# Patient Record
Sex: MALE | Race: BLACK OR AFRICAN AMERICAN | Employment: FULL TIME | URBAN - METROPOLITAN AREA
[De-identification: names, ages, dates, MRNs, and addresses within clinical notes are randomized per-mention and may not be internally consistent; named-entity substitution may affect disease eponyms.]

---

## 2017-01-10 ENCOUNTER — OFFICE VISIT (OUTPATIENT)
Dept: FAMILY MEDICINE CLINIC | Age: 47
End: 2017-01-10

## 2017-01-10 VITALS
TEMPERATURE: 96.2 F | HEART RATE: 72 BPM | OXYGEN SATURATION: 96 % | BODY MASS INDEX: 32.55 KG/M2 | RESPIRATION RATE: 16 BRPM | WEIGHT: 240.3 LBS | SYSTOLIC BLOOD PRESSURE: 123 MMHG | DIASTOLIC BLOOD PRESSURE: 76 MMHG | HEIGHT: 72 IN

## 2017-01-10 DIAGNOSIS — L81.9 HYPERPIGMENTATION: Primary | ICD-10-CM

## 2017-01-10 DIAGNOSIS — Z23 ENCOUNTER FOR IMMUNIZATION: ICD-10-CM

## 2017-01-10 DIAGNOSIS — Z79.01 ANTICOAGULANT LONG-TERM USE: ICD-10-CM

## 2017-01-10 NOTE — MR AVS SNAPSHOT
Visit Information Date & Time Provider Department Dept. Phone Encounter #  
 1/10/2017  8:45 AM Skylar Benítez MD [facility] [phone] [id number] Follow-up Instructions Return in about 3 months (around 4/10/2017) for [facility], labs. Upcoming Health Maintenance Date Due DTaP/Tdap/Td series (1 - Tdap) 10/1/1990 INFLUENZA AGE 9 TO ADULT 1/5/2018* *Topic was postponed. The date shown is not the original due date. Allergies as of 1/10/2017  Review Complete On: 1/10/2017 By: Chelsi Pham LPN Severity Noted Reaction Type Reactions Bee Sting [Sting, Bee] High 09/08/2010    Swelling Neomycin High 09/08/2010    Rash Neosporin [Neomycin-bacitracin-polymyxin] High 09/08/2010    Rash Current Immunizations  Reviewed on 4/29/2014 Name Date Influenza Vaccine 11/14/2013 Tdap 1/10/2017 Not reviewed this visit You Were Diagnosed With   
  
 Codes Comments Hyperpigmentation    -  Primary ICD-10-CM: L81.9 ICD-9-CM: 709.00 Encounter for immunization     ICD-10-CM: W00 ICD-9-CM: V03.89 Anticoagulant long-term use     ICD-10-CM: Z79.01 
ICD-9-CM: V58.61 Vitals BP Pulse Temp Resp Height(growth percentile) Weight(growth percentile) 123/76 72 96.2 °F (35.7 °C) 16 6' (1.829 m) 240 lb 4.8 oz (109 kg) SpO2 BMI Smoking Status 96% 32.59 kg/m2 Never Smoker BMI and BSA Data Body Mass Index Body Surface Area 32.59 kg/m 2 2.35 m 2 Preferred Pharmacy Pharmacy Name Phone Mirella Velas[facility] [address]  [place] [phone] Your Updated Medication List  
  
   
This list is accurate as of: 1/10/17  9:15 AM.  Always use your most recent med list.  
  
  
  
  
 EPINEPHrine 0.3 mg/0.3 mL injection Commonly known as:  EPIPEN  
0.3 mL by IntraMUSCular route once as needed for Anaphylaxis or Allergic Response. ergocalciferol 50,000 unit capsule Commonly known as:  ERGOCALCIFEROL Take 1 Cap by mouth two (2) times a week. LORazepam 0.5 mg tablet Commonly known as:  ATIVAN Take 1 Tab by mouth daily as needed for Anxiety. propranolol 10 mg tablet Commonly known as:  INDERAL  
TAKE 1/2 TO 1 TABLET BY MOUTH TWICE DAILY  
  
 rosuvastatin 20 mg tablet Commonly known as:  CRESTOR  
TAKE 1 TABLET BY MOUTH EVERY NIGHT  
  
 sertraline 50 mg tablet Commonly known as:  ZOLOFT Take 1 Tab by mouth daily. Start with 1/2 tablet daily for 8 days then take a full tablet for anxiety. warfarin 5 mg tablet Commonly known as:  COUMADIN  
TAKE 1 TABLET BY MOUTH MONDAY THROUGH FRIDAY AND 1/2 TABLET BY MOUTH SATURDAY AND SUNDAY We Performed the Following IA IMMUNIZ ADMIN,1 SINGLE/COMB VAC/TOXOID [id number] CPT(R)] TETANUS, DIPHTHERIA TOXOIDS AND ACELLULAR PERTUSSIS VACCINE (TDAP), IN INDIVIDS. >=7, IM [id number] CPT(R)] Follow-up Instructions Return in about 3 months (around 4/10/2017) for [facility]. Introducing [facility] [place] [facility]! Zackary Mars introduces [employer] patient portal. Now you can access parts of your medical record, email your doctor's office, and request medication refills online. 1. In your internet browser, go to [email or web address] 2. Click on the First Time User? Click Here link in the Sign In box. You will see the New Member Sign Up page. 3. Enter your [employer] Access Code exactly as it appears below. You will not need to use this code after youve completed the sign-up process. If you do not sign up before the expiration date, you must request a new code. · [employer] Access Code: [email or web address] Expires: 4/10/2017  9:15 AM 
 
4. Enter the last four digits of your Social Security Number (xxxx) and Date of Birth (mm/dd/yyyy) as indicated and click Submit. You will be taken to the next sign-up page. 5. Create a [employer] ID. This will be your [employer] login ID and cannot be changed, so think of one that is secure and easy to remember. 6. Create a [employer] password. You can change your password at any time. 7. Enter your Password Reset Question and Answer. This can be used at a later time if you forget your password. 8. Enter your e-mail address. You will receive e-mail notification when new information is available in [address] 9. Click Sign Up. You can now view and download portions of your medical record. 10. Click the Download Summary menu link to download a portable copy of your medical information. If you have questions, please visit the Frequently Asked Questions section of the [employer] website. Remember, [employer] is NOT to be used for urgent needs. For medical emergencies, dial 911. Now available from your iPhone and Android! Please provide this summary of care documentation to your next provider. Your primary care clinician is listed as [id number] If you have any questions after today's visit, please call [phone].

## 2017-01-10 NOTE — PROGRESS NOTES
Left leg pain 2 days ago. Notes darkening of skin top of left foot for months. No regular exercise. Discussed diet changes. Nurse history read and confirmed by patient. Visit Vitals    /76    Pulse 72    Temp 96.2 °F (35.7 °C)    Resp 16    Ht 6' (1.829 m)    Wt 240 lb 4.8 oz (109 kg)    SpO2 96%    BMI 32.59 kg/m2     Patient alert and cooperative. Left dorsal foot with area of hyperpigmentation, looks also like dry skin. Assessment:  1. Left foot hyperpigmentation. Plan:  1. Recommended to use moisturizing cream, use soft brush with washing to remove if caused by dry skin. 2. Follow up if not improving or worse for derm referral.  3. Return in three months for annual labs, CHP. 4. Tdap given. 5. Follow otherwise here prn.

## 2017-01-10 NOTE — PROGRESS NOTES
Here for recheck and to discuss labs-he just had his cholesterol done. He has not had a CHP since 2013  Has been having pain in his left leg off and on for a couple days. Had been sitting more than usual with recent snow storm.  Not really bothering him today  Declines flu but will take his Tdap  Non smoker with no plans to start  Has not seen any other providers since last visit  We are out of Right to Decide booklets

## 2017-01-19 RX ORDER — WARFARIN SODIUM 5 MG/1
TABLET ORAL
Qty: 90 TAB | Refills: 3 | Status: SHIPPED | OUTPATIENT
Start: 2017-01-19 | End: 2018-01-14 | Stop reason: SDUPTHER

## 2017-04-04 ENCOUNTER — LAB ONLY (OUTPATIENT)
Dept: FAMILY MEDICINE CLINIC | Age: 47
End: 2017-04-04

## 2017-04-04 DIAGNOSIS — E78.9 DISORDER OF LIPOID METABOLISM: ICD-10-CM

## 2017-04-04 DIAGNOSIS — E55.9 VITAMIN D DEFICIENCY: Primary | ICD-10-CM

## 2017-04-04 DIAGNOSIS — R73.03 PREDIABETES: ICD-10-CM

## 2017-04-04 NOTE — LETTER
4/5/2017 10:19 AM 
 
Mr. Rocco Meyers [facility] [address] [id number] Dear Rocco Meyers: Please find your most recent results below. Resulted Orders VITAMIN D, 25 HYDROXY Result Value Ref Range VITAMIN D, 25-HYDROXY 18.0 (L) 30.0 - 100.0 ng/mL Comment:  
   Vitamin D deficiency has been defined by the [address] [place] [address] practice guideline as a 
level of serum 25-OH vitamin D less than 20 ng/mL (1,2). The Endocrine Society went on to further define vitamin D 
insufficiency as a level between 21 and 29 ng/mL (2). 1. IOM ([place] of Medicine). 2010. Dietary reference 
   intakes for calcium and D. [facility]: The 
   [place]. 2. Priya MF, Nay UNDERWOOD, Jose ALMAZAN, et al. 
   Evaluation, treatment, and prevention of vitamin D 
   deficiency: an Endocrine Society clinical practice 
   guideline. JCEM. 2011 Jul; 96(7):1911-30. Narrative Performed at:  [address]  [id number] : Apurva Werner MD, Phone:  [phone] HEMOGLOBIN A1C WITH EAG Result Value Ref Range Hemoglobin A1c 6.3 (H) 4.8 - 5.6 % Comment:  
            Pre-diabetes: 5.7 - 6.4 Diabetes: >6.4 Glycemic control for adults with diabetes: <7.0 Estimated average glucose 134 mg/dL Narrative Performed at:  [address]  [id number] : Apurva Werner MD, Phone:  [phone] LIPID PANEL Result Value Ref Range Cholesterol, total 132 100 - 199 mg/dL Triglyceride 172 (H) 0 - 149 mg/dL HDL Cholesterol 37 (L) >39 mg/dL VLDL, calculated 34 5 - 40 mg/dL LDL, calculated 61 0 - 99 mg/dL Narrative Performed at:  [address]  [id number] : Apurva Werner MD, Phone:  [phone] METABOLIC PANEL, COMPREHENSIVE  
 Result Value Ref Range Glucose 117 (H) 65 - 99 mg/dL BUN 12 6 - 24 mg/dL Creatinine 0.97 0.76 - 1.27 mg/dL GFR est non-AA 93 >59 mL/min/1.73 GFR est  >59 mL/min/1.73  
 BUN/Creatinine ratio 12 9 - 20 Comment: **Please note reference interval change** Sodium 141 134 - 144 mmol/L Potassium 4.5 3.5 - 5.2 mmol/L Chloride 103 96 - 106 mmol/L  
 CO2 22 18 - 29 mmol/L Calcium 9.0 8.7 - 10.2 mg/dL Protein, total 6.9 6.0 - 8.5 g/dL Albumin 4.2 3.5 - 5.5 g/dL GLOBULIN, TOTAL 2.7 1.5 - 4.5 g/dL A-G Ratio 1.6 1.2 - 2.2 Comment: **Please note reference interval change** Bilirubin, total 0.5 0.0 - 1.2 mg/dL Alk. phosphatase 72 39 - 117 IU/L  
 AST (SGOT) 37 0 - 40 IU/L  
 ALT (SGPT) 60 (H) 0 - 44 IU/L Narrative Performed at:  [address]  [id number] : Fortunato Feliciano MD, Phone:  [phone] CVD REPORT Result Value Ref Range INTERPRETATION Note Comment:  
   Supplement report is available. Narrative Performed at:  [address] [place] [address] [address] [place] [address]  [id number] : Everton Weaver PhD, Phone:  [phone] Lab [facility]

## 2017-04-05 LAB
25(OH)D3+25(OH)D2 SERPL-MCNC: 18 NG/ML (ref 30–100)
ALBUMIN SERPL-MCNC: 4.2 G/DL (ref 3.5–5.5)
ALBUMIN/GLOB SERPL: 1.6 {RATIO} (ref 1.2–2.2)
ALP SERPL-CCNC: 72 IU/L (ref 39–117)
ALT SERPL-CCNC: 60 IU/L (ref 0–44)
AST SERPL-CCNC: 37 IU/L (ref 0–40)
BILIRUB SERPL-MCNC: 0.5 MG/DL (ref 0–1.2)
BUN SERPL-MCNC: 12 MG/DL (ref 6–24)
BUN/CREAT SERPL: 12 (ref 9–20)
CALCIUM SERPL-MCNC: 9 MG/DL (ref 8.7–10.2)
CHLORIDE SERPL-SCNC: 103 MMOL/L (ref 96–106)
CHOLEST SERPL-MCNC: 132 MG/DL (ref 100–199)
CO2 SERPL-SCNC: 22 MMOL/L (ref 18–29)
CREAT SERPL-MCNC: 0.97 MG/DL (ref 0.76–1.27)
EST. AVERAGE GLUCOSE BLD GHB EST-MCNC: 134 MG/DL
GLOBULIN SER CALC-MCNC: 2.7 G/DL (ref 1.5–4.5)
GLUCOSE SERPL-MCNC: 117 MG/DL (ref 65–99)
HBA1C MFR BLD: 6.3 % (ref 4.8–5.6)
HDLC SERPL-MCNC: 37 MG/DL
INTERPRETATION, 910389: NORMAL
LDLC SERPL CALC-MCNC: 61 MG/DL (ref 0–99)
POTASSIUM SERPL-SCNC: 4.5 MMOL/L (ref 3.5–5.2)
PROT SERPL-MCNC: 6.9 G/DL (ref 6–8.5)
SODIUM SERPL-SCNC: 141 MMOL/L (ref 134–144)
TRIGL SERPL-MCNC: 172 MG/DL (ref 0–149)
VLDLC SERPL CALC-MCNC: 34 MG/DL (ref 5–40)

## 2017-04-05 RX ORDER — ERGOCALCIFEROL 1.25 MG/1
50000 CAPSULE ORAL 2 TIMES WEEKLY
Qty: 24 CAP | Refills: 0 | Status: SHIPPED | OUTPATIENT
Start: 2017-04-07 | End: 2017-06-27 | Stop reason: ALTCHOICE

## 2017-04-05 NOTE — PROGRESS NOTES
Low Vit. D.  Begin ergocalciferol 50 K units twice weekly and recheck 3 mos. Average BS remains in prediabetic range. Inc TG/low HDL. Take Krill oil supp.   Rest O.K.

## 2017-04-20 DIAGNOSIS — Z79.01 ANTICOAGULANT LONG-TERM USE: Primary | ICD-10-CM

## 2017-05-25 ENCOUNTER — HOSPITAL ENCOUNTER (EMERGENCY)
Age: 47
Discharge: HOME | End: 2017-05-26
Attending: EMERGENCY MEDICINE
Payer: COMMERCIAL

## 2017-05-25 VITALS
BODY MASS INDEX: 30.54 KG/M2 | SYSTOLIC BLOOD PRESSURE: 132 MMHG | HEIGHT: 75 IN | OXYGEN SATURATION: 97 % | RESPIRATION RATE: 16 BRPM | HEART RATE: 70 BPM | DIASTOLIC BLOOD PRESSURE: 62 MMHG | TEMPERATURE: 97.6 F | WEIGHT: 245.59 LBS

## 2017-05-25 DIAGNOSIS — S80.12XA CONTUSION OF LEFT LOWER LEG, INITIAL ENCOUNTER: Primary | ICD-10-CM

## 2017-05-25 PROCEDURE — 99282 EMERGENCY DEPT VISIT SF MDM: CPT

## 2017-05-26 RX ORDER — HYDROCODONE BITARTRATE AND ACETAMINOPHEN 5; 325 MG/1; MG/1
1 TABLET ORAL
Qty: 20 TAB | Refills: 0 | Status: SHIPPED | OUTPATIENT
Start: 2017-05-26 | End: 2017-06-14

## 2017-05-26 NOTE — ED PROVIDER NOTES
HPI Comments: Audelia Courtney is a 51 y.o. male with PMHx significant for Cellulitis, who presents ambulatory to [facility] ED with cc of pain and swelling to the left lower leg. Pt states he has a \"dent\" to his left leg and does not know if it resulted from a recent injury or a potential bug bite. He notes that the swelling has blue discoloration and that the pain is localized to the bruise. Pt denies any fever. Oesas Cox MD    Social Hx: - smoking; + EtOH; - illicit drug use    There are no other complains, changes, or physical findings at this time. The history is provided by the patient. No  was used. Past Medical History:   Diagnosis Date    Allergic rhinitis     Anxiety disorder     Cellulitis     post tattoo    Deep vein thrombosis (DVT) ([address]     [facility]  [facility]17 f/u note to manage Lovenox    Essential tremor     Phlebitis     Pulmonary embolism ([facility] 3/2011    multiple episodes    Rectal bleeding 10/2011    on coumadin at the time  and again 4/2017 4/12/17 note from [address] [facility])     Umbilical hernia     Vitamin D deficiency 04/2014    again 4/2017       Past Surgical History:   Procedure Laterality Date    HX APPENDECTOMY  2003    HX HERNIA REPAIR  4105    umbilical         Family History:   Problem Relation Age of Onset    Heart Disease Father     Heart Disease Maternal Grandmother     Heart Disease Maternal Grandfather        Social History     Social History    Marital status: SINGLE     Spouse name: N/A    Number of children: N/A    Years of education: N/A     Occupational History    Not on file.      Social History Main Topics    Smoking status: Never Smoker    Smokeless tobacco: Current User    Alcohol use 0.0 oz/week      Comment: rare social    Drug use: No    Sexual activity: Yes     Partners: Female     Other Topics Concern    Not on file     Social History Narrative         ALLERGIES: Bee sting [sting, bee]; Neomycin; and Neosporin [neomycin-bacitracin-polymyxin]    Review of Systems   Constitutional: Negative for fatigue and fever. HENT: Negative for congestion, ear pain and rhinorrhea. Eyes: Negative for pain and redness. Respiratory: Negative for cough and wheezing. Cardiovascular: Negative for chest pain and palpitations. Gastrointestinal: Negative for abdominal pain, nausea and vomiting. Genitourinary: Negative for dysuria, frequency and urgency. Musculoskeletal: Positive for myalgias (left leg ). Negative for back pain, neck pain and neck stiffness. Skin: Negative for rash and wound. + bruising to lower left leg   Neurological: Negative for weakness, light-headedness, numbness and headaches. Patient Vitals for the past 12 hrs:   Temp Pulse Resp BP SpO2   05/25/17 2349 97.6 °F (36.4 °C) 70 16 132/62 97 %     Physical Exam   Constitutional: He is oriented to person, place, and time. He appears well-developed and well-nourished. Non-toxic appearance. No distress. HENT:   Head: Normocephalic and atraumatic. Head is without right periorbital erythema and without left periorbital erythema. Right Ear: External ear normal.   Left Ear: External ear normal.   Nose: Nose normal.   Mouth/Throat: Uvula is midline. No trismus in the jaw. Eyes: Conjunctivae and EOM are normal. Pupils are equal, round, and reactive to light. No scleral icterus. Neck: Normal range of motion and full passive range of motion without pain. Cardiovascular: Normal rate, regular rhythm and normal heart sounds. Pulmonary/Chest: Effort normal and breath sounds normal. No accessory muscle usage. No tachypnea. No respiratory distress. He has no decreased breath sounds. He has no wheezes. Abdominal: Soft. There is no tenderness. There is no rigidity and no guarding. Musculoskeletal: Normal range of motion. Neurological: He is alert and oriented to person, place, and time. He is not disoriented. No cranial nerve deficit or sensory deficit. GCS eye subscore is 4. GCS verbal subscore is 5. GCS motor subscore is 6. Skin: Skin is intact. No rash noted. Tender contusion of LLE. No break in skin, no surrounding erythema. Psychiatric: He has a normal mood and affect. His speech is normal.   Nursing note and vitals reviewed. MDM  Number of Diagnoses or Management Options  Contusion of left lower leg, initial encounter:   Diagnosis management comments: No redness, break in skin. Presentation suggests contusion. Will have pt monitor for changes in redness or swelling. Amount and/or Complexity of Data Reviewed  Review and summarize past medical records: yes    Patient Progress  Patient progress: stable      Procedures    LABORATORY TESTS:  No results found for this or any previous visit (from the past 12 hour(s)). IMAGING RESULTS:  No orders to display     MEDICATIONS GIVEN:  Medications - No data to display    IMPRESSION:  1. Contusion of left lower leg, initial encounter        PLAN:  1. Current Discharge Medication List      START taking these medications    Details   HYDROcodone-acetaminophen (NORCO) 5-325 mg per tablet Take 1 Tab by mouth every four (4) hours as needed for Pain. Max Daily Amount: 6 Tabs. Qty: 20 Tab, Refills: 0           2. Follow-up Information     Follow up With Details Comments [address], [place] Schedule an appointment as soon as possible for a visit PRIMARY CARE: call to schedule follow up 14 [facility]  [address]  [phone]          Return to ED if worse     DISCHARGE NOTE  1:00 AM  The patient has been re-evaluated and is ready for discharge. Reviewed available results with patient. Counseled patient on diagnosis and care plan. Patient has expressed understanding, and all questions have been answered.  Patient agrees with plan and agrees to follow up as recommended, or return to the ED if their symptoms worsen. Discharge instructions have been provided and explained to the patient, along with reasons to return to the ED. ATTESTATION:  This note is prepared by Tom Avalos, acting as Scribe for ENE June. ENE June: The scribe's documentation has been prepared under my direction and personally reviewed by me in its entirety. I confirm that the note above accurately reflects all work, treatment, procedures, and medical decision making performed by me.

## 2017-05-26 NOTE — DISCHARGE INSTRUCTIONS

## 2017-05-26 NOTE — ED NOTES
Discharge instructions reviewed with patient by BOZENA Saucedo. Pt denies questions or concerns. Pt appears in no acute distress. Pt ambulatory out of department with steady gait.

## 2017-05-26 NOTE — ED TRIAGE NOTES
Pt c/o of painful spot on his left lower leg since yesterday. Pt thinks it might be an insect bite. Pt states the pain has continued to worsen. Denies itching or fevers.

## 2017-05-31 ENCOUNTER — OFFICE VISIT (OUTPATIENT)
Dept: FAMILY MEDICINE CLINIC | Age: 47
End: 2017-05-31

## 2017-05-31 VITALS
HEART RATE: 88 BPM | OXYGEN SATURATION: 95 % | BODY MASS INDEX: 30.23 KG/M2 | HEIGHT: 75 IN | DIASTOLIC BLOOD PRESSURE: 61 MMHG | RESPIRATION RATE: 16 BRPM | WEIGHT: 243.1 LBS | TEMPERATURE: 98.1 F | SYSTOLIC BLOOD PRESSURE: 114 MMHG

## 2017-05-31 DIAGNOSIS — S80.12XA CONTUSION OF LEFT LEG, INITIAL ENCOUNTER: Primary | ICD-10-CM

## 2017-05-31 DIAGNOSIS — L03.116 CELLULITIS OF LEFT LOWER LEG: ICD-10-CM

## 2017-05-31 RX ORDER — SULFAMETHOXAZOLE AND TRIMETHOPRIM 800; 160 MG/1; MG/1
1 TABLET ORAL 2 TIMES DAILY
Qty: 14 TAB | Refills: 0 | Status: SHIPPED | OUTPATIENT
Start: 2017-05-31 | End: 2017-06-07

## 2017-05-31 NOTE — PROGRESS NOTES
Chief Complaint   Patient presents with    Leg Swelling     5/25/17 noted swelling in left leg and redness. ED [facility] ER on 5/25/17. 1. Have you been to the ER, urgent care clinic since your last visit? Hospitalized since your last visit? Yes When: 5/25/17 Where: ED [facility] ER Reason for visit: Left leg swollen and red. 2. Have you seen or consulted any other health care providers outside of the [address] [place] [address] since your last visit? Include any pap smears or colon screening. Yes When: 4/17 Where: GI Specialist-Dr. Juan Walker Reason for visit: Colonoscopy. The patient was counseled on the dangers of tobacco use, and Patient is a non smoker. Reviewed strategies to maximize success, including Continue not to smoke. I have reviewed Health Maintenance with the patient and updated. Advance Care Planning information reviewed and given to the patient.

## 2017-05-31 NOTE — PROGRESS NOTES
Left lower leg with inc warmth, erythema, and swelling. Tender to palpation. Has a 1 cm knot also tender but more tender overlying area of redness and swelling below. Visit Vitals    /61 (BP 1 Location: Right arm, BP Patient Position: Sitting)    Pulse 88    Temp 98.1 °F (36.7 °C)    Resp 16    Ht 6' 3\" (1.905 m)    Wt 243 lb 1.6 oz (110.3 kg)    SpO2 95%    BMI 30.39 kg/m2       Patient alert and cooperative. Left lower leg with 1 cm knot, has moderate tenderness, erythema and swelling just below this. No posterior calf tenderness. No tenderness in the foot. No significant tenderness above the knot. Assessment:  1. Left lower leg possible contusion, evidence from exam consistent with cellulitis. Plan:  1. Begin Bactrim DS b.i.d. for seven days. 2. Check xray. 3. Return in a week for recheck, can cancel if significantly improved on the antibiotic. 4. Follow otherwise here prn.

## 2017-05-31 NOTE — MR AVS SNAPSHOT
Visit Information Date & Time Provider Department Dept. Phone Encounter #  
 5/31/2017  4:30 PM Marly Moses MD [facility] [phone] [id number] Follow-up Instructions Return in about 1 week (around 6/7/2017) for Recheck. Your Appointments 6/20/2017  9:15 AM  
LAB with LAB ARIEL Jama (GLENDY Mcgraw) Appt Note: Dr. Armando Corrales Fasting [address] [facility] [address]  
  
   
 [address]  
  
    
 6/27/2017  9:30 AM  
COMPLETE PHYSICAL with MD Sheri Hunter [facility]) Appt Note: CHP, Results; CHP, results. (30 min.) - lp  
 [address] 
[address] [facility] [address]  
[phone]  
  
   
 [address] [address] [facility] [address] [place] [address] Upcoming Health Maintenance Date Due INFLUENZA AGE 9 TO ADULT 1/5/2018* DTaP/Tdap/Td series (2 - Td) 1/10/2027 *Topic was postponed. The date shown is not the original due date. Allergies as of 5/31/2017  Review Complete On: 5/31/2017 By: Dylan Clatyon RN Severity Noted Reaction Type Reactions Bee Sting [Sting, Bee] High 09/08/2010    Swelling Neomycin High 09/08/2010    Rash Neosporin [Neomycin-bacitracin-polymyxin] High 09/08/2010    Rash Current Immunizations  Reviewed on 1/10/2017 Name Date Influenza Vaccine 11/14/2013 Tdap 1/10/2017 Not reviewed this visit You Were Diagnosed With   
  
 Codes Comments Contusion of left leg, initial encounter    -  Primary ICD-10-CM: A83.81DR ICD-9-CM: 924.5 Cellulitis of left lower leg     ICD-10-CM: L03.116 ICD-9-CM: 729. 6 Vitals BP Pulse Temp Resp Height(growth percentile) Weight(growth percentile) 114/61 (BP 1 Location: Right arm, BP Patient Position: Sitting) 88 98.1 °F (36.7 °C) 16 6' 3\" (1.905 m) 243 lb 1.6 oz (110.3 kg) SpO2 BMI Smoking Status 95% 30.39 kg/m2 Never Smoker Vitals History BMI and BSA Data Body Mass Index Body Surface Area  
 30.39 kg/m 2 2.42 m 2 Preferred Pharmacy Pharmacy Name Phone Mirella Velasquez [address]  [place] [phone] Your Updated Medication List  
  
   
This list is accurate as of: 5/31/17  4:55 PM.  Always use your most recent med list.  
  
  
  
  
 ergocalciferol 50,000 unit capsule Commonly known as:  ERGOCALCIFEROL Take 1 Cap by mouth two (2) times a week. Indications: VITAMIN D DEFICIENCY HYDROcodone-acetaminophen 5-325 mg per tablet Commonly known as:  Prem Kilts Take 1 Tab by mouth every four (4) hours as needed for Pain. Max Daily Amount: 6 Tabs. propranolol 10 mg tablet Commonly known as:  INDERAL  
TAKE 1/2 TO 1 TABLET BY MOUTH TWICE DAILY  
  
 rosuvastatin 20 mg tablet Commonly known as:  CRESTOR  
TAKE 1 TABLET BY MOUTH EVERY NIGHT  
  
 trimethoprim-sulfamethoxazole 160-800 mg per tablet Commonly known as:  BACTRIM DS, SEPTRA DS Take 1 Tab by mouth two (2) times a day for 7 days. warfarin 5 mg tablet Commonly known as:  COUMADIN  
TAKE 1 TABLET BY MOUTH ONCE DAILY MONDAY THROUGH FRIDAY AND ONE-HALF TABLET ONCE DAILY ON SATURDAY AND SUNDAY AS DIRECTED Prescriptions Sent to Pharmacy Refills  
 trimethoprim-sulfamethoxazole (BACTRIM DS, SEPTRA DS) 160-800 mg per tablet 0 Sig: Take 1 Tab by mouth two (2) times a day for 7 days. Class: Normal  
 Pharmacy: [facility] [address] #: [phone] Route: Oral  
  
Follow-up Instructions Return in about 1 week (around 6/7/2017) for Recheck. To-Do List   
 05/31/2017 Imaging:  XR TIB/FIB LT Introducing [facility]!    
 Juanita Savage introduces [employer] patient portal. Now you can access parts of your medical record, email your doctor's office, and request medication refills online. 1. In your internet browser, go to https://[employer]. Done In :[employer] 2. Click on the First Time User? Click Here link in the Sign In box. You will see the New Member Sign Up page. 3. Enter your [employer] Access Code exactly as it appears below. You will not need to use this code after youve completed the sign-up process. If you do not sign up before the expiration date, you must request a new code. · [employer] Access Code: [email or web address] Expires: 8/23/2017 11:42 PM 
 
4. Enter the last four digits of your Social Security Number (xxxx) and Date of Birth (mm/dd/yyyy) as indicated and click Submit. You will be taken to the next sign-up page. 5. Create a [employer] ID. This will be your [employer] login ID and cannot be changed, so think of one that is secure and easy to remember. 6. Create a [employer] password. You can change your password at any time. 7. Enter your Password Reset Question and Answer. This can be used at a later time if you forget your password. 8. Enter your e-mail address. You will receive e-mail notification when new information is available in [address] 9. Click Sign Up. You can now view and download portions of your medical record. 10. Click the Download Summary menu link to download a portable copy of your medical information. If you have questions, please visit the Frequently Asked Questions section of the [employer] website. Remember, [employer] is NOT to be used for urgent needs. For medical emergencies, dial 911. Now available from your iPhone and Android! Please provide this summary of care documentation to your next provider. Your primary care clinician is listed as [phone] If you have any questions after today's visit, please call [phone].

## 2017-06-01 ENCOUNTER — HOSPITAL ENCOUNTER (EMERGENCY)
Age: 47
Discharge: HOME | End: 2017-06-02
Attending: EMERGENCY MEDICINE | Admitting: EMERGENCY MEDICINE
Payer: COMMERCIAL

## 2017-06-01 DIAGNOSIS — L03.116 CELLULITIS OF LEFT LOWER EXTREMITY: ICD-10-CM

## 2017-06-01 DIAGNOSIS — S80.12XD CONTUSION OF LEG, LEFT, SUBSEQUENT ENCOUNTER: Primary | ICD-10-CM

## 2017-06-01 LAB
ALBUMIN SERPL BCP-MCNC: 3.9 G/DL (ref 3.5–5)
ALBUMIN/GLOB SERPL: 1.1 {RATIO} (ref 1.1–2.2)
ALP SERPL-CCNC: 71 U/L (ref 45–117)
ALT SERPL-CCNC: 43 U/L (ref 12–78)
ANION GAP BLD CALC-SCNC: 8 MMOL/L (ref 5–15)
AST SERPL W P-5'-P-CCNC: 30 U/L (ref 15–37)
BASOPHILS # BLD AUTO: 0 K/UL (ref 0–0.1)
BASOPHILS # BLD: 0 % (ref 0–1)
BILIRUB SERPL-MCNC: 0.9 MG/DL (ref 0.2–1)
BUN SERPL-MCNC: 15 MG/DL (ref 6–20)
BUN/CREAT SERPL: 13 (ref 12–20)
CALCIUM SERPL-MCNC: 9.4 MG/DL (ref 8.5–10.1)
CHLORIDE SERPL-SCNC: 109 MMOL/L (ref 97–108)
CO2 SERPL-SCNC: 24 MMOL/L (ref 21–32)
CREAT SERPL-MCNC: 1.17 MG/DL (ref 0.7–1.3)
EOSINOPHIL # BLD: 0.1 K/UL (ref 0–0.4)
EOSINOPHIL NFR BLD: 2 % (ref 0–7)
ERYTHROCYTE [DISTWIDTH] IN BLOOD BY AUTOMATED COUNT: 14.1 % (ref 11.5–14.5)
GLOBULIN SER CALC-MCNC: 3.7 G/DL (ref 2–4)
GLUCOSE SERPL-MCNC: 104 MG/DL (ref 65–100)
HCT VFR BLD AUTO: 42.8 % (ref 36.6–50.3)
HGB BLD-MCNC: 14.6 G/DL (ref 12.1–17)
INR BLD: 2.2 (ref 0.9–1.2)
INR PPP: 3.1 (ref 0.9–1.1)
LYMPHOCYTES # BLD AUTO: 34 % (ref 12–49)
LYMPHOCYTES # BLD: 2.3 K/UL (ref 0.8–3.5)
MCH RBC QN AUTO: 30 PG (ref 26–34)
MCHC RBC AUTO-ENTMCNC: 34.1 G/DL (ref 30–36.5)
MCV RBC AUTO: 88.1 FL (ref 80–99)
MONOCYTES # BLD: 0.3 K/UL (ref 0–1)
MONOCYTES NFR BLD AUTO: 5 % (ref 5–13)
NEUTS SEG # BLD: 4.1 K/UL (ref 1.8–8)
NEUTS SEG NFR BLD AUTO: 59 % (ref 32–75)
PLATELET # BLD AUTO: 171 K/UL (ref 150–400)
POTASSIUM SERPL-SCNC: 4 MMOL/L (ref 3.5–5.1)
PROT SERPL-MCNC: 7.6 G/DL (ref 6.4–8.2)
PROTHROMBIN TIME: 32.4 SEC (ref 9–11.1)
RBC # BLD AUTO: 4.86 M/UL (ref 4.1–5.7)
SODIUM SERPL-SCNC: 141 MMOL/L (ref 136–145)
WBC # BLD AUTO: 6.9 K/UL (ref 4.1–11.1)

## 2017-06-01 PROCEDURE — 36415 COLL VENOUS BLD VENIPUNCTURE: CPT | Performed by: EMERGENCY MEDICINE

## 2017-06-01 PROCEDURE — 99284 EMERGENCY DEPT VISIT MOD MDM: CPT

## 2017-06-01 PROCEDURE — 85610 PROTHROMBIN TIME: CPT | Performed by: EMERGENCY MEDICINE

## 2017-06-01 PROCEDURE — 85025 COMPLETE CBC W/AUTO DIFF WBC: CPT | Performed by: EMERGENCY MEDICINE

## 2017-06-01 PROCEDURE — 80053 COMPREHEN METABOLIC PANEL: CPT | Performed by: EMERGENCY MEDICINE

## 2017-06-01 PROCEDURE — 85610 PROTHROMBIN TIME: CPT

## 2017-06-02 ENCOUNTER — APPOINTMENT (OUTPATIENT)
Dept: GENERAL RADIOLOGY | Age: 47
End: 2017-06-02
Attending: EMERGENCY MEDICINE
Payer: COMMERCIAL

## 2017-06-02 ENCOUNTER — APPOINTMENT (OUTPATIENT)
Dept: ULTRASOUND IMAGING | Age: 47
End: 2017-06-02
Attending: EMERGENCY MEDICINE
Payer: COMMERCIAL

## 2017-06-02 VITALS
DIASTOLIC BLOOD PRESSURE: 72 MMHG | TEMPERATURE: 98.1 F | SYSTOLIC BLOOD PRESSURE: 118 MMHG | RESPIRATION RATE: 18 BRPM | HEART RATE: 87 BPM | WEIGHT: 241.18 LBS | BODY MASS INDEX: 29.99 KG/M2 | HEIGHT: 75 IN | OXYGEN SATURATION: 97 %

## 2017-06-02 PROCEDURE — 93971 EXTREMITY STUDY: CPT

## 2017-06-02 PROCEDURE — 73590 X-RAY EXAM OF LOWER LEG: CPT

## 2017-06-02 NOTE — ED NOTES
Pt discharged with written instructions at this time. Pt verbalizes understanding and all questions were answered. Discharged by Lonell Close, in stable condition, with wife.

## 2017-06-02 NOTE — ED PROVIDER NOTES
HPI Comments: Robin Mitchell is a 51 y.o. male, pmhx significant for DVT in LLE (on Coumadin), cellulitis, PE, anxiety, and vitamin D deficiency, who presents ambulatory with family to the ED c/o progressively worsening lower left leg swelling and erythema x 1 week. Pt was seen at [facility] ED on 5/25/17 with similar complaints and diagnosed with contusion of lower left leg. Pt then followed-up with PCP yesterday, who started him on Bactrim for concern of developing cellulitis, and ordered an outpatient tib/fib XR which pt has not gotten yet. Pt states that he filled his Bactrim prescription, but has not started the abx. Pt states that he was diagnosed with a DVT in his LLE x 6 years ago, with an unknown cause. Pt has been chronically on Coumadin since, and reports compliance with daily medication. Pt denies any recent trauma/injury to his leg. Pt denies fever, chills, chest pain, or SOB. PCP: Louis Alex MD    Family Hx: denies family hx of DVT/PE  PSHx: Significant for appendectomy, umbilical hernia repair, colonoscopy  Social Hx: - tobacco, + EtOH (rarely), - Illicit Drugs    There are no other complaints, changes, or physical findings at this time. The history is provided by the patient. No  was used.         Past Medical History:   Diagnosis Date    Allergic rhinitis     Anxiety disorder     Cellulitis     post tattoo    Deep vein thrombosis (DVT) ([facility] [address]     [facility]  [facility]/24/17 f/u note to manage Lovenox    Essential tremor     Phlebitis     Pulmonary embolism ([facility] [address] 3/2011    multiple episodes    Rectal bleeding 10/2011    on coumadin at the time  and again 4/2017 [facility]12/[facility]     Umbilical hernia     Vitamin D deficiency 04/2014    again 4/2017       Past Surgical History:   Procedure Laterality Date    HX APPENDECTOMY  2003    HX COLONOSCOPY  04/2017    Dr. Dread Dominguez  [address]    umbilical         Family History:   Problem Relation Age of Onset    Heart Disease Father     Heart Disease Maternal Grandmother     Heart Disease Maternal Grandfather        Social History     Social History    Marital status: SINGLE     Spouse name: N/A    Number of children: N/A    Years of education: N/A     Occupational History    Not on file. Social History Main Topics    Smoking status: Never Smoker    Smokeless tobacco: Current User    Alcohol use 0.0 oz/week      Comment: rare social    Drug use: No    Sexual activity: Yes     Partners: Female     Other Topics Concern    Not on file     Social History Narrative         ALLERGIES: Bee sting [sting, bee]; Neomycin; and Neosporin [neomycin-bacitracin-polymyxin]    Review of Systems   Constitutional: Negative for activity change, chills and fever. HENT: Negative for congestion and sore throat. Eyes: Negative for pain and redness. Respiratory: Negative for cough, chest tightness and shortness of breath. Cardiovascular: Positive for leg swelling (LLE). Negative for chest pain and palpitations. Gastrointestinal: Negative for abdominal pain, diarrhea, nausea and vomiting. Genitourinary: Negative for dysuria, frequency and urgency. Musculoskeletal: Negative for back pain and neck pain. Skin: Positive for color change (erythema in LLE). Negative for rash. Neurological: Negative for syncope, light-headedness and headaches. Psychiatric/Behavioral: Negative for confusion. All other systems reviewed and are negative. Vitals:    06/01/17 2141   BP: 137/82   Pulse: 87   Resp: 18   Temp: 98.1 °F (36.7 °C)   SpO2: 98%   Weight: 109.4 kg (241 lb 2.9 oz)   Height: 6' 3\" (1.905 m)            Physical Exam   Constitutional: He is oriented to person, place, and time. He appears well-developed and well-nourished. No distress. HENT:   Head: Normocephalic. Nose: Nose normal.   Mouth/Throat: Oropharynx is clear and moist. No oropharyngeal exudate.    Eyes: Conjunctivae are normal. Pupils are equal, round, and reactive to light. No scleral icterus. Neck: Normal range of motion. Neck supple. No JVD present. No tracheal deviation present. No thyromegaly present. Cardiovascular: Normal rate, regular rhythm and intact distal pulses. Exam reveals no gallop and no friction rub. No murmur heard. Pulmonary/Chest: Effort normal and breath sounds normal. No stridor. No respiratory distress. He has no wheezes. He has no rales. Abdominal: Soft. Bowel sounds are normal. He exhibits no distension. There is no tenderness. There is no rebound and no guarding. Musculoskeletal: Normal range of motion. He exhibits no edema. Lymphadenopathy:     He has no cervical adenopathy. Neurological: He is alert and oriented to person, place, and time. No cranial nerve deficit. He exhibits normal muscle tone. Coordination normal.   Skin: Skin is warm and dry. No rash noted. He is not diaphoretic. No erythema. Left Leg:   Mild erythema to distal third of anterior shin  Mild soft tissue swelling around ankle  2+ DP/TP pulses  Brisk CR  Skin intact   Psychiatric: He has a normal mood and affect. His behavior is normal.   Nursing note and vitals reviewed.        MDM  Number of Diagnoses or Management Options  Cellulitis of left lower extremity:   Contusion of leg, left, subsequent encounter:   Diagnosis management comments:   DDx: cellulitis, contusion, DVT, insect bite       Amount and/or Complexity of Data Reviewed  Clinical lab tests: reviewed and ordered  Tests in the radiology section of CPT®: ordered and reviewed  Review and summarize past medical records: yes    Risk of Complications, Morbidity, and/or Mortality  General comments: Suspect simple cellulitis/contusion vs insect bite; afebrile; doppler negative for dvt; inr therapeutic; plain films negative for fx; continue bactrim as prescribed; Cee Purcell MD      Patient Progress  Patient progress: stable    ED Course Procedures    Progress Note:  12:58 AM  Discussed workup results/findings, and counseled pt regarding his diagnosis. Pt has been encouraged to follow-up as instructed. All questions have been answered, and pt conveyed understanding. Written by YUDI Olguin, as dictated by Benja Baird MD.    LABORATORY TESTS:  Recent Results (from the past 12 hour(s))   POC INR    Collection Time: 06/01/17 10:02 PM   Result Value Ref Range    INR (POC) 2.2 (H) <1.2     CBC WITH AUTOMATED DIFF    Collection Time: 06/01/17 10:59 PM   Result Value Ref Range    WBC 6.9 4.1 - 11.1 K/uL    RBC 4.86 4.10 - 5.70 M/uL    HGB 14.6 12.1 - 17.0 g/dL    HCT 42.8 36.6 - 50.3 %    MCV 88.1 80.0 - 99.0 FL    MCH 30.0 26.0 - 34.0 PG    MCHC 34.1 30.0 - 36.5 g/dL    RDW 14.1 11.5 - 14.5 %    PLATELET 641 982 - 526 K/uL    NEUTROPHILS 59 32 - 75 %    LYMPHOCYTES 34 12 - 49 %    MONOCYTES 5 5 - 13 %    EOSINOPHILS 2 0 - 7 %    BASOPHILS 0 0 - 1 %    ABS. NEUTROPHILS 4.1 1.8 - 8.0 K/UL    ABS. LYMPHOCYTES 2.3 0.8 - 3.5 K/UL    ABS. MONOCYTES 0.3 0.0 - 1.0 K/UL    ABS. EOSINOPHILS 0.1 0.0 - 0.4 K/UL    ABS. BASOPHILS 0.0 0.0 - 0.1 K/UL   METABOLIC PANEL, COMPREHENSIVE    Collection Time: 06/01/17 10:59 PM   Result Value Ref Range    Sodium 141 136 - 145 mmol/L    Potassium 4.0 3.5 - 5.1 mmol/L    Chloride 109 (H) 97 - 108 mmol/L    CO2 24 21 - 32 mmol/L    Anion gap 8 5 - 15 mmol/L    Glucose 104 (H) 65 - 100 mg/dL    BUN 15 6 - 20 MG/DL    Creatinine 1.17 0.70 - 1.30 MG/DL    BUN/Creatinine ratio 13 12 - 20      GFR est AA >60 >60 ml/min/1.73m2    GFR est non-AA >60 >60 ml/min/1.73m2    Calcium 9.4 8.5 - 10.1 MG/DL    Bilirubin, total 0.9 0.2 - 1.0 MG/DL    ALT (SGPT) 43 12 - 78 U/L    AST (SGOT) 30 15 - 37 U/L    Alk.  phosphatase 71 45 - 117 U/L    Protein, total 7.6 6.4 - 8.2 g/dL    Albumin 3.9 3.5 - 5.0 g/dL    Globulin 3.7 2.0 - 4.0 g/dL    A-G Ratio 1.1 1.1 - 2.2     PROTHROMBIN TIME + INR    Collection Time: 06/01/17 10:59 PM   Result Value Ref Range    INR 3.1 (H) 0.9 - 1.1      Prothrombin time 32.4 (H) 9.0 - 11.1 sec       IMAGING RESULTS:  DUPLEX LOWER EXT VENOUS LEFT   Final Result     Indication: Left lower extremity swelling for one week     Duplex venous Doppler examination was performed from the left inguinal ligament  to the mid-calf. Deep venous structures were well imaged and appeared  compressible throughout. Both wave form and color flow analysis demonstrated  normal flow patterns. Augmentation was demonstrable.     IMPRESSION  IMPRESSION:  NORMAL DUPLEX VENOUS DOPPLER EXAMINATION. XR TIB/FIB LT   Final Result     EXAM: XR TIB/FIB LT     INDICATION: Left leg swelling for one week.     COMPARISON: None.     FINDINGS: AP and lateral views of the left tibia and fibula demonstrate no  fracture or other acute osseous, articular or soft tissue abnormality.     IMPRESSION  IMPRESSION: No acute abnormality. IMPRESSION:  1. Contusion of leg, left, subsequent encounter    2. Cellulitis of left lower extremity        PLAN:  1. Discharge home. Current Discharge Medication List        2. Follow-up Information     Follow up With Details Comments [address], [place] Schedule an appointment as soon as possible for a visit in 1 day  [address] [place], [place] [id number] [phone]          3. Return to ED if worse       DISCHARGE NOTE:  12:58 AM  Patient's results have been reviewed with them. Patient and/or family have verbally conveyed their understanding and agreement of the patient's signs, symptoms, diagnosis, treatment and prognosis and additionally agree to follow up as recommended or return to the Emergency Room should their condition change prior to follow-up.  Discharge instructions have also been provided to the patient with some educational information regarding their diagnosis as well a list of reasons why they would want to return to the ER prior to their follow-up appointment should their condition change. This note is prepared by Douguo, acting as Scribe for MD Rell Bryant MD: The scribe's documentation has been prepared under my direction and personally reviewed by me in its entirety. I confirm that the note above accurately reflects all work, treatment, procedures, and medical decision making performed by me.

## 2017-06-02 NOTE — DISCHARGE INSTRUCTIONS
Cellulitis: Care Instructions  Your Care Instructions    Cellulitis is a skin infection. It often occurs after a break in the skin from a scrape, cut, bite, or puncture, or after a rash. The doctor has checked you carefully, but problems can develop later. If you notice any problems or new symptoms, get medical treatment right away. Follow-up care is a key part of your treatment and safety. Be sure to make and go to all appointments, and call your doctor if you are having problems. It's also a good idea to know your test results and keep a list of the medicines you take. How can you care for yourself at home? · Take your antibiotics as directed. Do not stop taking them just because you feel better. You need to take the full course of antibiotics. · Prop up the infected area on pillows to reduce pain and swelling. Try to keep the area above the level of your heart as often as you can. · If your doctor told you how to care for your wound, follow your doctor's instructions. If you did not get instructions, follow this general advice:  ¨ Wash the wound with clean water 2 times a day. Don't use hydrogen peroxide or alcohol, which can slow healing. ¨ You may cover the wound with a thin layer of petroleum jelly, such as Vaseline, and a nonstick bandage. ¨ Apply more petroleum jelly and replace the bandage as needed. · Be safe with medicines. Take pain medicines exactly as directed. ¨ If the doctor gave you a prescription medicine for pain, take it as prescribed. ¨ If you are not taking a prescription pain medicine, ask your doctor if you can take an over-the-counter medicine. To prevent cellulitis in the future  · Try to prevent cuts, scrapes, or other injuries to your skin. Cellulitis most often occurs where there is a break in the skin. · If you get a scrape, cut, mild burn, or bite, wash the wound with clean water as soon as you can to help avoid infection.  Don't use hydrogen peroxide or alcohol, which can slow healing. · If you have swelling in your legs (edema), support stockings and good skin care may help prevent leg sores and cellulitis. · Take care of your feet, especially if you have diabetes or other conditions that increase the risk of infection. Wear shoes and socks. Do not go barefoot. If you have athlete's foot or other skin problems on your feet, talk to your doctor about how to treat them. When should you call for help? Call your doctor now or seek immediate medical care if:  · You have signs that your infection is getting worse, such as:  ¨ Increased pain, swelling, warmth, or redness. ¨ Red streaks leading from the area. ¨ Pus draining from the area. ¨ A fever. · You get a rash. Watch closely for changes in your health, and be sure to contact your doctor if:  · You are not getting better after 1 day (24 hours). · You do not get better as expected. Where can you learn more? Go to http://tito-ludmila.info/. Samreen Akers in the search box to learn more about \"Cellulitis: Care Instructions. \"  Current as of: October 13, 2016  Content Version: 11.2  © 1915-1043 [employer]. Care instructions adapted under license by [employer] (which disclaims liability or warranty for this information). If you have questions about a medical condition or this instruction, always ask your healthcare professional. [place] [address] any warranty or liability for your use of this information. Contusion: Care Instructions  Your Care Instructions  Contusion is the medical term for a bruise. It is the result of a direct blow or an impact, such as a fall. Contusions are common sports injuries. Most people think of a bruise as a black-and-blue spot. This happens when small blood vessels get torn and leak blood under the skin. But bones, muscles, and organs can also get bruised. This may damage deep tissues but not cause a bruise you can see.   The doctor will do a physical exam to find the location of your contusion. You may also have tests to make sure you do not have a more serious injury, such as a broken bone or nerve damage. These may include X-rays or other imaging tests like a CT scan or MRI. Deep-tissue contusions may cause pain and swelling. But if there is no serious damage, they will often get better in a few weeks with home treatment. The doctor has checked you carefully, but problems can develop later. If you notice any problems or new symptoms, get medical treatment right away. Follow-up care is a key part of your treatment and safety. Be sure to make and go to all appointments, and call your doctor if you are having problems. It's also a good idea to know your test results and keep a list of the medicines you take. How can you care for yourself at home? · Put ice or a cold pack on the sore area for 10 to 20 minutes at a time to stop swelling. Put a thin cloth between the ice pack and your skin. · Be safe with medicines. Read and follow all instructions on the label. ¨ If the doctor gave you a prescription medicine for pain, take it as prescribed. ¨ If you are not taking a prescription pain medicine, ask your doctor if you can take an over-the-counter medicine. · If you can, prop up the sore area on pillows as much as possible for the next few days. Try to keep the sore area above the level of your heart. When should you call for help? Call your doctor now or seek immediate medical care if:  · Your pain gets worse. · You have new or worse swelling. · You have tingling, weakness, or numbness in the area near the contusion. · The area near the contusion is cold or pale. Watch closely for changes in your health, and be sure to contact your doctor if:  · You do not get better as expected. Where can you learn more? Go to http://tito-ludmila.info/.   Enter S2 in the search box to learn more about \"Contusion: Care Instructions. \"  Current as of: May 27, 2016  Content Version: 11.2  © 2655-0695 [employer], [facility]. Care instructions adapted under license by [employer] (which disclaims liability or warranty for this information). If you have questions about a medical condition or this instruction, always ask your healthcare professional. [place] [address] any warranty or liability for your use of this information.

## 2017-06-13 ENCOUNTER — HOSPITAL ENCOUNTER (EMERGENCY)
Age: 47
Discharge: HOME | End: 2017-06-14
Attending: EMERGENCY MEDICINE
Payer: COMMERCIAL

## 2017-06-13 DIAGNOSIS — L03.119 CELLULITIS AND ABSCESS OF LEG, EXCEPT FOOT: Primary | ICD-10-CM

## 2017-06-13 DIAGNOSIS — L02.419 CELLULITIS AND ABSCESS OF LEG, EXCEPT FOOT: Primary | ICD-10-CM

## 2017-06-13 PROCEDURE — 99284 EMERGENCY DEPT VISIT MOD MDM: CPT

## 2017-06-13 PROCEDURE — 96365 THER/PROPH/DIAG IV INF INIT: CPT

## 2017-06-13 NOTE — LETTER
[address] 
[facility] [place] [facility] DE[facility] 
[address] [address] 
[phone] Work/School Note Date: 6/13/2017 To Whom It May concern: Shannan Hair was seen and treated today in the emergency room by the following provider(s): 
Attending Provider: Joy Dowell MD. Shannan Hair should be excused from work on 6/14/2017. Sincerely, Joy Dowell MD

## 2017-06-14 ENCOUNTER — APPOINTMENT (OUTPATIENT)
Dept: CT IMAGING | Age: 47
End: 2017-06-14
Attending: EMERGENCY MEDICINE
Payer: COMMERCIAL

## 2017-06-14 VITALS
TEMPERATURE: 97.8 F | HEART RATE: 68 BPM | BODY MASS INDEX: 30.51 KG/M2 | SYSTOLIC BLOOD PRESSURE: 114 MMHG | RESPIRATION RATE: 16 BRPM | DIASTOLIC BLOOD PRESSURE: 78 MMHG | WEIGHT: 245.37 LBS | OXYGEN SATURATION: 94 % | HEIGHT: 75 IN

## 2017-06-14 LAB
ALBUMIN SERPL BCP-MCNC: 3.4 G/DL (ref 3.5–5)
ALBUMIN/GLOB SERPL: 1.1 {RATIO} (ref 1.1–2.2)
ALP SERPL-CCNC: 69 U/L (ref 45–117)
ALT SERPL-CCNC: 53 U/L (ref 12–78)
ANION GAP BLD CALC-SCNC: 8 MMOL/L (ref 5–15)
AST SERPL W P-5'-P-CCNC: 28 U/L (ref 15–37)
BASOPHILS # BLD AUTO: 0 K/UL (ref 0–0.1)
BASOPHILS # BLD: 0 % (ref 0–1)
BILIRUB SERPL-MCNC: 0.8 MG/DL (ref 0.2–1)
BUN SERPL-MCNC: 20 MG/DL (ref 6–20)
BUN/CREAT SERPL: 23 (ref 12–20)
CALCIUM SERPL-MCNC: 8.2 MG/DL (ref 8.5–10.1)
CHLORIDE SERPL-SCNC: 108 MMOL/L (ref 97–108)
CO2 SERPL-SCNC: 23 MMOL/L (ref 21–32)
CREAT SERPL-MCNC: 0.86 MG/DL (ref 0.7–1.3)
EOSINOPHIL # BLD: 0.1 K/UL (ref 0–0.4)
EOSINOPHIL NFR BLD: 3 % (ref 0–7)
ERYTHROCYTE [DISTWIDTH] IN BLOOD BY AUTOMATED COUNT: 14 % (ref 11.5–14.5)
GLOBULIN SER CALC-MCNC: 3.2 G/DL (ref 2–4)
GLUCOSE SERPL-MCNC: 126 MG/DL (ref 65–100)
HCT VFR BLD AUTO: 37.8 % (ref 36.6–50.3)
HGB BLD-MCNC: 12.8 G/DL (ref 12.1–17)
INR BLD: 2.1 (ref 0.9–1.2)
LYMPHOCYTES # BLD AUTO: 41 % (ref 12–49)
LYMPHOCYTES # BLD: 2.1 K/UL (ref 0.8–3.5)
MCH RBC QN AUTO: 29.3 PG (ref 26–34)
MCHC RBC AUTO-ENTMCNC: 33.9 G/DL (ref 30–36.5)
MCV RBC AUTO: 86.5 FL (ref 80–99)
MONOCYTES # BLD: 0.4 K/UL (ref 0–1)
MONOCYTES NFR BLD AUTO: 8 % (ref 5–13)
NEUTS SEG # BLD: 2.5 K/UL (ref 1.8–8)
NEUTS SEG NFR BLD AUTO: 48 % (ref 32–75)
PLATELET # BLD AUTO: 169 K/UL (ref 150–400)
POTASSIUM SERPL-SCNC: 3.8 MMOL/L (ref 3.5–5.1)
PROT SERPL-MCNC: 6.6 G/DL (ref 6.4–8.2)
RBC # BLD AUTO: 4.37 M/UL (ref 4.1–5.7)
SODIUM SERPL-SCNC: 139 MMOL/L (ref 136–145)
WBC # BLD AUTO: 5.2 K/UL (ref 4.1–11.1)

## 2017-06-14 PROCEDURE — 85610 PROTHROMBIN TIME: CPT

## 2017-06-14 PROCEDURE — 74011000258 HC RX REV CODE- 258: Performed by: EMERGENCY MEDICINE

## 2017-06-14 PROCEDURE — 73701 CT LOWER EXTREMITY W/DYE: CPT

## 2017-06-14 PROCEDURE — 74011250636 HC RX REV CODE- 250/636: Performed by: EMERGENCY MEDICINE

## 2017-06-14 PROCEDURE — 36415 COLL VENOUS BLD VENIPUNCTURE: CPT | Performed by: EMERGENCY MEDICINE

## 2017-06-14 PROCEDURE — 85025 COMPLETE CBC W/AUTO DIFF WBC: CPT | Performed by: EMERGENCY MEDICINE

## 2017-06-14 PROCEDURE — 74011636320 HC RX REV CODE- 636/320: Performed by: EMERGENCY MEDICINE

## 2017-06-14 PROCEDURE — 80053 COMPREHEN METABOLIC PANEL: CPT | Performed by: EMERGENCY MEDICINE

## 2017-06-14 RX ORDER — CEFTRIAXONE 1 G/1
INJECTION, POWDER, FOR SOLUTION INTRAMUSCULAR; INTRAVENOUS
Status: DISCONTINUED
Start: 2017-06-14 | End: 2017-06-14 | Stop reason: HOSPADM

## 2017-06-14 RX ORDER — SODIUM CHLORIDE 900 MG/100ML
INJECTION INTRAVENOUS
Status: DISCONTINUED
Start: 2017-06-14 | End: 2017-06-14 | Stop reason: HOSPADM

## 2017-06-14 RX ORDER — SODIUM CHLORIDE 0.9 % (FLUSH) 0.9 %
10 SYRINGE (ML) INJECTION
Status: COMPLETED | OUTPATIENT
Start: 2017-06-14 | End: 2017-06-14

## 2017-06-14 RX ORDER — SULFAMETHOXAZOLE AND TRIMETHOPRIM 800; 160 MG/1; MG/1
1 TABLET ORAL 2 TIMES DAILY
Qty: 14 TAB | Refills: 0 | Status: SHIPPED | OUTPATIENT
Start: 2017-06-14 | End: 2017-06-21

## 2017-06-14 RX ORDER — SODIUM CHLORIDE 0.9 % (FLUSH) 0.9 %
5-10 SYRINGE (ML) INJECTION EVERY 8 HOURS
Status: DISCONTINUED | OUTPATIENT
Start: 2017-06-14 | End: 2017-06-14 | Stop reason: HOSPADM

## 2017-06-14 RX ORDER — HYDROCODONE BITARTRATE AND ACETAMINOPHEN 5; 325 MG/1; MG/1
1 TABLET ORAL
Qty: 20 TAB | Refills: 0 | Status: SHIPPED | OUTPATIENT
Start: 2017-06-14 | End: 2017-06-27 | Stop reason: ALTCHOICE

## 2017-06-14 RX ORDER — SODIUM CHLORIDE 0.9 % (FLUSH) 0.9 %
5-10 SYRINGE (ML) INJECTION AS NEEDED
Status: DISCONTINUED | OUTPATIENT
Start: 2017-06-14 | End: 2017-06-14 | Stop reason: HOSPADM

## 2017-06-14 RX ORDER — CEPHALEXIN 500 MG/1
500 CAPSULE ORAL 4 TIMES DAILY
Qty: 28 CAP | Refills: 0 | Status: SHIPPED | OUTPATIENT
Start: 2017-06-14 | End: 2017-06-21

## 2017-06-14 RX ORDER — SODIUM CHLORIDE 9 MG/ML
50 INJECTION, SOLUTION INTRAVENOUS
Status: COMPLETED | OUTPATIENT
Start: 2017-06-14 | End: 2017-06-14

## 2017-06-14 RX ADMIN — Medication 10 ML: at 01:39

## 2017-06-14 RX ADMIN — SODIUM CHLORIDE 50 ML/HR: 900 INJECTION, SOLUTION INTRAVENOUS at 01:39

## 2017-06-14 RX ADMIN — CEFTRIAXONE 1 G: 1 INJECTION, POWDER, FOR SOLUTION INTRAMUSCULAR; INTRAVENOUS at 01:08

## 2017-06-14 RX ADMIN — IOPAMIDOL 100 ML: 612 INJECTION, SOLUTION INTRAVENOUS at 01:39

## 2017-06-14 NOTE — DISCHARGE INSTRUCTIONS

## 2017-06-14 NOTE — ED NOTES
Assumed care of pt from triage. Pt complaining of pain and swelling in left lower leg. Swelling started approximately 3 wks ago. Pt referred to a doctor, but pt did not go to appt because \"swelling went back down\". Pt states \"it flared back up Saturday\". Pt denies DM or cardiac history. Pt has 3+ pitting edema in LLE with palpable pedal pulses and sensation present. Pt in no acute distress at this time. Call bell within reach.

## 2017-06-14 NOTE — ED PROVIDER NOTES
HPI Comments: Jennifer Cheng is a 51 y.o. male, pmhx HTN, prediabetes / PE / cellulitis, who presents ambulatory to the ED c/o progressively worsening redness, swelling, and pain to his L anterior shin x 3 weeks. Pt states he was evaluated for similar sxs, in ED [facility] ED, on 5/25/2017. He reports completing his course of Bactrim ~1 week ago and states his sxs seemed to improve, before they worsened again. Pt notes obtaining a cut over the area of redness recently, but states that is unrelated to his earlier sxs. Per chart review, pt has been evaluated in this ED twice for similar sxs over the last 3 weeks. He specifically denies any recent loss of sensation in his toes, fever, chills, nausea, vomiting, diarrhea, abd pain, CP, SOB, lightheadedness, dizziness, HA, heart palpitations, urinary sxs, changes in BM, changes in PO intake, melena, hematochezia, cough, or congestion. PCP: Jagruti Gonzalez MD    PMHx: Significant for DVT, umbilical hernia, cellulitis s/p tattoo, PE, anxiety, HTN, prediabetes  PSHx: Significant for appendectomy, hernia repair, colonoscopy  Social Hx: -tobacco, +EtOH (rarely), -Illicit Drugs    There are no other complaints, changes, or physical findings at this time. The history is provided by the patient.         Past Medical History:   Diagnosis Date    Allergic rhinitis     Anxiety disorder     Cellulitis     post tattoo    Deep vein thrombosis (DVT) ([address]     [facility] f/u note to manage Lovenox    Essential tremor     Phlebitis     Pulmonary embolism ([address] 3/2011    multiple episodes    Rectal bleeding 10/2011    on coumadin @ the time &again 4/2017 4/12/17 note from [facility]la//colonoscopy report rec'd    Thromboembolus [facility])     Umbilical hernia     Vitamin D deficiency 04/2014    again 4/2017       Past Surgical History:   Procedure Laterality Date    HX APPENDECTOMY  2003    HX COLONOSCOPY  04/28/2017    Dr. Gerald Perez [address]  2000 umbilical         Family History:   Problem Relation Age of Onset    Heart Disease Father     Heart Disease Maternal Grandmother     Heart Disease Maternal Grandfather        Social History     Social History    Marital status: SINGLE     Spouse name: N/A    Number of children: N/A    Years of education: N/A     Occupational History    Not on file. Social History Main Topics    Smoking status: Never Smoker    Smokeless tobacco: Current User    Alcohol use 0.0 oz/week      Comment: rare social    Drug use: No    Sexual activity: Yes     Partners: Female     Other Topics Concern    Not on file     Social History Narrative         ALLERGIES: Bee sting [sting, bee]; Neomycin; and Neosporin [neomycin-bacitracin-polymyxin]    Review of Systems   Constitutional: Negative for chills and fever. HENT: Negative for congestion, ear pain, rhinorrhea and sore throat. Respiratory: Negative for cough and shortness of breath. Cardiovascular: Negative for chest pain, palpitations and leg swelling. Gastrointestinal: Negative for abdominal pain, constipation, diarrhea, nausea and vomiting. No melena  No hematochezia   Endocrine: Negative for polyuria. Genitourinary: Negative for dysuria, frequency and hematuria. Musculoskeletal: Positive for myalgias (LLE). +swelling to L anterior shin   Skin: Positive for color change (LLE redness). Neurological: Negative for dizziness, weakness, light-headedness, numbness and headaches. No tingling   All other systems reviewed and are negative. Vitals:    06/13/17 2350 06/13/17 2356 06/14/17 0045 06/14/17 0100   BP:  129/88 114/78    Pulse:  68     Resp:  16     Temp:  97.8 °F (36.6 °C)     SpO2: 96% 95% 94% 94%   Weight:  111.3 kg (245 lb 6 oz)     Height:  6' 3\" (1.905 m)              Physical Exam   Nursing note and vitals reviewed.   General appearance - well nourished, well appearing, and in no distress  Eyes - pupils equal and reactive, extraocular eye movements intact  ENT - mucous membranes moist, pharynx normal without lesions  Neck - supple, no significant adenopathy; non-tender to palpation  Chest - clear to auscultation, no wheezes, rales or rhonchi; non-tender to palpation  Heart - normal rate and regular rhythm, S1 and S2 normal, no murmurs noted  Abdomen - soft, nontender, nondistended, no masses or organomegaly  Musculoskeletal - no joint tenderness, deformity; normal ROM  Extremities - peripheral pulses normal, L food edematous  Skin - erythema, warmth, and tenderness over the L anterior shin, small area with questionable fluctuance, abrasion tot he proximal aspect with surrounding erythema  Neurological - alert, oriented x3, normal speech, no focal findings or movement disorder noted  Written by Philip Arroyo ED Scribe, as dictated by Desi Cherry MD    MDM  Number of Diagnoses or Management Options  Cellulitis and abscess of leg, except foot:   Diagnosis management comments:   DDx: cellulitis, abscess       Amount and/or Complexity of Data Reviewed  Clinical lab tests: reviewed and ordered  Tests in the radiology section of CPT®: reviewed and ordered  Review and summarize past medical records: yes  Independent visualization of images, tracings, or specimens: yes    Patient Progress  Patient progress: stable      Procedures      Progress note:  2:54 AM  Pt noted to be feeling better, INR 2.1, pt ready for discharge. Updated pt and/or family on all final lab and imaging findings. Will follow up as instructed. All questions have been answered, pt voiced understanding and agreement with plan. Narcotics were prescribed, pt was advised not to drive or operate heavy machinery. Abx were prescribed, pt advised that diarrhea and rash are possible side effects of the medications. Specific return precautions provided as well as instructions to return to the ED should sx worsen at any time. Vital signs stable for discharge.    Written by Dain Truong, ED Scribe, as dictated by Rufina Alexander MD     LABORATORY TESTS:  Recent Results (from the past 12 hour(s))   CBC WITH AUTOMATED DIFF    Collection Time: 06/14/17 12:54 AM   Result Value Ref Range    WBC 5.2 4.1 - 11.1 K/uL    RBC 4.37 4.10 - 5.70 M/uL    HGB 12.8 12.1 - 17.0 g/dL    HCT 37.8 36.6 - 50.3 %    MCV 86.5 80.0 - 99.0 FL    MCH 29.3 26.0 - 34.0 PG    MCHC 33.9 30.0 - 36.5 g/dL    RDW 14.0 11.5 - 14.5 %    PLATELET 555 918 - 511 K/uL    NEUTROPHILS 48 32 - 75 %    LYMPHOCYTES 41 12 - 49 %    MONOCYTES 8 5 - 13 %    EOSINOPHILS 3 0 - 7 %    BASOPHILS 0 0 - 1 %    ABS. NEUTROPHILS 2.5 1.8 - 8.0 K/UL    ABS. LYMPHOCYTES 2.1 0.8 - 3.5 K/UL    ABS. MONOCYTES 0.4 0.0 - 1.0 K/UL    ABS. EOSINOPHILS 0.1 0.0 - 0.4 K/UL    ABS. BASOPHILS 0.0 0.0 - 0.1 K/UL   METABOLIC PANEL, COMPREHENSIVE    Collection Time: 06/14/17 12:54 AM   Result Value Ref Range    Sodium 139 136 - 145 mmol/L    Potassium 3.8 3.5 - 5.1 mmol/L    Chloride 108 97 - 108 mmol/L    CO2 23 21 - 32 mmol/L    Anion gap 8 5 - 15 mmol/L    Glucose 126 (H) 65 - 100 mg/dL    BUN 20 6 - 20 MG/DL    Creatinine 0.86 0.70 - 1.30 MG/DL    BUN/Creatinine ratio 23 (H) 12 - 20      GFR est AA >60 >60 ml/min/1.73m2    GFR est non-AA >60 >60 ml/min/1.73m2    Calcium 8.2 (L) 8.5 - 10.1 MG/DL    Bilirubin, total 0.8 0.2 - 1.0 MG/DL    ALT (SGPT) 53 12 - 78 U/L    AST (SGOT) 28 15 - 37 U/L    Alk. phosphatase 69 45 - 117 U/L    Protein, total 6.6 6.4 - 8.2 g/dL    Albumin 3.4 (L) 3.5 - 5.0 g/dL    Globulin 3.2 2.0 - 4.0 g/dL    A-G Ratio 1.1 1.1 - 2.2     POC INR    Collection Time: 06/14/17  3:02 AM   Result Value Ref Range    INR (POC) 2.1 (H) <1.2         IMAGING RESULTS:     CT Results  (Last 48 hours)               06/14/17 0153  CT LOW EXT LT W CONT Preliminary result    Narrative:  *PRELIMINARY REPORT*       *PRELIMINARY REPORT*       Diffuse soft tissue edema compatible with cellulitis. No significant bone   findings.  No fluid collection or masses, vessels appear patent. Preliminary report was provided by Dr. Lynn López, the on-call radiologist, at       Final report to follow. *END PRELIMINARY REPORT*                               Preliminary report was provided by  , the on-call radiologist, at       Final report to follow. *END PRELIMINARY REPORT*                                       MEDICATIONS GIVEN:  Medications   sodium chloride (NS) flush 5-10 mL (not administered)   sodium chloride (NS) flush 5-10 mL (not administered)   ADDaptor (not administered)   cefTRIAXone (ROCEPHIN) 1 gram injection (not administered)   0.9% sodium chloride (MBP/ADV) 0.9 % infusion (not administered)   sodium chloride (NS) flush 10 mL (10 mL IntraVENous Given 6/14/17 0139)   iopamidol (ISOVUE 300) 61 % contrast injection 100 mL (100 mL IntraVENous Given 6/14/17 0139)   0.9% sodium chloride infusion (50 mL/hr IntraVENous New Bag 6/14/17 0139)   cefTRIAXone (ROCEPHIN) 1 g in 0.9% sodium chloride (MBP/ADV) 50 mL (1 g IntraVENous New Bag 6/14/17 0108)       IMPRESSION:  1. Cellulitis and abscess of leg, except foot        PLAN:  1. Current Discharge Medication List      START taking these medications    Details   cephALEXin (KEFLEX) 500 mg capsule Take 1 Cap by mouth four (4) times daily for 7 days. Qty: 28 Cap, Refills: 0      trimethoprim-sulfamethoxazole (BACTRIM DS) 160-800 mg per tablet Take 1 Tab by mouth two (2) times a day for 7 days. Qty: 14 Tab, Refills: 0         CONTINUE these medications which have CHANGED    Details   HYDROcodone-acetaminophen (NORCO) 5-325 mg per tablet Take 1 Tab by mouth every four (4) hours as needed for Pain. Max Daily Amount: 6 Tabs. Qty: 20 Tab, Refills: 0           2.    Follow-up Information     Follow up With Details Comments [address], [place] In 2 days  [address] [facility]  [address]  [phone]      [facility] EMERGENCY DEPT  If symptoms worsen [address] [facility]  [phone]        Return to ED if worse     DISCHARGE NOTE:  2:54 AM  The patient's results have been reviewed with family and/or caregiver. They verbally convey their understanding and agreement of the patient's signs, symptoms, diagnosis, treatment, and prognosis. They additionally agree to follow up as recommended in the discharge instructions or to return to the Emergency Room should the patient's condition change prior to their follow-up appointment. The family and/or caregiver verbally agrees with the care-plan and all of their questions have been answered. The discharge instructions have also been provided to the them along with educational information regarding the patient's diagnosis and a list of reasons why the patient would want to return to the ER prior to their follow-up appointment should their condition change. Written by Martín Calderon, ED Scribe, as dictated by Patric Chawla MD.         This note is prepared by Martín Calderon, acting as Scribe for MD Rufina Sierra MD : The scribe's documentation has been prepared under my direction and personally reviewed by me in its entirety. I confirm that the note above accurately reflects all work, treatment, procedures, and medical decision making performed by me. This note will not be viewable in [address]

## 2017-06-16 ENCOUNTER — HOSPITAL ENCOUNTER (EMERGENCY)
Age: 47
Discharge: HOME | End: 2017-06-16
Attending: FAMILY MEDICINE

## 2017-06-16 VITALS
TEMPERATURE: 98.2 F | HEART RATE: 75 BPM | RESPIRATION RATE: 18 BRPM | OXYGEN SATURATION: 95 % | HEIGHT: 75 IN | SYSTOLIC BLOOD PRESSURE: 120 MMHG | BODY MASS INDEX: 30.6 KG/M2 | DIASTOLIC BLOOD PRESSURE: 66 MMHG | WEIGHT: 246.1 LBS

## 2017-06-16 DIAGNOSIS — L03.116 CELLULITIS OF LEFT LOWER EXTREMITY: Primary | ICD-10-CM

## 2017-06-16 NOTE — UC PROVIDER NOTE
HPI Comments: Reports this is a work related injury. Patient is a 51 y.o. male presenting with skin problem. The history is provided by the patient. No  was used. Skin Problem    This is a new problem. Episode onset: Hx of 3 weeks ago he hit himself with a shovel to LLE, soon after LLE was swellling with redness. Seen at [facility] multiple times, last visit was 6/13/17, placed on Bactrim and Keflex. Reports no changes. Denies fever. Past Medical History:   Diagnosis Date    Allergic rhinitis     Anxiety disorder     Cellulitis     post tattoo    Deep vein thrombosis (DVT) ([address]     [facility] f/u note to manage Lovenox    Essential tremor     Phlebitis     Pulmonary embolism ([facility] 3/2011    multiple episodes    Rectal bleeding 10/2011    on coumadin @ the time &again 4/2017 4/12/17 note from [facility]//colonoscopy report rec'd    Thromboembolus [facility])     Umbilical hernia     Vitamin D deficiency 04/2014    again 4/2017        Past Surgical History:   Procedure Laterality Date    HX APPENDECTOMY  2003    HX COLONOSCOPY  04/28/2017    Dr. Harriet Shell HX HERNIA REPAIR  5697    umbilical         Family History   Problem Relation Age of Onset    Heart Disease Father     Heart Disease Maternal Grandmother     Heart Disease Maternal Grandfather         Social History     Social History    Marital status: SINGLE     Spouse name: N/A    Number of children: N/A    Years of education: N/A     Occupational History    Not on file. Social History Main Topics    Smoking status: Never Smoker    Smokeless tobacco: Current User    Alcohol use 0.0 oz/week      Comment: rare social    Drug use: No    Sexual activity: Yes     Partners: Female     Other Topics Concern    Not on file     Social History Narrative                ALLERGIES: Bee sting [sting, bee]; Neomycin; and Neosporin [neomycin-bacitracin-polymyxin]    Review of Systems   Constitutional: Negative. HENT: Negative. Respiratory: Negative. Cardiovascular: Negative. Gastrointestinal: Negative. Musculoskeletal: Negative for joint swelling. Skin: Positive for color change and wound. Neurological: Negative. Psychiatric/Behavioral: Negative. Vitals:    06/16/17 1450   BP: 120/66   Pulse: 75   Resp: 18   Temp: 98.2 °F (36.8 °C)   SpO2: 95%   Weight: 111.6 kg (246 lb 1.6 oz)   Height: 6' 3\" (1.905 m)       Physical Exam   Constitutional: He is oriented to person, place, and time. He appears well-developed and well-nourished. HENT:   Head: Normocephalic and atraumatic. Right Ear: External ear normal.   Left Ear: External ear normal.   Nose: Nose normal.   Mouth/Throat: Oropharynx is clear and moist.   Eyes: Conjunctivae and EOM are normal. Pupils are equal, round, and reactive to light. Neck: Normal range of motion. Neck supple. Cardiovascular: Normal rate, regular rhythm and normal heart sounds. Pulmonary/Chest: Effort normal and breath sounds normal.   Musculoskeletal: Normal range of motion. He exhibits edema and tenderness. Neurological: He is alert and oriented to person, place, and time. Skin: Skin is warm and dry. There is erythema. LLE with 2 + edema including left foot  + erythema (erysipelas)  2+ DP/PT pulses  No calf tenderness    Psychiatric: He has a normal mood and affect. His behavior is normal. Thought content normal.   Nursing note and vitals reviewed. MDM     Differential Diagnosis; Clinical Impression; Plan:     CLINICAL IMPRESSION:  Cellulitis of left lower extremity  (primary encounter diagnosis)    Plan:  1. Cellulites of LLE. Patient is advised to go to the ER as he is failing outpatient therapy. He reports this is a work related injury, employed by [employer]. 2. Advised to speak with supervisor but needs to get IV abx.   3.   Risk of Significant Complications, Morbidity, and/or Mortality:   Presenting problems:   Moderate  Diagnostic procedures:   Moderate  Progress:   Patient progress:  Stable      Procedures

## 2017-06-16 NOTE — DISCHARGE INSTRUCTIONS

## 2017-06-19 DIAGNOSIS — Z79.01 ANTICOAGULANT LONG-TERM USE: Primary | ICD-10-CM

## 2017-06-19 NOTE — PROGRESS NOTES
Called and reached his voicemail that is full and is not taking messages.  He generally will call back once he sees our number on the phone display

## 2017-06-21 LAB
INR PPP: 1.6 (ref 0.8–1.2)
PROTHROMBIN TIME: 16.2 SEC (ref 9.1–12)

## 2017-06-27 ENCOUNTER — OFFICE VISIT (OUTPATIENT)
Dept: FAMILY MEDICINE CLINIC | Age: 47
End: 2017-06-27

## 2017-06-27 VITALS
HEIGHT: 72 IN | TEMPERATURE: 98.1 F | DIASTOLIC BLOOD PRESSURE: 73 MMHG | OXYGEN SATURATION: 96 % | WEIGHT: 241.2 LBS | RESPIRATION RATE: 16 BRPM | HEART RATE: 74 BPM | BODY MASS INDEX: 32.67 KG/M2 | SYSTOLIC BLOOD PRESSURE: 114 MMHG

## 2017-06-27 DIAGNOSIS — Z72.0 TOBACCO ABUSE: ICD-10-CM

## 2017-06-27 DIAGNOSIS — E78.9 DISORDER OF LIPOID METABOLISM: ICD-10-CM

## 2017-06-27 DIAGNOSIS — Z91.030 BEE STING ALLERGY: ICD-10-CM

## 2017-06-27 DIAGNOSIS — Z00.00 PHYSICAL EXAM: Primary | ICD-10-CM

## 2017-06-27 DIAGNOSIS — L03.116 CELLULITIS OF LEFT LOWER LEG: ICD-10-CM

## 2017-06-27 DIAGNOSIS — E66.9 CLASS 1 OBESITY WITH BODY MASS INDEX (BMI) 32.0 TO 32.9 IN ADULT: ICD-10-CM

## 2017-06-27 DIAGNOSIS — E55.9 VITAMIN D DEFICIENCY: ICD-10-CM

## 2017-06-27 DIAGNOSIS — Z86.711 HX PULMONARY EMBOLISM: ICD-10-CM

## 2017-06-27 DIAGNOSIS — F41.0 PANIC ANXIETY SYNDROME: ICD-10-CM

## 2017-06-27 DIAGNOSIS — Z86.718 PERSONAL HISTORY OF DVT (DEEP VEIN THROMBOSIS): ICD-10-CM

## 2017-06-27 DIAGNOSIS — R73.03 PREDIABETES: ICD-10-CM

## 2017-06-27 DIAGNOSIS — Z79.01 ANTICOAGULANT LONG-TERM USE: ICD-10-CM

## 2017-06-27 DIAGNOSIS — R25.1 TREMOR: ICD-10-CM

## 2017-06-27 RX ORDER — DOXYCYCLINE 100 MG/1
CAPSULE ORAL
Refills: 0 | COMMUNITY
Start: 2017-06-17 | End: 2017-10-25 | Stop reason: ALTCHOICE

## 2017-06-27 RX ORDER — PNV NO.95/FERROUS FUM/FOLIC AC 28MG-0.8MG
1 TABLET ORAL DAILY
COMMUNITY
End: 2020-02-22

## 2017-06-27 NOTE — PROGRESS NOTES
HISTORY OF PRESENT ILLNESS  Lissette Purdy is a 51 y.o. male. HPI   Here for [facility]. Pt will inc Krill from 300 to 500 and recheck Vit. D and LP in 2-3 mos. Eye doctor > 10 yrs. Dentist 10 yrs. Colonoscopy past yr. 10 yr repeat. Finishing antibiotic for left lower leg cellulitis. Wants to stop dipping. 1 can daily. ER visit x 2 for leg cellulitis past month. No other signif hx past yr. No FH prostate cancer. Trying to lose. Inc water intake. Walks 1 mile daily in 30 miles. 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       warfarin (COUMADIN) 5 mg tablet TAKE 1 TABLET BY MOUTH ONCE DAILY MONDAY THROUGH FRIDAY AND ONE-HALF TABLET ONCE DAILY ON SATURDAY AND SUNDAY AS DIRECTED 90 Tab 3    rosuvastatin (CRESTOR) 20 mg tablet TAKE 1 TABLET BY MOUTH EVERY NIGHT 30 Tab 10     Allergies   Allergen Reactions    Bee Sting [Sting, Bee] Swelling    Neomycin Rash    Neosporin [Neomycin-Bacitracin-Polymyxin] Rash     Past Medical History:   Diagnosis Date    Allergic rhinitis     Anxiety disorder     Cellulitis     post tattoo    [facility] vein thrombosis [facility]DVT) [facility]     [facility] f/u note to manage Lovenox    Essential tremor     Phlebitis     Pul[facility] 3/2011    multiple episodes    Rectal bleeding 10/2011    on coumadin @ the time &again 4/2017 4/12/17 note from [facility]//colonoscopy report rec'd    [facility]embolus [facility])  Umbilical hernia     Vitamin D deficiency 04/2014    again 4/2017     Past Surgical History:   Procedure Laterality Date    HX APPENDECTOMY  2003    HX COLONOSCOPY  04/28/2017    Dr. Meera Barclay HX HERNIA REPAIR  3519    umbilical     Family History   Problem Relation Age of Onset    Heart Disease Father     Heart Disease Maternal Grandmother     Heart Disease Maternal Grandfather      Social History   Substance Use Topics    Smoking status: Never Smoker    Smokeless tobacco: Current User     Types: Snuff    Alcohol use 0.0 oz/week      Comment: rare social      Lab Results  Component Value Date/Time   WBC 5.2 06/14/2017 12:54 AM   HGB 12.8 06/14/2017 12:54 AM   HCT 37.8 06/14/2017 12:54 AM   PLATELET 077 74/97/7367 12:54 AM   MCV 86.5 06/14/2017 12:54 AM     Lab Results  Component Value Date/Time   Hemoglobin A1c 6.3 04/04/2017 09:05 AM   Hemoglobin A1c 6.2 04/04/2016 11:06 AM   Hemoglobin A1c 6.2 04/03/2015 08:16 AM   Glucose 126 06/14/2017 12:54 AM   LDL, calculated 61 04/04/2017 09:05 AM   Creatinine 0.86 06/14/2017 12:54 AM      Lab Results  Component Value Date/Time   Cholesterol, total 132 04/04/2017 09:05 AM   HDL Cholesterol 37 04/04/2017 09:05 AM   LDL, calculated 61 04/04/2017 09:05 AM   Triglyceride 172 04/04/2017 09:05 AM   Lab Results  Component Value Date/Time   ALT (SGPT) 53 06/14/2017 12:54 AM   AST (SGOT) 28 06/14/2017 12:54 AM   Alk.  phosphatase 69 06/14/2017 12:54 AM   Bilirubin, total 0.8 06/14/2017 12:54 AM   Albumin 3.4 06/14/2017 12:54 AM   Protein, total 6.6 06/14/2017 12:54 AM   INR 1.6 06/20/2017 12:10 PM   Prothrombin time 16.2 06/20/2017 12:10 PM   PLATELET 410 95/81/2290 12:54 AM       Lab Results  Component Value Date/Time   GFR est non-AA >60 06/14/2017 12:54 AM   GFR est AA >60 06/14/2017 12:54 AM   Creatinine 0.86 06/14/2017 12:54 AM   BUN 20 06/14/2017 12:54 AM   Sodium 139 06/14/2017 12:54 AM   Potassium 3.8 06/14/2017 12:54 AM   Chloride 108 06/14/2017 12:54 AM   CO2 23 06/14/2017 12:54 AM   Lab Results  Component Value Date/Time   TSH 2.390 04/04/2016 11:06 AM      Lab Results   Component Value Date/Time    Glucose 126 06/14/2017 12:54 AM         Review of Systems   Constitutional: Positive for weight loss. HENT: Negative. Eyes: Negative. Respiratory: Negative. Cardiovascular: Negative. Gastrointestinal: Negative. Genitourinary: Negative. Musculoskeletal: Negative. Skin: Negative. Neurological: Negative. Endo/Heme/Allergies: Negative. Psychiatric/Behavioral: Negative. Physical Exam   Vitals:    06/27/17 0934   BP: 114/73   Pulse: 74   Resp: 16   Temp: 98.1 °F (36.7 °C)   SpO2: 96%   Weight: 241 lb 3.2 oz (109.4 kg)   Height: 6' 0.25\" (1.835 m)       General  alert, cooperative, no distress, appears stated age   Head  Normocephalic, without obvious abnormality, atraumatic   Eyes  conjunctivae/corneas clear. PERRL. Fundi benign   Ears  Canals and TMs clear   Nose Passages patent. Mucosa normal. No drainage or sinus tenderness. Throat Lips, mucosa nml, and tongue pierced with bead. Teeth and gums normal.  Post pharynx neg. Neck supple, nontender, no adenopathy, thyroid: not enlarged, no masses/nodules, no carotid bruits   Back   symmetric, no curvature. FROM. No CVA tenderness   Lungs   clear to auscultation bilaterally   Chest wall  no tenderness   Heart  regular rate and rhythm, no murmur, click, rub or gallop   Abdomen   Obese, soft, non-tender. Bowel sounds normal. No masses,  No organomegaly   Genitalia  Testes atrophic, descended bilat w/o masses. No hernias   Rectal  Normal tone, normal lower pole prostate, no masses or tenderness   Extremities extremities normal, atraumatic, no cyanosis. Pitting edema left ankle. Pulses 2+ and symmetric except absent pedals. Skin No rashes or lesions. Tattoos. Neurologic Romberg neg, nml heel, toe and Tandem gait. DTRs 2+ symmetric except absent Achilles.          ASSESSMENT and PLAN    ICD-10-CM ICD-9-CM    1. Physical exam Z00.00 V70.9 AMB POC EKG ROUTINE W/ 12 LEADS, INTER & REP   2. Vitamin D deficiency E55.9 268.9    3. Prediabetes R73.03 790.29    4. 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 Personal history of DVT (deep vein thrombosis) Z86.718 V12.51    6. Hx pulmonary embolism Z86.711 V12.55    7. Bee sting allergy Z91.038 V15.06    8. Panic anxiety syndrome F41.0 300.01    9. Anticoagulant long-term use Z79.01 V58.61    10. Tremor R25.1 781.0    11. Tobacco abuse Z72.0 305.1    12. Class 1 obesity with body mass index (BMI) 32.0 to 32.9 in adult E66.9 278.00     Z68.32 V85.32    13. Cellulitis of left lower leg L03.116 682.6 doxycycline (MONODOX) 100 mg capsule     Follow-up Disposition:  Return in about 1 year (around 6/27/2018) for Annual visit and labs.

## 2017-06-27 NOTE — PROGRESS NOTES
Chief Complaint   Patient presents with    Complete Physical     Labs done and has the results. Patient completed Vit D and has not had a recheck yet. 1. Have you been to the ER, urgent care clinic since your last visit? Hospitalized since your last visit? Yes When: 6/17 Where: [facility] x2, [address] ER Reason for visit: Left leg cellulitis    2. Have you seen or consulted any other health care providers outside of the [address] [place] [address] since your last visit? Include any pap smears or colon screening. Yes When: 6/17 Where: [address] Reason for visit: Cellulitis did lab work and given antibiotics. I have reviewed Health Maintenance with the patient and updated. Advance Care Planning information reviewed and given to the patient. Complete Physical Exam Male  Pre-Visit Questions:    1. Do you follow a low fat or low salt diet ? y  2. Do you follow an exercise program? n  3. Have you had your tetanus booster in the last 10 years? y  3. Have you ever had a Pneumonia vaccine? n  5. Do you smoke? n  6. Do you consider yourself overweight? y  7. Do you perform Testicular self exam?n  8. Is there a family history of CAD< age 48? n  5. Is there a family history of Cancer? n  10. Do you have any Cancer risks? n  11. Have you had a colonoscopy? y  15. Have you been to your eye doctor past year?   n  15. Have you been to your dentist in the last 6 months?  n  14. Have you had your flu shot for this season?   n

## 2017-06-27 NOTE — MR AVS SNAPSHOT
Visit Information Date & Time Provider Department Dept. Phone Encounter #  
 6/27/2017  9:30 AM Marly Moses MD [facility] [phone] [id number] Follow-up Instructions Return in about 1 year (around 6/27/2018) for Annual visit and labs. Upcoming Health Maintenance Date Due INFLUENZA AGE 9 TO ADULT 1/5/2018* DTaP/Tdap/Td series (2 - Td) 1/10/2027 COLONOSCOPY 4/28/2027 *Topic was postponed. The date shown is not the original due date. Allergies as of 6/27/2017  Review Complete On: 6/27/2017 By: Marly Moses MD  
  
 Severity Noted Reaction Type Reactions Bee Sting [Sting, Bee] High 09/08/2010    Swelling Neomycin High 09/08/2010    Rash Neosporin [Neomycin-bacitracin-polymyxin] High 09/08/2010    Rash Current Immunizations  Reviewed on 1/10/2017 Name Date Influenza Vaccine 11/14/2013 Tdap 1/10/2017 Not reviewed this visit You Were Diagnosed With   
  
 Codes Comments Physical exam    -  Primary ICD-10-CM: Z00.00 ICD-9-CM: V70.9 Vitamin D deficiency     ICD-10-CM: E55.9 ICD-9-CM: 268.9 Prediabetes     ICD-10-CM: R73.03 
ICD-9-CM: 790.29 Disorder of lipoid metabolism     ICD-10-CM: E78.9 ICD-9-CM: 272.9 Personal history of DVT (deep vein thrombosis)     ICD-10-CM: W63.559 ICD-9-CM: V12.51 Hx pulmonary embolism     ICD-10-CM: C37.826 ICD-9-CM: V12.55 Bee sting allergy     ICD-10-CM: Z91.038 
ICD-9-CM: V15.06 Panic anxiety syndrome     ICD-10-CM: F41.0 ICD-9-CM: 300.01 Anticoagulant long-term use     ICD-10-CM: Z79.01 
ICD-9-CM: V58.61 Tremor     ICD-10-CM: R25.1 ICD-9-CM: 781.0 Tobacco abuse     ICD-10-CM: Z72.0 ICD-9-CM: 305.1 Class 1 obesity with body mass index (BMI) 32.0 to 32.9 in adult     ICD-10-CM: E66.9, Z68.32 
ICD-9-CM: 278.00, V85.32 Cellulitis of left lower leg     ICD-10-CM: L03.116 ICD-9-CM: 164. 6 Vitals BP Pulse Temp Resp Height(growth percentile) Weight(growth percentile) 114/73 (BP 1 Location: Left arm, BP Patient Position: Sitting) 74 98.1 °F (36.7 °C) 16 6' 0.25\" (1.835 m) 241 lb 3.2 oz (109.4 kg) SpO2 BMI Smoking Status 96% 32.49 kg/m2 Never Smoker Vitals History BMI and BSA Data Body Mass Index Body Surface Area  
 32.49 kg/m 2 2.36 m 2 Preferred Pharmacy Pharmacy Name Phone Mirella Velas[facility] [address]  [place] [phone] Your Updated Medication List  
  
   
This list is accurate as of: 6/27/17 10:31 AM.  Always use your most recent med list.  
  
  
  
  
 doxycycline 100 mg capsule Commonly known as:  Phan Major TK ONE C PO  Q 12 H TAT KRILL -97-36-50 mg Cap Generic drug:  krill-omega-3-dha-epa-lipids Take 1 Cap by mouth daily. rosuvastatin 20 mg tablet Commonly known as:  CRESTOR  
TAKE 1 TABLET BY MOUTH EVERY NIGHT  
  
 warfarin 5 mg tablet Commonly known as:  COUMADIN  
TAKE 1 TABLET BY MOUTH ONCE DAILY MONDAY THROUGH FRIDAY AND ONE-HALF TABLET ONCE DAILY ON SATURDAY AND SUNDAY AS DIRECTED We Performed the Following AMB POC EKG ROUTINE W/ 12 LEADS, INTER & REP [99471 CPT(R)] Follow-up Instructions Return in about 1 year (around 6/27/2018) for Annual visit and labs. Introducing [facility] & HEALTH SERVICES! Chris Garnett introduces Lumoid patient portal. Now you can access parts of your medical record, email your doctor's office, and request medication refills online. 1. In your internet browser, go to [email or web address] 2. Click on the First Time User? Click Here link in the Sign In box. You will see the New Member Sign Up page. 3. Enter your Lumoid Access Code exactly as it appears below. You will not need to use this code after youve completed the sign-up process.  If you do not sign up before the expiration date, you must request a new code. · LearnVest Access Code: [email or web address] Expires: 8/23/2017 11:42 PM 
 
4. Enter the last four digits of your Social Security Number (xxxx) and Date of Birth (mm/dd/yyyy) as indicated and click Submit. You will be taken to the next sign-up page. 5. Create a LearnVest ID. This will be your LearnVest login ID and cannot be changed, so think of one that is secure and easy to remember. 6. Create a LearnVest password. You can change your password at any time. 7. Enter your Password Reset Question and Answer. This can be used at a later time if you forget your password. 8. Enter your e-mail address. You will receive e-mail notification when new information is available in [address] 9. Click Sign Up. You can now view and download portions of your medical record. 10. Click the Download Summary menu link to download a portable copy of your medical information. If you have questions, please visit the Frequently Asked Questions section of the LearnVest website. Remember, LearnVest is NOT to be used for urgent needs. For medical emergencies, dial 911. Now available from your iPhone and Android! Please provide this summary of care documentation to your next provider. Your primary care clinician is listed as [phone] If you have any questions after today's visit, please call [phone].

## 2017-06-27 NOTE — LETTER
8/1/2017 4:30 PM 
 
Mr. Shannan Hair [facility] [address] [address] Dear Shannan Hair: Please find your most recent results below. Resulted Orders PROTHROMBIN TIME + INR Result Value Ref Range INR 2.3 (H) 0.8 - 1.2 Comment:  
   Reference interval is for non-anticoagulated patients. Suggested INR therapeutic range for Vitamin K 
antagonist therapy: 
   Standard Dose (moderate intensity 
                  therapeutic range):       2.0 - 3.0 Higher intensity therapeutic range       2.5 - 3.5 Prothrombin time 23.1 (H) 9.1 - 12.0 sec Narrative Performed at:  [address]  [id number] : Rebeca Cuevas MD, Phone:  [phone] Please call me if you have any questions: [phone] Sincerely, Marcus Francois MD

## 2017-07-12 RX ORDER — ROSUVASTATIN CALCIUM 20 MG/1
TABLET, COATED ORAL
Qty: 90 TAB | Refills: 2 | Status: SHIPPED | OUTPATIENT
Start: 2017-07-12 | End: 2018-06-01 | Stop reason: SDUPTHER

## 2017-07-28 LAB
INR PPP: 2.3 (ref 0.8–1.2)
PROTHROMBIN TIME: 23.1 SEC (ref 9.1–12)

## 2017-08-01 NOTE — PROGRESS NOTES
Spoke with patient after obtaining 2 patient identifiers-Lab results reviewed with the patient. Copy of labs mailed to the patient.

## 2017-09-23 ENCOUNTER — APPOINTMENT (OUTPATIENT)
Dept: GENERAL RADIOLOGY | Age: 47
End: 2017-09-23
Attending: EMERGENCY MEDICINE
Payer: COMMERCIAL

## 2017-09-23 ENCOUNTER — HOSPITAL ENCOUNTER (EMERGENCY)
Age: 47
Discharge: HOME | End: 2017-09-23
Attending: EMERGENCY MEDICINE | Admitting: EMERGENCY MEDICINE
Payer: COMMERCIAL

## 2017-09-23 VITALS
HEART RATE: 78 BPM | WEIGHT: 243 LBS | DIASTOLIC BLOOD PRESSURE: 72 MMHG | SYSTOLIC BLOOD PRESSURE: 118 MMHG | TEMPERATURE: 98.3 F | OXYGEN SATURATION: 96 % | RESPIRATION RATE: 16 BRPM | BODY MASS INDEX: 32.2 KG/M2 | HEIGHT: 73 IN

## 2017-09-23 DIAGNOSIS — S41.112A ARM LACERATION, LEFT, INITIAL ENCOUNTER: Primary | ICD-10-CM

## 2017-09-23 LAB
INR PPP: 2.5 (ref 0.9–1.1)
PROTHROMBIN TIME: 25.7 SEC (ref 9–11.1)

## 2017-09-23 PROCEDURE — 73080 X-RAY EXAM OF ELBOW: CPT

## 2017-09-23 PROCEDURE — 73090 X-RAY EXAM OF FOREARM: CPT

## 2017-09-23 PROCEDURE — 85610 PROTHROMBIN TIME: CPT | Performed by: EMERGENCY MEDICINE

## 2017-09-23 PROCEDURE — 99284 EMERGENCY DEPT VISIT MOD MDM: CPT

## 2017-09-23 PROCEDURE — 36415 COLL VENOUS BLD VENIPUNCTURE: CPT | Performed by: EMERGENCY MEDICINE

## 2017-09-23 PROCEDURE — 75810000294 HC INTERM/LAYERED WND RPR

## 2017-09-23 PROCEDURE — 74011000250 HC RX REV CODE- 250: Performed by: EMERGENCY MEDICINE

## 2017-09-23 PROCEDURE — 77030018836 HC SOL IRR NACL ICUM -A

## 2017-09-23 PROCEDURE — 77030002974 HC SUT PLN J&J -A

## 2017-09-23 PROCEDURE — 77030031132 HC SUT NYL COVD -A

## 2017-09-23 RX ORDER — LIDOCAINE HYDROCHLORIDE AND EPINEPHRINE 10; 10 MG/ML; UG/ML
1.5 INJECTION, SOLUTION INFILTRATION; PERINEURAL ONCE
Status: COMPLETED | OUTPATIENT
Start: 2017-09-23 | End: 2017-09-23

## 2017-09-23 RX ADMIN — LIDOCAINE HYDROCHLORIDE,EPINEPHRINE BITARTRATE 15 MG: 10; .01 INJECTION, SOLUTION INFILTRATION; PERINEURAL at 09:41

## 2017-09-23 NOTE — ED PROVIDER NOTES
HPI Comments: 51 y.o. male with past medical history significant for thromboembolus, phlebitis, umbilical hernia, cellulitis, rectal bleeding, anxiety, and deep vein thrombosis who presents from home with chief complaint of laceration. Patient comes in from work for a left forearm laceration after accidentally cutting it with a chainsaw. Patient notes he was able to control the bleeding, but he currently takes Coumadin for clots. Patient denies any other issues at this time. There are no other acute medical concerns at this time. Social hx: Tobacco Use: No, Alcohol Use: Yes, Drug Use: No    PCP: Georgia Ng MD    Note written by Jeremías Helms, as dictated by Jazmine Hughes MD 9:05 AM      The history is provided by the patient. Past Medical History:   Diagnosis Date    Allergic rhinitis     Anxiety disorder     Cellulitis     post tattoo    Deep vein thrombosis (DVT) ([facility] [address]     [facility]  [facility]17 f/u note to manage Lovenox    Essential tremor     Phlebitis     Pulmonary embolism ([facility] [address] 3/2011    multiple episodes    Rectal bleeding 10/2011    on coumadin @ the time &again 4/2017 4/12/17 note from Carla//colonoscopy report rec'd    Thromboembolus [facility])     Umbilical hernia     Vitamin D deficiency 04/2014    again 4/2017       Past Surgical History:   Procedure Laterality Date    HX APPENDECTOMY  2003    HX COLONOSCOPY  04/28/2017    Dr. Shannan Oh HX HERNIA REPAIR  4505    umbilical         Family History:   Problem Relation Age of Onset    Heart Disease Father     Heart Disease Maternal Grandmother     Heart Disease Maternal Grandfather        Social History     Social History    Marital status: SINGLE     Spouse name: N/A    Number of children: N/A    Years of education: N/A     Occupational History    Not on file.      Social History Main Topics    Smoking status: Never Smoker    Smokeless tobacco: Current User     Types: Snuff    Alcohol use 0.0 oz/week Comment: rare social    Drug use: No    Sexual activity: Yes     Partners: Female     Other Topics Concern    Not on file     Social History Narrative         ALLERGIES: Bee sting [sting, bee]; Neomycin; and Neosporin [neomycin-bacitracin-polymyxin]    Review of Systems   Constitutional: Negative for chills, diaphoresis and fever. HENT: Negative for congestion, postnasal drip, rhinorrhea and sore throat. Eyes: Negative for photophobia, discharge, redness and visual disturbance. Respiratory: Negative for cough, chest tightness, shortness of breath and wheezing. Cardiovascular: Negative for chest pain, palpitations and leg swelling. Gastrointestinal: Negative for abdominal distention, abdominal pain, blood in stool, constipation, diarrhea, nausea and vomiting. Genitourinary: Negative for difficulty urinating, dysuria, frequency, hematuria and urgency. Musculoskeletal: Negative for arthralgias, back pain, joint swelling and myalgias. Skin: Positive for wound. Negative for color change and rash. Neurological: Negative for dizziness, speech difficulty, weakness, light-headedness, numbness and headaches. Psychiatric/Behavioral: Negative for confusion. The patient is not nervous/anxious. All other systems reviewed and are negative. Vitals:    09/23/17 0855   BP: 137/86   Pulse: 81   Resp: 18   Temp: 98 °F (36.7 °C)   SpO2: 100%   Weight: 110.2 kg (243 lb)   Height: 6' 1\" (1.854 m)            Physical Exam   Constitutional: He is oriented to person, place, and time. He appears well-developed and well-nourished. No distress. HENT:   Head: Normocephalic and atraumatic. Right Ear: External ear normal.   Left Ear: External ear normal.   Nose: Nose normal.   Mouth/Throat: Oropharynx is clear and moist.   Eyes: Conjunctivae and EOM are normal. Pupils are equal, round, and reactive to light. No scleral icterus. Neck: Normal range of motion. Neck supple. No JVD present.  No tracheal deviation present. No thyromegaly present. Cardiovascular: Normal rate, regular rhythm and normal heart sounds. Exam reveals no gallop and no friction rub. No murmur heard. Pulmonary/Chest: Effort normal and breath sounds normal. No respiratory distress. He has no wheezes. He has no rales. He exhibits no tenderness. Abdominal: Soft. Bowel sounds are normal. He exhibits no distension and no mass. There is no tenderness. There is no rebound and no guarding. Musculoskeletal: Normal range of motion. He exhibits no edema or tenderness. Patient is distally neurovascularly and motor intact    Lymphadenopathy:     He has no cervical adenopathy. Neurological: He is alert and oriented to person, place, and time. He has normal strength. He displays no atrophy and no tremor. No cranial nerve deficit. He exhibits normal muscle tone. Coordination and gait normal.   Skin: Skin is warm and dry. Laceration noted. No rash noted. He is not diaphoretic. No erythema. See attached image of left forearm   Psychiatric: He has a normal mood and affect. His behavior is normal. Judgment and thought content normal.   Nursing note and vitals reviewed. Note written by Jeremías Jackson, as dictated by Albert Anaya MD 9:05 AM          MDM  Number of Diagnoses or Management Options  Diagnosis management comments: Aury Bonner  Impression: 50-year-old male presenting to the emergency department was acute laceration to his left arm sustained with a chain saw while at work. Of note the patient is on Coumadin secondary to the thrombophilia disease. Plan of care will be x-ray to rule out foreign body, wash the wound repair laceration. His tetanus immunization is up-to-date.     ED Course       Procedures

## 2017-09-23 NOTE — ED TRIAGE NOTES
Pt arrives from work with laceration to left forearm approx 3-4\" in length. Pt was cutting down trees and tree limb hit pt causing chainsaw to cut his forearm. Bleeding controlled upon arrival. Pt takes coumadin.

## 2017-09-23 NOTE — ED NOTES
Procedure Note - Wound Repair:    Performed by BOZENA Gordon . Immediately prior to the procedure, the patient was reevaluated and found suitable for the planned procedure and any planned medications. Tendon/Joint function was Intact. Neurovascular function was Intact. Site prepped with Betadine. Anesthesia was obtained via local infiltration with 1% lidocaine and with epinephrine. Wound irrigated with copious amounts of normal saline and explored. Wound was located on the left forearm, measured 6 cm and was linear and clean wound edges. Level of complexity was: layered. Second superficial laceration was present, 3 cm in length, level of complexity was: simple. Wound was closed using Two layer suture closure: Subcutaneous Layer:  3 sutures placed, stitch type:simple interrupted, suture: 4-0 gut. Skin Layer:  10 sutures placed, stitch type:simple interrupted, suture: 4-0 nylon. .  Foreign body was not suspected. Foreign body was not found.   Procedure was tolerated well with no immediate complications

## 2017-09-23 NOTE — DISCHARGE INSTRUCTIONS
Cuts Closed With Stitches: Care Instructions  Your Care Instructions  A cut can happen anywhere on your body. The doctor used stitches to close the cut. Using stitches also helps the cut heal and reduces scarring. Sometimes pieces of tape called Steri-Strips are put over the stitches. If the cut went deep and through the skin, the doctor may have put in two layers of stitches. The deeper layer brings the deep part of the cut together. These stitches will dissolve and don't need to be removed. The stitches in the upper layer are the ones you see on the cut. You will probably have a bandage over the stitches. You will need to have the stitches removed, usually in 7 to 14 days. The doctor has checked you carefully, but problems can develop later. If you notice any problems or new symptoms, get medical treatment right away. Follow-up care is a key part of your treatment and safety. Be sure to make and go to all appointments, and call your doctor if you are having problems. It's also a good idea to know your test results and keep a list of the medicines you take. How can you care for yourself at home? · Keep the cut dry for the first 24 to 48 hours. After this, you can shower if your doctor okays it. Pat the cut dry. · Don't soak the cut, such as in a bathtub. Your doctor will tell you when it's safe to get the cut wet. · If your doctor told you how to care for your cut, follow your doctor's instructions. If you did not get instructions, follow this general advice:  ¨ After the first 24 to 48 hours, wash around the cut with clean water 2 times a day. Don't use hydrogen peroxide or alcohol, which can slow healing. ¨ You may cover the cut with a thin layer of petroleum jelly, such as Vaseline, and a nonstick bandage. ¨ Apply more petroleum jelly and replace the bandage as needed. · Prop up the sore area on a pillow anytime you sit or lie down during the next 3 days.  Try to keep it above the level of your heart. This will help reduce swelling. · Avoid any activity that could cause your cut to reopen. · Do not remove the stitches on your own. Your doctor will tell you when to come back to have the stitches removed. · Leave Steri-Strips on until they fall off. · Be safe with medicines. Read and follow all instructions on the label. ¨ If the doctor gave you a prescription medicine for pain, take it as prescribed. ¨ If you are not taking a prescription pain medicine, ask your doctor if you can take an over-the-counter medicine. When should you call for help? Call your doctor now or seek immediate medical care if:  · You have new pain, or your pain gets worse. · The skin near the cut is cold or pale or changes color. · You have tingling, weakness, or numbness near the cut. · The cut starts to bleed, and blood soaks through the bandage. Oozing small amounts of blood is normal.  · You have trouble moving the area near the cut. · You have symptoms of infection, such as:  ¨ Increased pain, swelling, warmth, or redness around the cut. ¨ Red streaks leading from the cut. ¨ Pus draining from the cut. ¨ A fever. Watch closely for changes in your health, and be sure to contact your doctor if:  · The cut reopens. · You do not get better as expected. Where can you learn more? Go to http://tito-ludmila.info/. Enter R217 in the search box to learn more about \"Cuts Closed With Stitches: Care Instructions. \"  Current as of: March 20, 2017  Content Version: 11.3  © 5284-5139 reportbrain. Care instructions adapted under license by [employer] (which disclaims liability or warranty for this information). If you have questions about a medical condition or this instruction, always ask your healthcare professional. [address] any warranty or liability for your use of this information.

## 2017-09-23 NOTE — LETTER
[address] 
[address] [place] [address] [id number] 
[phone] Work/School Note Date: 9/23/2017 To Whom It May concern: Hannah Herring was seen and treated today in the emergency room by the following provider(s): 
Attending Provider: Albert Anaya MD. Hannah Herring - no use left arm for 7 days Sincerely, 
 
 
 
 
Albert Anaya MD

## 2017-10-25 ENCOUNTER — OFFICE VISIT (OUTPATIENT)
Dept: FAMILY MEDICINE CLINIC | Age: 47
End: 2017-10-25

## 2017-10-25 VITALS
OXYGEN SATURATION: 96 % | HEART RATE: 66 BPM | HEIGHT: 73 IN | BODY MASS INDEX: 32.93 KG/M2 | TEMPERATURE: 96.7 F | WEIGHT: 248.5 LBS | DIASTOLIC BLOOD PRESSURE: 69 MMHG | SYSTOLIC BLOOD PRESSURE: 118 MMHG | RESPIRATION RATE: 16 BRPM

## 2017-10-25 DIAGNOSIS — N52.9 ERECTILE DYSFUNCTION, UNSPECIFIED ERECTILE DYSFUNCTION TYPE: Primary | ICD-10-CM

## 2017-10-25 RX ORDER — SILDENAFIL CITRATE 20 MG/1
20-100 TABLET ORAL
Qty: 20 TAB | Refills: 0 | Status: SHIPPED | OUTPATIENT
Start: 2017-10-25 | End: 2018-03-28

## 2017-10-25 NOTE — PROGRESS NOTES
Chief Complaint   Patient presents with    Blood Pressure Check    Medication Refill     1. Have you been to the ER, urgent care clinic since your last visit? Hospitalized since your last visit? Yes When: 9/23/17 Where: [facility] ER Reason for visit: Cut left arm with a chain saw. 2. Have you seen or consulted any other health care providers outside of the [address] [place] [address] since your last visit? Include any pap smears or colon screening. No     I have reviewed Health Maintenance with the patient and updated. Advance Care Planning information reviewed and given to the patient.

## 2017-10-25 NOTE — PROGRESS NOTES
Pbs keeping erection past month. Never been on meds. Walking, crunches, drinking more water. Visit Vitals    /69 (BP 1 Location: Left arm, BP Patient Position: Sitting)    Pulse 66    Temp 96.7 °F (35.9 °C) (Oral)    Resp 16    Ht 6' 1\" (1.854 m)    Wt 248 lb 8 oz (112.7 kg)    SpO2 96%    BMI 32.79 kg/m2       Patient alert and cooperative. Reviewed above. Assessment:  1. ED. Plan:  1. Begin Sildenafil 20 mg tab, take up to 5 at one time for dysfunction. 2. Patient due for monthly INR check today. 3. Return in eight months for CHP, annual blood work. 4. Follow otherwise here prn.

## 2017-10-25 NOTE — MR AVS SNAPSHOT
Visit Information Date & Time Provider Department Dept. Phone Encounter #  
 10/25/2017  9:40 AM Brenda Schmidt MD [facility] [phone] [id number] Follow-up Instructions Return in about 8 months (around 6/25/2018) for [facility], labs. Upcoming Health Maintenance Date Due INFLUENZA AGE 9 TO ADULT 1/5/2018* DTaP/Tdap/Td series (2 - Td) 1/10/2027 COLONOSCOPY 4/28/2027 *Topic was postponed. The date shown is not the original due date. Allergies as of 10/25/2017  Review Complete On: 10/25/2017 By: Brenda Schmidt MD  
  
 Severity Noted Reaction Type Reactions Bee Sting [Sting, Bee] High 09/08/2010    Swelling Neomycin High 09/08/2010    Rash Neosporin [Neomycin-bacitracin-polymyxin] High 09/08/2010    Rash Current Immunizations  Reviewed on 1/10/2017 Name Date Influenza Vaccine 11/14/2013 Tdap 1/10/2017 Not reviewed this visit You Were Diagnosed With   
  
 Codes Comments Erectile dysfunction, unspecified erectile dysfunction type    -  Primary ICD-10-CM: N52.9 ICD-9-CM: 607.84 Vitals BP Pulse Temp Resp Height(growth percentile) Weight(growth percentile)  
 118/69 (BP 1 Location: Left arm, BP Patient Position: Sitting) 66 96.7 °F (35.9 °C) (Oral) 16 6' 1\" (1.854 m) 248 lb 8 oz (112.7 kg) SpO2 BMI Smoking Status 96% 32.79 kg/m2 Never Smoker Vitals History BMI and BSA Data Body Mass Index Body Surface Area 32.79 kg/m 2 2.41 m 2 Preferred Pharmacy Pharmacy Name Phone Mirella Velasquez [address]  [place] [phone] Your Updated Medication List  
  
   
This list is accurate as of: 10/25/17 10:17 AM.  Always use your most recent med list. KRILL -49-46-50 mg Cap Generic drug:  krill-omega-3-dha-epa-lipids Take 1 Cap by mouth daily. rosuvastatin 20 mg tablet Commonly known as:  CRESTOR  
TAKE 1 TABLET BY MOUTH EVERY NIGHT  
  
 sildenafil 20 mg tablet Commonly known as:  REVATIO Take 1-5 Tabs by mouth daily as needed. warfarin 5 mg tablet Commonly known as:  COUMADIN  
TAKE 1 TABLET BY MOUTH ONCE DAILY MONDAY THROUGH FRIDAY AND ONE-HALF TABLET ONCE DAILY ON SATURDAY AND SUNDAY AS DIRECTED Prescriptions Printed Refills  
 sildenafil (REVATIO) 20 mg tablet 0 Sig: Take 1-5 Tabs by mouth daily as needed. Class: Print Route: Oral  
  
Follow-up Instructions Return in about 8 months (around 6/25/2018) for [facility], labs. Introducing [facility] SERVICES! Kash Luciano introduces Huddler patient portal. Now you can access parts of your medical record, email your doctor's office, and request medication refills online. 1. In your internet browser, go to [email or web address] 2. Click on the First Time User? Click Here link in the Sign In box. You will see the New Member Sign Up page. 3. Enter your Huddler Access Code exactly as it appears below. You will not need to use this code after youve completed the sign-up process. If you do not sign up before the expiration date, you must request a new code. · Huddler Access Code: [email or web address] Expires: 12/22/2017 11:04 AM 
 
4. Enter the last four digits of your Social Security Number (xxxx) and Date of Birth (mm/dd/yyyy) as indicated and click Submit. You will be taken to the next sign-up page. 5. Create a Easy-Pointt ID. This will be your Huddler login ID and cannot be changed, so think of one that is secure and easy to remember. 6. Create a Huddler password. You can change your password at any time. 7. Enter your Password Reset Question and Answer. This can be used at a later time if you forget your password. 8. Enter your e-mail address. You will receive e-mail notification when new information is available in [address] 9. Click Sign Up. You can now view and download portions of your medical record. 10. Click the Download Summary menu link to download a portable copy of your medical information. If you have questions, please visit the Frequently Asked Questions section of the [facility] website. Remember, [facility] is NOT to be used for urgent needs. For medical emergencies, dial 911. Now available from your iPhone and Android! Please provide this summary of care documentation to your next provider. Your primary care clinician is listed as [phone] If you have any questions after today's visit, please call [phone].

## 2017-10-25 NOTE — LETTER
11/1/2017 10:56 AM 
 
Mr. Gillian Reinoso [address] [address] [id number] Dear Gillian Reinoso: Please find your most recent results below. Resulted Orders PROTHROMBIN TIME + INR Result Value Ref Range INR 2.5 (H) 0.8 - 1.2 Comment:  
   Reference interval is for non-anticoagulated patients. Suggested INR therapeutic range for Vitamin K 
antagonist therapy: 
   Standard Dose (moderate intensity 
                  therapeutic range):       2.0 - 3.0 Higher intensity therapeutic range       2.5 - 3.5 Prothrombin time 25.0 (H) 9.1 - 12.0 sec Narrative Performed at:  [address]  [id number] : Christine Sullivan MD, Phone:  [phone] Please call me if you have any questions: [phone] Sincerely, Ekta Bland MD

## 2017-10-26 LAB
INR PPP: 2.5 (ref 0.8–1.2)
PROTHROMBIN TIME: 25 SEC (ref 9.1–12)

## 2018-01-24 ENCOUNTER — LAB ONLY (OUTPATIENT)
Dept: FAMILY MEDICINE CLINIC | Age: 48
End: 2018-01-24

## 2018-01-24 DIAGNOSIS — Z79.01 ANTICOAGULANT LONG-TERM USE: Primary | ICD-10-CM

## 2018-01-25 LAB
INR PPP: 1.8 (ref 0.8–1.2)
PROTHROMBIN TIME: 18.8 SEC (ref 9.1–12)

## 2018-03-22 LAB
INR PPP: 3 (ref 0.8–1.2)
PROTHROMBIN TIME: 29.3 SEC (ref 9.1–12)

## 2018-03-28 ENCOUNTER — HOSPITAL ENCOUNTER (EMERGENCY)
Age: 48
Discharge: HOME | End: 2018-03-28
Attending: EMERGENCY MEDICINE
Payer: COMMERCIAL

## 2018-03-28 VITALS
HEART RATE: 67 BPM | BODY MASS INDEX: 32.25 KG/M2 | SYSTOLIC BLOOD PRESSURE: 147 MMHG | OXYGEN SATURATION: 98 % | HEIGHT: 74 IN | RESPIRATION RATE: 17 BRPM | TEMPERATURE: 98.4 F | DIASTOLIC BLOOD PRESSURE: 81 MMHG | WEIGHT: 251.32 LBS

## 2018-03-28 DIAGNOSIS — L73.9 FOLLICULITIS: Primary | ICD-10-CM

## 2018-03-28 PROCEDURE — 99283 EMERGENCY DEPT VISIT LOW MDM: CPT

## 2018-03-28 PROCEDURE — 74011250637 HC RX REV CODE- 250/637: Performed by: PHYSICIAN ASSISTANT

## 2018-03-28 RX ORDER — SILDENAFIL 50 MG/1
50 TABLET, FILM COATED ORAL AS NEEDED
COMMUNITY
End: 2020-08-28 | Stop reason: SDUPTHER

## 2018-03-28 RX ORDER — HYDROCODONE BITARTRATE AND ACETAMINOPHEN 5; 325 MG/1; MG/1
1 TABLET ORAL
Qty: 10 TAB | Refills: 0 | Status: SHIPPED | OUTPATIENT
Start: 2018-03-28 | End: 2018-08-15 | Stop reason: ALTCHOICE

## 2018-03-28 RX ORDER — DOXYCYCLINE HYCLATE 100 MG
100 TABLET ORAL
Status: COMPLETED | OUTPATIENT
Start: 2018-03-28 | End: 2018-03-28

## 2018-03-28 RX ORDER — HYDROCODONE BITARTRATE AND ACETAMINOPHEN 5; 325 MG/1; MG/1
1 TABLET ORAL
Status: COMPLETED | OUTPATIENT
Start: 2018-03-28 | End: 2018-03-28

## 2018-03-28 RX ORDER — DOXYCYCLINE 100 MG/1
100 CAPSULE ORAL 2 TIMES DAILY
Qty: 20 CAP | Refills: 0 | Status: SHIPPED | OUTPATIENT
Start: 2018-03-29 | End: 2018-08-15 | Stop reason: ALTCHOICE

## 2018-03-28 RX ADMIN — DOXYCYCLINE HYCLATE 100 MG: 100 TABLET, COATED ORAL at 23:09

## 2018-03-28 RX ADMIN — HYDROCODONE BITARTRATE AND ACETAMINOPHEN 1 TABLET: 5; 325 TABLET ORAL at 23:09

## 2018-03-28 NOTE — LETTER
[address] 
[facility] EMERGENCY DEPT 
[address] [place] [address] [address] [id number] [phone] Work/School Note Date: 3/28/2018 To Whom It May concern: Katia Peña was seen and treated today in the emergency room by the following provider(s): 
Attending Provider: Nicolasa Garcia MD 
Physician Assistant: BOZENA Tracey. Katia Peña may return to work on 3/31/18 or sooner, if feeling better. Sincerely, Catrachita Marvin PA

## 2018-03-29 NOTE — ED NOTES
BOZENA Navarro at bedside to provide discharge paperwork. Vital signs stable. Pt in no apparent distress at this time. Mental status at baseline. Ambulatory to waiting room with steady gate, discharge paperwork in hand. Accompanied by visitor.

## 2018-03-29 NOTE — ED NOTES
Assumed care of patient from triage. Patient is A&Ox4, respirations even and unlabored. Pt. States he noticed what appears to be an insect bite to the right side of his face, temporal region on Sunday and reports the area is tingling and he believes swelling is moving down his face from the initial site. Patient denies vision changes but reports headache. No other complaints at this time. Pt. Connie Crawford in low bed in POC, side rail x1.

## 2018-03-29 NOTE — DISCHARGE INSTRUCTIONS
Folliculitis: Care Instructions  Your Care Instructions    Folliculitis (say \"aoo-YPK-hie-LY-tus\") is an infection of the pouches (follicles) in the skin where hair grows. It can occur on any part of the body, but it is most common on the scalp, face, armpits, and groin. Bacteria, such as those found in a hot tub, can cause folliculitis. Folliculitis begins as a red, tender area near a strand of hair. The skin can itch or burn and may drain pus or blood. Sometimes folliculitis can lead to more serious skin infections. Your doctor usually can treat mild folliculitis with an antibiotic cream or ointment. If you have folliculitis on your scalp, you may use a shampoo that kills bacteria. Antibiotics you take as pills can treat infections deeper in the skin. For stubborn cases of folliculitis, laser treatment may be an option. Laser treatment uses strong beams of light to destroy the hair follicle. But hair will no longer grow in the treated area. Follow-up care is a key part of your treatment and safety. Be sure to make and go to all appointments, and call your doctor if you are having problems. It's also a good idea to know your test results and keep a list of the medicines you take. How can you care for yourself at home? · Take your medicine exactly as prescribed. If your doctor prescribed antibiotics, take them as directed. Do not stop taking them just because you feel better. You need to take the full course of antibiotics. · Use a soap that kills bacteria to wash the infected area. If your scalp or beard is infected, use a shampoo with selenium or propylene glycol. Be careful. Do not scrub too long or too hard. · Mix 1 1/3 cup warm water and 1 tablespoon vinegar. Soak a cloth in the mixture, and place it over the infected skin until it cools off (usually 5 to 10 minutes). You can do this 3 to 6 times a day. · Do not share your razor, towel, or washcloth. That can spread folliculitis.   · Use a new blade in your razor each time you shave to keep from re-infecting your skin. · If you tend to get folliculitis, avoid using hot tubs. They can contain bacteria that cause folliculitis. When should you call for help? Call your doctor now or seek immediate medical care if:  ? · You have symptoms of infection, such as:  ¨ Increased pain, swelling, warmth, or redness. ¨ Red streaks leading from the area. ¨ Pus draining from the area. ¨ A fever. ? Watch closely for changes in your health, and be sure to contact your doctor if:  ? · You do not get better as expected. Where can you learn more? Go to http://tito-ludmila.info/. Enter M257 in the search box to learn more about \"Folliculitis: Care Instructions. \"  Current as of: October 13, 2016  Content Version: 11.4  © 1409-7361 [employer]. Care instructions adapted under license by [employer] (which disclaims liability or warranty for this information). If you have questions about a medical condition or this instruction, always ask your healthcare professional. [address] any warranty or liability for your use of this information.

## 2018-03-29 NOTE — ED PROVIDER NOTES
EMERGENCY DEPARTMENT HISTORY AND PHYSICAL EXAM      Date: 3/28/2018  Patient Name: Becky Russell    History of Presenting Illness     Chief Complaint   Patient presents with   [address]     since sunday noticed red spot on right side of face. \"i think something bit me and it wont go away\" last medicated with tylenol at 9pm       History Provided By: Patient    HPI: Becky Russell, 49 y.o. male with PMHx significant for phlebitis, umbilical hernia, cellulitis, PE, anxiety, tremors, DVT, and a-fib, presents ambulatory to the ED with cc of 3 small bumps to the right side of his face which he first noticed 3 days ago. Pt is unsure of the source of the bumps. He explains that the bumps are painful. His associated pain is moderate and constant. He describes the pain as a stinging sensation. Pt also c/o an associated headache. He has tried Tylenol with no relief. Pt is currently on Coumadin. He reports no itching or burning with the bumps. He denies visual disturbance or fevers. PCP: Marco Pearce MD    There are no other complaints, changes, or physical findings at this time. Current Facility-Administered Medications   Medication Dose Route Frequency Provider Last Rate Last Dose    doxycycline (VIBRAMYCIN) capsule 100 mg  100 mg Oral NOW BOZENA Pearson        HYDROcodone-acetaminophen [facility]) 5-325 mg per tablet 1 Tab  1 Tab Oral NOW BOZENA Pearson         Current Outpatient Prescriptions   Medication Sig Dispense Refill    [START ON 3/29/2018] doxycycline (MONODOX) 100 mg capsule Take 1 Cap by mouth two (2) times a day. 20 Cap 0    HYDROcodone-acetaminophen (NORCO) 5-325 mg per tablet Take 1 Tab by mouth every six (6) hours as needed for Pain. Max Daily Amount: 4 Tabs.  10 Tab 0    warfarin (COUMADIN) 5 mg tablet TAKE 1 TABLET BY MOUTH ONCE DAILY ON MONDAY THROUGH FRIDAY AND ONE-HALF TABLET ONCE DAILY ON SATURDAY AND SUNDAY AS DIRECTED. 30 Tab 0    rosuvastatin (CRESTOR) 20 mg tablet TAKE 1 TABLET BY MOUTH EVERY NIGHT 90 Tab 2    krill-omega-3-dha-epa-lipids (KRILL OIL) 356-15-80-27 mg cap Take 1 Cap by mouth daily.  sildenafil citrate (VIAGRA) 50 mg tablet Take 50 mg by mouth as needed. Past History     Past Medical History:  Past Medical History:   Diagnosis Date    [facility]fib ([facility]     Allergic rhinitis     Anxiety disorder     Cellulitis     post tattoo    Deep vein thrombosis (DVT) ([facility] [address]     [facility]  [facility]/24/17 f/u note to manage Lovenox    Essential tremor     Laceration of arm 09/23/2017    Left cut by a chain saw.  Phlebitis     Pulmonary embolism ([facility] [address] 3/2011    multiple episodes    Rectal bleeding 10/2011    on coumadin @ the time &again 4/2017 4/12/17 note from Carla//colonoscopy report rec'd    Thromboembolus [facility])     Umbilical hernia     Vitamin D deficiency 04/2014    again 4/2017       Past Surgical History:  Past Surgical History:   Procedure Laterality Date    HX APPENDECTOMY  2003    HX COLONOSCOPY  04/28/2017    Dr. Dre Pires HX HERNIA REPAIR  [id number]    umbilical       Family History:  Family History   Problem Relation Age of Onset    Heart Disease Father     Heart Disease Maternal Grandmother     Heart Disease Maternal Grandfather        Social History:  Social History   Substance Use Topics    Smoking status: Never Smoker    Smokeless tobacco: Current User     Types: Snuff    Alcohol use 0.0 oz/week      Comment: weekends       Allergies: Allergies   Allergen Reactions    Bee Sting [Sting, Bee] Swelling    Neomycin Rash    Neosporin [Neomycin-Bacitracin-Polymyxin] Rash         Review of Systems   Review of Systems   Constitutional: Negative. Negative for fever. HENT: Negative. Eyes: Negative for visual disturbance. Respiratory: Negative. Cardiovascular: Negative. Gastrointestinal: Negative. Negative for constipation, diarrhea, nausea and vomiting.         Denies liver disease   Endocrine:        Denies thyroid disease   Genitourinary: Negative. Negative for dysuria. Denies kidney disease   Musculoskeletal: Negative. Skin: Positive for rash (3 bumps to right side of face). Neurological: Positive for headaches. All other systems reviewed and are negative. Physical Exam   Physical Exam   Constitutional: He is oriented to person, place, and time. He appears well-developed and well-nourished. No distress. HENT:   Head: Normocephalic and atraumatic. Right Ear: External ear normal.   Left Ear: External ear normal.   Nose: Nose normal.   Mouth/Throat: Oropharynx is clear and moist. No oropharyngeal exudate. Eyes: Conjunctivae and EOM are normal. Pupils are equal, round, and reactive to light. Right eye exhibits no discharge. Left eye exhibits no discharge. No scleral icterus. Neck: Normal range of motion. Neck supple. No tracheal deviation present. Cardiovascular: Normal rate, regular rhythm, normal heart sounds and intact distal pulses. Exam reveals no gallop and no friction rub. No murmur heard. Pulmonary/Chest: Effort normal and breath sounds normal. No respiratory distress. He has no wheezes. He has no rales. He exhibits no tenderness. Abdominal: Soft. Bowel sounds are normal. He exhibits no distension and no mass. There is no tenderness. There is no rebound and no guarding. Musculoskeletal: He exhibits no edema or tenderness. Lymphadenopathy:     He has no cervical adenopathy. Neurological: He is alert and oriented to person, place, and time. No cranial nerve deficit. Coordination normal.   Skin: Skin is warm and dry. No rash noted. No erythema. 3 4-5 mm areas of induration and erythema to right cheek, temple, and preauricular area. Able to express a small amount of puss. Psychiatric: He has a normal mood and affect. His behavior is normal. Judgment and thought content normal.   Nursing note and vitals reviewed.     Medical Decision Making   I am the first provider for this patient. I reviewed the vital signs, available nursing notes, past medical history, past surgical history, family history and social history. Vital Signs-Reviewed the patient's vital signs. Patient Vitals for the past 12 hrs:   Temp Pulse Resp BP SpO2   03/28/18 2146 98.4 °F (36.9 °C) 67 17 147/81 98 %     Records Reviewed: Nursing Notes and Old Medical Records    Provider Notes (Medical Decision Making):   DDx: folliculitis, cellulitis, abscess, rash, ingrown hair, viral rash    ED Course:   Initial assessment performed. The patients presenting problems have been discussed, and they are in agreement with the care plan formulated and outlined with them. I have encouraged them to ask questions as they arise throughout their visit. 10:49 PM  Patient counseled to wear sunscreen while working outside after taking the antibiotic. He also was given strict return precautions. Pt understands and agrees. Disposition:  DISCHARGE NOTE  10:53 PM  The patient has been re-evaluated and is ready for discharge. Reviewed available results with patient. Counseled patient on diagnosis and care plan. Patient has expressed understanding, and all questions have been answered. Patient agrees with plan and agrees to follow up as recommended, or return to the ED if their symptoms worsen. Discharge instructions have been provided and explained to the patient, along with reasons to return to the ED. PLAN:  1. Current Discharge Medication List      START taking these medications    Details   doxycycline (MONODOX) 100 mg capsule Take 1 Cap by mouth two (2) times a day. Qty: 20 Cap, Refills: 0      HYDROcodone-acetaminophen (NORCO) 5-325 mg per tablet Take 1 Tab by mouth every six (6) hours as needed for Pain. Max Daily Amount: 4 Tabs. Qty: 10 Tab, Refills: 0    Associated Diagnoses: Folliculitis           2.    Follow-up Information     Follow up With Details Comments [address] [place]   [id number] [address] [facility] [address]  [phone]      [facility] EMERGENCY DEPT  If symptoms worsen [address] [facility] [place] [address]  [phone]        Return to ED if worse     Diagnosis     Clinical Impression:   1. Folliculitis        Attestations:    Attestation Note:  This note is prepared by SELENE [facility], acting as Scribe for [employer]    ENE Salas Peer: The scribe's documentation has been prepared under my direction and personally reviewed by me in its entirety. I confirm that the note above accurately reflects all work, treatment, procedures, and medical decision making performed by me.

## 2018-04-03 RX ORDER — WARFARIN SODIUM 5 MG/1
TABLET ORAL
Qty: 30 TAB | Refills: 0 | Status: SHIPPED | OUTPATIENT
Start: 2018-04-03 | End: 2018-05-07 | Stop reason: SDUPTHER

## 2018-04-03 NOTE — TELEPHONE ENCOUNTER
PCP: Dell Almanza MD    Last appt: 3/21/2018  Future Appointments  Date Time Provider Sabi Elena   4/23/2018 9:40 AM LAB [facility] GLENDY BLAIR       Requested Prescriptions     Pending Prescriptions Disp Refills    warfarin (COUMADIN) 5 mg tablet [Pharmacy Med Name: WARFARIN SOD 5MG TABLETS (PEACH)] 30 Tab 0     Sig: TAKE 1 TABLET BY MOUTH ONCE DAILY MONDAY- FRIDAY AND 1/2 TABLET BY MOUTH ONCE DAILY SAT-SUN     Lab Results   Component Value Date/Time    Sodium 139 06/14/2017 12:54 AM    Potassium 3.8 06/14/2017 12:54 AM    Chloride 108 06/14/2017 12:54 AM    CO2 23 06/14/2017 12:54 AM    Anion gap 8 06/14/2017 12:54 AM    Glucose 126 (H) 06/14/2017 12:54 AM    BUN 20 06/14/2017 12:54 AM    Creatinine 0.86 06/14/2017 12:54 AM    BUN/Creatinine ratio 23 (H) 06/14/2017 12:54 AM    GFR est AA >60 06/14/2017 12:54 AM    GFR est non-AA >60 06/14/2017 12:54 AM    Calcium 8.2 (L) 06/14/2017 12:54 AM     Lab Results   Component Value Date/Time    Hemoglobin A1c 6.3 (H) 04/04/2017 09:05 AM     Lab Results   Component Value Date/Time    Cholesterol, total 132 04/04/2017 09:05 AM    HDL Cholesterol 37 (L) 04/04/2017 09:05 AM    LDL, calculated 61 04/04/2017 09:05 AM    VLDL, calculated 34 04/04/2017 09:05 AM    Triglyceride 172 (H) 04/04/2017 09:05 AM     Lab Results   Component Value Date/Time    TSH 2.390 04/04/2016 11:06 AM

## 2018-04-24 LAB
INR PPP: 2.3 (ref 0.8–1.2)
PROTHROMBIN TIME: 22.8 SEC (ref 9.1–12)

## 2018-05-09 NOTE — TELEPHONE ENCOUNTER
PCP: Hernandez Alcala MD    Last appt: 4/23/2018  No future appointments.     Requested Prescriptions     Pending Prescriptions Disp Refills    warfarin (COUMADIN) 5 mg tablet [Pharmacy Med Name: WARFARIN SOD 5MG TABLETS (PEACH)] 30 Tab 0     Sig: TAKE 1 TABLET BY MOUTH ONCE DAILY MONDAY- FRIDAY AND 1/2 TABLET BY MOUTH ONCE DAILY SAT-SUN     Lab Results   Component Value Date/Time    Sodium 139 06/14/2017 12:54 AM    Potassium 3.8 06/14/2017 12:54 AM    Chloride 108 06/14/2017 12:54 AM    CO2 23 06/14/2017 12:54 AM    Anion gap 8 06/14/2017 12:54 AM    Glucose 126 (H) 06/14/2017 12:54 AM    BUN 20 06/14/2017 12:54 AM    Creatinine 0.86 06/14/2017 12:54 AM    BUN/Creatinine ratio 23 (H) 06/14/2017 12:54 AM    GFR est AA >60 06/14/2017 12:54 AM    GFR est non-AA >60 06/14/2017 12:54 AM    Calcium 8.2 (L) 06/14/2017 12:54 AM     Lab Results   Component Value Date/Time    Hemoglobin A1c 6.3 (H) 04/04/2017 09:05 AM     Lab Results   Component Value Date/Time    Cholesterol, total 132 04/04/2017 09:05 AM    HDL Cholesterol 37 (L) 04/04/2017 09:05 AM    LDL, calculated 61 04/04/2017 09:05 AM    VLDL, calculated 34 04/04/2017 09:05 AM    Triglyceride 172 (H) 04/04/2017 09:05 AM     Lab Results   Component Value Date/Time    TSH 2.390 04/04/2016 11:06 AM

## 2018-05-10 NOTE — TELEPHONE ENCOUNTER
----- Message from Gamaliel Russell sent at 5/9/2018  6:00 PM EDT -----  Regarding: Dr. Freida Iverson  Pt called regarding status of Rx(Warfarin\" 5 mg) requested 2 days ago to be called to [facility] on [address], and no response from the doctor. Pt stated he will be out of medication by Saturday. Best contact number [phone].

## 2018-05-12 RX ORDER — WARFARIN SODIUM 5 MG/1
TABLET ORAL
Qty: 30 TAB | Refills: 0 | Status: SHIPPED | OUTPATIENT
Start: 2018-05-12 | End: 2018-06-12 | Stop reason: SDUPTHER

## 2018-06-01 RX ORDER — ROSUVASTATIN CALCIUM 20 MG/1
TABLET, COATED ORAL
Qty: 90 TAB | Refills: 0 | Status: SHIPPED | OUTPATIENT
Start: 2018-06-01 | End: 2018-08-31 | Stop reason: SDUPTHER

## 2018-06-01 NOTE — TELEPHONE ENCOUNTER
PCP: Andrés Coats MD    Last appt: 4/23/2018  No future appointments.     Requested Prescriptions     Pending Prescriptions Disp Refills    rosuvastatin (CRESTOR) 20 mg tablet [Pharmacy Med Name: ROSUVASTATIN CALCIUM 20MG TABLETS] 90 Tab 0     Sig: TAKE 1 TABLET BY MOUTH EVERY NIGHT       Prior labs and Blood pressures:  BP Readings from Last 3 Encounters:   03/28/18 147/81   10/25/17 118/69   09/23/17 118/72     Lab Results   Component Value Date/Time    Sodium 139 06/14/2017 12:54 AM    Potassium 3.8 06/14/2017 12:54 AM    Chloride 108 06/14/2017 12:54 AM    CO2 23 06/14/2017 12:54 AM    Anion gap 8 06/14/2017 12:54 AM    Glucose 126 (H) 06/14/2017 12:54 AM    BUN 20 06/14/2017 12:54 AM    Creatinine 0.86 06/14/2017 12:54 AM    BUN/Creatinine ratio 23 (H) 06/14/2017 12:54 AM    GFR est AA >60 06/14/2017 12:54 AM    GFR est non-AA >60 06/14/2017 12:54 AM    Calcium 8.2 (L) 06/14/2017 12:54 AM     Lab Results   Component Value Date/Time    Hemoglobin A1c 6.3 (H) 04/04/2017 09:05 AM     Lab Results   Component Value Date/Time    Cholesterol, total 132 04/04/2017 09:05 AM    HDL Cholesterol 37 (L) 04/04/2017 09:05 AM    LDL, calculated 61 04/04/2017 09:05 AM    VLDL, calculated 34 04/04/2017 09:05 AM    Triglyceride 172 (H) 04/04/2017 09:05 AM     Lab Results   Component Value Date/Time    VITAMIN D, 25-HYDROXY 18.0 (L) 04/04/2017 09:05 AM       Lab Results   Component Value Date/Time    TSH 2.390 04/04/2016 11:06 AM

## 2018-06-20 LAB
INR PPP: 2.1 (ref 0.8–1.2)
PROTHROMBIN TIME: 21.4 SEC (ref 9.1–12)

## 2018-06-21 ENCOUNTER — TELEPHONE (OUTPATIENT)
Dept: FAMILY MEDICINE CLINIC | Age: 48
End: 2018-06-21

## 2018-08-15 ENCOUNTER — OFFICE VISIT (OUTPATIENT)
Dept: FAMILY MEDICINE CLINIC | Age: 48
End: 2018-08-15

## 2018-08-15 VITALS
TEMPERATURE: 96.2 F | OXYGEN SATURATION: 96 % | WEIGHT: 254.9 LBS | BODY MASS INDEX: 33.78 KG/M2 | HEART RATE: 70 BPM | DIASTOLIC BLOOD PRESSURE: 72 MMHG | HEIGHT: 73 IN | RESPIRATION RATE: 16 BRPM | SYSTOLIC BLOOD PRESSURE: 113 MMHG

## 2018-08-15 DIAGNOSIS — R73.03 PREDIABETES: ICD-10-CM

## 2018-08-15 DIAGNOSIS — Z79.01 ANTICOAGULANT LONG-TERM USE: ICD-10-CM

## 2018-08-15 DIAGNOSIS — Z12.5 PROSTATE CANCER SCREENING: ICD-10-CM

## 2018-08-15 DIAGNOSIS — E55.9 VITAMIN D DEFICIENCY: ICD-10-CM

## 2018-08-15 DIAGNOSIS — R60.9 EDEMA, UNSPECIFIED TYPE: Primary | ICD-10-CM

## 2018-08-15 DIAGNOSIS — Z00.00 LABORATORY EXAM ORDERED AS PART OF ROUTINE GENERAL MEDICAL EXAMINATION: ICD-10-CM

## 2018-08-15 DIAGNOSIS — E78.9 DISORDER OF LIPOID METABOLISM: ICD-10-CM

## 2018-08-15 PROBLEM — E66.9 CLASS 1 OBESITY WITH BODY MASS INDEX (BMI) 32.0 TO 32.9 IN ADULT: Status: RESOLVED | Noted: 2017-06-27 | Resolved: 2018-08-15

## 2018-08-15 RX ORDER — HYDROCHLOROTHIAZIDE 25 MG/1
25 TABLET ORAL DAILY
Qty: 10 TAB | Refills: 0 | Status: SHIPPED | OUTPATIENT
Start: 2018-08-15 | End: 2019-08-09

## 2018-08-15 RX ORDER — WARFARIN SODIUM 5 MG/1
TABLET ORAL
Qty: 90 TAB | Refills: 0 | Status: SHIPPED | OUTPATIENT
Start: 2018-08-15 | End: 2018-08-20 | Stop reason: SDUPTHER

## 2018-08-15 NOTE — PROGRESS NOTES
Chief Complaint   Patient presents with    Leg Pain     Pt c/o left leg pain from his leg retaining fluid starting three weeks ago. Visit Vitals    /72 (BP 1 Location: Left arm, BP Patient Position: Sitting)    Pulse 70    Temp 96.2 °F (35.7 °C) (Oral)    Resp 16    Ht 6' 1\" (1.854 m)    Wt 254 lb 14.4 oz (115.6 kg)    SpO2 96%    BMI 33.63 kg/m2     1. Have you been to the ER, urgent care clinic since your last visit? Hospitalized since your last visit? No   2. Have you seen or consulted any other health care providers outside of the [facility] since your last visit? Include any pap smears or colon screening.   No

## 2018-08-15 NOTE — PROGRESS NOTES
Noted swelling of left more so than right lower leg since getting new socks which are tighter. Has been eating more salty foods lately. Visit Vitals    /72 (BP 1 Location: Left arm, BP Patient Position: Sitting)    Pulse 70    Temp 96.2 °F (35.7 °C) (Oral)    Resp 16    Ht 6' 1\" (1.854 m)    Wt 254 lb 14.4 oz (115.6 kg)    SpO2 96%    BMI 33.63 kg/m2       Patient alert and cooperative. Left leg with a definite line at the end of the sock and some pitting edema above. No elicited calf tenderness. Assessment:  1. Lower extremity edema, questionably related to salt indiscretion. Plan:  1. Given script for HCTZ 25, (#10), to use as needed for swelling. 2. Lay off the salt. 3. Annual labs today. 4. Follow otherwise here prn.

## 2018-08-16 LAB
25(OH)D3+25(OH)D2 SERPL-MCNC: 23.1 NG/ML (ref 30–100)
ALBUMIN SERPL-MCNC: 4.3 G/DL (ref 3.5–5.5)
ALBUMIN/GLOB SERPL: 1.7 {RATIO} (ref 1.2–2.2)
ALP SERPL-CCNC: 64 IU/L (ref 39–117)
ALT SERPL-CCNC: 30 IU/L (ref 0–44)
APPEARANCE UR: CLEAR
AST SERPL-CCNC: 23 IU/L (ref 0–40)
BASOPHILS # BLD AUTO: 0 X10E3/UL (ref 0–0.2)
BASOPHILS NFR BLD AUTO: 0 %
BILIRUB SERPL-MCNC: 0.7 MG/DL (ref 0–1.2)
BILIRUB UR QL STRIP: NEGATIVE
BUN SERPL-MCNC: 12 MG/DL (ref 6–24)
BUN/CREAT SERPL: 12 (ref 9–20)
CALCIUM SERPL-MCNC: 9 MG/DL (ref 8.7–10.2)
CHLORIDE SERPL-SCNC: 106 MMOL/L (ref 96–106)
CHOLEST SERPL-MCNC: 113 MG/DL (ref 100–199)
CO2 SERPL-SCNC: 23 MMOL/L (ref 20–29)
COLOR UR: YELLOW
CREAT SERPL-MCNC: 1.01 MG/DL (ref 0.76–1.27)
EOSINOPHIL # BLD AUTO: 0.1 X10E3/UL (ref 0–0.4)
EOSINOPHIL NFR BLD AUTO: 2 %
ERYTHROCYTE [DISTWIDTH] IN BLOOD BY AUTOMATED COUNT: 14.7 % (ref 12.3–15.4)
EST. AVERAGE GLUCOSE BLD GHB EST-MCNC: 131 MG/DL
GLOBULIN SER CALC-MCNC: 2.5 G/DL (ref 1.5–4.5)
GLUCOSE SERPL-MCNC: 99 MG/DL (ref 65–99)
GLUCOSE UR QL: NEGATIVE
HBA1C MFR BLD: 6.2 % (ref 4.8–5.6)
HCT VFR BLD AUTO: 40.6 % (ref 37.5–51)
HDLC SERPL-MCNC: 38 MG/DL
HGB BLD-MCNC: 13.7 G/DL (ref 13–17.7)
HGB UR QL STRIP: NEGATIVE
IMM GRANULOCYTES # BLD: 0 X10E3/UL (ref 0–0.1)
IMM GRANULOCYTES NFR BLD: 0 %
INR PPP: 3.8 (ref 0.8–1.2)
INTERPRETATION, 910389: NORMAL
KETONES UR QL STRIP: NEGATIVE
LDLC SERPL CALC-MCNC: 48 MG/DL (ref 0–99)
LEUKOCYTE ESTERASE UR QL STRIP: NEGATIVE
LYMPHOCYTES # BLD AUTO: 2 X10E3/UL (ref 0.7–3.1)
LYMPHOCYTES NFR BLD AUTO: 36 %
MCH RBC QN AUTO: 29.6 PG (ref 26.6–33)
MCHC RBC AUTO-ENTMCNC: 33.7 G/DL (ref 31.5–35.7)
MCV RBC AUTO: 88 FL (ref 79–97)
MICRO URNS: ABNORMAL
MONOCYTES # BLD AUTO: 0.3 X10E3/UL (ref 0.1–0.9)
MONOCYTES NFR BLD AUTO: 6 %
NEUTROPHILS # BLD AUTO: 3.1 X10E3/UL (ref 1.4–7)
NEUTROPHILS NFR BLD AUTO: 56 %
NITRITE UR QL STRIP: NEGATIVE
PH UR STRIP: 5 [PH] (ref 5–7.5)
PLATELET # BLD AUTO: 185 X10E3/UL (ref 150–379)
POTASSIUM SERPL-SCNC: 4 MMOL/L (ref 3.5–5.2)
PROT SERPL-MCNC: 6.8 G/DL (ref 6–8.5)
PROT UR QL STRIP: NEGATIVE
PROTHROMBIN TIME: 36.5 SEC (ref 9.1–12)
PSA SERPL-MCNC: 0.5 NG/ML (ref 0–4)
RBC # BLD AUTO: 4.63 X10E6/UL (ref 4.14–5.8)
SODIUM SERPL-SCNC: 142 MMOL/L (ref 134–144)
SP GR UR: >=1.03 (ref 1–1.03)
TRIGL SERPL-MCNC: 133 MG/DL (ref 0–149)
TSH SERPL DL<=0.005 MIU/L-ACNC: 1.36 UIU/ML (ref 0.45–4.5)
UROBILINOGEN UR STRIP-MCNC: 0.2 MG/DL (ref 0.2–1)
VLDLC SERPL CALC-MCNC: 27 MG/DL (ref 5–40)
WBC # BLD AUTO: 5.5 X10E3/UL (ref 3.4–10.8)

## 2018-08-16 NOTE — PROGRESS NOTES
Low Vit. D. Take OTC supp 0369-3942 units daily. INR little high. Watch for excess bruising. Skip a day's dose. Conc urine. Drink more water. Low HDL. Try Krill oil supp. Average BS remains in prediabetic range.

## 2018-12-04 DIAGNOSIS — Z79.01 ANTICOAGULANT LONG-TERM USE: Primary | ICD-10-CM

## 2018-12-07 LAB
INR PPP: 1.9 (ref 0.8–1.2)
PROTHROMBIN TIME: 19.4 SEC (ref 9.1–12)

## 2018-12-11 NOTE — PROGRESS NOTES
Writer called patient to notify of results from Dr. Elizabeth Gamez. Writer spoke with patient. Patient verified . Writer notified patient. Patient verbalized understanding and appreciation.

## 2019-02-25 RX ORDER — WARFARIN SODIUM 5 MG/1
TABLET ORAL
Qty: 90 TAB | Refills: 0 | Status: SHIPPED | OUTPATIENT
Start: 2019-02-25 | End: 2019-05-24 | Stop reason: SDUPTHER

## 2019-02-25 NOTE — TELEPHONE ENCOUNTER
PCP: Joe Brown MD    Last appt: 12/6/2018  No future appointments.     Requested Prescriptions     Pending Prescriptions Disp Refills    warfarin (COUMADIN) 5 mg tablet [Pharmacy Med Name: WARFARIN SOD 5MG TABLETS (PEACH)] 90 Tab 0     Sig: TAKE 1 TABLET BY MOUTH EVERY DAY ON MONDAY THROUGH FRIDAY, AND TAKE 1/2 TABLET EVERY SATURDAY AND SUNDAY       Prior labs and Blood pressures:  BP Readings from Last 3 Encounters:   08/15/18 113/72   03/28/18 147/81   10/25/17 118/69     Lab Results   Component Value Date/Time    Sodium 142 08/15/2018 02:46 PM    Potassium 4.0 08/15/2018 02:46 PM    Chloride 106 08/15/2018 02:46 PM    CO2 23 08/15/2018 02:46 PM    Anion gap 8 06/14/2017 12:54 AM    Glucose 99 08/15/2018 02:46 PM    BUN 12 08/15/2018 02:46 PM    Creatinine 1.01 08/15/2018 02:46 PM    BUN/Creatinine ratio 12 08/15/2018 02:46 PM    GFR est  08/15/2018 02:46 PM    GFR est non-AA 88 08/15/2018 02:46 PM    Calcium 9.0 08/15/2018 02:46 PM     Lab Results   Component Value Date/Time    Hemoglobin A1c 6.2 (H) 08/15/2018 02:46 PM     Lab Results   Component Value Date/Time    Cholesterol, total 113 08/15/2018 02:46 PM    HDL Cholesterol 38 (L) 08/15/2018 02:46 PM    LDL, calculated 48 08/15/2018 02:46 PM    VLDL, calculated 27 08/15/2018 02:46 PM    Triglyceride 133 08/15/2018 02:46 PM     Lab Results   Component Value Date/Time    VITAMIN D, 25-HYDROXY 23.1 (L) 08/15/2018 02:46 PM       Lab Results   Component Value Date/Time    TSH 1.360 08/15/2018 02:46 PM

## 2019-04-16 ENCOUNTER — OFFICE VISIT (OUTPATIENT)
Dept: FAMILY MEDICINE CLINIC | Age: 49
End: 2019-04-16

## 2019-04-16 VITALS
SYSTOLIC BLOOD PRESSURE: 118 MMHG | DIASTOLIC BLOOD PRESSURE: 81 MMHG | TEMPERATURE: 97.1 F | BODY MASS INDEX: 32.08 KG/M2 | RESPIRATION RATE: 18 BRPM | HEIGHT: 74 IN | OXYGEN SATURATION: 97 % | HEART RATE: 71 BPM | WEIGHT: 250 LBS

## 2019-04-16 DIAGNOSIS — Z79.01 ANTICOAGULANT LONG-TERM USE: Primary | ICD-10-CM

## 2019-04-16 DIAGNOSIS — J40 BRONCHITIS: Primary | ICD-10-CM

## 2019-04-16 DIAGNOSIS — Z91.030 BEE STING ALLERGY: ICD-10-CM

## 2019-04-16 RX ORDER — DOXYCYCLINE 100 MG/1
100 CAPSULE ORAL 2 TIMES DAILY
Qty: 14 CAP | Refills: 0 | Status: SHIPPED | OUTPATIENT
Start: 2019-04-16 | End: 2019-04-22 | Stop reason: SDUPTHER

## 2019-04-16 RX ORDER — ENOXAPARIN SODIUM 100 MG/ML
40 INJECTION SUBCUTANEOUS DAILY
Qty: 10 SYRINGE | Refills: 0 | Status: SHIPPED | OUTPATIENT
Start: 2019-04-16 | End: 2019-04-22 | Stop reason: SDUPTHER

## 2019-04-16 RX ORDER — EPINEPHRINE 0.3 MG/.3ML
0.3 INJECTION SUBCUTANEOUS
Qty: 2 SYRINGE | Refills: 0 | Status: SHIPPED | OUTPATIENT
Start: 2019-04-16 | End: 2019-04-16

## 2019-04-16 NOTE — PROGRESS NOTES
Dark yellow mucus past 2-3 weeks. Not currently working on diet nor exercise. May need to get off coumadin for tooth extraction. Previously switched to Lovenox. Friend gave him the daily injections. Also needs Epipen refilled. Patient denies chest pain, dyspnea, unexpected weight change, unexpected pain, mood or memory changes. Visit Vitals /81 (BP 1 Location: Left arm, BP Patient Position: Sitting) Pulse 71 Temp 97.1 °F (36.2 °C) (Oral) Resp 18 Ht 6' 1.62\" (1.87 m) Wt 250 lb (113.4 kg) SpO2 97% BMI 32.43 kg/m² Patient alert and cooperative. Lungs clear. Assessment: 1. Bronchitis by history. Plan: 1. Doxycycline 100 mg b.i.d. for a week. 2. Follow up if persistent purulence. 3. Refilled EpiPen for bee sting allergy. 4. Script for Lovenox to use with upcoming tooth extraction off Coumadin. 5. Follow otherwise here prn.

## 2019-04-16 NOTE — PROGRESS NOTES
Caio Burdick  Identified pt with two pt identifiers(name and ). Chief Complaint Patient presents with  Sore Throat 2.5 weeks  Cough  
  dark yellow; 2.5 weeks 1. Have you been to the ER, urgent care clinic since your last visit? Hospitalized since your last visit? No 
 
2. Have you seen or consulted any other health care providers outside of the [address] [place] [address] since your last visit? Include any pap smears or colon screening. No 
 
 
Would you like to sign up for MyChart today, if you have not already done so? No 
If not, would you like information on MyChart, and how to sign up at a later time? No 
 
 
Medication reconciliation up to date and corrected with patient at this time. Today's provider has been notified of reason for visit, vitals and flowsheets obtained on patients. Reviewed record in preparation for visit, huddled with provider and have obtained necessary documentation. There are no preventive care reminders to display for this patient. Wt Readings from Last 3 Encounters:  
19 250 lb (113.4 kg) 08/15/18 254 lb 14.4 oz (115.6 kg) 18 251 lb 5.2 oz (114 kg) Temp Readings from Last 3 Encounters:  
19 97.1 °F (36.2 °C) (Oral) 08/15/18 96.2 °F (35.7 °C) (Oral) 18 98.4 °F (36.9 °C) BP Readings from Last 3 Encounters:  
19 118/81  
08/15/18 113/72  
18 147/81 Pulse Readings from Last 3 Encounters:  
19 71  
08/15/18 70  
18 67 Vitals:  
 19 3497 BP: 118/81 Pulse: 71 Resp: 18 Temp: 97.1 °F (36.2 °C) TempSrc: Oral  
SpO2: 97% Weight: 250 lb (113.4 kg) Height: 6' 1.62\" (1.87 m) PainSc:   3 PainLoc: Throat Learning Assessment: 
:  
 
Learning Assessment 2017 PRIMARY LEARNER Patient HIGHEST LEVEL OF EDUCATION - PRIMARY LEARNER  SOME COLLEGE  
BARRIERS PRIMARY LEARNER NONE  
CO-LEARNER CAREGIVER No  
PRIMARY LANGUAGE ENGLISH  
 LEARNER PREFERENCE PRIMARY DEMONSTRATION  
ANSWERED BY Patient RELATIONSHIP SELF Depression Screening: 
:  
 
3 most recent PHQ Screens 4/16/2019 Little interest or pleasure in doing things Not at all Feeling down, depressed, irritable, or hopeless Not at all Total Score PHQ 2 0 Fall Risk Assessment: 
:  
 
Fall Risk Assessment, last 12 mths 4/16/2019 Able to walk? Yes Fall in past 12 months? No  
 
 
Abuse Screening: 
:  
 
Abuse Screening Questionnaire 4/16/2019 Do you ever feel afraid of your partner? Marvene Hence Are you in a relationship with someone who physically or mentally threatens you? Marvene Hence Is it safe for you to go home? Y  
 
 
ADL Screening: 
:  
 

## 2019-04-18 LAB
INR PPP: 2.3 (ref 0.8–1.2)
PROTHROMBIN TIME: 23.1 SEC (ref 9.1–12)

## 2019-04-22 DIAGNOSIS — J40 BRONCHITIS: ICD-10-CM

## 2019-04-22 RX ORDER — ENOXAPARIN SODIUM 100 MG/ML
40 INJECTION SUBCUTANEOUS DAILY
Qty: 10 SYRINGE | Refills: 0 | Status: SHIPPED | OUTPATIENT
Start: 2019-04-22 | End: 2019-05-02

## 2019-04-22 RX ORDER — DOXYCYCLINE 100 MG/1
100 CAPSULE ORAL 2 TIMES DAILY
Qty: 14 CAP | Refills: 0 | Status: SHIPPED | OUTPATIENT
Start: 2019-04-22 | End: 2019-08-09 | Stop reason: ALTCHOICE

## 2019-04-22 RX ORDER — EPINEPHRINE 0.3 MG/.3ML
INJECTION SUBCUTANEOUS
Refills: 0 | COMMUNITY
Start: 2019-04-16 | End: 2019-04-22 | Stop reason: SDUPTHER

## 2019-04-22 RX ORDER — EPINEPHRINE 0.3 MG/.3ML
INJECTION SUBCUTANEOUS
Qty: 1 SYRINGE | Refills: 0 | Status: SHIPPED | OUTPATIENT
Start: 2019-04-22 | End: 2021-12-10

## 2019-04-22 NOTE — TELEPHONE ENCOUNTER
PCP: Jas Matt MD    Last appt: 2019  No future appointments. Requested Prescriptions     Pending Prescriptions Disp Refills    doxycycline (MONODOX) 100 mg capsule 14 Cap 0     Sig: Take 1 Cap by mouth two (2) times a day.  enoxaparin (LOVENOX) 40 mg/0.4 mL 10 Syringe 0     Si.4 mL by SubCUTAneous route daily for 10 doses.     EPINEPHrine (EPIPEN) 0.3 mg/0.3 mL injection  0     Sig: INJECT 0.3 ML BY INTRAMUSCULAR ROUTE ONCE PRN FOR ANAPHYLAXIS OR ALLERGIC RESPONSE FOR UP TO ONE DOSE       Prior labs and Blood pressures:  BP Readings from Last 3 Encounters:   19 118/81   08/15/18 113/72   18 147/81     Lab Results   Component Value Date/Time    Sodium 142 08/15/2018 02:46 PM    Potassium 4.0 08/15/2018 02:46 PM    Chloride 106 08/15/2018 02:46 PM    CO2 23 08/15/2018 02:46 PM    Anion gap 8 2017 12:54 AM    Glucose 99 08/15/2018 02:46 PM    BUN 12 08/15/2018 02:46 PM    Creatinine 1.01 08/15/2018 02:46 PM    BUN/Creatinine ratio 12 08/15/2018 02:46 PM    GFR est  08/15/2018 02:46 PM    GFR est non-AA 88 08/15/2018 02:46 PM    Calcium 9.0 08/15/2018 02:46 PM     Lab Results   Component Value Date/Time    Hemoglobin A1c 6.2 (H) 08/15/2018 02:46 PM     Lab Results   Component Value Date/Time    Cholesterol, total 113 08/15/2018 02:46 PM    HDL Cholesterol 38 (L) 08/15/2018 02:46 PM    LDL, calculated 48 08/15/2018 02:46 PM    VLDL, calculated 27 08/15/2018 02:46 PM    Triglyceride 133 08/15/2018 02:46 PM     Lab Results   Component Value Date/Time    VITAMIN D, 25-HYDROXY 23.1 (L) 08/15/2018 02:46 PM       Lab Results   Component Value Date/Time    TSH 1.360 08/15/2018 02:46 PM

## 2019-05-24 NOTE — TELEPHONE ENCOUNTER
PCP: Greer Amin MD    Last appt: 4/17/2019  No future appointments.     Requested Prescriptions     Pending Prescriptions Disp Refills    warfarin (COUMADIN) 5 mg tablet [Pharmacy Med Name: WARFARIN SOD 5MG TABLETS (PEACH)] 90 Tab 0     Sig: TAKE 1 TABLET BY MOUTH EVERY DAY ON MONDAY THROUGH FRIDAY, AND TAKE 1/2 TABLET EVERY SATURDAY AND SUNDAY       Prior labs and Blood pressures:  BP Readings from Last 3 Encounters:   04/16/19 118/81   08/15/18 113/72   03/28/18 147/81     Lab Results   Component Value Date/Time    Sodium 142 08/15/2018 02:46 PM    Potassium 4.0 08/15/2018 02:46 PM    Chloride 106 08/15/2018 02:46 PM    CO2 23 08/15/2018 02:46 PM    Anion gap 8 06/14/2017 12:54 AM    Glucose 99 08/15/2018 02:46 PM    BUN 12 08/15/2018 02:46 PM    Creatinine 1.01 08/15/2018 02:46 PM    BUN/Creatinine ratio 12 08/15/2018 02:46 PM    GFR est  08/15/2018 02:46 PM    GFR est non-AA 88 08/15/2018 02:46 PM    Calcium 9.0 08/15/2018 02:46 PM     Lab Results   Component Value Date/Time    Hemoglobin A1c 6.2 (H) 08/15/2018 02:46 PM     Lab Results   Component Value Date/Time    Cholesterol, total 113 08/15/2018 02:46 PM    HDL Cholesterol 38 (L) 08/15/2018 02:46 PM    LDL, calculated 48 08/15/2018 02:46 PM    VLDL, calculated 27 08/15/2018 02:46 PM    Triglyceride 133 08/15/2018 02:46 PM     Lab Results   Component Value Date/Time    VITAMIN D, 25-HYDROXY 23.1 (L) 08/15/2018 02:46 PM       Lab Results   Component Value Date/Time    TSH 1.360 08/15/2018 02:46 PM

## 2019-05-28 RX ORDER — WARFARIN SODIUM 5 MG/1
TABLET ORAL
Qty: 90 TAB | Refills: 0 | Status: SHIPPED | OUTPATIENT
Start: 2019-05-28 | End: 2019-08-27 | Stop reason: SDUPTHER

## 2019-06-22 ENCOUNTER — HOSPITAL ENCOUNTER (EMERGENCY)
Age: 49
Discharge: HOME | End: 2019-06-22
Attending: EMERGENCY MEDICINE
Payer: COMMERCIAL

## 2019-06-22 ENCOUNTER — APPOINTMENT (OUTPATIENT)
Dept: ULTRASOUND IMAGING | Age: 49
End: 2019-06-22
Attending: EMERGENCY MEDICINE
Payer: COMMERCIAL

## 2019-06-22 VITALS
SYSTOLIC BLOOD PRESSURE: 122 MMHG | OXYGEN SATURATION: 96 % | TEMPERATURE: 97.8 F | BODY MASS INDEX: 33.24 KG/M2 | HEIGHT: 74 IN | HEART RATE: 74 BPM | WEIGHT: 259.04 LBS | DIASTOLIC BLOOD PRESSURE: 85 MMHG | RESPIRATION RATE: 18 BRPM

## 2019-06-22 DIAGNOSIS — M79.89 LEG SWELLING: Primary | ICD-10-CM

## 2019-06-22 LAB
INR PPP: 2.9 (ref 0.8–1.2)
INR PPP: 3.2 (ref 0.9–1.1)
PROTHROMBIN TIME: 28.5 SEC (ref 9.1–12)
PROTHROMBIN TIME: 30.5 SEC (ref 9–11.1)

## 2019-06-22 PROCEDURE — 85610 PROTHROMBIN TIME: CPT

## 2019-06-22 PROCEDURE — 99282 EMERGENCY DEPT VISIT SF MDM: CPT

## 2019-06-22 PROCEDURE — 36415 COLL VENOUS BLD VENIPUNCTURE: CPT

## 2019-06-22 PROCEDURE — 93971 EXTREMITY STUDY: CPT

## 2019-06-22 NOTE — DISCHARGE INSTRUCTIONS
Patient Education        Leg and Ankle Edema: Care Instructions  Your Care Instructions  Swelling in the legs, ankles, and feet is called edema. It is common after you sit or stand for a while. Long plane flights or car rides often cause swelling in the legs and feet. You may also have swelling if you have to stand for long periods of time at your job. Problems with the veins in the legs (varicose veins) and changes in hormones can also cause swelling. Sometimes the swelling in the ankles and feet is caused by a more serious problem, such as heart failure, infection, blood clots, or liver or kidney disease. Follow-up care is a key part of your treatment and safety. Be sure to make and go to all appointments, and call your doctor if you are having problems. It's also a good idea to know your test results and keep a list of the medicines you take. How can you care for yourself at home? · If your doctor gave you medicine, take it as prescribed. Call your doctor if you think you are having a problem with your medicine. · Whenever you are resting, raise your legs up. Try to keep the swollen area higher than the level of your heart. · Take breaks from standing or sitting in one position. ? Walk around to increase the blood flow in your lower legs. ? Move your feet and ankles often while you stand, or tighten and relax your leg muscles. · Wear support stockings. Put them on in the morning, before swelling gets worse. · Eat a balanced diet. Lose weight if you need to. · Limit the amount of salt (sodium) in your diet. Salt holds fluid in the body and may increase swelling. When should you call for help? Call 911 anytime you think you may need emergency care. For example, call if:    · You have symptoms of a blood clot in your lung (called a pulmonary embolism). These may include:  ? Sudden chest pain. ? Trouble breathing. ?  Coughing up blood.    Call your doctor now or seek immediate medical care if:    · You have signs of a blood clot, such as:  ? Pain in your calf, back of the knee, thigh, or groin. ? Redness and swelling in your leg or groin.     · You have symptoms of infection, such as:  ? Increased pain, swelling, warmth, or redness. ? Red streaks or pus. ? A fever.    Watch closely for changes in your health, and be sure to contact your doctor if:    · Your swelling is getting worse.     · You have new or worsening pain in your legs.     · You do not get better as expected. Where can you learn more? Go to http://tito-ludmila.info/. Enter W132 in the search box to learn more about \"Leg and Ankle Edema: Care Instructions. \"  Current as of: September 23, 2018  Content Version: 11.9  © 4925-4466 [employer], [employer]. Care instructions adapted under license by [employer] (which disclaims liability or warranty for this information). If you have questions about a medical condition or this instruction, always ask your healthcare professional. [place] [address] any warranty or liability for your use of this information.

## 2019-06-22 NOTE — ED NOTES
Pt c/o RLE pain. Pt reports \"knot\" and redness behind knee. Pt has strong pedal pulse. Pt has hx of DVT in LLE and PE. Pt states he has been on coumadin ~10 yrs. Pt in no acute distress at this time. VSS. Call bell within reach.

## 2019-06-22 NOTE — ED PROVIDER NOTES
EMERGENCY DEPARTMENT HISTORY AND PHYSICAL EXAM      Date: 6/22/2019  Patient Name: Romana Lares    History of Presenting Illness     Chief Complaint   Patient presents with    Leg Pain     ambulatory tot riage, states that he started with mild right leg pain yesterday and noticed redness and a \"knot\" behind his knee. Denies injury, has a hx of DVT, takes coumadin for DVT       History Provided By: Patient    HPI: Romana Lares, 51 y.o. male with PMHx as noted below presents the emergency department with chief complaint of leg swelling. Patient notes that 2 days ago noted swelling behind the right knee that is been constant without specific relieving or exacerbating factors. Patient notes there is no pain associated with the swelling. Patient does have a history of DVT in the past and notes he has been compliant with his Coumadin. Otherwise no fevers, chills, chest pain, shortness of breath. PCP: Cadence Sotomayor MD    Current Outpatient Medications   Medication Sig Dispense Refill    warfarin (COUMADIN) 5 mg tablet TAKE 1 TABLET BY MOUTH EVERY DAY ON MONDAY THROUGH FRIDAY, AND TAKE 1/2 TABLET EVERY SATURDAY AND SUNDAY 90 Tab 0    doxycycline (MONODOX) 100 mg capsule Take 1 Cap by mouth two (2) times a day. 14 Cap 0    EPINEPHrine (EPIPEN) 0.3 mg/0.3 mL injection INJECT 0.3 ML BY INTRAMUSCULAR ROUTE ONCE PRN FOR ANAPHYLAXIS OR ALLERGIC RESPONSE FOR UP TO ONE DOSE 1 Syringe 0    rosuvastatin (CRESTOR) 20 mg tablet TAKE 1 TABLET BY MOUTH EVERY NIGHT 90 Tab 3    hydroCHLOROthiazide (HYDRODIURIL) 25 mg tablet Take 1 Tab by mouth daily. Indications: Edema 10 Tab 0    sildenafil citrate (VIAGRA) 50 mg tablet Take 50 mg by mouth as needed.  krill-omega-3-dha-epa-lipids (KRILL OIL) 031-05-63-08 mg cap Take 1 Cap by mouth daily.          Past History     Past Medical History:  Past Medical History:   Diagnosis Date    A-fib ([address]     Allergic rhinitis     Anxiety disorder     Cellulitis post tattoo    Deep vein thrombosis (DVT) [address] [address]     [facility]  [facility]17 f/u note to manage Lovenox    Essential tremor     Laceration of arm 09/23/2017    Left cut by a chain saw.  Phlebitis     Pulmonary embolism ([facility] [address] 3/2011    multiple episodes    Rectal bleeding 10/2011    on coumadin @ the time &again 4/2017 4/12/17 note from Carla//colonoscopy report rec'd    [facility]embolus [facility])     Umbilical hernia     Vitamin D deficiency 04/2014    again 4/2017       Past Surgical History:  Past Surgical History:   Procedure Laterality Date    HX APPENDECTOMY  2003    HX COLONOSCOPY  04/28/2017    Dr. Dennise Carrillo HX HERNIA REPAIR  [id number]    umbilical       Family History:  Family History   Problem Relation Age of Onset    Heart Disease Father     Heart Disease Maternal Grandmother     Heart Disease Maternal Grandfather        Social History:  Social History     Tobacco Use    Smoking status: Never Smoker    Smokeless tobacco: Current User     Types: Snuff   Substance Use Topics    Alcohol use: Yes     Alcohol/week: 0.0 oz     Comment: weekends    Drug use: No       Allergies: Allergies   Allergen Reactions    Bee Sting [Sting, Bee] Swelling    Neomycin Rash    Neosporin [Neomycin-Bacitracin-Polymyxin] Rash         Review of Systems   Review of Systems  Constitutional: Negative for fever, chills, and fatigue. HENT: Negative for congestion, sore throat, rhinorrhea, sneezing and neck stiffness   Eyes: Negative for discharge and redness. Respiratory: Negative for  shortness of breath, wheezing   Cardiovascular: Negative for chest pain, palpitations   Gastrointestinal: Negative for nausea, vomiting, abdominal pain, constipation, diarrhea and blood in stool. Genitourinary: Negative for dysuria, hematuria, flank pain, decreased urine volume, discharge,   Musculoskeletal: Negative for myalgias or joint pain . Skin: Negative for rash or lesions .    Neurological: Negative weakness, light-headedness, numbness and headaches. Physical Exam   Physical Exam    GENERAL: alert and oriented, no acute distress  EYES: PEERL, No injection, discharge or icterus. ENT: Mucous membranes pink and moist.  NECK: Supple  LUNGS: Airway patent. Non-labored respirations. Breath sounds clear with good air entry bilaterally. HEART: Regular rate and rhythm. No peripheral edema  ABDOMEN: Non-distended and non-tender, without guarding or rebound. SKIN:  warm, dry  MSK/EXTREMITIES: Without tenderness or deformity, with normal ROM, area of swelling in the right popliteal fossa without any erythema, warmth, induration or fluctuance noted  NEUROLOGICAL: Alert, oriented      Diagnostic Study Results     Labs -     Recent Results (from the past 12 hour(s))   PROTHROMBIN TIME + INR    Collection Time: 06/22/19  3:50 AM   Result Value Ref Range    INR 3.2 (H) 0.9 - 1.1      Prothrombin time 30.5 (H) 9.0 - 11.1 sec       Radiologic Studies -   No orders to display     CT Results  (Last 48 hours)    None        CXR Results  (Last 48 hours)    None            Medical Decision Making   I am the first provider for this patient. I reviewed the vital signs, available nursing notes, past medical history, past surgical history, family history and social history. Vital Signs-Reviewed the patient's vital signs. Patient Vitals for the past 12 hrs:   Temp Pulse Resp BP SpO2   06/22/19 0245    122/85    06/22/19 0158 97.8 °F (36.6 °C) 74 18 136/83 96 %         Records Reviewed: Nursing Notes and Old Medical Records    Provider Notes (Medical Decision Making): On presentation, the patient is well appearing, in no acute distress with normal vital signs. Based on my history and exam the differential diagnosis for this patient includes DVT, Baker's cyst, hematoma. There were no signs of significant infection on exam.  Ultrasound demonstrated no DVT.   Patient symptoms may be secondary to nonvisualized Baker's cyst or perhaps hematoma secondary to his anticoagulation status. Patient is stable for discharge home at this time. He should return to the emergency department if the area continues to expand. Otherwise should follow-up with his primary care physician next week. ED Course:   Initial assessment performed. The patients presenting problems have been discussed, and they are in agreement with the care plan formulated and outlined with them. I have encouraged them to ask questions as they arise throughout their visit. PROGRESS NOTE:  The patient has been re-evaluated and is ready for discharge. Reviewed available results with patient and have counseled them on diagnosis and care plan. They have expressed understanding, and all their questions have been answered. They agree with plan and agree to have pt F/U as recommended, or return to the ED if their sxs worsen. Discharge instructions have been provided and explained to them, along with reasons to have pt return to the ED. The patient is amenable to discharge so will discharge pt at this time    Rossy Vargas MD        Disposition:  home    PLAN:  1. Discharge Medication List as of 6/22/2019  5:16 AM        2. Follow-up Information     Follow up With Specialties Details Why Contact Info    Jumana Sotomayor MD Family Practice Schedule an appointment as soon as possible for a visit in 2 days  [address] [place], [place] [id number] [phone]          Return to ED if worse     Diagnosis     Clinical Impression:   1.  Leg swelling

## 2019-06-22 NOTE — ED NOTES
Bedside shift change report given to SAMAN Serrato (oncoming nurse) by Grace Kathleen RN (offgoing nurse). Report included the following information SBAR, Kardex, ED Summary, Intake/Output, MAR and Recent Results.

## 2019-08-09 ENCOUNTER — OFFICE VISIT (OUTPATIENT)
Dept: FAMILY MEDICINE CLINIC | Age: 49
End: 2019-08-09

## 2019-08-09 VITALS
SYSTOLIC BLOOD PRESSURE: 119 MMHG | BODY MASS INDEX: 32.6 KG/M2 | DIASTOLIC BLOOD PRESSURE: 79 MMHG | RESPIRATION RATE: 24 BRPM | OXYGEN SATURATION: 93 % | HEART RATE: 78 BPM | WEIGHT: 254 LBS | HEIGHT: 74 IN | TEMPERATURE: 96.9 F

## 2019-08-09 DIAGNOSIS — E55.9 VITAMIN D DEFICIENCY: ICD-10-CM

## 2019-08-09 DIAGNOSIS — R60.9 EDEMA, UNSPECIFIED TYPE: ICD-10-CM

## 2019-08-09 DIAGNOSIS — Z79.01 ANTICOAGULANT LONG-TERM USE: ICD-10-CM

## 2019-08-09 DIAGNOSIS — E78.9 DISORDER OF LIPOID METABOLISM: ICD-10-CM

## 2019-08-09 DIAGNOSIS — K62.5 RECTAL BLEEDING: Primary | ICD-10-CM

## 2019-08-09 DIAGNOSIS — R73.03 PREDIABETES: ICD-10-CM

## 2019-08-09 DIAGNOSIS — Z12.5 PROSTATE CANCER SCREENING: ICD-10-CM

## 2019-08-09 NOTE — PROGRESS NOTES
Here for f/u from ER 6/22. Swelling got better with propping up. Swells up at night. Swelling worse past week. Worse when sitting. Better when walking around. Right heel pain past 2 weeks. Notes in AM.  Notes blood in toilet past week. Patient denies chest pain, dyspnea, unexpected weight change, unexpected pain, mood or memory changes. Visit Vitals  /79 (BP 1 Location: Right arm, BP Patient Position: Sitting)   Pulse 78   Temp 96.9 °F (36.1 °C) (Oral)   Resp 24   Ht 6' 2\" (1.88 m)   Wt 254 lb (115.2 kg)   SpO2 93%   BMI 32.61 kg/m²     Patient alert and cooperative. Right heel - no swelling, warmth, erythema. No specific tenderness, though locates pain on the tip of the posterior calcaneus. Assessment:  1. Rectal bleeding, new, on Coumadin. 2. Right heel pain. Plan:  1. Recommended to get insert for shoe. 2. Check annual labs. 3. Set up urgent GI referral for the rectal bleeding. 4. Follow otherwise here prn.

## 2019-08-09 NOTE — PROGRESS NOTES
Eloisa Heredia  Identified pt with two pt identifiers(name and ). Chief Complaint   Patient presents with    Leg Swelling     BLE    Nasal Congestion    Foot Pain     right heel    Rectal Bleeding       1. Have you been to the ER, urgent care clinic since your last visit? Hospitalized since your last visit? Yes, [facility] ED    2. Have you seen or consulted any other health care providers outside of the [address] [place] [address] since your last visit? Include any pap smears or colon screening. No      Would you like to sign up for MyChart today, if you have not already done so? No  If not, would you like information on MyChart, and how to sign up at a later time? No      Medication reconciliation up to date and corrected with patient at this time. Today's provider has been notified of reason for visit, vitals and flowsheets obtained on patients. Reviewed record in preparation for visit, huddled with provider and have obtained necessary documentation.       Health Maintenance Due   Topic    Influenza Age 5 to Adult        Wt Readings from Last 3 Encounters:   19 254 lb (115.2 kg)   19 259 lb 0.7 oz (117.5 kg)   19 250 lb (113.4 kg)     Temp Readings from Last 3 Encounters:   19 96.9 °F (36.1 °C) (Oral)   19 97.8 °F (36.6 °C)   19 97.1 °F (36.2 °C) (Oral)     BP Readings from Last 3 Encounters:   19 119/79   19 122/85   19 118/81     Pulse Readings from Last 3 Encounters:   19 78   19 74   19 71     Vitals:    19 0935   BP: 119/79   Pulse: 78   Resp: 24   Temp: 96.9 °F (36.1 °C)   TempSrc: Oral   SpO2: 93%   Weight: 254 lb (115.2 kg)   Height: 6' 2\" (1.88 m)   PainSc:   0 - No pain         Learning Assessment:  :     Learning Assessment 2017   PRIMARY LEARNER Patient   HIGHEST LEVEL OF EDUCATION - PRIMARY LEARNER  SOME COLLEGE   BARRIERS PRIMARY LEARNER NONE   CO-LEARNER CAREGIVER No   PRIMARY LANGUAGE ENGLISH   LEARNER PREFERENCE PRIMARY DEMONSTRATION   ANSWERED BY Patient   RELATIONSHIP SELF       Depression Screening:  :     3 most recent PHQ Screens 4/16/2019   Little interest or pleasure in doing things Not at all   Feeling down, depressed, irritable, or hopeless Not at all   Total Score PHQ 2 0       Fall Risk Assessment:  :     Fall Risk Assessment, last 12 mths 4/16/2019   Able to walk? Yes   Fall in past 12 months? No       Abuse Screening:  :     Abuse Screening Questionnaire 4/16/2019   Do you ever feel afraid of your partner? N   Are you in a relationship with someone who physically or mentally threatens you? N   Is it safe for you to go home?  Y       ADL Screening:  :     ADL Assessment 4/16/2019   Feeding yourself No Help Needed   Getting from bed to chair No Help Needed   Getting dressed No Help Needed   Bathing or showering No Help Needed   Walk across the room (includes cane/walker) No Help Needed   Using the telphone No Help Needed   Taking your medications No Help Needed   Preparing meals No Help Needed   Managing money (expenses/bills) No Help Needed   Moderately strenuous housework (laundry) No Help Needed   Shopping for personal items (toiletries/medicines) No Help Needed   Shopping for groceries No Help Needed   Driving No Help Needed   Climbing a flight of stairs No Help Needed   Getting to places beyond walking distances No Help Needed

## 2019-08-19 LAB
25(OH)D3+25(OH)D2 SERPL-MCNC: 26.9 NG/ML (ref 30–100)
ALBUMIN SERPL-MCNC: 4 G/DL (ref 3.5–5.5)
ALBUMIN/GLOB SERPL: 1.4 {RATIO} (ref 1.2–2.2)
ALP SERPL-CCNC: 76 IU/L (ref 39–117)
ALT SERPL-CCNC: 32 IU/L (ref 0–44)
APPEARANCE UR: CLEAR
AST SERPL-CCNC: 26 IU/L (ref 0–40)
BASOPHILS # BLD AUTO: 0 X10E3/UL (ref 0–0.2)
BASOPHILS NFR BLD AUTO: 0 %
BILIRUB SERPL-MCNC: 0.8 MG/DL (ref 0–1.2)
BILIRUB UR QL STRIP: NEGATIVE
BUN SERPL-MCNC: 7 MG/DL (ref 6–24)
BUN/CREAT SERPL: 6 (ref 9–20)
CALCIUM SERPL-MCNC: 8.8 MG/DL (ref 8.7–10.2)
CHLORIDE SERPL-SCNC: 108 MMOL/L (ref 96–106)
CHOLEST SERPL-MCNC: 112 MG/DL (ref 100–199)
CO2 SERPL-SCNC: 23 MMOL/L (ref 20–29)
COLOR UR: YELLOW
CREAT SERPL-MCNC: 1.13 MG/DL (ref 0.76–1.27)
EOSINOPHIL # BLD AUTO: 0.2 X10E3/UL (ref 0–0.4)
EOSINOPHIL NFR BLD AUTO: 2 %
ERYTHROCYTE [DISTWIDTH] IN BLOOD BY AUTOMATED COUNT: 15.6 % (ref 12.3–15.4)
EST. AVERAGE GLUCOSE BLD GHB EST-MCNC: 137 MG/DL
GLOBULIN SER CALC-MCNC: 2.9 G/DL (ref 1.5–4.5)
GLUCOSE SERPL-MCNC: 114 MG/DL (ref 65–99)
GLUCOSE UR QL: NEGATIVE
HBA1C MFR BLD: 6.4 % (ref 4.8–5.6)
HCT VFR BLD AUTO: 47.3 % (ref 37.5–51)
HDLC SERPL-MCNC: 33 MG/DL
HGB BLD-MCNC: 15.8 G/DL (ref 13–17.7)
HGB UR QL STRIP: NEGATIVE
IMM GRANULOCYTES # BLD AUTO: 0 X10E3/UL (ref 0–0.1)
IMM GRANULOCYTES NFR BLD AUTO: 0 %
INR PPP: 2.1 (ref 0.8–1.2)
INTERPRETATION, 910389: NORMAL
KETONES UR QL STRIP: ABNORMAL
LDLC SERPL CALC-MCNC: 53 MG/DL (ref 0–99)
LEUKOCYTE ESTERASE UR QL STRIP: NEGATIVE
LYMPHOCYTES # BLD AUTO: 1.3 X10E3/UL (ref 0.7–3.1)
LYMPHOCYTES NFR BLD AUTO: 20 %
MCH RBC QN AUTO: 30.3 PG (ref 26.6–33)
MCHC RBC AUTO-ENTMCNC: 33.4 G/DL (ref 31.5–35.7)
MCV RBC AUTO: 91 FL (ref 79–97)
MICRO URNS: ABNORMAL
MONOCYTES # BLD AUTO: 0.3 X10E3/UL (ref 0.1–0.9)
MONOCYTES NFR BLD AUTO: 5 %
NEUTROPHILS # BLD AUTO: 4.8 X10E3/UL (ref 1.4–7)
NEUTROPHILS NFR BLD AUTO: 73 %
NITRITE UR QL STRIP: NEGATIVE
PH UR STRIP: 5.5 [PH] (ref 5–7.5)
PLATELET # BLD AUTO: 194 X10E3/UL (ref 150–450)
POTASSIUM SERPL-SCNC: 4.4 MMOL/L (ref 3.5–5.2)
PROT SERPL-MCNC: 6.9 G/DL (ref 6–8.5)
PROT UR QL STRIP: NEGATIVE
PROTHROMBIN TIME: 20.2 SEC (ref 9.1–12)
PSA SERPL-MCNC: 0.4 NG/ML (ref 0–4)
RBC # BLD AUTO: 5.21 X10E6/UL (ref 4.14–5.8)
SODIUM SERPL-SCNC: 142 MMOL/L (ref 134–144)
SP GR UR: 1.02 (ref 1–1.03)
TRIGL SERPL-MCNC: 129 MG/DL (ref 0–149)
TSH SERPL DL<=0.005 MIU/L-ACNC: 1.41 UIU/ML (ref 0.45–4.5)
UROBILINOGEN UR STRIP-MCNC: 0.2 MG/DL (ref 0.2–1)
VLDLC SERPL CALC-MCNC: 26 MG/DL (ref 5–40)
WBC # BLD AUTO: 6.6 X10E3/UL (ref 3.4–10.8)

## 2019-08-19 NOTE — PROGRESS NOTES
Low Vit. D. Take OTC supp 2505-1203 units daily. INR O.K. Average BS in high prediabetic range. Recheck 3-6 mos. Watch sweets and carbs. Can begin Metformin to prevent from becoming diabetic if desired.

## 2019-08-26 ENCOUNTER — TELEPHONE (OUTPATIENT)
Dept: FAMILY MEDICINE CLINIC | Age: 49
End: 2019-08-26

## 2019-08-26 NOTE — TELEPHONE ENCOUNTER
Writer returned patients call, two patient identifiers used for patient verification, name and , patient advised 1-2000 units daily vit D. Patient voiced back understanding.

## 2019-08-26 NOTE — TELEPHONE ENCOUNTER
Patient called to see how much Vitamin D he needs to take. He was told by Dr. Ciara Molina that he needs to start taking it, but he's not sure how much.

## 2019-08-29 NOTE — TELEPHONE ENCOUNTER
Agree with nurse's note from Colleen Fitzpatrick [facility] on 4/88/5768, pt already notified 7399-7617 units Vit D daily. No further action needed.

## 2019-09-11 RX ORDER — ROSUVASTATIN CALCIUM 20 MG/1
TABLET, COATED ORAL
Qty: 90 TAB | Refills: 3 | Status: SHIPPED | OUTPATIENT
Start: 2019-09-11 | End: 2020-10-02

## 2019-09-11 NOTE — TELEPHONE ENCOUNTER
PCP: Bertha García MD    Last appt: 8/13/2019  No future appointments.     Requested Prescriptions     Pending Prescriptions Disp Refills    rosuvastatin (CRESTOR) 20 mg tablet [Pharmacy Med Name: ROSUVASTATIN 20MG TABLETS] 90 Tab 0     Sig: TAKE 1 TABLET BY MOUTH EVERY NIGHT       Prior labs and Blood pressures:  BP Readings from Last 3 Encounters:   08/09/19 119/79   06/22/19 122/85   04/16/19 118/81     Lab Results   Component Value Date/Time    Sodium 142 08/17/2019 08:11 AM    Potassium 4.4 08/17/2019 08:11 AM    Chloride 108 (H) 08/17/2019 08:11 AM    CO2 23 08/17/2019 08:11 AM    Anion gap 8 06/14/2017 12:54 AM    Glucose 114 (H) 08/17/2019 08:11 AM    BUN 7 08/17/2019 08:11 AM    Creatinine 1.13 08/17/2019 08:11 AM    BUN/Creatinine ratio 6 (L) 08/17/2019 08:11 AM    GFR est AA 88 08/17/2019 08:11 AM    GFR est non-AA 76 08/17/2019 08:11 AM    Calcium 8.8 08/17/2019 08:11 AM     Lab Results   Component Value Date/Time    Hemoglobin A1c 6.4 (H) 08/17/2019 08:11 AM     Lab Results   Component Value Date/Time    Cholesterol, total 112 08/17/2019 08:11 AM    HDL Cholesterol 33 (L) 08/17/2019 08:11 AM    LDL, calculated 53 08/17/2019 08:11 AM    VLDL, calculated 26 08/17/2019 08:11 AM    Triglyceride 129 08/17/2019 08:11 AM     Lab Results   Component Value Date/Time    VITAMIN D, 25-HYDROXY 26.9 (L) 08/17/2019 08:11 AM       Lab Results   Component Value Date/Time    TSH 1.410 08/17/2019 08:11 AM

## 2019-09-22 ENCOUNTER — OFFICE VISIT (OUTPATIENT)
Dept: URGENT CARE | Age: 49
End: 2019-09-22

## 2019-09-22 VITALS
HEIGHT: 74 IN | WEIGHT: 242 LBS | HEART RATE: 92 BPM | RESPIRATION RATE: 18 BRPM | DIASTOLIC BLOOD PRESSURE: 71 MMHG | SYSTOLIC BLOOD PRESSURE: 131 MMHG | TEMPERATURE: 98.6 F | BODY MASS INDEX: 31.06 KG/M2 | OXYGEN SATURATION: 93 %

## 2019-09-22 DIAGNOSIS — J34.0 CELLULITIS OF EXTERNAL NOSE: Primary | ICD-10-CM

## 2019-09-22 RX ORDER — DOXYCYCLINE 100 MG/1
100 TABLET ORAL 2 TIMES DAILY
Qty: 20 TAB | Refills: 0 | Status: SHIPPED | OUTPATIENT
Start: 2019-09-22 | End: 2019-10-02

## 2019-09-22 RX ORDER — HYDROCORTISONE 25 MG/G
CREAM TOPICAL
Qty: 3.5 G | Refills: 0 | Status: SHIPPED | OUTPATIENT
Start: 2019-09-22 | End: 2019-09-25

## 2019-09-22 NOTE — PROGRESS NOTES
Nasal Pain   This is a new problem. The current episode started more than 2 days ago. The problem occurs constantly. The problem has been gradually worsening. Associated symptoms include headaches. Pertinent negatives include no chest pain and no shortness of breath. Associated symptoms comments: A red swollen painful bump on bridge of nose. Exacerbated by: movment of face  Nothing relieves the symptoms. He has tried nothing for the symptoms. Past Medical History:   Diagnosis Date    [facility]-fib ([facility] [address]     Allergic rhinitis     Anxiety disorder     Cellulitis     post tattoo    Deep vein thrombosis (DVT) [facility]     [facility] f/u note to manage Lovenox    Essential tremor     Laceration of arm 09/23/2017    Left cut by a chain saw.     Phlebitis     Pulmonary embolism ([facility] [address] 3/2011    multiple episodes    Rectal bleeding 10/2011    on coumadin @ the time &again 4/2017 4/12/17 note from Carla//colonoscopy report rec'd    Thromboembolus [facility])     Umbilical hernia     Vitamin D deficiency 04/2014    again 4/2017        Past Surgical History:   Procedure Laterality Date    HX APPENDECTOMY  2003    HX COLONOSCOPY  04/28/2017    Dr. Louann Larsen HX HERNIA REPAIR  [id number]    umbilical         Family History   Problem Relation Age of Onset    Heart Disease Father     Heart Disease Maternal Grandmother     Heart Disease Maternal Grandfather         Social History     Socioeconomic History    Marital status: SINGLE     Spouse name: Not on file    Number of children: Not on file    Years of education: Not on file    Highest education level: Not on file   Occupational History    Not on file   Social Needs    Financial resource strain: Not on file    Food insecurity:     Worry: Not on file     Inability: Not on file    Transportation needs:     Medical: Not on file     Non-medical: Not on file   Tobacco Use    Smoking status: Never Smoker    Smokeless tobacco: Current User     Types: Snuff   Substance and Sexual Activity    Alcohol use: Yes     Alcohol/week: 0.0 standard drinks     Comment: weekends    Drug use: No    Sexual activity: Yes     Partners: Female   Lifestyle    Physical activity:     Days per week: Not on file     Minutes per session: Not on file    Stress: Not on file   Relationships    Social connections:     Talks on phone: Not on file     Gets together: Not on file     Attends [employer] service: Not on file     Active member of club or organization: Not on file     Attends meetings of clubs or organizations: Not on file     Relationship status: Not on file    Intimate partner violence:     Fear of current or ex partner: Not on file     Emotionally abused: Not on file     Physically abused: Not on file     Forced sexual activity: Not on file   Other Topics Concern    Not on file   Social History Narrative    Not on file                ALLERGIES: Bee sting [sting, bee]; Neomycin; and Neosporin [neomycin-bacitracin-polymyxin]    Review of Systems   Constitutional: Negative for chills, fatigue and fever. HENT: Negative. Eyes: Negative. Respiratory: Negative for cough, shortness of breath and wheezing. Cardiovascular: Negative. Negative for chest pain. Gastrointestinal: Negative. Genitourinary: Negative. Musculoskeletal: Negative. Skin: Negative. Elevated and swollen bump on bridge of nose causing pain and a headache   Neurological: Positive for headaches. Negative for dizziness, syncope, weakness, light-headedness and numbness. Vitals:    09/22/19 1415   BP: 131/71   Pulse: 92   Resp: 18   Temp: 98.6 °F (37 °C)   SpO2: 93%   Weight: 242 lb (109.8 kg)   Height: 6' 2\" (1.88 m)       Physical Exam   Constitutional: He is oriented to person, place, and time. He appears well-developed and well-nourished.    HENT:   Right Ear: External ear normal.   Left Ear: External ear normal.   Mouth/Throat: Oropharynx is clear and moist. No oropharyngeal exudate. Swelling on bridge of nose with erythema and warmth. No oozing or drainage noted. Neck: Normal range of motion. Cardiovascular: Normal rate, regular rhythm and normal heart sounds. Pulmonary/Chest: Effort normal and breath sounds normal. No respiratory distress. He has no wheezes. He has no rales. He exhibits no tenderness. Abdominal: Soft. Bowel sounds are normal.   Musculoskeletal: Normal range of motion. Lymphadenopathy:     He has no cervical adenopathy. Neurological: He is alert and oriented to person, place, and time. Skin: Skin is warm and dry. Psychiatric: He has a normal mood and affect. MDM     Differential Diagnosis; Clinical Impression; Plan:     (J34.0) Cellulitis of external nose  (primary encounter diagnosis)  Orders Placed This Encounter      doxycycline (ADOXA) 100 mg tablet          Sig: Take 1 Tab by mouth two (2) times a day for 10 days. Dispense:  20 Tab          Refill:  0      hydrocortisone (HYTONE) 2.5 % topical cream          Sig: Apply  to affected area two (2) times daily as needed (pain and itching) for up to 3 days. use thin layer          Dispense:  3.5 g          Refill:  0    The patients condition was discussed with the patient and they understand. The patient is to follow up with PCP. If signs and symptoms become worse the pt is to go to the ER. The patient is to take medications as prescribed. AVS given with patient instructions upon discharge.                   Procedures

## 2019-09-22 NOTE — PATIENT INSTRUCTIONS

## 2019-09-24 PROBLEM — Z12.5 PROSTATE CANCER SCREENING: Status: RESOLVED | Noted: 2018-08-15 | Resolved: 2019-09-24

## 2019-09-30 ENCOUNTER — OFFICE VISIT (OUTPATIENT)
Dept: FAMILY MEDICINE CLINIC | Age: 49
End: 2019-09-30

## 2019-09-30 VITALS
RESPIRATION RATE: 16 BRPM | WEIGHT: 244 LBS | HEIGHT: 74 IN | SYSTOLIC BLOOD PRESSURE: 114 MMHG | HEART RATE: 82 BPM | OXYGEN SATURATION: 90 % | BODY MASS INDEX: 31.32 KG/M2 | TEMPERATURE: 97.6 F | DIASTOLIC BLOOD PRESSURE: 68 MMHG

## 2019-09-30 DIAGNOSIS — R06.09 DYSPNEA ON EXERTION: Primary | ICD-10-CM

## 2019-09-30 DIAGNOSIS — Z79.01 ANTICOAGULANT LONG-TERM USE: ICD-10-CM

## 2019-09-30 RX ORDER — MAG HYDROX/ALUMINUM HYD/SIMETH 200-200-20
SUSPENSION, ORAL (FINAL DOSE FORM) ORAL 2 TIMES DAILY
COMMUNITY
End: 2019-11-04

## 2019-09-30 NOTE — PROGRESS NOTES
Eloisa Heredia  Identified pt with two pt identifiers(name and ). Chief Complaint   Patient presents with    Cold Symptoms     X 2 weeks. cold sweats in the morning, SOB, cough       1. Have you been to the ER, urgent care clinic since your last visit? Hospitalized since your last visit? Yes 19 bite on nose    2. Have you seen or consulted any other health care providers outside of the [address] [place] [address] since your last visit? Include any pap smears or colon screening. NO      Provider notified of reason for visit, vitals and flowsheets obtained on patients.      Patient received paperwork for advance directive during previous visit but has not completed at this time     Reviewed record In preparation for visit, huddled with provider and have obtained necessary documentation      Health Maintenance Due   Topic    Influenza Age 5 to Adult        Wt Readings from Last 3 Encounters:   19 244 lb (110.7 kg)   19 242 lb (109.8 kg)   19 254 lb (115.2 kg)     Temp Readings from Last 3 Encounters:   19 97.6 °F (36.4 °C) (Oral)   19 98.6 °F (37 °C)   19 96.9 °F (36.1 °C) (Oral)     BP Readings from Last 3 Encounters:   19 114/68   19 131/71   19 119/79     Pulse Readings from Last 3 Encounters:   19 82   19 92   19 78     Vitals:    19 1159   BP: 114/68   Pulse: 82   Resp: 16   Temp: 97.6 °F (36.4 °C)   TempSrc: Oral   SpO2: 90%   Weight: 244 lb (110.7 kg)   Height: 6' 2\" (1.88 m)   PainSc:   0 - No pain         Learning Assessment:  :     Learning Assessment 2017   PRIMARY LEARNER Patient   HIGHEST LEVEL OF EDUCATION - PRIMARY LEARNER  SOME COLLEGE   BARRIERS PRIMARY LEARNER NONE   CO-LEARNER CAREGIVER No   PRIMARY LANGUAGE ENGLISH   LEARNER PREFERENCE PRIMARY DEMONSTRATION   ANSWERED BY Patient   RELATIONSHIP SELF       Depression Screening:  :     3 most recent PHQ Screens 2019   Little interest or pleasure in doing things Not at all   Feeling down, depressed, irritable, or hopeless Not at all   Total Score PHQ 2 0       Fall Risk Assessment:  :     Fall Risk Assessment, last 12 mths 4/16/2019   Able to walk? Yes   Fall in past 12 months? No       Abuse Screening:  :     Abuse Screening Questionnaire 4/16/2019   Do you ever feel afraid of your partner? N   Are you in a relationship with someone who physically or mentally threatens you? N   Is it safe for you to go home? Y       ADL Screening:  :     ADL Assessment 4/16/2019   Feeding yourself No Help Needed   Getting from bed to chair No Help Needed   Getting dressed No Help Needed   Bathing or showering No Help Needed   Walk across the room (includes cane/walker) No Help Needed   Using the telphone No Help Needed   Taking your medications No Help Needed   Preparing meals No Help Needed   Managing money (expenses/bills) No Help Needed   Moderately strenuous housework (laundry) No Help Needed   Shopping for personal items (toiletries/medicines) No Help Needed   Shopping for groceries No Help Needed   Driving No Help Needed   Climbing a flight of stairs No Help Needed   Getting to places beyond walking distances No Help Needed         Medication reconciliation up to date and corrected with patient at this time.

## 2019-09-30 NOTE — PROGRESS NOTES
Nose getting better. Coughing up clear phlegm past week. Getting SOB with activity. Chest feels congested. Patient denies chest pain, unexpected weight change, unexpected pain, mood or memory changes. Visit Vitals  /68 (BP 1 Location: Left arm, BP Patient Position: Sitting)   Pulse 82   Temp 97.6 °F (36.4 °C) (Oral)   Resp 16   Ht 6' 2\" (1.88 m)   Wt 244 lb (110.7 kg)   SpO2 90%   BMI 31.33 kg/m²     Patient alert and cooperative. Lungs clear. Assessment:  1. Dyspnea on exertion. 2. URI type symptoms. 3. Patient on chronic Coumadin. Plan:  1. Check D-dimer. 2. Check P-T.  3. Check chest x-ray. 4. To ER if dyspnea worse. 5. Follow otherwise here prn.

## 2019-10-01 ENCOUNTER — TELEPHONE (OUTPATIENT)
Dept: FAMILY MEDICINE CLINIC | Age: 49
End: 2019-10-01

## 2019-10-01 LAB
D DIMER PPP FEU-MCNC: 0.3 MG/L FEU (ref 0–0.49)
INR PPP: 3.2 (ref 0.8–1.2)
PROTHROMBIN TIME: 30.1 SEC (ref 9.1–12)

## 2019-10-02 NOTE — TELEPHONE ENCOUNTER
Writer called patient regarding coumadin and dentist office. Dr. Alexx Valle stated that patient should ask the dentist and see what he recommends. Patient did not answer. Writer left  requesting phone call back.

## 2019-10-14 RX ORDER — WARFARIN SODIUM 5 MG/1
TABLET ORAL
Qty: 30 TAB | Refills: 0 | Status: SHIPPED | OUTPATIENT
Start: 2019-10-14 | End: 2019-11-04 | Stop reason: SDUPTHER

## 2019-10-30 ENCOUNTER — OFFICE VISIT (OUTPATIENT)
Dept: URGENT CARE | Age: 49
End: 2019-10-30

## 2019-10-30 VITALS
DIASTOLIC BLOOD PRESSURE: 65 MMHG | SYSTOLIC BLOOD PRESSURE: 115 MMHG | HEIGHT: 74 IN | WEIGHT: 244 LBS | OXYGEN SATURATION: 91 % | BODY MASS INDEX: 31.32 KG/M2 | TEMPERATURE: 99.1 F | HEART RATE: 83 BPM | RESPIRATION RATE: 18 BRPM

## 2019-10-30 DIAGNOSIS — J20.9 ACUTE BRONCHITIS, UNSPECIFIED ORGANISM: Primary | ICD-10-CM

## 2019-10-30 RX ORDER — IPRATROPIUM BROMIDE AND ALBUTEROL SULFATE 2.5; .5 MG/3ML; MG/3ML
3 SOLUTION RESPIRATORY (INHALATION)
Status: COMPLETED | OUTPATIENT
Start: 2019-10-30 | End: 2019-10-30

## 2019-10-30 RX ORDER — ALBUTEROL SULFATE 90 UG/1
2 AEROSOL, METERED RESPIRATORY (INHALATION)
Qty: 1 INHALER | Refills: 0 | Status: SHIPPED | OUTPATIENT
Start: 2019-10-30 | End: 2019-12-02 | Stop reason: ALTCHOICE

## 2019-10-30 RX ORDER — BENZONATATE 200 MG/1
200 CAPSULE ORAL
Qty: 21 CAP | Refills: 0 | Status: SHIPPED | OUTPATIENT
Start: 2019-10-30 | End: 2019-11-06

## 2019-10-30 RX ORDER — PREDNISONE 10 MG/1
TABLET ORAL
Qty: 21 TAB | Refills: 0 | Status: SHIPPED | OUTPATIENT
Start: 2019-10-30 | End: 2019-12-02 | Stop reason: ALTCHOICE

## 2019-10-30 RX ADMIN — IPRATROPIUM BROMIDE AND ALBUTEROL SULFATE 3 ML: 2.5; .5 SOLUTION RESPIRATORY (INHALATION) at 17:39

## 2019-10-30 NOTE — PROGRESS NOTES
Cough   The history is provided by the patient. This is a new problem. Episode onset: 1 month before. The problem has not changed since onset. The cough is non-productive. There has been no fever. Associated symptoms include shortness of breath (on exertion ) and wheezing. Pertinent negatives include no chest pain, no chills, no weight loss, no nausea and no vomiting. He has tried cough syrup and antibiotics (9/30/10- doxy- was started for other reason ) for the symptoms. The treatment provided mild relief. He is not a smoker. His past medical history is significant for bronchitis. His past medical history does not include pneumonia, asthma or heart failure. Past Medical History:   Diagnosis Date    [facility]fib ([facility] [address]     Allergic rhinitis     Anxiety disorder     Cellulitis     post tattoo    [facility]     [facility] f/u note to manage Lovenox    Essential tremor     Laceration of arm 09/23/2017    Left cut by a chain saw.     Phlebitis     Pulmonary embolism ([facility] [address] 3/2011    multiple episodes    Rectal bleeding 10/2011    on coumadin @ the time &again 4/2017 4/12/17 note from Carla//colonoscopy report rec'd    Thromboembolus [facility])     Umbilical hernia     Vitamin D deficiency 04/2014    again 4/2017        Past Surgical History:   Procedure Laterality Date    HX APPENDECTOMY  2003    HX COLONOSCOPY  04/28/2017    Dr. Castellanos Safe  [id number]    umbilical         Family History   Problem Relation Age of Onset    Heart Disease Father     Heart Disease Maternal Grandmother     Heart Disease Maternal Grandfather         Social History     Socioeconomic History    Marital status: SINGLE     Spouse name: Not on file    Number of children: Not on file    Years of education: Not on file    Highest education level: Not on file   Occupational History    Not on file   Social Needs    Financial resource strain: Not on file    Food insecurity: Worry: Not on file     Inability: Not on file    Transportation needs:     Medical: Not on file     Non-medical: Not on file   Tobacco Use    Smoking status: Never Smoker    Smokeless tobacco: Current User     Types: Snuff   Substance and Sexual Activity    Alcohol use: Yes     Alcohol/week: 0.0 standard drinks     Comment: weekends    Drug use: No    Sexual activity: Yes     Partners: Female   Lifestyle    Physical activity:     Days per week: Not on file     Minutes per session: Not on file    Stress: Not on file   Relationships    Social connections:     Talks on phone: Not on file     Gets together: Not on file     Attends Worship service: Not on file     Active member of club or organization: Not on file     Attends meetings of clubs or organizations: Not on file     Relationship status: Not on file    Intimate partner violence:     Fear of current or ex partner: Not on file     Emotionally abused: Not on file     Physically abused: Not on file     Forced sexual activity: Not on file   Other Topics Concern    Not on file   Social History Narrative    Not on file                ALLERGIES: Bee sting [sting, bee]; Neomycin; and Neosporin [neomycin-bacitracin-polymyxin]    Review of Systems   Constitutional: Negative for chills and weight loss. Respiratory: Positive for cough, chest tightness, shortness of breath (on exertion ) and wheezing. Cardiovascular: Negative for chest pain and leg swelling. Gastrointestinal: Positive for abdominal pain. Negative for nausea and vomiting. Denies reflux sxs   All other systems reviewed and are negative. Vitals:    10/30/19 1657   BP: 115/65   Pulse: 83   Resp: 18   Temp: 99.1 °F (37.3 °C)   SpO2: 91%   Weight: 244 lb (110.7 kg)   Height: 6' 2\" (1.88 m)       Physical Exam   Constitutional: No distress.    HENT:   Right Ear: Tympanic membrane and ear canal normal.   Left Ear: Tympanic membrane and ear canal normal.   Nose: Nose normal. Mouth/Throat: No oropharyngeal exudate, posterior oropharyngeal edema or posterior oropharyngeal erythema. Eyes: Conjunctivae are normal. Right eye exhibits no discharge. Left eye exhibits no discharge. Neck: Neck supple. Pulmonary/Chest: Effort normal. No respiratory distress. He has decreased breath sounds. He has no wheezes. He has no rhonchi. He has no rales. Lymphadenopathy:     He has no cervical adenopathy. Skin: No rash noted. Nursing note and vitals reviewed. MDM    Procedures        ICD-10-CM ICD-9-CM    1. Acute bronchitis, unspecified organism J20.9 466.0 XR CHEST PA LAT       If sxs not resolved use omeprazole    Medications Ordered Today   Medications    albuterol-ipratropium (DUO-NEB) 2.5 MG-0.5 MG/3 ML     Order Specific Question:   MODE OF DELIVERY     Answer:   Nebulizer    predniSONE (STERAPRED DS) 10 mg dose pack     Sig: As directed     Dispense:  21 Tab     Refill:  0    benzonatate (TESSALON) 200 mg capsule     Sig: Take 1 Cap by mouth three (3) times daily as needed for Cough for up to 7 days. Dispense:  21 Cap     Refill:  0    albuterol (PROVENTIL HFA, VENTOLIN HFA, PROAIR HFA) 90 mcg/actuation inhaler     Sig: Take 2 Puffs by inhalation every six (6) hours as needed for Wheezing. Dispense:  1 Inhaler     Refill:  0     Results for orders placed or performed in visit on 10/30/19   XR CHEST PA LAT    Narrative    Exam:  2 view chest    Indication: Cough    Comparison to 9/30/2019. PA and lateral views demonstrate normal heart size. There is diffuse mild  interstitial thickening. No focal infiltrate is identified. The osseous  structures are unremarkable. Impression    Impression: New diffuse mild interstitial thickening may be related to  bronchitis. No focal infiltrate is identified. The patients condition was discussed with the patient and they understand. The patient is to follow up with primary care doctor.   If signs and symptoms become worse the pt is to go to the ER. The patient is to take medications as prescribed.

## 2019-11-04 ENCOUNTER — OFFICE VISIT (OUTPATIENT)
Dept: FAMILY MEDICINE CLINIC | Age: 49
End: 2019-11-04

## 2019-11-04 VITALS
HEIGHT: 74 IN | DIASTOLIC BLOOD PRESSURE: 68 MMHG | WEIGHT: 241 LBS | BODY MASS INDEX: 30.93 KG/M2 | HEART RATE: 72 BPM | RESPIRATION RATE: 18 BRPM | TEMPERATURE: 97.3 F | SYSTOLIC BLOOD PRESSURE: 108 MMHG | OXYGEN SATURATION: 98 %

## 2019-11-04 DIAGNOSIS — Z79.01 ANTICOAGULANT LONG-TERM USE: Primary | ICD-10-CM

## 2019-11-04 DIAGNOSIS — Z79.01 ANTICOAGULANT LONG-TERM USE: ICD-10-CM

## 2019-11-04 RX ORDER — WARFARIN SODIUM 5 MG/1
TABLET ORAL
Qty: 90 TAB | Refills: 0 | Status: SHIPPED | OUTPATIENT
Start: 2019-11-04 | End: 2020-03-09

## 2019-11-04 NOTE — PROGRESS NOTES
Coughing up green 2 weeks ago. Clear past 1-2 weeks. Doing better. Not working on diet nor exercise. Patient denies chest pain, dyspnea, unexpected weight change, unexpected pain, mood or memory changes. Visit Vitals  /68 (BP 1 Location: Left arm, BP Patient Position: Sitting)   Pulse 72   Temp 97.3 °F (36.3 °C) (Oral)   Resp 18   Ht 6' 2\" (1.88 m)   Wt 241 lb (109.3 kg)   SpO2 98%   BMI 30.94 kg/m²     Patient alert and cooperative. Reviewed above. Assessment:  1. Recent bronchitis, improving. 2. Chronic anticoagulation. Plan:  1. Check PT-INR today. 2. Refilled Coumadin. 3. Recheck in a month. 4. Follow otherwise here prn.

## 2019-11-04 NOTE — PROGRESS NOTES
Shayna Dan  Identified pt with two pt identifiers(name and ). Chief Complaint   Patient presents with    Cold Symptoms     X 1 month    Labs       1. Have you been to the ER, urgent care clinic since your last visit? Hospitalized since your last visit? NO    2. Have you seen or consulted any other health care providers outside of the [address] [place] [address] since your last visit? Include any pap smears or colon screening. NO      Provider notified of reason for visit, vitals and flowsheets obtained on patients.      Patient received paperwork for advance directive during previous visit but has not completed at this time     Reviewed record In preparation for visit, huddled with provider and have obtained necessary documentation      Health Maintenance Due   Topic    Influenza Age 5 to Adult     Shingrix Vaccine Age 49> (1 of 2)       Wt Readings from Last 3 Encounters:   19 241 lb (109.3 kg)   10/30/19 244 lb (110.7 kg)   19 244 lb (110.7 kg)     Temp Readings from Last 3 Encounters:   19 97.3 °F (36.3 °C) (Oral)   10/30/19 99.1 °F (37.3 °C)   19 97.6 °F (36.4 °C) (Oral)     BP Readings from Last 3 Encounters:   19 108/68   10/30/19 115/65   19 114/68     Pulse Readings from Last 3 Encounters:   19 72   10/30/19 83   19 82     Vitals:    19 0858   BP: 108/68   Pulse: 72   Resp: 18   Temp: 97.3 °F (36.3 °C)   TempSrc: Oral   SpO2: 98%   Weight: 241 lb (109.3 kg)   Height: 6' 2\" (1.88 m)   PainSc:   0 - No pain         Learning Assessment:  :     Learning Assessment 2017   PRIMARY LEARNER Patient   HIGHEST LEVEL OF EDUCATION - PRIMARY LEARNER  SOME COLLEGE   BARRIERS PRIMARY LEARNER NONE   CO-LEARNER CAREGIVER No   PRIMARY LANGUAGE ENGLISH   LEARNER PREFERENCE PRIMARY DEMONSTRATION   ANSWERED BY Patient   RELATIONSHIP SELF       Depression Screening:  :     3 most recent PHQ Screens 2019   Little interest or pleasure in doing things Not at all   Feeling down, depressed, irritable, or hopeless Not at all   Total Score PHQ 2 0       Fall Risk Assessment:  :     Fall Risk Assessment, last 12 mths 4/16/2019   Able to walk? Yes   Fall in past 12 months? No       Abuse Screening:  :     Abuse Screening Questionnaire 4/16/2019   Do you ever feel afraid of your partner? N   Are you in a relationship with someone who physically or mentally threatens you? N   Is it safe for you to go home? Y       ADL Screening:  :     ADL Assessment 4/16/2019   Feeding yourself No Help Needed   Getting from bed to chair No Help Needed   Getting dressed No Help Needed   Bathing or showering No Help Needed   Walk across the room (includes cane/walker) No Help Needed   Using the telphone No Help Needed   Taking your medications No Help Needed   Preparing meals No Help Needed   Managing money (expenses/bills) No Help Needed   Moderately strenuous housework (laundry) No Help Needed   Shopping for personal items (toiletries/medicines) No Help Needed   Shopping for groceries No Help Needed   Driving No Help Needed   Climbing a flight of stairs No Help Needed   Getting to places beyond walking distances No Help Needed         Medication reconciliation up to date and corrected with patient at this time.

## 2019-11-05 LAB
INR PPP: 3 (ref 0.8–1.2)
PROTHROMBIN TIME: 28.8 SEC (ref 9.1–12)

## 2019-12-02 ENCOUNTER — OFFICE VISIT (OUTPATIENT)
Dept: FAMILY MEDICINE CLINIC | Age: 49
End: 2019-12-02

## 2019-12-02 VITALS
WEIGHT: 242.1 LBS | DIASTOLIC BLOOD PRESSURE: 79 MMHG | SYSTOLIC BLOOD PRESSURE: 126 MMHG | HEART RATE: 105 BPM | RESPIRATION RATE: 24 BRPM | OXYGEN SATURATION: 89 % | TEMPERATURE: 98.2 F | BODY MASS INDEX: 31.07 KG/M2 | HEIGHT: 74 IN

## 2019-12-02 DIAGNOSIS — R05.9 COUGH: ICD-10-CM

## 2019-12-02 DIAGNOSIS — R06.2 WHEEZING: ICD-10-CM

## 2019-12-02 DIAGNOSIS — R06.09 DOE (DYSPNEA ON EXERTION): ICD-10-CM

## 2019-12-02 DIAGNOSIS — J40 BRONCHITIS: ICD-10-CM

## 2019-12-02 DIAGNOSIS — J02.9 SORE THROAT: Primary | ICD-10-CM

## 2019-12-02 LAB
FLUAV+FLUBV AG NOSE QL IA.RAPID: NEGATIVE POS/NEG
FLUAV+FLUBV AG NOSE QL IA.RAPID: NEGATIVE POS/NEG
S PYO AG THROAT QL: NEGATIVE
VALID INTERNAL CONTROL?: YES
VALID INTERNAL CONTROL?: YES

## 2019-12-02 RX ORDER — BENZONATATE 200 MG/1
200 CAPSULE ORAL
Qty: 30 CAP | Refills: 0 | Status: SHIPPED | OUTPATIENT
Start: 2019-12-02 | End: 2019-12-30

## 2019-12-02 RX ORDER — PREDNISONE 10 MG/1
TABLET ORAL
Qty: 21 TAB | Refills: 0 | Status: SHIPPED | OUTPATIENT
Start: 2019-12-02 | End: 2019-12-30

## 2019-12-02 NOTE — PROGRESS NOTES
Chief Complaint   Patient presents with    Sore Throat     room 5       HPI:  The patient is a 47 y.o. male who presents today for URI symptoms. Agree with nurse's note. SUBJECTIVE:   Gorge Flaherty complains of sore throat, productive cough, cough described as productive, mild shortness of breath with exertion and mild wheezing while lying flat and pain while swallowing for 1 1/2 weeks ago, gradually worsening since that time. He denies a history of chills, fevers, myalgias, nausea and vomiting. Tried OTC cold remedies with temporary relief including see below. Patient denies a history of asthma. Patient does not smoke cigarettes. Any evaluation to date? Yes, prior to his trip he had similar symptoms, was given Tessalon Perles, Steroid Pack, albuterol inhaler. Symptoms resolved but then returned after he returned to the [address] [place] [address],[address]. Recent Travel? Yes, just returned from a cruise to the [address] [place] [address]? no    FLU VACCINE? no   Immunization History   Administered Date(s) Administered    Influenza Vaccine 11/14/2013     Pneumonia Vaccine? no There is no immunization history for the selected administration types on file for this patient. Chart reviewed: immunizations are up to date and documented. Review of Systems  A comprehensive review of systems was negative except for that written in the HPI.     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  Phlebitis     Pulmonary embolism ([address] 3/2011    multiple episodes    Rectal bleeding 10/2011    on coumadin @ the time &again 4/2017 4/12/17 note from Carla//colonoscopy report rec'd    [facility]romboembolus [facility])     Umbilical hernia     Vitamin D deficiency 04/2014    again 4/2017       Past Surgical History:   Procedure Laterality Date    HX APPENDECTOMY  2003    HX COLONOSCOPY  04/28/2017    Dr. Bairon Medley    umbilical       Social History     Tobacco Use    Smoking status: Never Smoker    Smokeless tobacco: Current User     Types: Snuff   Substance Use Topics    Alcohol use: Yes     Alcohol/week: 0.0 standard drinks     Comment: weekends    Drug use: No       Family History   Problem Relation Age of Onset    Heart Disease Father     Heart Disease Maternal Grandmother     Heart Disease Maternal Grandfather        Outpatient Medications Marked as Taking for the 12/2/19 encounter (Office Visit) with Rony Pearce NP   Medication Sig Dispense Refill    predniSONE (STERAPRED DS) 10 mg dose pack As directed 21 Tab 0    benzonatate (TESSALON) 200 mg capsule Take 1 Cap by mouth three (3) times daily as needed for Cough. 30 Cap 0    aluminum-magnesium hydroxide 200-200 mg/5 mL 30 mL solution Take 5 mL by mouth three (3) times daily. gargle, swish and spit 100 mL 0    albuterol sulfate (PROAIR RESPICLICK) 90 mcg/actuation aepb Take 2 Puffs by inhalation every six (6) hours as needed for Cough.  1 Inhaler 0    warfarin (COUMADIN) 5 mg tablet TAKE 1 TABLET BY MOUTH EVERY DAY ON MONDAY THROUGH FRIDAY, AND 1/2 TABLET EVERY SATURDAY AND SUNDAY 90 Tab 0    rosuvastatin (CRESTOR) 20 mg tablet TAKE 1 TABLET BY MOUTH EVERY NIGHT 90 Tab 3    EPINEPHrine (EPIPEN) 0.3 mg/0.3 mL injection INJECT 0.3 ML BY INTRAMUSCULAR ROUTE ONCE PRN FOR ANAPHYLAXIS OR ALLERGIC RESPONSE FOR UP TO ONE DOSE 1 Syringe 0    sildenafil citrate (VIAGRA) 50 mg tablet Take 50 mg by mouth as needed.  krill-omega-3-dha-epa-lipids (KRILL OIL) 212-59-48-97 mg cap Take 1 Cap by mouth daily. Allergies   Allergen Reactions    Bee Sting [Sting, Bee] Swelling    Neomycin Rash    Neosporin [Neomycin-Bacitracin-Polymyxin] Rash       OBJECTIVE:    PE:  Visit Vitals  /79 (BP 1 Location: Left arm, BP Patient Position: Sitting)   Pulse (!) 105   Temp 98.2 °F (36.8 °C) (Oral)   Resp 24   Ht 6' 2\" (1.88 m)   Wt 242 lb 1.6 oz (109.8 kg)   SpO2 (!) 89%   BMI 31.08 kg/m²     He appears well, vital signs are as noted above   PAIN: No complaints of pain today. HEAD:  Normocephalic. Atraumatic.  + tender sinuses x 4. EYE: PERRLA. EOMs intact. Sclera anicteric without injection. No drainage or discharge. EARS: Hearing intact bilaterally. External ear canals normal without evidence of blood or swelling. Bilateral TM's intact, pearly grey with landmarks visible. + erythema or effusion. NOSE: Patent. Nasal turbinates pink. No polyps noted. + erythema/edema. + clear  discharge. MOUTH: mucous membranes pink and moist. Posterior pharynx normal with cobblestone appearance. + erythema, no white exudate or obstruction. NECK: supple. Midline trachea. RESP: Breath sounds are symmetrical bilaterally. Unlabored without SOB. Speaking in full sentences. Diminished throughout, right greater than left. No wheezes. No rales or rhonchi. Non-productive cough when elicited. CV: normal rate. Regular rhythm. S1, S2 audible. No murmur noted. No rubs, clicks or gallops noted. HEME/LYMPH: peripheral pulses palpable 2+ x 4 extremities. No peripheral edema is noted. + cervical adenopathy noted. SKIN: clean dry and intact throughout.  no rashes, erythema, ecchymosis, lacerations, abrasions, suspicious moles noted    Results for orders placed or performed in visit on 12/02/19   AMB POC RAPID STREP A   Result Value Ref Range    VALID INTERNAL CONTROL POC Yes     Group A Strep Ag Negative Negative   AMB POC NORBERTO INFLUENZA A/B TEST   Result Value Ref Range    VALID INTERNAL CONTROL POC Yes     Influenza A Ag POC Negative Negative Pos/Neg    Influenza B Ag POC Negative Negative Pos/Neg     Assessment/Plan:  Differential diagnosis and treatment options reviewed with patient who is in agreement with treatment plan as outlined below. ICD-10-CM ICD-9-CM    1. Sore throat J02.9 462 AMB POC RAPID STREP A      AMB POC NORBERTO INFLUENZA A/B TEST   2. Cough R05 786.2 XR CHEST PA LAT      predniSONE (STERAPRED DS) 10 mg dose pack      benzonatate (TESSALON) 200 mg capsule      aluminum-magnesium hydroxide 200-200 mg/5 mL 30 mL solution      albuterol sulfate (PROAIR RESPICLICK) 90 mcg/actuation aepb   3. ECHAVARRIA (dyspnea on exertion) R06.09 786.09 XR CHEST PA LAT      predniSONE (STERAPRED DS) 10 mg dose pack      aluminum-magnesium hydroxide 200-200 mg/5 mL 30 mL solution      albuterol sulfate (PROAIR RESPICLICK) 90 mcg/actuation aepb   4. Wheezing R06.2 786.07 XR CHEST PA LAT      predniSONE (STERAPRED DS) 10 mg dose pack      aluminum-magnesium hydroxide 200-200 mg/5 mL 30 mL solution      albuterol sulfate (PROAIR RESPICLICK) 90 mcg/actuation aepb   5. Bronchitis J40 490 XR CHEST PA LAT      predniSONE (STERAPRED DS) 10 mg dose pack      aluminum-magnesium hydroxide 200-200 mg/5 mL 30 mL solution      albuterol sulfate (PROAIR RESPICLICK) 90 mcg/actuation aepb     Diagnoses and all orders for this visit:    1. Sore throat  -     AMB POC RAPID STREP A  -     AMB POC NORBERTO INFLUENZA A/B TEST    2. Cough  -     XR CHEST PA LAT; Future  -     predniSONE (STERAPRED DS) 10 mg dose pack; As directed  -     benzonatate (TESSALON) 200 mg capsule; Take 1 Cap by mouth three (3) times daily as needed for Cough. -     aluminum-magnesium hydroxide 200-200 mg/5 mL 30 mL solution; Take 5 mL by mouth three (3) times daily. gargle, swish and spit  -     albuterol sulfate (PROAIR RESPICLICK) 90 mcg/actuation aepb; Take 2 Puffs by inhalation every six (6) hours as needed for Cough. 3. ECHAVARRIA (dyspnea on exertion)  -     XR CHEST PA LAT; Future  -     predniSONE (STERAPRED DS) 10 mg dose pack; As directed  -     aluminum-magnesium hydroxide 200-200 mg/5 mL 30 mL solution; Take 5 mL by mouth three (3) times daily. gargle, swish and spit  -     albuterol sulfate (PROAIR RESPICLICK) 90 mcg/actuation aepb; Take 2 Puffs by inhalation every six (6) hours as needed for Cough. 4. Wheezing  -     XR CHEST PA LAT; Future  -     predniSONE (STERAPRED DS) 10 mg dose pack; As directed  -     aluminum-magnesium hydroxide 200-200 mg/5 mL 30 mL solution; Take 5 mL by mouth three (3) times daily. gargle, swish and spit  -     albuterol sulfate (PROAIR RESPICLICK) 90 mcg/actuation aepb; Take 2 Puffs by inhalation every six (6) hours as needed for Cough. 5. Bronchitis  -     XR CHEST PA LAT; Future  -     predniSONE (STERAPRED DS) 10 mg dose pack; As directed  -     aluminum-magnesium hydroxide 200-200 mg/5 mL 30 mL solution; Take 5 mL by mouth three (3) times daily. gargle, swish and spit  -     albuterol sulfate (PROAIR RESPICLICK) 90 mcg/actuation aepb; Take 2 Puffs by inhalation every six (6) hours as needed for Cough. Follow-up and Dispositions    · Return if symptoms worsen or fail to improve.       lab results and schedule of future lab studies reviewed with patient  reviewed diet, exercise and weight control  reviewed medications and side effects in detail    Symptomatic therapy suggested: push fluids, rest and return office visit prn if symptoms persist or worsen. Lack of antibiotic effectiveness discussed with him. Call or return to clinic prn if these symptoms worsen or fail to improve as anticipated. Health Maintenance reviewed - deferred to PCP. Recommended healthy diet low in carbohydrates, fats, sodium and cholesterol. Recommended regular cardiovascular exercise 3-6 times per week for 30-60 minutes daily. Chart is reviewed and updated today in the office. Records requested for other providers patient has seen and is currently seeing. Verbal and written instructions (see AVS) provided. Patient expresses understanding of diagnosis and treatment plan.

## 2019-12-02 NOTE — PATIENT INSTRUCTIONS
Bronchitis: Care Instructions Your Care Instructions Bronchitis is inflammation of the bronchial tubes, which carry air to the lungs. The tubes swell and produce mucus, or phlegm. The mucus and inflamed bronchial tubes make you cough. You may have trouble breathing. Most cases of bronchitis are caused by viruses like those that cause colds. Antibiotics usually do not help and they may be harmful. Bronchitis usually develops rapidly and lasts about 2 to 3 weeks in otherwise healthy people. Follow-up care is a key part of your treatment and safety. Be sure to make and go to all appointments, and call your doctor if you are having problems. It's also a good idea to know your test results and keep a list of the medicines you take. How can you care for yourself at home? · Take all medicines exactly as prescribed. Call your doctor if you think you are having a problem with your medicine. · Get some extra rest. 
· Take an over-the-counter pain medicine, such as acetaminophen (Tylenol), ibuprofen (Advil, Motrin), or naproxen (Aleve) to reduce fever and relieve body aches. Read and follow all instructions on the label. · Do not take two or more pain medicines at the same time unless the doctor told you to. Many pain medicines have acetaminophen, which is Tylenol. Too much acetaminophen (Tylenol) can be harmful. · Take an over-the-counter cough medicine that contains dextromethorphan to help quiet a dry, hacking cough so that you can sleep. Avoid cough medicines that have more than one active ingredient. Read and follow all instructions on the label. · Breathe moist air from a humidifier, hot shower, or sink filled with hot water. The heat and moisture will thin mucus so you can cough it out. · Do not smoke. Smoking can make bronchitis worse. If you need help quitting, talk to your doctor about stop-smoking programs and medicines. These can increase your chances of quitting for good. When should you call for help? Call 911 anytime you think you may need emergency care. For example, call if: 
  · You have severe trouble breathing.  
 Call your doctor now or seek immediate medical care if: 
  · You have new or worse trouble breathing.  
  · You cough up dark brown or bloody mucus (sputum).  
  · You have a new or higher fever.  
  · You have a new rash.  
 Watch closely for changes in your health, and be sure to contact your doctor if: 
  · You cough more deeply or more often, especially if you notice more mucus or a change in the color of your mucus.  
  · You are not getting better as expected. Where can you learn more? Go to http://tito-ludmila.info/. Enter H333 in the search box to learn more about \"Bronchitis: Care Instructions. \" Current as of: June 9, 2019 Content Version: 12.2 © 8865-1220 [employer]. Care instructions adapted under license by [employer] (which disclaims liability or warranty for this information). If you have questions about a medical condition or this instruction, always ask your healthcare professional. [address] any warranty or liability for your use of this information. Cough: Care Instructions Your Care Instructions A cough is your body's response to something that bothers your throat or airways. Many things can cause a cough. You might cough because of a cold or the flu, bronchitis, or asthma. Smoking, postnasal drip, allergies, and stomach acid that backs up into your throat also can cause coughs. A cough is a symptom, not a disease. Most coughs stop when the cause, such as a cold, goes away. You can take a few steps at home to cough less and feel better. Follow-up care is a key part of your treatment and safety. Be sure to make and go to all appointments, and call your doctor if you are having problems.  It's also a good idea to know your test results and keep a list of the medicines you take. How can you care for yourself at home? · Drink lots of water and other fluids. This helps thin the mucus and soothes a dry or sore throat. Honey or lemon juice in hot water or tea may ease a dry cough. · Take cough medicine as directed by your doctor. · Prop up your head on pillows to help you breathe and ease a dry cough. · Try cough drops to soothe a dry or sore throat. Cough drops don't stop a cough. Medicine-flavored cough drops are no better than candy-flavored drops or hard candy. · Do not smoke. Avoid secondhand smoke. If you need help quitting, talk to your doctor about stop-smoking programs and medicines. These can increase your chances of quitting for good. When should you call for help? Call 911 anytime you think you may need emergency care. For example, call if: 
  · You have severe trouble breathing.  
 Call your doctor now or seek immediate medical care if: 
  · You cough up blood.  
  · You have new or worse trouble breathing.  
  · You have a new or higher fever.  
  · You have a new rash.  
 Watch closely for changes in your health, and be sure to contact your doctor if: 
  · You cough more deeply or more often, especially if you notice more mucus or a change in the color of your mucus.  
  · You have new symptoms, such as a sore throat, an earache, or sinus pain.  
  · You do not get better as expected. Where can you learn more? Go to http://tito-ludmila.info/. Enter D279 in the search box to learn more about \"Cough: Care Instructions. \" Current as of: June 9, 2019 Content Version: 12.2 © 0792-0131 [employer]. Care instructions adapted under license by [employer] (which disclaims liability or warranty for this information).  If you have questions about a medical condition or this instruction, always ask your healthcare professional. [address] any warranty or liability for your use of this information.

## 2019-12-02 NOTE — PROGRESS NOTES
Chief Complaint   Patient presents with    Sore Throat     room 5     Patient states he has had a sore throat x 1.5 weeks, pt states he has been using the inhaler, not helping, having difficulty taking deep breaths and coughing. 1. Have you been to the ER, urgent care clinic since your last visit? Hospitalized since your last visit? No    2. Have you seen or consulted any other health care providers outside of the [address] [place] [address] since your last visit? Include any pap smears or colon screening.  No    Visit Vitals  /79 (BP 1 Location: Left arm, BP Patient Position: Sitting)   Pulse (!) 105   Temp 98.2 °F (36.8 °C) (Oral)   Resp 24   Ht 6' 2\" (1.88 m)   Wt 242 lb 1.6 oz (109.8 kg)   SpO2 (!) 89%   BMI 31.08 kg/m²

## 2019-12-30 ENCOUNTER — OFFICE VISIT (OUTPATIENT)
Dept: URGENT CARE | Age: 49
End: 2019-12-30

## 2019-12-30 VITALS
SYSTOLIC BLOOD PRESSURE: 117 MMHG | HEART RATE: 92 BPM | WEIGHT: 236 LBS | HEIGHT: 74 IN | DIASTOLIC BLOOD PRESSURE: 59 MMHG | RESPIRATION RATE: 18 BRPM | TEMPERATURE: 98.9 F | BODY MASS INDEX: 30.29 KG/M2 | OXYGEN SATURATION: 96 %

## 2019-12-30 DIAGNOSIS — J20.9 ACUTE BRONCHITIS, UNSPECIFIED ORGANISM: Primary | ICD-10-CM

## 2019-12-30 RX ORDER — PREDNISONE 20 MG/1
20 TABLET ORAL 2 TIMES DAILY
Qty: 10 TAB | Refills: 0 | Status: SHIPPED | OUTPATIENT
Start: 2019-12-30 | End: 2020-01-04

## 2019-12-30 RX ORDER — BENZONATATE 200 MG/1
200 CAPSULE ORAL
Qty: 21 CAP | Refills: 0 | Status: SHIPPED | OUTPATIENT
Start: 2019-12-30 | End: 2020-01-06

## 2019-12-30 RX ORDER — DOXYCYCLINE 100 MG/1
100 CAPSULE ORAL 2 TIMES DAILY
Qty: 20 CAP | Refills: 0 | Status: SHIPPED | OUTPATIENT
Start: 2019-12-30 | End: 2020-01-14 | Stop reason: ALTCHOICE

## 2019-12-30 NOTE — PATIENT INSTRUCTIONS
Bronchitis: Care Instructions  Your Care Instructions    Bronchitis is inflammation of the bronchial tubes, which carry air to the lungs. The tubes swell and produce mucus, or phlegm. The mucus and inflamed bronchial tubes make you cough. You may have trouble breathing. Most cases of bronchitis are caused by viruses like those that cause colds. Antibiotics usually do not help and they may be harmful. Bronchitis usually develops rapidly and lasts about 2 to 3 weeks in otherwise healthy people. Follow-up care is a key part of your treatment and safety. Be sure to make and go to all appointments, and call your doctor if you are having problems. It's also a good idea to know your test results and keep a list of the medicines you take. How can you care for yourself at home? · Take all medicines exactly as prescribed. Call your doctor if you think you are having a problem with your medicine. · Get some extra rest.  · Take an over-the-counter pain medicine, such as acetaminophen (Tylenol), ibuprofen (Advil, Motrin), or naproxen (Aleve) to reduce fever and relieve body aches. Read and follow all instructions on the label. · Do not take two or more pain medicines at the same time unless the doctor told you to. Many pain medicines have acetaminophen, which is Tylenol. Too much acetaminophen (Tylenol) can be harmful. · Take an over-the-counter cough medicine that contains dextromethorphan to help quiet a dry, hacking cough so that you can sleep. Avoid cough medicines that have more than one active ingredient. Read and follow all instructions on the label. · Breathe moist air from a humidifier, hot shower, or sink filled with hot water. The heat and moisture will thin mucus so you can cough it out. · Do not smoke. Smoking can make bronchitis worse. If you need help quitting, talk to your doctor about stop-smoking programs and medicines. These can increase your chances of quitting for good.   When should you call for help? Call 911 anytime you think you may need emergency care. For example, call if:    · You have severe trouble breathing.    Call your doctor now or seek immediate medical care if:    · You have new or worse trouble breathing.     · You cough up dark brown or bloody mucus (sputum).     · You have a new or higher fever.     · You have a new rash.    Watch closely for changes in your health, and be sure to contact your doctor if:    · You cough more deeply or more often, especially if you notice more mucus or a change in the color of your mucus.     · You are not getting better as expected. Where can you learn more? Go to http://tito-ludmila.info/. Enter H333 in the search box to learn more about \"Bronchitis: Care Instructions. \"  Current as of: June 9, 2019  Content Version: 12.2  © 3422-7458 [employer]. Care instructions adapted under license by [employer] (which disclaims liability or warranty for this information). If you have questions about a medical condition or this instruction, always ask your healthcare professional. [address] any warranty or liability for your use of this information.

## 2019-12-30 NOTE — PROGRESS NOTES
Cough   The history is provided by the patient. This is a recurrent problem. The current episode started more than 1 week ago. The problem occurs every few minutes. The problem has been gradually worsening. The cough is non-productive. There has been no fever. Associated symptoms include wheezing. Pertinent negatives include no chest pain, no chills, no weight loss and no shortness of breath. He has tried cough syrup, inhalers and antibiotics for the symptoms. The treatment provided no relief. He is not a smoker (tobacco dipping ). His past medical history is significant for bronchitis and asthma. His past medical history does not include pneumonia. Past Medical History:   Diagnosis Date    [facility]fib ([facility] [address]     Allergic rhinitis     Anxiety disorder     Cellulitis     post tattoo    [facility]) [facility]     [facility] f/u note to manage Lovenox    Essential tremor     Laceration of arm 09/23/2017    Left cut by a chain saw.     Phlebitis     Pulmonary embolism ([facility] [address] 3/2011    multiple episodes    Rectal bleeding 10/2011    on coumadin @ the time &again 4/2017 4/12/17 note from Carla//colonoscopy report rec'd    [facility]embolus [facility])     Umbilical hernia     Vitamin D deficiency 04/2014    again 4/2017        Past Surgical History:   Procedure Laterality Date    HX APPENDECTOMY  2003    HX COLONOSCOPY  04/28/2017    Dr. Gopal Brand  [id number]    umbilical         Family History   Problem Relation Age of Onset    Heart Disease Father     Heart Disease Maternal Grandmother     Heart Disease Maternal Grandfather         Social History     Socioeconomic History    Marital status: SINGLE     Spouse name: Not on file    Number of children: Not on file    Years of education: Not on file    Highest education level: Not on file   Occupational History    Not on file   Social Needs    Financial resource strain: Not on file    Food insecurity:     Worry: Not on file     Inability: Not on file    Transportation needs:     Medical: Not on file     Non-medical: Not on file   Tobacco Use    Smoking status: Never Smoker    Smokeless tobacco: Current User     Types: Snuff   Substance and Sexual Activity    Alcohol use: Yes     Alcohol/week: 0.0 standard drinks     Comment: weekends    Drug use: No    Sexual activity: Yes     Partners: Female   Lifestyle    Physical activity:     Days per week: Not on file     Minutes per session: Not on file    Stress: Not on file   Relationships    Social connections:     Talks on phone: Not on file     Gets together: Not on file     Attends Judaism service: Not on file     Active member of club or organization: Not on file     Attends meetings of clubs or organizations: Not on file     Relationship status: Not on file    Intimate partner violence:     Fear of current or ex partner: Not on file     Emotionally abused: Not on file     Physically abused: Not on file     Forced sexual activity: Not on file   Other Topics Concern    Not on file   Social History Narrative    Not on file                ALLERGIES: Bee sting [sting, bee]; Neomycin; and Neosporin [neomycin-bacitracin-polymyxin]    Review of Systems   Constitutional: Negative for chills and weight loss. Respiratory: Positive for cough and wheezing. Negative for shortness of breath. Cardiovascular: Negative for chest pain. All other systems reviewed and are negative. Vitals:    12/30/19 1219   BP: 117/59   Pulse: 92   Resp: 18   Temp: 98.9 °F (37.2 °C)   SpO2: 96%   Weight: 236 lb (107 kg)   Height: 6' 2\" (1.88 m)       Physical Exam  Vitals signs and nursing note reviewed. Constitutional:       General: He is not in acute distress.   HENT:      Right Ear: Tympanic membrane and ear canal normal.      Left Ear: Tympanic membrane and ear canal normal.      Nose: Nose normal.      Mouth/Throat:      Pharynx: No oropharyngeal exudate or posterior oropharyngeal erythema. Eyes:      General:         Right eye: No discharge. Left eye: No discharge. Conjunctiva/sclera: Conjunctivae normal.   Neck:      Musculoskeletal: Neck supple. Pulmonary:      Effort: Pulmonary effort is normal. No respiratory distress. Breath sounds: Normal breath sounds. No wheezing or rales. Lymphadenopathy:      Cervical: No cervical adenopathy. Skin:     Findings: No rash. MDM    Procedures      ICD-10-CM ICD-9-CM    1. Acute bronchitis, unspecified organism J20.9 466.0        Out patient CXR ordered 12/2- he hasn't get it done ad deferred to do it today     Advised to get CXR if not better wityh antibiotics in 10 days   Medications Ordered Today   Medications    doxycycline (MONODOX) 100 mg capsule     Sig: Take 1 Cap by mouth two (2) times a day. Dispense:  20 Cap     Refill:  0    predniSONE (DELTASONE) 20 mg tablet     Sig: Take 20 mg by mouth two (2) times a day for 5 days. With food     Dispense:  10 Tab     Refill:  0    benzonatate (TESSALON) 200 mg capsule     Sig: Take 1 Cap by mouth three (3) times daily as needed for Cough for up to 7 days. Dispense:  21 Cap     Refill:  0     No results found for any visits on 12/30/19. The patients condition was discussed with the patient and they understand. The patient is to follow up with primary care doctor. If signs and symptoms become worse the pt is to go to the ER. The patient is to take medications as prescribed.

## 2019-12-30 NOTE — LETTER
NOTIFICATION RETURN TO WORK / SCHOOL 
 
12/30/2019 12:59 PM 
 
Mr. Judge Reaves [address] [place] [id number] To Whom It May Concern: Judge Reaves is currently under the care of [facility]. He will return to work/school on: 12/31/19. If there are questions or concerns please have the patient contact our office. Sincerely, Uriel Lynn MD

## 2020-01-10 ENCOUNTER — TELEPHONE (OUTPATIENT)
Dept: FAMILY MEDICINE CLINIC | Age: 50
End: 2020-01-10

## 2020-01-10 NOTE — TELEPHONE ENCOUNTER
----- Message from Calin Cameron sent at 1/9/2020 12:03 PM EST -----  Regarding: Read/telephone  Pt is returning a call from today. Pts number is [phone].

## 2020-01-14 ENCOUNTER — OFFICE VISIT (OUTPATIENT)
Dept: FAMILY MEDICINE CLINIC | Age: 50
End: 2020-01-14

## 2020-01-14 VITALS
RESPIRATION RATE: 18 BRPM | TEMPERATURE: 97.7 F | DIASTOLIC BLOOD PRESSURE: 76 MMHG | HEIGHT: 74 IN | SYSTOLIC BLOOD PRESSURE: 128 MMHG | OXYGEN SATURATION: 92 % | BODY MASS INDEX: 30.42 KG/M2 | HEART RATE: 81 BPM | WEIGHT: 237 LBS

## 2020-01-14 DIAGNOSIS — F41.0 PANIC ANXIETY SYNDROME: ICD-10-CM

## 2020-01-14 DIAGNOSIS — J45.31 MILD PERSISTENT ASTHMA WITH ACUTE EXACERBATION: Primary | ICD-10-CM

## 2020-01-14 RX ORDER — BUSPIRONE HYDROCHLORIDE 10 MG/1
TABLET ORAL
Qty: 50 TAB | Refills: 0 | Status: SHIPPED | OUTPATIENT
Start: 2020-01-14 | End: 2020-02-06

## 2020-01-14 RX ORDER — FLUTICASONE PROPIONATE 220 UG/1
2 AEROSOL, METERED RESPIRATORY (INHALATION) 2 TIMES DAILY
Qty: 1 INHALER | Refills: 1 | Status: SHIPPED | OUTPATIENT
Start: 2020-01-14 | End: 2020-02-22

## 2020-01-14 NOTE — PROGRESS NOTES
States ECHAVARRIA since seen December. Using inh 1 puff q 4-6 hours. Advised to inc to 2 puffs. Patient denies chest pain, unexpected weight change, unexpected pain, mood or memory changes. Requests med for nerves and shakes. Used Buspar in past with benefit. Visit Vitals  /76 (BP 1 Location: Right arm, BP Patient Position: Sitting)   Pulse 81   Temp 97.7 °F (36.5 °C) (Oral)   Resp 18   Ht 6' 2\" (1.88 m)   Wt 237 lb (107.5 kg)   SpO2 92%   BMI 30.43 kg/m²     Patient alert and cooperative. Reviewed above. Lungs clear. Assessment:  1. Asthma exacerbation post infection. Plan:  1. Increase current Albuterol to two puffs every four to six hours as needed. 2. Add Flovent, two puffs twice a day till not needing Albuterol inhaler. 3. If not improving, worse, set up pulmonary evaluation. 4. Recheck here otherwise prn.

## 2020-01-14 NOTE — PROGRESS NOTES
Etienne Palencia is a 47 y.o. male  HIPAA verified by two patient identifiers. Health Maintenance Due   Topic    Influenza Age 5 to Adult     Shingrix Vaccine Age 49> (1 of 2)     Chief Complaint   Patient presents with    Other     need different inhaler,sob     Visit Vitals  /76 (BP 1 Location: Right arm, BP Patient Position: Sitting)   Pulse 81   Temp 97.7 °F (36.5 °C) (Oral)   Resp 18   Ht 6' 2\" (1.88 m)   Wt 237 lb (107.5 kg)   SpO2 92%   BMI 30.43 kg/m²       Pain Scale: 0 - No pain/10  Pain Location:   1. Have you been to the ER, urgent care clinic since your last visit? Hospitalized since your last visit? No    2. Have you seen or consulted any other health care providers outside of the [address] [place] [address] since your last visit? Include any pap smears or colon screening.  No

## 2020-02-06 DIAGNOSIS — F41.0 PANIC ANXIETY SYNDROME: ICD-10-CM

## 2020-02-06 RX ORDER — BUSPIRONE HYDROCHLORIDE 10 MG/1
TABLET ORAL
Qty: 50 TAB | Refills: 0 | Status: SHIPPED | OUTPATIENT
Start: 2020-02-06 | End: 2020-03-17

## 2020-02-10 ENCOUNTER — OFFICE VISIT (OUTPATIENT)
Dept: FAMILY MEDICINE CLINIC | Age: 50
End: 2020-02-10

## 2020-02-10 DIAGNOSIS — Z79.01 ANTICOAGULANT LONG-TERM USE: Primary | ICD-10-CM

## 2020-02-11 LAB
INR PPP: 2.5 (ref 0.8–1.2)
PROTHROMBIN TIME: 24.5 SEC (ref 9.1–12)

## 2020-02-17 ENCOUNTER — APPOINTMENT (OUTPATIENT)
Dept: CT IMAGING | Age: 50
DRG: 189 | End: 2020-02-17
Attending: EMERGENCY MEDICINE
Payer: COMMERCIAL

## 2020-02-17 ENCOUNTER — OFFICE VISIT (OUTPATIENT)
Dept: FAMILY MEDICINE CLINIC | Age: 50
End: 2020-02-17

## 2020-02-17 ENCOUNTER — HOSPITAL ENCOUNTER (INPATIENT)
Age: 50
LOS: 5 days | Discharge: HOME | DRG: 189 | End: 2020-02-22
Attending: EMERGENCY MEDICINE | Admitting: INTERNAL MEDICINE
Payer: COMMERCIAL

## 2020-02-17 ENCOUNTER — APPOINTMENT (OUTPATIENT)
Dept: ULTRASOUND IMAGING | Age: 50
DRG: 189 | End: 2020-02-17
Attending: EMERGENCY MEDICINE
Payer: COMMERCIAL

## 2020-02-17 VITALS
WEIGHT: 242 LBS | OXYGEN SATURATION: 80 % | HEART RATE: 85 BPM | SYSTOLIC BLOOD PRESSURE: 134 MMHG | RESPIRATION RATE: 16 BRPM | DIASTOLIC BLOOD PRESSURE: 80 MMHG | TEMPERATURE: 98 F | HEIGHT: 74 IN | BODY MASS INDEX: 31.06 KG/M2

## 2020-02-17 DIAGNOSIS — R04.2 HEMOPTYSIS: ICD-10-CM

## 2020-02-17 DIAGNOSIS — M79.89 LEFT LEG SWELLING: ICD-10-CM

## 2020-02-17 DIAGNOSIS — Z86.718 PERSONAL HISTORY OF DVT (DEEP VEIN THROMBOSIS): ICD-10-CM

## 2020-02-17 DIAGNOSIS — Z86.711 HX PULMONARY EMBOLISM: ICD-10-CM

## 2020-02-17 DIAGNOSIS — R09.02 HYPOXIA: Primary | ICD-10-CM

## 2020-02-17 DIAGNOSIS — R06.09 DYSPNEA ON EXERTION: Primary | ICD-10-CM

## 2020-02-17 PROBLEM — J96.01 ACUTE RESPIRATORY FAILURE WITH HYPOXIA (HCC): Status: ACTIVE | Noted: 2020-02-17

## 2020-02-17 LAB
ALBUMIN SERPL-MCNC: 3.5 G/DL (ref 3.5–5)
ALBUMIN/GLOB SERPL: 0.7 {RATIO} (ref 1.1–2.2)
ALP SERPL-CCNC: 93 U/L (ref 45–117)
ALT SERPL-CCNC: 22 U/L (ref 12–78)
ANION GAP SERPL CALC-SCNC: 4 MMOL/L (ref 5–15)
ARTERIAL PATENCY WRIST A: YES
AST SERPL-CCNC: 29 U/L (ref 15–37)
ATRIAL RATE: 73 BPM
BASE EXCESS BLD CALC-SCNC: 1 MMOL/L
BASOPHILS # BLD: 0 K/UL (ref 0–0.1)
BASOPHILS NFR BLD: 1 % (ref 0–1)
BDY SITE: ABNORMAL
BILIRUB SERPL-MCNC: 1.2 MG/DL (ref 0.2–1)
BUN SERPL-MCNC: 6 MG/DL (ref 6–20)
BUN/CREAT SERPL: 5 (ref 12–20)
CA-I BLD-SCNC: 1.21 MMOL/L (ref 1.12–1.32)
CALCIUM SERPL-MCNC: 9 MG/DL (ref 8.5–10.1)
CALCULATED P AXIS, ECG09: 46 DEGREES
CALCULATED R AXIS, ECG10: -46 DEGREES
CALCULATED T AXIS, ECG11: 15 DEGREES
CHLORIDE SERPL-SCNC: 108 MMOL/L (ref 97–108)
CO2 SERPL-SCNC: 28 MMOL/L (ref 21–32)
CREAT SERPL-MCNC: 1.26 MG/DL (ref 0.7–1.3)
DIAGNOSIS, 93000: NORMAL
DIFFERENTIAL METHOD BLD: ABNORMAL
EOSINOPHIL # BLD: 0.2 K/UL (ref 0–0.4)
EOSINOPHIL NFR BLD: 4 % (ref 0–7)
ERYTHROCYTE [DISTWIDTH] IN BLOOD BY AUTOMATED COUNT: 18.9 % (ref 11.5–14.5)
GAS FLOW.O2 O2 DELIVERY SYS: ABNORMAL L/MIN
GAS FLOW.O2 SETTING OXYMISER: 4 L/M
GLOBULIN SER CALC-MCNC: 4.9 G/DL (ref 2–4)
GLUCOSE SERPL-MCNC: 87 MG/DL (ref 65–100)
HCO3 BLD-SCNC: 25.3 MMOL/L (ref 22–26)
HCT VFR BLD AUTO: 57.7 % (ref 36.6–50.3)
HGB BLD-MCNC: 18.2 G/DL (ref 12.1–17)
IMM GRANULOCYTES # BLD AUTO: 0 K/UL (ref 0–0.04)
IMM GRANULOCYTES NFR BLD AUTO: 0 % (ref 0–0.5)
INR PPP: 2.4 (ref 0.9–1.1)
LYMPHOCYTES # BLD: 1.2 K/UL (ref 0.8–3.5)
LYMPHOCYTES NFR BLD: 24 % (ref 12–49)
MCH RBC QN AUTO: 27.1 PG (ref 26–34)
MCHC RBC AUTO-ENTMCNC: 31.5 G/DL (ref 30–36.5)
MCV RBC AUTO: 85.9 FL (ref 80–99)
MONOCYTES # BLD: 0.4 K/UL (ref 0–1)
MONOCYTES NFR BLD: 8 % (ref 5–13)
NEUTS SEG # BLD: 3.1 K/UL (ref 1.8–8)
NEUTS SEG NFR BLD: 63 % (ref 32–75)
NRBC # BLD: 0 K/UL (ref 0–0.01)
NRBC BLD-RTO: 0 PER 100 WBC
P-R INTERVAL, ECG05: 170 MS
PCO2 BLD: 37.9 MMHG (ref 35–45)
PH BLD: 7.43 [PH] (ref 7.35–7.45)
PLATELET # BLD AUTO: 201 K/UL (ref 150–400)
PMV BLD AUTO: 8.8 FL (ref 8.9–12.9)
PO2 BLD: 50 MMHG (ref 80–100)
POTASSIUM SERPL-SCNC: 4.2 MMOL/L (ref 3.5–5.1)
PROT SERPL-MCNC: 8.4 G/DL (ref 6.4–8.2)
PROTHROMBIN TIME: 23.1 SEC (ref 9–11.1)
Q-T INTERVAL, ECG07: 394 MS
QRS DURATION, ECG06: 90 MS
QTC CALCULATION (BEZET), ECG08: 434 MS
RBC # BLD AUTO: 6.72 M/UL (ref 4.1–5.7)
SAO2 % BLD: 87 % (ref 92–97)
SODIUM SERPL-SCNC: 140 MMOL/L (ref 136–145)
SPECIMEN TYPE: ABNORMAL
TOTAL RESP. RATE, ITRR: 16
TROPONIN I SERPL-MCNC: <0.05 NG/ML
VENTRICULAR RATE, ECG03: 73 BPM
WBC # BLD AUTO: 5 K/UL (ref 4.1–11.1)

## 2020-02-17 PROCEDURE — 85610 PROTHROMBIN TIME: CPT

## 2020-02-17 PROCEDURE — 71275 CT ANGIOGRAPHY CHEST: CPT

## 2020-02-17 PROCEDURE — 74011250637 HC RX REV CODE- 250/637: Performed by: INTERNAL MEDICINE

## 2020-02-17 PROCEDURE — 93005 ELECTROCARDIOGRAM TRACING: CPT

## 2020-02-17 PROCEDURE — 74011636320 HC RX REV CODE- 636/320: Performed by: EMERGENCY MEDICINE

## 2020-02-17 PROCEDURE — 65660000000 HC RM CCU STEPDOWN

## 2020-02-17 PROCEDURE — 80053 COMPREHEN METABOLIC PANEL: CPT

## 2020-02-17 PROCEDURE — 36600 WITHDRAWAL OF ARTERIAL BLOOD: CPT

## 2020-02-17 PROCEDURE — 74011250636 HC RX REV CODE- 250/636: Performed by: INTERNAL MEDICINE

## 2020-02-17 PROCEDURE — 36415 COLL VENOUS BLD VENIPUNCTURE: CPT

## 2020-02-17 PROCEDURE — 85025 COMPLETE CBC W/AUTO DIFF WBC: CPT

## 2020-02-17 PROCEDURE — 74011000250 HC RX REV CODE- 250: Performed by: EMERGENCY MEDICINE

## 2020-02-17 PROCEDURE — 74011250637 HC RX REV CODE- 250/637: Performed by: EMERGENCY MEDICINE

## 2020-02-17 PROCEDURE — 84484 ASSAY OF TROPONIN QUANT: CPT

## 2020-02-17 PROCEDURE — 77010033678 HC OXYGEN DAILY

## 2020-02-17 PROCEDURE — 93970 EXTREMITY STUDY: CPT

## 2020-02-17 PROCEDURE — 99285 EMERGENCY DEPT VISIT HI MDM: CPT

## 2020-02-17 PROCEDURE — 82803 BLOOD GASES ANY COMBINATION: CPT

## 2020-02-17 PROCEDURE — 94640 AIRWAY INHALATION TREATMENT: CPT

## 2020-02-17 RX ORDER — IPRATROPIUM BROMIDE 0.5 MG/2.5ML
0.5 SOLUTION RESPIRATORY (INHALATION)
Status: COMPLETED | OUTPATIENT
Start: 2020-02-17 | End: 2020-02-17

## 2020-02-17 RX ORDER — WARFARIN SODIUM 5 MG/1
5 TABLET ORAL
Status: DISCONTINUED | OUTPATIENT
Start: 2020-02-17 | End: 2020-02-17

## 2020-02-17 RX ORDER — SODIUM CHLORIDE 0.9 % (FLUSH) 0.9 %
10 SYRINGE (ML) INJECTION
Status: COMPLETED | OUTPATIENT
Start: 2020-02-17 | End: 2020-02-17

## 2020-02-17 RX ORDER — FUROSEMIDE 10 MG/ML
40 INJECTION INTRAMUSCULAR; INTRAVENOUS 2 TIMES DAILY
Status: DISCONTINUED | OUTPATIENT
Start: 2020-02-18 | End: 2020-02-19

## 2020-02-17 RX ORDER — FUROSEMIDE 10 MG/ML
40 INJECTION INTRAMUSCULAR; INTRAVENOUS ONCE
Status: COMPLETED | OUTPATIENT
Start: 2020-02-17 | End: 2020-02-17

## 2020-02-17 RX ORDER — WARFARIN 2.5 MG/1
2.5 TABLET ORAL
COMMUNITY
End: 2020-09-11 | Stop reason: DRUGHIGH

## 2020-02-17 RX ORDER — ACETAMINOPHEN 325 MG/1
650 TABLET ORAL
Status: DISCONTINUED | OUTPATIENT
Start: 2020-02-17 | End: 2020-02-22 | Stop reason: HOSPADM

## 2020-02-17 RX ORDER — CARVEDILOL 3.12 MG/1
3.12 TABLET ORAL 2 TIMES DAILY WITH MEALS
Status: DISCONTINUED | OUTPATIENT
Start: 2020-02-17 | End: 2020-02-22 | Stop reason: HOSPADM

## 2020-02-17 RX ORDER — WARFARIN SODIUM 5 MG/1
5 TABLET ORAL ONCE
Status: COMPLETED | OUTPATIENT
Start: 2020-02-17 | End: 2020-02-17

## 2020-02-17 RX ORDER — ALBUTEROL SULFATE 0.83 MG/ML
5 SOLUTION RESPIRATORY (INHALATION)
Status: COMPLETED | OUTPATIENT
Start: 2020-02-17 | End: 2020-02-17

## 2020-02-17 RX ORDER — WARFARIN 2.5 MG/1
2.5 TABLET ORAL
Status: DISCONTINUED | OUTPATIENT
Start: 2020-02-22 | End: 2020-02-17

## 2020-02-17 RX ORDER — ROSUVASTATIN CALCIUM 20 MG/1
20 TABLET, COATED ORAL
Status: DISCONTINUED | OUTPATIENT
Start: 2020-02-17 | End: 2020-02-18 | Stop reason: CLARIF

## 2020-02-17 RX ADMIN — CARVEDILOL 3.12 MG: 3.12 TABLET, FILM COATED ORAL at 19:03

## 2020-02-17 RX ADMIN — ALBUTEROL SULFATE 5 MG: 2.5 SOLUTION RESPIRATORY (INHALATION) at 14:11

## 2020-02-17 RX ADMIN — Medication 10 ML: at 19:03

## 2020-02-17 RX ADMIN — IOPAMIDOL 100 ML: 755 INJECTION, SOLUTION INTRAVENOUS at 11:18

## 2020-02-17 RX ADMIN — FUROSEMIDE 40 MG: 10 INJECTION, SOLUTION INTRAMUSCULAR; INTRAVENOUS at 16:44

## 2020-02-17 RX ADMIN — ROSUVASTATIN 20 MG: 20 TABLET, FILM COATED ORAL at 21:13

## 2020-02-17 RX ADMIN — WARFARIN SODIUM 5 MG: 5 TABLET ORAL at 19:03

## 2020-02-17 RX ADMIN — IPRATROPIUM BROMIDE 0.5 MG: 0.5 SOLUTION RESPIRATORY (INHALATION) at 14:11

## 2020-02-17 NOTE — PROGRESS NOTES
TRANSFER - IN REPORT:    Verbal report received from Christine Randall (name) on Francisco Ornelas  being received from [facility] 12 (unit) for routine progression of care      Report consisted of patients Situation, Background, Assessment and   Recommendations(SBAR). Information from the following report(s) SBAR was reviewed with the receiving nurse. Opportunity for questions and clarification was provided. Assessment completed upon patients arrival to unit and care assumed.

## 2020-02-17 NOTE — H&P
Hospitalist Admission Note    NAME: Etienne Palencia   :  1970   MRN:  [id number]     Date/Time:  2020 3:53 PM    Patient PCP: Ronel Cisse MD  ______________________________________________________________________  Given the patient's current clinical presentation, I have a high level of concern for decompensation if discharged from the emergency department. Complex decision making was performed, which includes reviewing the patient's available past medical records, laboratory results, and x-ray films. My assessment of this patient's clinical condition and my plan of care is as follows. Assessment / Plan:  New onset hypoxic respiratory failure  New onset heart failure  -Admit to telemetry  -Start diuresis with IV Lasix  CTA chest: Diffuse groundglass opacity with focal areas of air-trapping may  represent mild edema or other acute abnormality. No evidence of pulmonary  Embolism.  -check TTE  -cardiology consult in the AM    ?Bronchitis  -has used prn inhalers in the past. Monitor response of his breathing to diuresis  -Add PRN nebs  -cough may relate to HF    Poxy Afib  -in sinus on EKG  -rate controlled     DVT  PE  -cont coumadin, pharm to dose    Code Status: Full  Surrogate Decision Maker:    DVT Prophylaxis: warfarin  GI Prophylaxis: not indicated    Baseline: Functional, independent ADLs      Subjective:   CHIEF COMPLAINT: SOB, lower extremity swelling    HISTORY OF PRESENT ILLNESS:     Vonda Burrell is a 47 y.o. hospital history of A. fib, DVT and PE, which occurred a few years apart, but more than 9 years ago, for which he is on coumadin. patient admits to cough which is been going on since about October with productive mostly of clear sputum. In the last several days he has noticed also a small clots of blood that come up with a cough. He is also noticed lower extremity edema ongoing for about 2 to 3 days.   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 He has not required oxygen in the past and is not known to have lung disease. He admits to using snuff but denies any smoking history. We were asked to admit for work up and evaluation of the above problems. Past Medical History:   Diagnosis Date    [facility]fib ([facility] [address]     Allergic rhinitis     Anxiety disorder     Cellulitis     post tattoo    [facility]     [facility] f/u note to manage Lovenox    Essential tremor     Laceration of arm 09/23/2017    Left cut by a chain saw.  Phlebitis     Pulmonary embolism ([facility] [address] 3/2011    multiple episodes    Rectal bleeding 10/2011    on coumadin @ the time &again 4/2017 4/12/17 note from Carla//colonoscopy report rec'd    Thromboembolus [facility])     Umbilical hernia     Vitamin D deficiency 04/2014    again 4/2017        Past Surgical History:   Procedure Laterality Date    HX APPENDECTOMY  2003    HX COLONOSCOPY  04/28/2017    Dr. Stacy Covert    umbilical       Social History     Tobacco Use    Smoking status: Never Smoker    Smokeless tobacco: Current User     Types: Snuff   Substance Use Topics    Alcohol use: Yes     Alcohol/week: 0.0 standard drinks     Comment: weekends        Family History   Problem Relation Age of Onset    Heart Disease Father     Heart Disease Maternal Grandmother     Heart Disease Maternal Grandfather      Allergies   Allergen Reactions    Bee Sting [Sting, Bee] Swelling    Neomycin Rash    Neosporin [Neomycin-Bacitracin-Polymyxin] Rash        Prior to Admission medications    Medication Sig Start Date End Date Taking?  Authorizing Provider   busPIRone (BUSPAR) 10 mg tablet TAKE 1 TO 2 TABLETS BY MOUTH THREE TIMES DAILY 2/6/20   Rosalina Sotomayor MD   fluticasone propionate (FLOVENT HFA) 220 mcg/actuation inhaler Take 2 Puffs by inhalation two (2) times a day. 1/14/20   Yue Sotomayor MD   albuterol sulfate (PROAIR RESPICLICK) 90 mcg/actuation aepb Take 2 Puffs by inhalation every six (6) hours as needed for Cough. 12/2/19   Tirso Negrete NP   warfarin (COUMADIN) 5 mg tablet TAKE 1 TABLET BY MOUTH EVERY DAY ON MONDAY THROUGH FRIDAY, AND 1/2 TABLET EVERY SATURDAY AND SUNDAY 11/4/19   Yue Sotomayor MD   rosuvastatin (CRESTOR) 20 mg tablet TAKE 1 TABLET BY MOUTH EVERY NIGHT 9/11/19   Yue Sotomayor MD   EPINEPHrine (EPIPEN) 0.3 mg/0.3 mL injection INJECT 0.3 ML BY INTRAMUSCULAR ROUTE ONCE PRN FOR ANAPHYLAXIS OR ALLERGIC RESPONSE FOR UP TO ONE DOSE 4/22/19   Yue Sotomayor MD   sildenafil citrate (VIAGRA) 50 mg tablet Take 50 mg by mouth as needed. Other, MD Gibson   krill-omega-3-dha-epa-lipids (KRILL OIL) 505-79-95-50 mg cap Take 1 Cap by mouth daily. Provider, Historical       REVIEW OF SYSTEMS:     I am not able to complete the review of systems because:    The patient is intubated and sedated    The patient has altered mental status due to his acute medical problems    The patient has baseline aphasia from prior stroke(s)    The patient has baseline dementia and is not reliable historian    The patient is in acute medical distress and unable to provide information           Total of 12 systems reviewed as follows:       POSITIVE= underlined text  Negative = text not underlined  General:  fever, chills, sweats, generalized weakness, weight loss/gain,      loss of appetite   Eyes:    blurred vision, eye pain, loss of vision, double vision  ENT:    rhinorrhea, pharyngitis   Respiratory:   cough, sputum production, SOB, ECHAVARRIA, wheezing, pleuritic pain   Cardiology:   chest pain, palpitations, orthopnea, PND, edema, syncope   Gastrointestinal:  abdominal pain , N/V, diarrhea, dysphagia, constipation, bleeding   Genitourinary:  frequency, urgency, dysuria, hematuria, incontinence   Muskuloskeletal :  arthralgia, myalgia, back pain  Hematology:  easy bruising, nose or gum bleeding, lymphadenopathy   Dermatological: rash, ulceration, pruritis, color change / jaundice  Endocrine:   hot flashes or polydipsia   Neurological:  headache, dizziness, confusion, focal weakness, paresthesia,     Speech difficulties, memory loss, gait difficulty  Psychological: Feelings of anxiety, depression, agitation    Objective:   VITALS:    Visit Vitals  /72   Pulse 86   Temp 97.7 °F (36.5 °C)   Resp 14   SpO2 91%       PHYSICAL EXAM:    General:    Alert, cooperative, no distress, appears stated age. HEENT: Atraumatic, anicteric sclerae, pink conjunctivae     No oral ulcers, mucosa moist, throat clear, dentition fair  Neck:  Supple, symmetrical,  thyroid: non tender  Lungs:   Lung sounds severely diminished. No Wheezing or Rhonchi. No rales. Chest wall:  No tenderness  No Accessory muscle use. Heart:   Regular  rhythm,  No  murmur   2+ pitting edema  Abdomen:   Soft, non-tender. Not distended. Bowel sounds normal  Extremities: No cyanosis. No clubbing,      Skin turgor normal, Capillary refill normal, Radial dial pulse 2+  Skin:     Not pale. Not Jaundiced  No rashes   Psych:  Good insight. Not depressed. Not anxious or agitated. Neurologic: EOMs intact. No facial asymmetry. No aphasia or slurred speech. Symmetrical strength, Sensation grossly intact.  Alert and oriented X 4.     _______________________________________________________________________  Care Plan discussed with:    Comments   Patient x    Family      RN x    Care Manager                    Consultant:      _______________________________________________________________________  Expected  Disposition:   Home with Family x   HH/PT/OT/RN    SNF/LTC    JOHNNA    ________________________________________________________________________  TOTAL TIME: 72 Minutes    Critical Care Provided     Minutes non procedure based      Comments     Reviewed previous records   >50% of visit spent in counseling and coordination of care x Discussion with patient and/or family and questions answered       ________________________________________________________________________  Signed: Bernetashi Kelly DO    Procedures: see electronic medical records for all procedures/Xrays and details which were not copied into this note but were reviewed prior to creation of Plan. LAB DATA REVIEWED:    Recent Results (from the past 24 hour(s))   CBC WITH AUTOMATED DIFF    Collection Time: 02/17/20 11:09 AM   Result Value Ref Range    WBC 5.0 4.1 - 11.1 K/uL    RBC 6.72 (H) 4.10 - 5.70 M/uL    HGB 18.2 (H) 12.1 - 17.0 g/dL    HCT 57.7 (H) 36.6 - 50.3 %    MCV 85.9 80.0 - 99.0 FL    MCH 27.1 26.0 - 34.0 PG    MCHC 31.5 30.0 - 36.5 g/dL    RDW 18.9 (H) 11.5 - 14.5 %    PLATELET 866 273 - 827 K/uL    MPV 8.8 (L) 8.9 - 12.9 FL    NRBC 0.0 0  WBC    ABSOLUTE NRBC 0.00 0.00 - 0.01 K/uL    NEUTROPHILS 63 32 - 75 %    LYMPHOCYTES 24 12 - 49 %    MONOCYTES 8 5 - 13 %    EOSINOPHILS 4 0 - 7 %    BASOPHILS 1 0 - 1 %    IMMATURE GRANULOCYTES 0 0.0 - 0.5 %    ABS. NEUTROPHILS 3.1 1.8 - 8.0 K/UL    ABS. LYMPHOCYTES 1.2 0.8 - 3.5 K/UL    ABS. MONOCYTES 0.4 0.0 - 1.0 K/UL    ABS. EOSINOPHILS 0.2 0.0 - 0.4 K/UL    ABS. BASOPHILS 0.0 0.0 - 0.1 K/UL    ABS. IMM.  GRANS. 0.0 0.00 - 0.04 K/UL    DF AUTOMATED     PROTHROMBIN TIME + INR    Collection Time: 02/17/20 11:09 AM   Result Value Ref Range    INR 2.4 (H) 0.9 - 1.1      Prothrombin time 23.1 (H) 9.0 - 92.3 sec   METABOLIC PANEL, COMPREHENSIVE    Collection Time: 02/17/20 11:09 AM   Result Value Ref Range    Sodium 140 136 - 145 mmol/L    Potassium 4.2 3.5 - 5.1 mmol/L    Chloride 108 97 - 108 mmol/L    CO2 28 21 - 32 mmol/L    Anion gap 4 (L) 5 - 15 mmol/L    Glucose 87 65 - 100 mg/dL    BUN 6 6 - 20 MG/DL    Creatinine 1.26 0.70 - 1.30 MG/DL    BUN/Creatinine ratio 5 (L) 12 - 20      GFR est AA >60 >60 ml/min/1.73m2    GFR est non-AA >60 >60 ml/min/1.73m2 Calcium 9.0 8.5 - 10.1 MG/DL    Bilirubin, total 1.2 (H) 0.2 - 1.0 MG/DL    ALT (SGPT) 22 12 - 78 U/L    AST (SGOT) 29 15 - 37 U/L    Alk. phosphatase 93 45 - 117 U/L    Protein, total 8.4 (H) 6.4 - 8.2 g/dL    Albumin 3.5 3.5 - 5.0 g/dL    Globulin 4.9 (H) 2.0 - 4.0 g/dL    A-G Ratio 0.7 (L) 1.1 - 2.2     TROPONIN I    Collection Time: 02/17/20 11:09 AM   Result Value Ref Range    Troponin-I, Qt. <0.05 <0.05 ng/mL   EKG, 12 LEAD, INITIAL    Collection Time: 02/17/20 11:14 AM   Result Value Ref Range    Ventricular Rate 73 BPM    Atrial Rate 73 BPM    P-R Interval 170 ms    QRS Duration 90 ms    Q-T Interval 394 ms    QTC Calculation (Bezet) 434 ms    Calculated P Axis 46 degrees    Calculated R Axis -46 degrees    Calculated T Axis 15 degrees    Diagnosis       Normal sinus rhythm  Left anterior fascicular block  When compared with ECG of 13-NOV-1999 20:06,  No significant change was found     POC EG7    Collection Time: 02/17/20  2:52 PM   Result Value Ref Range    Calcium, ionized (POC) 1.21 1.12 - 1.32 mmol/L    pH (POC) 7.432 7.35 - 7.45      pCO2 (POC) 37.9 35.0 - 45.0 MMHG    pO2 (POC) 50 (L) 80 - 100 MMHG    HCO3 (POC) 25.3 22 - 26 MMOL/L    Base excess (POC) 1 mmol/L    sO2 (POC) 87 (L) 92 - 97 %    Site LEFT RADIAL      Device: NASAL CANNULA      Flow rate (POC) 4 L/M    Allens test (POC) YES      Specimen type (POC) ARTERIAL      Total resp.  rate 16

## 2020-02-17 NOTE — PROGRESS NOTES
Identified pt with two pt identifiers(name and ). Reviewed record in preparation for visit and have obtained necessary documentation. Chief Complaint   Patient presents with    Medication Reaction     pt has been coughing up blood d/t using his current inhalers; spoke with Dr. Neyda Calle who advised pt to come into today to address; pt states no coughinig up blood since he stopped using inhalers 2 days ago    Breathing Problem     pt reports dyspnea on exertion, even when using inhalers, causing difficulty performing work duties; pt states he has never seen a pulmonologist (needs referral if deemed necessary)        Health Maintenance Due   Topic    Pneumococcal 0-64 years (1 of 1 - PPSV23)    Shingrix Vaccine Age 50> (1 of 2)       Coordination of Care Questionnaire:  :   1) Have you been to an emergency room, urgent care, or hospitalized since your last visit? If yes, where when, and reason for visit? no       2. Have seen or consulted any other health care provider since your last visit? If yes, where when, and reason for visit? NO      Patient is accompanied by self I have received verbal consent from Rivas Concepcion to discuss any/all medical information while they are present in the room.

## 2020-02-17 NOTE — PROGRESS NOTES
Pharmacy Medication Reconciliation     The patient was interviewed regarding current PTA medication list, use and drug allergies;  patient present in room and obtained permission from patient to discuss drug regimen with visitor(s) present. The patient was questioned regarding use of any other inhalers, topical products, over the counter medications, herbal medications, vitamin products or ophthalmic/nasal/otic medication use. Allergy Update: Bee sting [sting, bee]; Neomycin; and Neosporin [neomycin-bacitracin-polymyxin]    Recommendations/Findings: The following amendments were made to the [facility] [id number]:   1) Additions: None    2) Deletions: None    3) Changes: None      Pertinent Findings:   -reconfirmed, patient takes warfarin 5mg Mon through Friday, and 2.5mg on Sat/Sun    -Clarified PTA med list with patient interview, and Rx query. PTA medication list was corrected to the following:     Prior to Admission Medications   Prescriptions Last Dose Informant Taking? EPINEPHrine (EPIPEN) 0.3 mg/0.3 mL injection  Self Yes   Sig: INJECT 0.3 ML BY INTRAMUSCULAR ROUTE ONCE PRN FOR ANAPHYLAXIS OR ALLERGIC RESPONSE FOR UP TO ONE DOSE   albuterol sulfate (PROAIR RESPICLICK) 90 mcg/actuation aepb  Self Yes   Sig: Take 2 Puffs by inhalation every six (6) hours as needed for Cough. busPIRone (BUSPAR) 10 mg tablet 2/17/2020 at 0900 Self Yes   Sig: TAKE 1 TO 2 TABLETS BY MOUTH THREE TIMES DAILY   fluticasone propionate (FLOVENT HFA) 220 mcg/actuation inhaler 2/17/2020 at 0900 Self Yes   Sig: Take 2 Puffs by inhalation two (2) times a day. krill-omega-3-dha-epa-lipids (KRILL OIL) 584-80-88-02 mg cap 2/17/2020 at 0900 Self Yes   Sig: Take 1 Cap by mouth daily. rosuvastatin (CRESTOR) 20 mg tablet 2/16/2020 at 2100 Self Yes   Sig: TAKE 1 TABLET BY MOUTH EVERY NIGHT   sildenafil citrate (VIAGRA) 50 mg tablet  Self Yes   Sig: Take 50 mg by mouth as needed.    warfarin (COUMADIN) 2.5 mg tablet 2/16/2020 at 2100 Self Yes   Sig: Take 2.5 mg by mouth two (2) times daily on Sat & Sun. 2.5mg on Sat/Sun. TAKE 1 TAB (5MG) BY MOUTH EVERY DAY ON MONDAY THROUGH FRIDAY, AND 1/2 TAB (2.5MG) EVERY SATURDAY AND Sunday. warfarin (COUMADIN) 5 mg tablet 2/14/2020 at 2100 Self Yes   Sig: TAKE 1 TABLET BY MOUTH EVERY DAY ON MONDAY THROUGH FRIDAY, AND 1/2 TABLET EVERY SATURDAY AND SUNDAY   Patient taking differently: Take 5 mg by mouth five (5) days a week. TAKE 1 TABLET (5MG) BY MOUTH EVERY DAY ON MONDAY THROUGH FRIDAY, AND 1/2 TABLET (2.5MG) EVERY SATURDAY AND Sunday.       Facility-Administered Medications: None          Thank you,  SABIHA Rae

## 2020-02-17 NOTE — ED PROVIDER NOTES
EMERGENCY DEPARTMENT HISTORY AND PHYSICAL EXAM      Date: 2/17/2020  Patient Name: Wilfredo Reed    History of Presenting Illness     Chief Complaint   Patient presents with    Leg Swelling     left lower leg swelling pain    Shortness of Breath     several days       History Provided By: Patient    HPI: Wilfredo Reed, 47 y.o. male with PMHx significant for A. 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 Patient reports that he has been short of breath since October. Was put on multiple inhalers by his primary care provider. Does note that at one point after he started on Flonase he had some episodes of hemoptysis which resolved after he stopped the inhaled steroid. States he has been getting progressively more short of breath and is also had some increased leg swelling for the last 3 days. States has been compliant with his Coumadin and his INR was checked last week and was 2.5. Denies any chest pain, fever, productive cough, abdominal pain, nausea, vomiting. PCP: Temo Sotomayor MD    There are no other complaints, changes, or physical findings at this time.     Current Facility-Administered Medications   Medication Dose Route Frequency Provider Last Rate Last Dose    sodium chloride (NS) flush 10 mL  10 mL IntraVENous RAD ONCE Jamaal Ward MD        iopamidoL (ISOVUE-370) 76 % injection 100 mL  100 mL IntraVENous RAD ONCE Jamaal Ward MD        acetaminophen (TYLENOL) tablet 650 mg  650 mg Oral Q6H PRN Jamaal Ward MD        furosemide (LASIX) injection 40 mg  40 mg IntraVENous ONCE Bebo Segal DO        [START ON 2/18/2020] furosemide (LASIX) injection 40 mg  40 mg IntraVENous BID Bebo Segal DO        carvediloL (COREG) tablet 3.125 mg  3.125 mg Oral BID WITH MEALS Bebo Segal DO        rosuvastatin (CRESTOR) tablet 20 mg  20 mg Oral QHS Brian Segal DO  [START ON 2/22/2020] warfarin (COUMADIN) tablet 2.5 mg  2.5 mg Oral BID Sat & Sun Noemi Warren M, DO        warfarin (COUMADIN) tablet 5 mg  5 mg Oral Once per day on Mon Tue Wed Thu Fri Banquete, Bebo M, DO         Current Outpatient Medications   Medication Sig Dispense Refill    warfarin (COUMADIN) 2.5 mg tablet Take 2.5 mg by mouth two (2) times daily on Sat & Sun. 2.5mg on Sat/Sun. TAKE 1 TAB (5MG) BY MOUTH EVERY DAY ON MONDAY THROUGH FRIDAY, AND 1/2 TAB (2.5MG) EVERY SATURDAY AND Sunday.  busPIRone (BUSPAR) 10 mg tablet TAKE 1 TO 2 TABLETS BY MOUTH THREE TIMES DAILY 50 Tab 0    fluticasone propionate (FLOVENT HFA) 220 mcg/actuation inhaler Take 2 Puffs by inhalation two (2) times a day. 1 Inhaler 1    albuterol sulfate (PROAIR RESPICLICK) 90 mcg/actuation aepb Take 2 Puffs by inhalation every six (6) hours as needed for Cough. 1 Inhaler 0    warfarin (COUMADIN) 5 mg tablet TAKE 1 TABLET BY MOUTH EVERY DAY ON MONDAY THROUGH FRIDAY, AND 1/2 TABLET EVERY SATURDAY AND SUNDAY (Patient taking differently: Take 5 mg by mouth five (5) days a week. TAKE 1 TABLET (5MG) BY MOUTH EVERY DAY ON MONDAY THROUGH FRIDAY, AND 1/2 TABLET (2.5MG) EVERY SATURDAY AND Sunday.) 90 Tab 0    rosuvastatin (CRESTOR) 20 mg tablet TAKE 1 TABLET BY MOUTH EVERY NIGHT 90 Tab 3    EPINEPHrine (EPIPEN) 0.3 mg/0.3 mL injection INJECT 0.3 ML BY INTRAMUSCULAR ROUTE ONCE PRN FOR ANAPHYLAXIS OR ALLERGIC RESPONSE FOR UP TO ONE DOSE 1 Syringe 0    sildenafil citrate (VIAGRA) 50 mg tablet Take 50 mg by mouth as needed.  krill-omega-3-dha-epa-lipids (KRILL OIL) 499-15-44-65 mg cap Take 1 Cap by mouth daily.        Past History     Past Medical History:  Past Medical History:   Diagnosis Date    [facility] [address]     Allergic rhinitis     Anxiety disorder     Cellulitis     post tattoo    [facility]     [facility] f/u note to manage Lovenox    Essential tremor     Laceration of arm 09/23/2017    Left cut by a chain saw.  Phlebitis     Pulmonary embolism ([address] 3/2011    multiple episodes    Rectal bleeding 10/2011    on coumadin @ the time &again 4/2017 4/12/17 note from Carla//colonoscopy report rec'd    Thromboembolus [facility])     Umbilical hernia     Vitamin D deficiency 04/2014    again 4/2017     Past Surgical History:  Past Surgical History:   Procedure Laterality Date    HX APPENDECTOMY  2003    HX COLONOSCOPY  04/28/2017    Dr. Yobany Man HX HERNIA REPAIR  [id number]    umbilical     Family History:  Family History   Problem Relation Age of Onset    Heart Disease Father     Heart Disease Maternal Grandmother     Heart Disease Maternal Grandfather      Social History:  Social History     Tobacco Use    Smoking status: Never Smoker    Smokeless tobacco: Current User     Types: Snuff   Substance Use Topics    Alcohol use: Yes     Alcohol/week: 0.0 standard drinks     Comment: weekends    Drug use: No     Allergies: Allergies   Allergen Reactions    Bee Sting [Sting, Bee] Swelling    Neomycin Rash    Neosporin [Neomycin-Bacitracin-Polymyxin] Rash     Review of Systems   Review of Systems   Constitutional: Negative for chills and fever. HENT: Negative for congestion, rhinorrhea and sore throat. Respiratory: Positive for shortness of breath. Negative for cough. Cardiovascular: Positive for leg swelling. Negative for chest pain. Gastrointestinal: Negative for abdominal pain, nausea and vomiting. Genitourinary: Negative for dysuria and urgency. Skin: Negative for rash. Neurological: Negative for dizziness, light-headedness and headaches. All other systems reviewed and are negative. Physical Exam   Physical Exam  Vitals signs and nursing note reviewed. Constitutional:       General: He is not in acute distress. Appearance: He is well-developed. HENT:      Head: Normocephalic and atraumatic.    Eyes:      Conjunctiva/sclera: Conjunctivae normal. Pupils: Pupils are equal, round, and reactive to light. Neck:      Musculoskeletal: Normal range of motion. Cardiovascular:      Rate and Rhythm: Normal rate and regular rhythm. Comments: B/l edema L>R  Pulmonary:      Effort: Pulmonary effort is normal. No respiratory distress. Breath sounds: Normal breath sounds. No stridor. Abdominal:      General: There is no distension. Palpations: Abdomen is soft. Tenderness: There is no abdominal tenderness. Musculoskeletal: Normal range of motion. Right lower leg: Edema present. Left lower leg: Edema present. Skin:     General: Skin is warm and dry. Neurological:      Mental Status: He is alert and oriented to person, place, and time. Diagnostic Study Results   Labs -     Recent Results (from the past 12 hour(s))   CBC WITH AUTOMATED DIFF    Collection Time: 02/17/20 11:09 AM   Result Value Ref Range    WBC 5.0 4.1 - 11.1 K/uL    RBC 6.72 (H) 4.10 - 5.70 M/uL    HGB 18.2 (H) 12.1 - 17.0 g/dL    HCT 57.7 (H) 36.6 - 50.3 %    MCV 85.9 80.0 - 99.0 FL    MCH 27.1 26.0 - 34.0 PG    MCHC 31.5 30.0 - 36.5 g/dL    RDW 18.9 (H) 11.5 - 14.5 %    PLATELET 838 778 - 349 K/uL    MPV 8.8 (L) 8.9 - 12.9 FL    NRBC 0.0 0  WBC    ABSOLUTE NRBC 0.00 0.00 - 0.01 K/uL    NEUTROPHILS 63 32 - 75 %    LYMPHOCYTES 24 12 - 49 %    MONOCYTES 8 5 - 13 %    EOSINOPHILS 4 0 - 7 %    BASOPHILS 1 0 - 1 %    IMMATURE GRANULOCYTES 0 0.0 - 0.5 %    ABS. NEUTROPHILS 3.1 1.8 - 8.0 K/UL    ABS. LYMPHOCYTES 1.2 0.8 - 3.5 K/UL    ABS. MONOCYTES 0.4 0.0 - 1.0 K/UL    ABS. EOSINOPHILS 0.2 0.0 - 0.4 K/UL    ABS. BASOPHILS 0.0 0.0 - 0.1 K/UL    ABS. IMM.  GRANS. 0.0 0.00 - 0.04 K/UL    DF AUTOMATED     PROTHROMBIN TIME + INR    Collection Time: 02/17/20 11:09 AM   Result Value Ref Range    INR 2.4 (H) 0.9 - 1.1      Prothrombin time 23.1 (H) 9.0 - 92.3 sec   METABOLIC PANEL, COMPREHENSIVE    Collection Time: 02/17/20 11:09 AM   Result Value Ref Range    Sodium 140 136 - 145 mmol/L    Potassium 4.2 3.5 - 5.1 mmol/L    Chloride 108 97 - 108 mmol/L    CO2 28 21 - 32 mmol/L    Anion gap 4 (L) 5 - 15 mmol/L    Glucose 87 65 - 100 mg/dL    BUN 6 6 - 20 MG/DL    Creatinine 1.26 0.70 - 1.30 MG/DL    BUN/Creatinine ratio 5 (L) 12 - 20      GFR est AA >60 >60 ml/min/1.73m2    GFR est non-AA >60 >60 ml/min/1.73m2    Calcium 9.0 8.5 - 10.1 MG/DL    Bilirubin, total 1.2 (H) 0.2 - 1.0 MG/DL    ALT (SGPT) 22 12 - 78 U/L    AST (SGOT) 29 15 - 37 U/L    Alk. phosphatase 93 45 - 117 U/L    Protein, total 8.4 (H) 6.4 - 8.2 g/dL    Albumin 3.5 3.5 - 5.0 g/dL    Globulin 4.9 (H) 2.0 - 4.0 g/dL    A-G Ratio 0.7 (L) 1.1 - 2.2     TROPONIN I    Collection Time: 02/17/20 11:09 AM   Result Value Ref Range    Troponin-I, Qt. <0.05 <0.05 ng/mL   EKG, 12 LEAD, INITIAL    Collection Time: 02/17/20 11:14 AM   Result Value Ref Range    Ventricular Rate 73 BPM    Atrial Rate 73 BPM    P-R Interval 170 ms    QRS Duration 90 ms    Q-T Interval 394 ms    QTC Calculation (Bezet) 434 ms    Calculated P Axis 46 degrees    Calculated R Axis -46 degrees    Calculated T Axis 15 degrees    Diagnosis       Normal sinus rhythm  Left anterior fascicular block  When compared with ECG of 13-NOV-1999 20:06,  No significant change was found     POC EG7    Collection Time: 02/17/20  2:52 PM   Result Value Ref Range    Calcium, ionized (POC) 1.21 1.12 - 1.32 mmol/L    pH (POC) 7.432 7.35 - 7.45      pCO2 (POC) 37.9 35.0 - 45.0 MMHG    pO2 (POC) 50 (L) 80 - 100 MMHG    HCO3 (POC) 25.3 22 - 26 MMOL/L    Base excess (POC) 1 mmol/L    sO2 (POC) 87 (L) 92 - 97 %    Site LEFT RADIAL      Device: NASAL CANNULA      Flow rate (POC) 4 L/M    Allens test (POC) YES      Specimen type (POC) ARTERIAL      Total resp. rate 16         Radiologic Studies -   CTA CHEST W OR W WO CONT   Final Result   IMPRESSION: Diffuse groundglass opacity with focal areas of air-trapping may   represent mild edema or other acute abnormality.  No evidence of pulmonary   embolism. Cta Chest W Or W Wo Cont    Result Date: 2/17/2020  IMPRESSION: Diffuse groundglass opacity with focal areas of air-trapping may represent mild edema or other acute abnormality. No evidence of pulmonary embolism. Medical Decision Making   I am the first provider for this patient. I reviewed the vital signs, available nursing notes, past medical history, past surgical history, family history and social history. Vital Signs-Reviewed the patient's vital signs. Patient Vitals for the past 12 hrs:   Temp Pulse Resp BP SpO2   02/17/20 1630 98.2 °F (36.8 °C) 79 16 133/77 95 %   02/17/20 1552    111/72    02/17/20 1545  86 14  91 %   02/17/20 1544  81 17  92 %   02/17/20 1400  71 29 129/79 91 %   02/17/20 1330  68 24 121/89 91 %   02/17/20 1300  75 26 123/81 (!) 89 %   02/17/20 1128     95 %   02/17/20 1100  74 22 114/75 93 %   02/17/20 1045  90 20  94 %   02/17/20 1042     (!) 82 %   02/17/20 1032 97.7 °F (36.5 °C) 83 18 (!) 160/96 (!) 86 %       Pulse Oximetry Analysis - 82% on RA    Cardiac Monitor:   Rate: 74 bpm  Rhythm: Normal Sinus Rhythm      ED EKG interpretation:  Rhythm: normal sinus rhythm; and regular . Rate (approx.): 73; Axis: normal; P wave: normal; QRS interval: normal ; ST/T wave: normal; Other findings: normal. This EKG was interpreted by JUNE Duggan MD,ED Provider. Records Reviewed: Nursing Notes and Old Medical Records    Provider Notes (Medical Decision Making):   Patient presents with worsening hypoxia and shortness of breath. On exam, he is well-appearing, however does desat to low 80s on room air. Quickly improved with nasal cannula. Able to speak in full sentences. No significant wheezing on lung exam.  Does have some bilateral lower extremity edema, left greater than right. Will check basic lab work, coags, lower extremity ultrasounds. Also check a CT of the chest given profound hypoxia.     ED Course:   Initial assessment performed. The patients presenting problems have been discussed, and they are in agreement with the care plan formulated and outlined with them. I have encouraged them to ask questions as they arise throughout their visit. CTA does not show PE but does show some focal areas of air trapping. Patient received a breathing treatment with no improvement of his hypoxia. ABG was notable for a PO2 of 50. Discussed with hospitalist, Dr. Jane Rodríguez, who will see the patient for admission    Critical Care:  none    Disposition:    Admission Note:  Patient is being admitted to the hospital by Dr. Jane Rodríguez, Service: Hospitalist.  The results of their tests and reasons for their admission have been discussed with them and available family. They convey agreement and understanding for the need to be admitted and for their admission diagnosis. Diagnosis     Clinical Impression:   1. Hypoxia        This note will not be viewable in [address] Please note that this dictation was completed with [employer], the computer voice recognition software. Quite often unanticipated grammatical, syntax, homophones, and other interpretive errors are inadvertently transcribed by the computer software. Please disregard these errors.   Please excuse any errors that have escaped final proofreading

## 2020-02-17 NOTE — PROGRESS NOTES
Pharmacy Daily Dosing of Warfarin    Indication: DVT    Goal INR: 2-3    PTA Warfarin Dose: 5 mg M-F, 2.5 mg S/Sun    Concurrent anticoagulants:     Concurrent antiplatelet:     Major Interacting Medications   Drugs that may increase INR:   Drugs that may decrease INR:     Conditions that may increase/decrease INR (CHF, C. diff, cirrhosis, thyroid disorder, hypoalbuminemia):     Labs:  Recent Labs     02/17/20  1109   INR 2.4*   HGB 18.2*      SGOT 29   TBILI 1.2*   ALB 3.5           Impression/Plan:   Warfarin 5 mg PO x 1 dose. Daily INR  CBC w/o diff QOD     Pharmacy will follow daily and adjust the dose as appropriate. Thanks  Nikki Aguilar, [facility]      [email or web address]/[email or web address]/[email or web address]/Warfarin%20Dosing%20Protocol. pdf

## 2020-02-17 NOTE — PROGRESS NOTES
Subjective: Valery Chan is a 47 y.o. male with h/o DVT and PE, on coumadin, who complains of hemoptysis x 1 month and ECHAVARRIA x 4-5 mos. At last visit, he saw PCP Dr. Courtney Corral on 1/14/2020, C/O ECHAVARRIA , flovent inhaler added, 220 mcg 2puffs BID; and albuterol increased from 1 to 2 puffs q4-6hrs. He states since using the Flovent, about the third day of use, he started coughing up blood. He states he couldn't get a good deep breath while on it. Stopped the flovent 2 days ago and the hemoptysis stopped. He states he had been coughing and \"breathing hard\" with ECHAVARRIA that started in September, has had prednisone, also treated for bronchitis at urgent care in December, with doxycycline. He denies h/o asthma. Just started using inhalers last fall when complaints of cough and ECHAVARRIA started. Today's SpO2 89%, chart review shows range of 89 - 98% since 8/9/2019. Was 92% at last visit when inhaler was increased. He is on coumadin, H/O DVT and PE 2011. Last INR 2.5 on 2/10/2020. Also c/o left leg swelling x 2 wks. States his DVT has always been on his left side. Past Medical History:   Diagnosis Date    [facility]fib ([facility] [address]     Allergic rhinitis     Anxiety disorder     Cellulitis     post tattoo    [facility]     [facility] f/u note to manage Lovenox    Essential tremor     Laceration of arm 09/23/2017    Left cut by a chain saw.  Phlebitis     Pulmonary embolism ([facility] [address] 3/2011    multiple episodes    Rectal bleeding 10/2011    on coumadin @ the time &again 4/2017 4/12/17 note from [facility]//colonoscopy report rec'd    [facility]romboembolus [facility])     Umbilical hernia     Vitamin D deficiency 04/2014    again 4/2017     Social History     Tobacco Use    Smoking status: Never Smoker    Smokeless tobacco: Current User     Types: Snuff   Substance Use Topics    Alcohol use:  Yes     Alcohol/week: 0.0 standard drinks     Comment: weekends    Drug use: No     Outpatient Medications Marked as Taking for the 2/17/20 encounter (Office Visit) with Sherly Carlos PA-C   Medication Sig Dispense Refill    busPIRone (BUSPAR) 10 mg tablet TAKE 1 TO 2 TABLETS BY MOUTH THREE TIMES DAILY 50 Tab 0    warfarin (COUMADIN) 5 mg tablet TAKE 1 TABLET BY MOUTH EVERY DAY ON MONDAY THROUGH FRIDAY, AND 1/2 TABLET EVERY SATURDAY AND SUNDAY 90 Tab 0    rosuvastatin (CRESTOR) 20 mg tablet TAKE 1 TABLET BY MOUTH EVERY NIGHT 90 Tab 3    krill-omega-3-dha-epa-lipids (KRILL OIL) 183-99-67-50 mg cap Take 1 Cap by mouth daily. Allergies   Allergen Reactions    Bee Sting [Sting, Bee] Swelling    Neomycin Rash    Neosporin [Neomycin-Bacitracin-Polymyxin] Rash        Review of Systems  A comprehensive review of systems was negative except for that written in the HPI. Objective:     Visit Vitals  /80   Pulse 85   Temp 98 °F (36.7 °C) (Oral)   Resp 16   Ht 6' 2\" (1.88 m)   Wt 242 lb (109.8 kg)   SpO2 (!) 80% Comment: with walking   BMI 31.07 kg/m²     General:   alert, cooperative, no acute distress   Eyes: conjunctivae/scleras clear. PERRL, EOM's intact   Mouth:  No oral lesions, mild pharyngeal erythema, no exudates   Neck: Supple   Heart: S1 and S2 normal,no murmurs noted    Lungs: Clear to auscultation bilaterally, no increased work of breathing   Extremities: Bilateral ankle and lower leg edema, left more than right          No results found for this visit on 02/17/20. Lab Results   Component Value Date/Time    INR 2.5 (H) 02/10/2020 04:20 PM    INR 3.0 (H) 11/04/2019 11:44 AM    INR 3.2 (H) 09/30/2019 12:33 PM    Prothrombin time 24.5 (H) 02/10/2020 04:20 PM    Prothrombin time 28.8 (H) 11/04/2019 11:44 AM    Prothrombin time 30.1 (H) 09/30/2019 12:33 PM         Assessment/Plan:     1. Dyspnea on exertion    2. Hx pulmonary embolism    3. Hemoptysis    4. Left leg swelling    5.  Personal history of DVT (deep vein thrombosis)      Pt with h/o DVT and PE c/o ECHAVARRIA x 4-5 months, hemoptysis x 1 month, and left leg swelling x 2 wks. Appears in no acute distress, initial SpO2 89%, and lowered to 80% with walking; speaking full sentences and no tachypnea or acute distress. Has pitting edema, left more than right. Last INR 2/10/2020 was 2.5. Needs further eval to rule out PE, DVT, stat CBC and INR. His friend will drive him to [facility] ER and nurse will call report. Verbal and written instructions (see AVS) provided. Patient expresses understanding of diagnosis and treatment plan.

## 2020-02-17 NOTE — ED NOTES
TRANSFER - OUT REPORT:    Verbal report given to [place] (name) on Telma Shaffer  being transferred to [facility] (unit) for routine progression of care       Report consisted of patients Situation, Background, Assessment and   Recommendations(SBAR). Information from the following report(s) SBAR, ED Summary, [facility] and Recent Results was reviewed with the receiving nurse. Lines:   Peripheral IV 02/17/20 Right Antecubital (Active)   Site Assessment Clean, dry, & intact 2/17/2020 11:08 AM   Phlebitis Assessment 0 2/17/2020 11:08 AM   Infiltration Assessment 0 2/17/2020 11:08 AM   Dressing Status Clean, dry, & intact 2/17/2020 11:08 AM   Hub Color/Line Status Pink 2/17/2020 11:08 AM   Action Taken Catheter retaped 2/17/2020 11:08 AM        Opportunity for questions and clarification was provided.       Patient transported with:   O2 @ 5 liters

## 2020-02-18 ENCOUNTER — APPOINTMENT (OUTPATIENT)
Dept: NON INVASIVE DIAGNOSTICS | Age: 50
DRG: 189 | End: 2020-02-18
Attending: INTERNAL MEDICINE
Payer: COMMERCIAL

## 2020-02-18 PROBLEM — I48.0 PAF (PAROXYSMAL ATRIAL FIBRILLATION) (HCC): Status: ACTIVE | Noted: 2020-02-18

## 2020-02-18 LAB
ANION GAP SERPL CALC-SCNC: 6 MMOL/L (ref 5–15)
AV VELOCITY RATIO: 0.87
B PERT DNA SPEC QL NAA+PROBE: NOT DETECTED
BNP SERPL-MCNC: 6 PG/ML
BORDETELLA PARAPERTUSSIS PCR, BORPAR: NOT DETECTED
BUN SERPL-MCNC: 7 MG/DL (ref 6–20)
BUN/CREAT SERPL: 7 (ref 12–20)
C PNEUM DNA SPEC QL NAA+PROBE: NOT DETECTED
CALCIUM SERPL-MCNC: 9.1 MG/DL (ref 8.5–10.1)
CHLORIDE SERPL-SCNC: 105 MMOL/L (ref 97–108)
CO2 SERPL-SCNC: 27 MMOL/L (ref 21–32)
CREAT SERPL-MCNC: 0.98 MG/DL (ref 0.7–1.3)
ECHO AO ROOT DIAM: 3.33 CM
ECHO AV AREA PEAK VELOCITY: 3.2 CM2
ECHO AV AREA/BSA PEAK VELOCITY: 1.4 CM2/M2
ECHO AV PEAK GRADIENT: 4.8 MMHG
ECHO AV PEAK VELOCITY: 109.28 CM/S
ECHO EST RA PRESSURE: 10 MMHG
ECHO LA AREA 4C: 18.1 CM2
ECHO LA MAJOR AXIS: 3.12 CM
ECHO LA TO AORTIC ROOT RATIO: 0.94
ECHO LA VOL 4C: 42.88 ML (ref 18–58)
ECHO LA VOLUME INDEX A4C: 18.68 ML/M2 (ref 16–28)
ECHO LV E' LATERAL VELOCITY: 8.44 CM/S
ECHO LV E' SEPTAL VELOCITY: 7.85 CM/S
ECHO LV EDV TEICHHOLZ: 0.4 ML
ECHO LV ESV TEICHHOLZ: 0.33 ML
ECHO LV INTERNAL DIMENSION DIASTOLIC: 4.11 CM (ref 4.2–5.9)
ECHO LV INTERNAL DIMENSION SYSTOLIC: 3.79 CM
ECHO LV IVSD: 4.11 CM (ref 0.6–1)
ECHO LV MASS 2D: 716.1 G (ref 88–224)
ECHO LV MASS INDEX 2D: 312 G/M2 (ref 49–115)
ECHO LV POSTERIOR WALL DIASTOLIC: 0.95 CM (ref 0.6–1)
ECHO LV POSTERIOR WALL SYSTOLIC: 1.35 CM
ECHO LVOT DIAM: 2.15 CM
ECHO LVOT PEAK GRADIENT: 3.6 MMHG
ECHO LVOT PEAK VELOCITY: 95.26 CM/S
ECHO LVOT SV: 70.4 ML
ECHO LVOT VTI: 19.45 CM
ECHO MV A VELOCITY: 59.21 CM/S
ECHO MV AREA VTI: 3.1 CM2
ECHO MV E DECELERATION TIME (DT): 192.7 MS
ECHO MV E VELOCITY: 56.57 CM/S
ECHO MV E/A RATIO: 0.96
ECHO MV E/E' LATERAL: 6.7
ECHO MV E/E' RATIO (AVERAGED): 6.95
ECHO MV E/E' SEPTAL: 7.21
ECHO MV MAX VELOCITY: 63.67 CM/S
ECHO MV MEAN GRADIENT: 0.8 MMHG
ECHO MV MEAN INFLOW VELOCITY: 0.44 M/S
ECHO MV PEAK GRADIENT: 1.6 MMHG
ECHO MV REGURGITANT PEAK GRADIENT: 1 MMHG
ECHO MV REGURGITANT PEAK VELOCITY: 50.1 CM/S
ECHO MV VTI: 22.87 CM
ECHO PULMONARY ARTERY SYSTOLIC PRESSURE (PASP): 37.2 MMHG
ECHO RA AREA 4C: 15.88 CM2
ECHO RIGHT VENTRICULAR SYSTOLIC PRESSURE (RVSP): 37.2 MMHG
ECHO RV INTERNAL DIMENSION: 1.66 CM
ECHO TV REGURGITANT MAX VELOCITY: 260.69 CM/S
ECHO TV REGURGITANT PEAK GRADIENT: 27.2 MMHG
ERYTHROCYTE [DISTWIDTH] IN BLOOD BY AUTOMATED COUNT: 17.9 % (ref 11.5–14.5)
FLUAV H1 2009 PAND RNA SPEC QL NAA+PROBE: NOT DETECTED
FLUAV H1 RNA SPEC QL NAA+PROBE: NOT DETECTED
FLUAV H3 RNA SPEC QL NAA+PROBE: NOT DETECTED
FLUAV SUBTYP SPEC NAA+PROBE: NOT DETECTED
FLUBV RNA SPEC QL NAA+PROBE: NOT DETECTED
GLUCOSE SERPL-MCNC: 97 MG/DL (ref 65–100)
HADV DNA SPEC QL NAA+PROBE: NOT DETECTED
HCOV 229E RNA SPEC QL NAA+PROBE: NOT DETECTED
HCOV HKU1 RNA SPEC QL NAA+PROBE: NOT DETECTED
HCOV NL63 RNA SPEC QL NAA+PROBE: NOT DETECTED
HCOV OC43 RNA SPEC QL NAA+PROBE: NOT DETECTED
HCT VFR BLD AUTO: 53.3 % (ref 36.6–50.3)
HGB BLD-MCNC: 16.8 G/DL (ref 12.1–17)
HMPV RNA SPEC QL NAA+PROBE: NOT DETECTED
HPIV1 RNA SPEC QL NAA+PROBE: NOT DETECTED
HPIV2 RNA SPEC QL NAA+PROBE: NOT DETECTED
HPIV3 RNA SPEC QL NAA+PROBE: NOT DETECTED
HPIV4 RNA SPEC QL NAA+PROBE: NOT DETECTED
INR PPP: 2.3 (ref 0.9–1.1)
LVFS 2D: 7.79 %
LVOT MG: 1.59 MMHG
LVOT MV: 0.54 CM/S
LVSV (TEICH): 5.65 ML
M PNEUMO DNA SPEC QL NAA+PROBE: NOT DETECTED
MCH RBC QN AUTO: 27.1 PG (ref 26–34)
MCHC RBC AUTO-ENTMCNC: 31.5 G/DL (ref 30–36.5)
MCV RBC AUTO: 86.1 FL (ref 80–99)
MV DEC SLOPE: 2.94
NRBC # BLD: 0 K/UL (ref 0–0.01)
NRBC BLD-RTO: 0 PER 100 WBC
PLATELET # BLD AUTO: 205 K/UL (ref 150–400)
PMV BLD AUTO: 9 FL (ref 8.9–12.9)
POTASSIUM SERPL-SCNC: 3.5 MMOL/L (ref 3.5–5.1)
PROCALCITONIN SERPL-MCNC: <0.05 NG/ML
PROTHROMBIN TIME: 22.8 SEC (ref 9–11.1)
RBC # BLD AUTO: 6.19 M/UL (ref 4.1–5.7)
RSV RNA SPEC QL NAA+PROBE: NOT DETECTED
RV+EV RNA SPEC QL NAA+PROBE: NOT DETECTED
SODIUM SERPL-SCNC: 138 MMOL/L (ref 136–145)
TROPONIN I SERPL-MCNC: <0.05 NG/ML
WBC # BLD AUTO: 6.4 K/UL (ref 4.1–11.1)

## 2020-02-18 PROCEDURE — 84484 ASSAY OF TROPONIN QUANT: CPT

## 2020-02-18 PROCEDURE — 65660000000 HC RM CCU STEPDOWN

## 2020-02-18 PROCEDURE — 85610 PROTHROMBIN TIME: CPT

## 2020-02-18 PROCEDURE — 74011250637 HC RX REV CODE- 250/637: Performed by: INTERNAL MEDICINE

## 2020-02-18 PROCEDURE — 85027 COMPLETE CBC AUTOMATED: CPT

## 2020-02-18 PROCEDURE — 36415 COLL VENOUS BLD VENIPUNCTURE: CPT

## 2020-02-18 PROCEDURE — 84145 PROCALCITONIN (PCT): CPT

## 2020-02-18 PROCEDURE — 97162 PT EVAL MOD COMPLEX 30 MIN: CPT

## 2020-02-18 PROCEDURE — 74011250636 HC RX REV CODE- 250/636: Performed by: INTERNAL MEDICINE

## 2020-02-18 PROCEDURE — 83880 ASSAY OF NATRIURETIC PEPTIDE: CPT

## 2020-02-18 PROCEDURE — 80048 BASIC METABOLIC PNL TOTAL CA: CPT

## 2020-02-18 PROCEDURE — 93306 TTE W/DOPPLER COMPLETE: CPT

## 2020-02-18 PROCEDURE — 94760 N-INVAS EAR/PLS OXIMETRY 1: CPT

## 2020-02-18 PROCEDURE — 97116 GAIT TRAINING THERAPY: CPT

## 2020-02-18 PROCEDURE — 77030027138 HC INCENT SPIROMETER -A

## 2020-02-18 PROCEDURE — 0100U RESPIRATORY PANEL,PCR,NASOPHARYNGEAL: CPT

## 2020-02-18 PROCEDURE — 77010033678 HC OXYGEN DAILY

## 2020-02-18 RX ORDER — ATORVASTATIN CALCIUM 40 MG/1
40 TABLET, FILM COATED ORAL
Status: DISCONTINUED | OUTPATIENT
Start: 2020-02-18 | End: 2020-02-22 | Stop reason: HOSPADM

## 2020-02-18 RX ORDER — WARFARIN SODIUM 5 MG/1
5 TABLET ORAL ONCE
Status: COMPLETED | OUTPATIENT
Start: 2020-02-18 | End: 2020-02-18

## 2020-02-18 RX ADMIN — WARFARIN SODIUM 5 MG: 5 TABLET ORAL at 17:31

## 2020-02-18 RX ADMIN — FUROSEMIDE 40 MG: 10 INJECTION, SOLUTION INTRAMUSCULAR; INTRAVENOUS at 17:31

## 2020-02-18 RX ADMIN — FUROSEMIDE 40 MG: 10 INJECTION, SOLUTION INTRAMUSCULAR; INTRAVENOUS at 10:19

## 2020-02-18 RX ADMIN — CARVEDILOL 3.12 MG: 3.12 TABLET, FILM COATED ORAL at 10:19

## 2020-02-18 RX ADMIN — ATORVASTATIN CALCIUM 40 MG: 40 TABLET, FILM COATED ORAL at 21:08

## 2020-02-18 RX ADMIN — CARVEDILOL 3.12 MG: 3.12 TABLET, FILM COATED ORAL at 17:31

## 2020-02-18 NOTE — PROGRESS NOTES
Primary Nurse Torres Colmenares and SAMAN Virgen performed a dual skin assessment on this patient No impairment noted  Jason score is 23      Bedside shift change report GIVEN TO SAMAN Walker. Report included the following information SBAR. SIGNIFICANT CHANGES DURING SHIFT:  Weaned to 4.5L NC.      CONCERNS TO ADDRESS WITH MD:  CLARY          [facility] NURSING NOTE   Admission Date 2/17/2020   Admission Diagnosis Acute respiratory failure with hypoxia ([address] [J96.01]   Consults IP CONSULT TO HOSPITALIST  IP CONSULT TO CARDIOLOGY      Cardiac Monitoring [x] Yes [] No      Purposeful Hourly Rounding [x] Yes    Trino Score Total Score: 1   Trino score 3 or > [x] Bed Alarm [] Avasys [] 1:1 sitter [] Patient refused (Signed refusal form in chart)   Jason Score Jason Score: 21   Jason score 14 or < [] PMT consult [] Wound Care consult    []  Specialty bed  [] Nutrition consult      Influenza Vaccine             Oxygen needs? [] Room air Oxygen @  []1L    []2L    []3L   [x]4L    []5L   []6L via  NC   Chronic home O2 use? [] Yes [] No  Perform O2 challenge test and document in progress note using [employer])      Last bowel movement Last Bowel Movement Date: 02/15/20      Urinary Catheter             LDAs               Peripheral IV 02/17/20 Right Antecubital (Active)   Site Assessment Clean, dry, & intact 2/17/2020  8:24 PM   Phlebitis Assessment 0 2/17/2020  8:24 PM   Infiltration Assessment 0 2/17/2020  8:24 PM   Dressing Status Clean, dry, & intact 2/17/2020  8:24 PM   Dressing Type Transparent 2/17/2020  8:24 PM   Hub Color/Line Status Pink 2/17/2020  8:24 PM   Action Taken Catheter retaped 2/17/2020 11:08 AM                         Readmission Risk Assessment Tool Score Low Risk            2       Total Score        2 Charlson Comorbidity Score (Age + Comorbid Conditions)        Criteria that do not apply:    Has Seen PCP in Last 6 Months (Yes=3, No=0)    . Living with Significant Other.  Assisted Living. LTAC. SNF. or   Rehab    Patient Length of Stay (>5 days = 3)    IP Visits Last 12 Months (1-3=4, 4=9, >4=11)    Pt.  Coverage (Medicare=5 , Medicaid, or Self-Pay=4)       Expected Length of Stay - - -   Actual Length of Stay 0

## 2020-02-18 NOTE — CONSULTS
[address] [facility]     Date of  Admission: 2/17/2020 10:31 AM     Admission type:Emergency    Consult for: chf  Consult by: hospitalist      Subjective: Telma Shaffer is a 47 y.o. male with PMH PE, PAF who was admitted for Acute respiratory failure with hypoxia ([facility] [address] [J96.01]. Per ED provider note Telma Shaffer presented to the ED with c/o SOB since October with ECHAVARRIA and some coughing up clots. Cardiology consulted for possible chf. On assessment, Telma Shaffer endorses being SOB since October with ECHAVARRIA and some mild orthopnea. He feels better since admission (diuresis), but is still on 4LNC. He also has had some leg swelling. He denies chest pain, palpitations, dizziness. He was treated out patient by his PCP with steriods. Telma Shaffer  Does not follow with a cardiologist.  No prior records in our system. Cardiac risk factors: smoking/ tobacco exposure, obesity, male gender. 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    Personal history of DVT (deep vein thrombosis) 11/14/2013    Hx pulmonary embolism 11/14/2013    Bee sting allergy 07/25/2012    Panic anxiety syndrome 05/04/2011    Anticoagulant long-term use 04/09/2011    Tremor 09/08/2010    Tobacco abuse 09/08/2010      Read, Maurie Lundborg, MD  Past Medical History:   Diagnosis Date    A-fib [facility])     Allergic rhinitis     Anxiety disorder     Cellulitis     post tattoo    Deep vein thrombosis (DVT) ([facility] [address]     [facility]  4/24/17 f/u note to manage Lovenox    Essential tremor     Laceration of arm 09/23/2017    Left cut by a chain saw.     Phlebitis     Pulmonary embolism ([facility] [address] 3/2011    multiple episodes    Rectal bleeding 10/2011    on coumadin @ the time &again 4/2017 4/12/17 note from Carla//colonoscopy report rec'd    Thromboembolus [facility])     Umbilical hernia     Vitamin D deficiency 04/2014    again 4/2017      Social History     Socioeconomic History    Marital status: SINGLE     Spouse name: Not on file    Number of children: Not on file    Years of education: Not on file    Highest education level: Not on file   Tobacco Use    Smoking status: Never Smoker    Smokeless tobacco: Current User     Types: Snuff   Substance and Sexual Activity    Alcohol use:  Yes     Alcohol/week: 0.0 standard drinks     Comment: weekends    Drug use: No    Sexual activity: Yes     Partners: Female     Allergies   Allergen Reactions    Bee Sting [Sting, Bee] Swelling    Neomycin Rash    Neosporin [Neomycin-Bacitracin-Polymyxin] Rash      Family History   Problem Relation Age of Onset    Heart Disease Father     Heart Disease Maternal Grandmother     Heart Disease Maternal Grandfather       Current Facility-Administered Medications   Medication Dose Route Frequency    atorvastatin (LIPITOR) tablet 40 mg  40 mg Oral QHS    acetaminophen (TYLENOL) tablet 650 mg  650 mg Oral Q6H PRN    furosemide (LASIX) injection 40 mg  40 mg IntraVENous BID    carvediloL (COREG) tablet 3.125 mg  3.125 mg Oral BID WITH MEALS        Review of Symptoms:   11 systems reviewed, negative other than as stated in the HPI        Objective:      Visit Vitals  /66 (BP 1 Location: Right arm, BP Patient Position: At rest)   Pulse 70   Temp 98.1 °F (36.7 °C)   Resp 18   Wt 103.2 kg (227 lb 8.2 oz)   SpO2 96%   BMI 29.21 kg/m²       Physical:   General: pleasant adult AAM sitting in chair in NAD   Heart: RRR, no m/S3/JVD, no carotid bruits   Lungs: clear   Abdomen: Soft, +BS, NTND   Extremities: LE dante +DP/PT, no edema   Neurologic: Grossly normal    Data Review:   Recent Labs     02/18/20  0006 02/17/20  1109   WBC 6.4 5.0   HGB 16.8 18.2*   HCT 53.3* 57.7*    201     Recent Labs     02/18/20  0006 02/17/20  1109    140   K 3.5 4.2    108   CO2 27 28   GLU 97 87   BUN 7 6   CREA 0.98 1.26   CA 9.1 9.0   ALB  --  3.5   TBILI  --  1.2*   SGOT  --  29   ALT  --  22   INR 2.3* 2.4*       Recent Labs     02/17/20  1109   TROIQ <0.05         Intake/Output Summary (Last 24 hours) at 2/18/2020 1111  Last data filed at 2/18/2020 0428  Gross per 24 hour   Intake 440 ml   Output 1700 ml   Net -1260 ml        Cardiographics    Telemetry: SR  ECG:  NSR  Echocardiogram: pending   CTA chest:  No PE.  ? Mild edema        Assessment:       Active Problems:    Acute respiratory failure with hypoxia ([facility] [address] (2/17/2020)         Plan:     Etienne Palencia is a 47 y.o. male who presented to the hospital with multiple months of ECHAVARRIA/SOB and new leg swelling. Also some history of hemoptysis. Cardiology consulted for possible heart failure. Feeling better since admission with diuresis and oxygen, but still on 4LNC. Troponin negative. · Continue diuresis for now  · ECHO with normal LVEF and no signs of diastolic heart failure  · proBNP pending - only 6, which is not consistent with heart failure currently- ? If he never had heart failure or if it is resolved after diuresis? · ? pulm consult  · BP stable on current meds  · On coumadin at home for PE/PAF history, but currently on hold  · Started on coreg here. Doesn't appear that he was on any rate reduction meds at home. Thank you for consulting [facility]    Sergio Brown, TAM  DNP, RN, [facility]    Patient seen and examined by me with the above nurse practitioner. I personally performed all components of the history, physical, and medical decision making and agree with the assessment and plan with minor modifications as noted. No evidence of heart failure by pro BNP or echo. Question another pulmonary process?   If hypoxia resolves completely, it is still within the normal possibility that he had some diastolic heart failure. Will reassess tomorrow.

## 2020-02-18 NOTE — PROGRESS NOTES
[facility] INTERDISCIPLINARY ROUNDS    Cardiopulmonary Care Interdisciplinary Rounds were held today to discuss patient's plan of care and outcomes. The following members were present: NP/Physician, Pharmacy, Nursing and Case Management.   Expected Length of Stay:  3d 19h    PLAN OF CARE:   Continue current treatment plan

## 2020-02-18 NOTE — PROGRESS NOTES
Report received from Eladia Cuello , [facility]. SBAR were discussed. Jacqui Carlos RN     jpatient resting. 02 4 lpm and sats wnl. Complains of some sharp pain behind legs on calves. Eyes are red. Lungs are somewhat diminished. Doppler study negative yesterday.  No edema LE

## 2020-02-18 NOTE — PROGRESS NOTES
Result letter mailed to pt informing of the following per Dr. Alvarado Kapadia:  Jamaal Mcfadden coumadin level.  Recheck in a month.

## 2020-02-18 NOTE — PROGRESS NOTES
Problem: Breathing Pattern - Ineffective  Goal: *Absence of hypoxia  Outcome: Progressing Towards Goal     Problem: Patient Education: Go to Patient Education Activity  Goal: Patient/Family Education  Outcome: Progressing Towards Goal     Problem: Patient Education: Go to Patient Education Activity  Goal: Patient/Family Education  Outcome: Progressing Towards Goal     Problem: Heart Failure: Day 1  Goal: Off Pathway (Use only if patient is Off Pathway)  Outcome: Progressing Towards Goal  Goal: Activity/Safety  Outcome: Progressing Towards Goal  Goal: Consults, if ordered  Outcome: Progressing Towards Goal  Goal: Diagnostic Test/Procedures  Outcome: Progressing Towards Goal  Goal: Nutrition/Diet  Outcome: Progressing Towards Goal  Goal: Discharge Planning  Outcome: Progressing Towards Goal  Goal: Medications  Outcome: Progressing Towards Goal  Goal: Respiratory  Outcome: Progressing Towards Goal  Goal: Treatments/Interventions/Procedures  Outcome: Progressing Towards Goal  Goal: Psychosocial  Outcome: Progressing Towards Goal  Goal: *Oxygen saturation within defined limits  Outcome: Progressing Towards Goal  Goal: *Hemodynamically stable  Outcome: Progressing Towards Goal  Goal: *Optimal pain control at patient's stated goal  Outcome: Progressing Towards Goal  Goal: *Anxiety reduced or absent  Outcome: Progressing Towards Goal     Problem: Heart Failure: Day 2  Goal: Off Pathway (Use only if patient is Off Pathway)  Outcome: Progressing Towards Goal  Goal: Activity/Safety  Outcome: Progressing Towards Goal  Goal: Consults, if ordered  Outcome: Progressing Towards Goal  Goal: Diagnostic Test/Procedures  Outcome: Progressing Towards Goal  Goal: Nutrition/Diet  Outcome: Progressing Towards Goal  Goal: Discharge Planning  Outcome: Progressing Towards Goal  Goal: Medications  Outcome: Progressing Towards Goal  Goal: Respiratory  Outcome: Progressing Towards Goal  Goal: Treatments/Interventions/Procedures  Outcome: Progressing Towards Goal  Goal: Psychosocial  Outcome: Progressing Towards Goal  Goal: *Oxygen saturation within defined limits  Outcome: Progressing Towards Goal  Goal: *Hemodynamically stable  Outcome: Progressing Towards Goal  Goal: *Optimal pain control at patient's stated goal  Outcome: Progressing Towards Goal  Goal: *Anxiety reduced or absent  Outcome: Progressing Towards Goal  Goal: *Demonstrates progressive activity  Outcome: Progressing Towards Goal     Problem: Heart Failure: Day 3  Goal: Off Pathway (Use only if patient is Off Pathway)  Outcome: Progressing Towards Goal  Goal: Activity/Safety  Outcome: Progressing Towards Goal  Goal: Diagnostic Test/Procedures  Outcome: Progressing Towards Goal  Goal: Nutrition/Diet  Outcome: Progressing Towards Goal  Goal: Discharge Planning  Outcome: Progressing Towards Goal  Goal: Medications  Outcome: Progressing Towards Goal  Goal: Respiratory  Outcome: Progressing Towards Goal  Goal: Treatments/Interventions/Procedures  Outcome: Progressing Towards Goal  Goal: Psychosocial  Outcome: Progressing Towards Goal  Goal: *Oxygen saturation within defined limits  Outcome: Progressing Towards Goal  Goal: *Hemodynamically stable  Outcome: Progressing Towards Goal  Goal: *Optimal pain control at patient's stated goal  Outcome: Progressing Towards Goal  Goal: *Anxiety reduced or absent  Outcome: Progressing Towards Goal  Goal: *Demonstrates progressive activity  Outcome: Progressing Towards Goal     Problem: Heart Failure: Day 4  Goal: Off Pathway (Use only if patient is Off Pathway)  Outcome: Progressing Towards Goal  Goal: Activity/Safety  Outcome: Progressing Towards Goal  Goal: Diagnostic Test/Procedures  Outcome: Progressing Towards Goal  Goal: Nutrition/Diet  Outcome: Progressing Towards Goal  Goal: Discharge Planning  Outcome: Progressing Towards Goal  Goal: Medications  Outcome: Progressing Towards Goal  Goal: Respiratory  Outcome: Progressing Towards Goal  Goal: Treatments/Interventions/Procedures  Outcome: Progressing Towards Goal  Goal: Psychosocial  Outcome: Progressing Towards Goal  Goal: *Oxygen saturation within defined limits  Outcome: Progressing Towards Goal  Goal: *Hemodynamically stable  Outcome: Progressing Towards Goal  Goal: *Optimal pain control at patient's stated goal  Outcome: Progressing Towards Goal  Goal: *Anxiety reduced or absent  Outcome: Progressing Towards Goal  Goal: *Demonstrates progressive activity  Outcome: Progressing Towards Goal     Problem: Heart Failure: Day 5  Goal: Off Pathway (Use only if patient is Off Pathway)  Outcome: Progressing Towards Goal  Goal: Activity/Safety  Outcome: Progressing Towards Goal  Goal: Diagnostic Test/Procedures  Outcome: Progressing Towards Goal  Goal: Nutrition/Diet  Outcome: Progressing Towards Goal  Goal: Discharge Planning  Outcome: Progressing Towards Goal  Goal: Medications  Outcome: Progressing Towards Goal  Goal: Respiratory  Outcome: Progressing Towards Goal  Goal: Treatments/Interventions/Procedures  Outcome: Progressing Towards Goal  Goal: Psychosocial  Outcome: Progressing Towards Goal     Problem: Heart Failure: Discharge Outcomes  Goal: *Demonstrates ability to perform prescribed activity without shortness of breath or discomfort  Outcome: Progressing Towards Goal  Goal: *Left ventricular function assessment completed prior to or during stay, or planned for post-discharge  Outcome: Progressing Towards Goal  Goal: *ACEI prescribed if LVEF less than 40% and no contraindications or ARB prescribed  Outcome: Progressing Towards Goal  Goal: *Verbalizes understanding and describes prescribed diet  Outcome: Progressing Towards Goal  Goal: *Verbalizes understanding/describes prescribed medications  Outcome: Progressing Towards Goal  Goal: *Describes available resources and support systems  Description  (eg: Home Health, Palliative Care, Advanced Medical Directive)  Outcome: Progressing Towards Goal  Goal: *Describes smoking cessation resources  Outcome: Progressing Towards Goal  Goal: *Understands and describes signs and symptoms to report to providers(Stroke Metric)  Outcome: Progressing Towards Goal  Goal: *Describes/verbalizes understanding of follow-up/return appt  Description  (eg: to physicians, diabetes treatment coordinator, and other resources  Outcome: Progressing Towards Goal  Goal: *Describes importance of continuing daily weights and changes to report to physician  Outcome: Progressing Towards Goal

## 2020-02-18 NOTE — PROGRESS NOTES
Pharmacy Daily Dosing of Warfarin    Indication: PE/A fib  Goal INR: 2-3    PTA Warfarin Dose: 5 mg M-F, 2.5 mg S/Sun    Concurrent anticoagulants: None  Concurrent antiplatelet: None    Major Interacting Medications   Drugs that may increase INR: None  Drugs that may decrease INR: None    Conditions that may increase/decrease INR (CHF, C. diff, cirrhosis, thyroid disorder, hypoalbuminemia): None    Labs:  Recent Labs     02/18/20  0006 02/17/20  1109   INR 2.3* 2.4*   HGB 16.8 18.2*    201   SGOT  --  29   TBILI  --  1.2*   ALB  --  3.5           Impression/Plan:   Warfarin 5 mg PO x 1 dose. Daily INR  CBC w/o diff every other day      Pharmacy will follow daily and adjust the dose as appropriate. Thanks  Lenny Kaur, PHARMD      [email or web address]. pdf

## 2020-02-18 NOTE — PROGRESS NOTES
Hospitalist Progress Note    NAME: Gillian Reinoso   :  1970   MRN:  [id number]     Admit date: 2020    Today's date: 20    PCP: Ciro Primrose, MD Cathlene Ross, M.D. Cell [phone]      Assessment / Plan:    Acute hypoxic respiratory failure POA  ? New acute left CHF POA  Hemoptysis POA  Increasing cough, ECHAVARRIA x months  No prior O2 requirements, not a cigarette smoker  ABG in ED on 4 liters 7.43, pCO2 38, pO2 50, RR to 29, ECHAVARRIA  Vaping 2 years ago, denies in past year  3 pillow orthopnea, some LE edema  Cough with hemoptysis/phlegm x weeks, related to inhaler use  CTA chest reviewed, extensive ground glass opacities, minimal effusions  Differential      ? CHF with edema      ? Recent pulmonary infection      ? ILD       ? Allergic reaction in lung       ? pulm hemosiderosis with the hemoptysis  Continue diuresis for now  Check procalcitonin, pBNP, troponin  Check respiratory pathogen PCR  Check TTE  Cardiology consult  If cardiac work up unrevealing, CHF felt less likely, will consult pulmonary  Recent steroids and inhalers at home     Paroxysmal Afib  sinus on EKG  Rate controlled   Continue on coumadin     History of PE and DVT  PE  Cont coumadin, pharm to dose  Watch with hemoptysis    Overweight POA Body mass index is 29.21 kg/m².     Code Status: Full     DVT Prophylaxis: warfarin  GI Prophylaxis: not indicated       Subjective:     Chief Complaint / Reason for Physician Visit f/u acute hypoxic respiratory failure  \"My oxygen was real low yesterday\". Discussed with RN events overnight.    Recent increasing ECHAVARRIA and cough x months  Seen by PCP, O2 sats low, sent to ED, no prior home O2 use  Sleeps on 3 pillows, some LE edema    Review of Systems:  Symptom Y/N Comments  Symptom Y/N Comments   Fever/Chills n   Chest Pain n    Poor Appetite    Edema     Cough y   Abdominal Pain n    Sputum    Joint Pain     SOB/ECHAVARRIA y   Headache n    Nausea/vomit n   Tolerating PT/OT Diarrhea n   Tolerating Diet y    Constipation    Other       Could NOT obtain due to:      Objective:     VITALS:   Last 24hrs VS reviewed since prior progress note. Most recent are:  Patient Vitals for the past 24 hrs:   Temp Pulse Resp BP SpO2   02/18/20 0716 98.1 °F (36.7 °C) 70 18 113/66 96 %   02/18/20 0358 97.9 °F (36.6 °C) 79 18 112/66 97 %   02/18/20 0000 97.6 °F (36.4 °C) 67 18 117/71 95 %   02/17/20 1917 97.6 °F (36.4 °C) 72 20 109/72 94 %   02/17/20 1807 98 °F (36.7 °C) 71 24 107/69 98 %   02/17/20 1700    130/82    02/17/20 1630 98.2 °F (36.8 °C) 79 16 133/77 95 %   02/17/20 1552    111/72    02/17/20 1545  86 14  91 %   02/17/20 1544  81 17  92 %   02/17/20 1400  71 29 129/79 91 %   02/17/20 1330  68 24 121/89 91 %   02/17/20 1300  75 26 123/81 (!) 89 %   02/17/20 1128     95 %   02/17/20 1100  74 22 114/75 93 %   02/17/20 1045  90 20  94 %   02/17/20 1042     (!) 82 %   02/17/20 1032 97.7 °F (36.5 °C) 83 18 (!) 160/96 (!) 86 %       Intake/Output Summary (Last 24 hours) at 2/18/2020 0907  Last data filed at 2/18/2020 0428  Gross per 24 hour   Intake 440 ml   Output 1700 ml   Net -1260 ml        Wt Readings from Last 12 Encounters:   02/18/20 103.2 kg (227 lb 8.2 oz)   02/17/20 109.8 kg (242 lb)   01/14/20 107.5 kg (237 lb)   12/30/19 107 kg (236 lb)   12/02/19 109.8 kg (242 lb 1.6 oz)   11/04/19 109.3 kg (241 lb)   10/30/19 110.7 kg (244 lb)   09/30/19 110.7 kg (244 lb)   09/22/19 109.8 kg (242 lb)   08/09/19 115.2 kg (254 lb)   06/22/19 117.5 kg (259 lb 0.7 oz)   04/16/19 113.4 kg (250 lb)       PHYSICAL EXAM:  General: WD, WN. Alert, cooperative, no acute distress    EENT:  Anicteric sclerae. MMM  Neck:  No meningismus, no thyromegaly  Resp:  Few crackles at bases bilaterally, generally clear, no wheezing. No accessory muscle use  CV:  Regular  rhythm,  No edema  GI:  Soft, Non distended, Non tender.   +Bowel sounds, no rebound  LN:  No cervical or inguinal SARAH  Neurologic:  Alert and oriented X 3, normal speech, non focal motor exam  Psych:   Not anxious nor agitated  Skin:  No rashes. No jaundice    Reviewed most current lab test results and cultures  YES  Reviewed most current radiology test results   YES  Review and summation of old records today    NO  Reviewed patient's current orders and MAR    YES  PMH/SH reviewed - no change compared to H&P  ________________________________________________________________________  Care Plan discussed with:    Comments   Patient y    Family      RN y    Care Manager     Consultant                        Multidiciplinary team rounds were held today with , nursing, pharmacist and clinical coordinator. Patient's plan of care was discussed; medications were reviewed and discharge planning was addressed. ________________________________________________________________________      Comments   >50% of visit spent in counseling and coordination of care     ________________________________________________________________________  Marycruz Flaherty MD     Procedures: see electronic medical records for all procedures/Xrays and details which were not copied into this note but were reviewed prior to creation of Plan. LABS:  I reviewed today's most current labs and imaging studies.   Pertinent labs include:  Recent Labs     02/18/20  0006 02/17/20  1109   WBC 6.4 5.0   HGB 16.8 18.2*   HCT 53.3* 57.7*    201     Recent Labs     02/18/20  0006 02/17/20  1109    140   K 3.5 4.2    108   CO2 27 28   GLU 97 87   BUN 7 6   CREA 0.98 1.26   CA 9.1 9.0   ALB  --  3.5   TBILI  --  1.2*   SGOT  --  29   ALT  --  22   INR 2.3* 2.4*

## 2020-02-18 NOTE — PROGRESS NOTES
PHYSICAL THERAPY EVALUATION/DISCHARGE  Patient: Gillian Reinoso (27 y.o. male)  Date: 2/18/2020  Primary Diagnosis: Acute respiratory failure with hypoxia ([facility] [J96.01]       Precautions: none         ASSESSMENT  Based on the objective data described below, the patient presents with good strength, balance, activity tolerance and mobility skills. He was independent with bed mobility, transfers and ambulation. O2 sats decreased to 84% from 92% on 4L/min of O2 after walking 150 feet. Increased to 92% with PLB standing rest. Increased O2 to 6L/min for walk back to room and sats decreased to 88%. Once sitting, placed back on 4L/min and sats increased to 96% in a couple minutes. Patient is safe to be up ad radha in room, but will need nursing assistance managing O2 tank in watts. No DME needs or post hospital needs from PT standpoint identified at this time. Functional Outcome Measure: The patient scored 28/28 on the Tinetti outcome measure which is indicative of low risk for falls. Other factors to consider for discharge: May need home O2     Further skilled acute physical therapy is not indicated at this time. Will sign off. PLAN :  Recommendation for discharge: (in order for the patient to meet his/her long term goals)  No skilled physical therapy/ follow up rehabilitation needs identified at this time. This discharge recommendation:  Has been made in collaboration with the attending provider and/or case management    IF patient discharges home will need the following DME: none       SUBJECTIVE:   Patient stated Beau Jackelin, it feels great to be out of the bed.     OBJECTIVE DATA SUMMARY:   HISTORY:    Past Medical History:   Diagnosis Date    A-fib ([facility]     Allergic rhinitis     Anxiety disorder     Cellulitis     post tattoo    Deep vein thrombosis [facility]DVT) ([facility]     [facility]  [facility]17 f/u note to manage Lovenox    Essential tremor     Laceration of arm 09/23/2017    Left cut by a chain saw.    Phlebitis     Pulmonary embolism ([facility] 3/2011    multiple episodes    Rectal bleeding 10/2011    on coumadin @ the time &again 4/2017 4/12/17 note from Carla//colonoscopy report rec'd    Thromboembolus [facility])     Umbilical hernia     Vitamin D deficiency 04/2014    again 4/2017     Past Surgical History:   Procedure Laterality Date    HX APPENDECTOMY  2003    HX COLONOSCOPY  04/28/2017    Dr. Kim Pluck    umbilical       Prior level of function: independent with all ADLs, driving and mobility without devices. Works in construction. Reports resting as needed at job the last week when sob. Personal factors and/or comorbidities impacting plan of care: h/o DVT/PE, afib. Lives alone, but has friends who can assist if needed. Home Situation  Home Environment: Private residence  # Steps to Enter: 4  Rails to Enter: Yes  Hand Rails : Right  Wheelchair Ramp: No  One/Two Story Residence: One story  Living Alone: Yes  Support Systems: Friends \ neighbors  Patient Expects to be Discharged to[de-identified] Private residence  Current DME Used/Available at Home: None  Tub or Shower Type: Tub/Shower combination    EXAMINATION/PRESENTATION/DECISION MAKING:   Critical Behavior:  Neurologic State: Alert  Orientation Level: Oriented X4  Cognition: Appropriate decision making, Appropriate for age attention/concentration, Appropriate safety awareness, Follows commands  Safety/Judgement: Awareness of environment, Good awareness of safety precautions  Hearing: Auditory  Auditory Impairment: None  Skin:  No findings  Edema: ascites? Range Of Motion:  AROM: Within functional limits           PROM: Within functional limits           Strength:    Strength:  Within functional limits                    Tone & Sensation:   Tone: Normal              Sensation: Intact               Coordination:  Coordination: Within functional limits  Vision:   Acuity: Within Defined Limits  Functional Mobility:  Bed Mobility:  Rolling: Independent  Supine to Sit: Independent  Sit to Supine: Independent  Scooting: Independent  Transfers:  Sit to Stand: Independent  Stand to Sit: Independent  Stand Pivot Transfers: Independent     Bed to Chair: Independent              Balance:   Sitting: Intact  Standing: Intact  Ambulation/Gait Training:              Gait Description (WDL): Within defined limits(normal gait pattern)     Right Side Weight Bearing: Full  Left Side Weight Bearing: Full                                       Functional Measure:  Tinetti test:    Sitting Balance: 1  Arises: 2  Attempts to Rise: 2  Immediate Standing Balance: 2  Standing Balance: 2  Nudged: 2  Eyes Closed: 1  Turn 360 Degrees - Continuous/Discontinuous: 1  Turn 360 Degrees - Steady/Unsteady: 1  Sitting Down: 2  Balance Score: 16 Balance total score  Indication of Gait: 1  R Step Length/Height: 1  L Step Length/Height: 1  R Foot Clearance: 1  L Foot Clearance: 1  Step Symmetry: 1  Step Continuity: 1  Path: 2  Trunk: 2  Walking Time: 1  Gait Score: 12 Gait total score  Total Score: 28/28 Overall total score         Tinetti Tool Score Risk of Falls  <19 = High Fall Risk  19-24 = Moderate Fall Risk  25-28 = Low Fall Risk  Tinetti ME. Performance-Oriented Assessment of Mobility Problems in Elderly Patients. [facility]; [id number].  (Scoring Description: PT Bulletin Feb. 10, 1993)    Older adults: Blake Perrin et al, 2009; n = 1000 [facility] elderly evaluated with ABC, VANESSA, ADL, and IADL)  · Mean VANESSA score for males aged 69-68 years = 26.21(3.40)  · Mean VANESSA score for females age 69-68 years = 25.16(4.30)  · Mean VANESSA score for males over 80 years = 23.29(6.02)  · Mean VANESSA score for females over 80 years = 17.20(8.32)        Physical Therapy Evaluation Charge Determination   History Examination Presentation Decision-Making   MEDIUM  Complexity : 1-2 comorbidities / personal factors will impact the outcome/ POC  HIGH Complexity : 4+ Standardized tests and measures addressing body structure, function, activity limitation and / or participation in recreation  MEDIUM Complexity : Evolving with changing characteristics  Other outcome measures Tinetti  LOW       Based on the above components, the patient evaluation is determined to be of the following complexity level: LOW     Pain Rating:  None reported including L calf during movement in bed and walking. Activity Tolerance:   Good and desaturates with exertion and requires oxygen  Please refer to the flowsheet for vital signs taken during this treatment. After treatment patient left in no apparent distress:   Sitting in chair and Call bell within reach    COMMUNICATION/EDUCATION:   The patients plan of care was discussed with: Registered Nurse and . Fall prevention education was provided and the patient/caregiver indicated understanding., Patient/family have participated as able in goal setting and plan of care. and Patient/family agree to work toward stated goals and plan of care.     Thank you for this referral.  Shiela Bowen, PT   Time Calculation: 24 mins

## 2020-02-18 NOTE — PROGRESS NOTES
Home Oxygen Test  Date of test: 02/18/20  Time of test: 3:55 pm     Sa02 87 % on room air AT REST. Sa02 na  % on room air DURING AMBULATION. Sa02 89 % on 4 Liters DURING AMBULATION. Sa02 94 % on 4 Liters AT REST/AFTER AMBULATION. Patient became sob with exertion.

## 2020-02-18 NOTE — PROGRESS NOTES
Reason for Admission:   Acute respiratory failure with hypoxia                  RUR Score:     9 Low risk for readmission             Do you (patient/family) have any concerns for transition/discharge? None                 Plan for utilizing home health:   Pt has not had home health in the past. Pt will not need home health at d/c. Current Advanced Directive/Advance Care Plan:  Pt is FULL code status. Pt does not have an ACP on file. CM addressed with pt about AMD. Pt stated that he would like to address AMD. CM called [facility] to have AMD addressed. Transition of Care Plan:        Home  Follow-up Appointments    CM met with pt at bedside to discuss d/c plan. Pt was alert and oriented. CM verified pt's demographics, insurance and PCP. Pt is a 46 y/o [place] American male admitted to [facility] on 2/17/2020 for acute respiratory failure with hypoxia. Pt's PCP is Dr. Ruth Ann Corona. Pt sees PCP as needed. Pt [facility] [address] [facility] [address] [facility]. Pt resides alone in an one level home with 4 SOURAV. Pt is independent with ADL's and IADL's. Pt does drive. No DME. No HH, SNF or acute inpatient rehab in the past. Pt is FULL code status. Pt does not have an ACP on file. Pt's cousin Jose J Faith ([phone]) will transport at d/c. Care Management Interventions  PCP Verified by CM: Yes(Pt's PCP is Dr. Ruth Ann Corona. Pt sees PCP as needed. )  Palliative Care Criteria Met (RRAT>21 & CHF Dx)?: No  Mode of Transport at Discharge:  Other (see comment)( Pt's cousin Jose J Faith ([phone]) will transport at d/c. )  Transition of Care Consult (CM Consult): Discharge Planning  Discharge Durable Medical Equipment: No(No DME)  Physical Therapy Consult: Yes  Occupational Therapy Consult: No  Speech Therapy Consult: No  Current Support Network: Own Home, Lives Alone(Pt resides alone in an one level home with 4 SOURAV. )  Confirm Follow Up Transport: Self(Pt does drive)  [employer] Resource Information Provided?: No  Discharge Location  Discharge Placement: (Home with Follow-up Appointments)    CM will continue to follow patient for discharge planning needs and arrange for services as deemed necessary.     Jennifer Clarke [address]  [phone]

## 2020-02-18 NOTE — CARDIO/PULMONARY
Cardiac Rehab Note: chart review Pt is a 46 yo admitted with acute respiratory failure with hypoxia, consult for possible CHF. Consult has been acknowledged. CHF BUNDLE   
 
EF 55%  on 2/18/2020 per echo. Smoking history assessed. Patient is a never smoker. Smoking Cessation Program link has not been added to the AVS. Current inpatient diet: DIET REGULAR. Per pts echo results and cardiology consult entry pt education on CHF not indicated at this time.

## 2020-02-19 LAB
ANION GAP SERPL CALC-SCNC: 7 MMOL/L (ref 5–15)
APPEARANCE UR: CLEAR
BASOPHILS # BLD: 0.1 K/UL (ref 0–0.1)
BASOPHILS NFR BLD: 1 % (ref 0–1)
BILIRUB UR QL: NEGATIVE
BUN SERPL-MCNC: 16 MG/DL (ref 6–20)
BUN/CREAT SERPL: 14 (ref 12–20)
CALCIUM SERPL-MCNC: 8.7 MG/DL (ref 8.5–10.1)
CHLORIDE SERPL-SCNC: 103 MMOL/L (ref 97–108)
CO2 SERPL-SCNC: 26 MMOL/L (ref 21–32)
COLOR UR: NORMAL
CREAT SERPL-MCNC: 1.13 MG/DL (ref 0.7–1.3)
DIFFERENTIAL METHOD BLD: ABNORMAL
EOSINOPHIL # BLD: 0.2 K/UL (ref 0–0.4)
EOSINOPHIL NFR BLD: 4 % (ref 0–7)
ERYTHROCYTE [DISTWIDTH] IN BLOOD BY AUTOMATED COUNT: 19 % (ref 11.5–14.5)
GLUCOSE SERPL-MCNC: 119 MG/DL (ref 65–100)
GLUCOSE UR STRIP.AUTO-MCNC: NEGATIVE MG/DL
HCT VFR BLD AUTO: 55.8 % (ref 36.6–50.3)
HGB BLD-MCNC: 17.7 G/DL (ref 12.1–17)
HGB UR QL STRIP: NEGATIVE
IMM GRANULOCYTES # BLD AUTO: 0 K/UL (ref 0–0.04)
IMM GRANULOCYTES NFR BLD AUTO: 0 % (ref 0–0.5)
INR PPP: 2.4 (ref 0.9–1.1)
KETONES UR QL STRIP.AUTO: NEGATIVE MG/DL
LEUKOCYTE ESTERASE UR QL STRIP.AUTO: NEGATIVE
LYMPHOCYTES # BLD: 1.4 K/UL (ref 0.8–3.5)
LYMPHOCYTES NFR BLD: 21 % (ref 12–49)
MCH RBC QN AUTO: 27.3 PG (ref 26–34)
MCHC RBC AUTO-ENTMCNC: 31.7 G/DL (ref 30–36.5)
MCV RBC AUTO: 86.1 FL (ref 80–99)
MONOCYTES # BLD: 0.7 K/UL (ref 0–1)
MONOCYTES NFR BLD: 10 % (ref 5–13)
NEUTS SEG # BLD: 4.2 K/UL (ref 1.8–8)
NEUTS SEG NFR BLD: 64 % (ref 32–75)
NITRITE UR QL STRIP.AUTO: NEGATIVE
NRBC # BLD: 0 K/UL (ref 0–0.01)
NRBC BLD-RTO: 0 PER 100 WBC
PH UR STRIP: 5.5 [PH] (ref 5–8)
PLATELET # BLD AUTO: 221 K/UL (ref 150–400)
PMV BLD AUTO: 9.1 FL (ref 8.9–12.9)
POTASSIUM SERPL-SCNC: 4 MMOL/L (ref 3.5–5.1)
PROT UR STRIP-MCNC: NEGATIVE MG/DL
PROTHROMBIN TIME: 23.9 SEC (ref 9–11.1)
RBC # BLD AUTO: 6.48 M/UL (ref 4.1–5.7)
SODIUM SERPL-SCNC: 136 MMOL/L (ref 136–145)
SP GR UR REFRACTOMETRY: 1.02 (ref 1–1.03)
UROBILINOGEN UR QL STRIP.AUTO: 1 EU/DL (ref 0.2–1)
WBC # BLD AUTO: 6.6 K/UL (ref 4.1–11.1)

## 2020-02-19 PROCEDURE — 36415 COLL VENOUS BLD VENIPUNCTURE: CPT

## 2020-02-19 PROCEDURE — 74011250637 HC RX REV CODE- 250/637: Performed by: INTERNAL MEDICINE

## 2020-02-19 PROCEDURE — 85610 PROTHROMBIN TIME: CPT

## 2020-02-19 PROCEDURE — 77010033678 HC OXYGEN DAILY

## 2020-02-19 PROCEDURE — 65660000000 HC RM CCU STEPDOWN

## 2020-02-19 PROCEDURE — 94760 N-INVAS EAR/PLS OXIMETRY 1: CPT

## 2020-02-19 PROCEDURE — 74011250636 HC RX REV CODE- 250/636: Performed by: INTERNAL MEDICINE

## 2020-02-19 PROCEDURE — 85025 COMPLETE CBC W/AUTO DIFF WBC: CPT

## 2020-02-19 PROCEDURE — 81003 URINALYSIS AUTO W/O SCOPE: CPT

## 2020-02-19 PROCEDURE — 80048 BASIC METABOLIC PNL TOTAL CA: CPT

## 2020-02-19 RX ORDER — WARFARIN SODIUM 5 MG/1
5 TABLET ORAL ONCE
Status: COMPLETED | OUTPATIENT
Start: 2020-02-19 | End: 2020-02-19

## 2020-02-19 RX ORDER — FUROSEMIDE 20 MG/1
20 TABLET ORAL DAILY
Status: DISCONTINUED | OUTPATIENT
Start: 2020-02-20 | End: 2020-02-22 | Stop reason: HOSPADM

## 2020-02-19 RX ORDER — FUROSEMIDE 10 MG/ML
40 INJECTION INTRAMUSCULAR; INTRAVENOUS DAILY
Status: DISCONTINUED | OUTPATIENT
Start: 2020-02-20 | End: 2020-02-19

## 2020-02-19 RX ADMIN — WARFARIN SODIUM 5 MG: 5 TABLET ORAL at 18:00

## 2020-02-19 RX ADMIN — CARVEDILOL 3.12 MG: 3.12 TABLET, FILM COATED ORAL at 10:22

## 2020-02-19 RX ADMIN — FUROSEMIDE 40 MG: 10 INJECTION, SOLUTION INTRAMUSCULAR; INTRAVENOUS at 10:22

## 2020-02-19 RX ADMIN — ATORVASTATIN CALCIUM 40 MG: 40 TABLET, FILM COATED ORAL at 20:12

## 2020-02-19 RX ADMIN — CARVEDILOL 3.12 MG: 3.12 TABLET, FILM COATED ORAL at 18:00

## 2020-02-19 NOTE — PROGRESS NOTES
Spiritual Care Assessment/Progress Note  [facility]      NAME: Kecia Monteiro      MRN: [id number]  AGE: 47 y.o.  SEX: male  Muslim Affiliation: Zoroastrian   Language: English     2/19/2020     Total Time (in minutes): 33     Spiritual Assessment begun in [facility] CARDIOPULMONARY CARE through conversation with:         [x]Patient        [] Family    [] Friend(s)        Reason for Consult: Advance medical directive consult     Spiritual beliefs: (Please include comment if needed)     [x] Identifies with a nghia tradition:         [x] Supported by a nghia community:            [] Claims no spiritual orientation:           [] Seeking spiritual identity:                [] Adheres to an individual form of spirituality:           [] Not able to assess:                           Identified resources for coping:      [x] Prayer                               [] Music                  [] Guided Imagery     [x] Family/friends                 [] Pet visits     [] Devotional reading                         [] Unknown     [] Other:                                              Interventions offered during this visit: (See comments for more details)    Patient Interventions: Advance medical directive consult, Affirmation of nghia, Affirmation of emotions/emotional suffering, Iconic (affirming the presence of God/Higher Power), Prayer (assurance of), Muslim beliefs/image of God discussed           Plan of Care:     [] Support spiritual and/or cultural needs    [] Support AMD and/or advance care planning process      [] Support grieving process   [] Coordinate Rites and/or Rituals    [] Coordination with community clergy   [x] No spiritual needs identified at this time   [] Detailed Plan of Care below (See Comments)  [] Make referral to Music Therapy  [] Make referral to Pet Therapy     [] Make referral to Addiction services  [] Make referral to [facility]  [] Make referral to Spiritual Care Partner  [] No future visits requested        [x] Follow up visits as needed     Comments:  Request by In Corky to assist with Advance Medical Directive (AMD) in Cardio Pulmonary Care unit. Consulted with patients nurse  Yasemin Carlson RN) who stated that the patient is capable of completing an AMD document. . Explained document to patient. The patient did not have family/friends present. The patient decided that he would like to consult with the individuals whom he would like to name as agents, prior to listing them. Left paper work with the patient. Patient shared information about his spiritual life. The patient is a member of [address] [place] [address] [address]. The patient shared limited information about his medical challenges. The patient seemed at easy and alluded to his belief that all is in God's hands. Provided active listening and spiritual support for patient. .  Assured the patient that he will be kept in prayer. Advised of  availability. Chaplains will follow as able and/or needed. Rev.  Lakisha Becerra EdD MDiv     For  Assistance Page [phone])

## 2020-02-19 NOTE — PROGRESS NOTES
Pharmacy Daily Dosing of Warfarin    Indication: PE/A fib  Goal INR: 2-3    PTA Warfarin Dose: 5 mg M-F, 2.5 mg S/Sun    Concurrent anticoagulants: None  Concurrent antiplatelet: None    Major Interacting Medications   Drugs that may increase INR: None  Drugs that may decrease INR: None    Conditions that may increase/decrease INR (CHF, C. diff, cirrhosis, thyroid disorder, hypoalbuminemia): None    Labs:  Recent Labs     02/19/20  0308 02/18/20  0006 02/17/20  1109   INR 2.4* 2.3* 2.4*   HGB 17.7* 16.8 18.2*    205 201   SGOT  --   --  29   TBILI  --   --  1.2*   ALB  --   --  3.5           Impression/Plan:   Warfarin 5 mg PO x 1 dose. Daily INR  CBC w/o diff every other day      Pharmacy will follow daily and adjust the dose as appropriate. Thanks  Joe Rosario PHARMD      [email or web address]/Pharmacy/[email or web address]/Warfarin%[email or web address]. pdf

## 2020-02-19 NOTE — PROGRESS NOTES
Bedside shift change report GIVEN TO Eladia Cuello RN. Report included the following information SBAR. SIGNIFICANT CHANGES DURING SHIFT:  Patient had a walking challenge and needs 02 to mobilize. Overall he is feeling better. Did get sob with mbulation even on 4 lpm with decrease in 02 sats. Dr. Deja Claudio notified. ECHO completed    CONCERNS TO ADDRESS WITH MD:  Continue intake and output, daily weight, respiratory assessment          [facility] NURSING NOTE   Admission Date 2/17/2020   Admission Diagnosis Acute respiratory failure with hypoxia (HCC) [J96.01]   Consults IP CONSULT TO CARDIOLOGY      Cardiac Monitoring [] Yes [] No      Purposeful Hourly Rounding [] Yes    Trino Score Total Score: 2   Trino score 3 or > [] Bed Alarm [] Avasys [] 1:1 sitter [] Patient refused (Signed refusal form in chart)   Jason Score Jason Score: 20   Jason score 14 or < [] PMT consult [] Wound Care consult    []  Specialty bed  [] Nutrition consult      Influenza Vaccine Received Flu Vaccine for Current Season (usually Sept-March): No    Patient/Guardian Refused (Notify MD): Yes      Oxygen needs? [] Room air Oxygen @  []1L    []2L    []3L   []4L    []5L   []6L via  NC   Chronic home O2 use?  [] Yes [] No  Perform O2 challenge test and document in progress note using smartphAvanthae (.Homeoxygen)      Last bowel movement Last Bowel Movement Date: 02/15/20      Urinary Catheter             LDAs               Peripheral IV 02/17/20 Right Antecubital (Active)   Site Assessment Clean, dry, & intact 2/18/2020  7:36 AM   Phlebitis Assessment 0 2/18/2020  7:36 AM   Infiltration Assessment 0 2/18/2020  7:36 AM   Dressing Status Clean, dry, & intact 2/18/2020  3:38 AM   Dressing Type Transparent 2/18/2020  3:38 AM   Hub Color/Line Status Pink 2/18/2020  7:36 AM   Action Taken Catheter retaped 2/17/2020 11:08 AM                         Readmission Risk Assessment Tool Score Low Risk            2       Total Score        2 Charlson Comorbidity Score (Age + Comorbid Conditions)        Criteria that do not apply:    Has Seen PCP in Last 6 Months (Yes=3, No=0)    . Living with Significant Other. Assisted Living. LTAC. SNF. or   Rehab    Patient Length of Stay (>5 days = 3)    IP Visits Last 12 Months (1-3=4, 4=9, >4=11)    Pt.  Coverage (Medicare=5 , Medicaid, or Self-Pay=4)       Expected Length of Stay 3d 19h   Actual Length of Stay 1

## 2020-02-19 NOTE — PROGRESS NOTES
Problem: Breathing Pattern - Ineffective  Goal: *Absence of hypoxia  Outcome: Progressing Towards Goal     Problem: Patient Education: Go to Patient Education Activity  Goal: Patient/Family Education  Outcome: Progressing Towards Goal     Problem: Heart Failure: Day 3  Goal: Off Pathway (Use only if patient is Off Pathway)  Outcome: Progressing Towards Goal  Goal: Activity/Safety  Outcome: Progressing Towards Goal  Goal: Diagnostic Test/Procedures  Outcome: Progressing Towards Goal  Goal: Nutrition/Diet  Outcome: Progressing Towards Goal  Goal: Discharge Planning  Outcome: Progressing Towards Goal  Goal: Medications  Outcome: Progressing Towards Goal  Goal: Respiratory  Outcome: Progressing Towards Goal  Goal: Treatments/Interventions/Procedures  Outcome: Progressing Towards Goal  Goal: Psychosocial  Outcome: Progressing Towards Goal  Goal: *Oxygen saturation within defined limits  Outcome: Progressing Towards Goal  Goal: *Hemodynamically stable  Outcome: Progressing Towards Goal  Goal: *Optimal pain control at patient's stated goal  Outcome: Progressing Towards Goal  Goal: *Anxiety reduced or absent  Outcome: Progressing Towards Goal  Goal: *Demonstrates progressive activity  Outcome: Progressing Towards Goal     Problem: Falls - Risk of  Goal: *Absence of Falls  Description  Document Tiffany Trejo Fall Risk and appropriate interventions in the flowsheet.   Outcome: Progressing Towards Goal  Note: Fall Risk Interventions:  Mobility Interventions: Bed/chair exit alarm, Patient to call before getting OOB         Medication Interventions: Assess postural VS orthostatic hypotension, Bed/chair exit alarm, Patient to call before getting OOB, Teach patient to arise slowly                   Problem: Patient Education: Go to Patient Education Activity  Goal: Patient/Family Education  Outcome: Progressing Towards Goal

## 2020-02-19 NOTE — ACP (ADVANCE CARE PLANNING)
Request by In Basket to assist with Advance Medical Directive (AMD) in Cardio Pulmonary Care unit. Consulted with patients nurse  Reji Castillo RN) who stated that the patient is capable of completing an AMD document. . Explained document to patient. The patient did not have family/friends present. The patient decided that he would like to consult with the individuals whom he would like to name as agents, prior to listing them. Left paper work with the patient. Rev.  Leighann Yates MDiv   For  Assistance Page [phone]

## 2020-02-19 NOTE — CONSULTS
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 Katalina Ireland for hypoxia and abnormal chest CT. Patient is a 47 y.o. male nonsmoker (uses snuff), who has a 5 month history of chest congestion and cough. He has had streaky hemoptysis at times. Had increasing dyspnea on exertion and LE edema prompting presentation to the hospital.  Found to have significant hypoxia and diffuse GGOs on imaging. Currently denies shortness of breath but he was on room air with SpO2 in the low 80s. He reports his SpO2 has been in the 70s at times during this illness. He is on chronic warfarin due to recurrent pulmonary emboli as well as afib. No travel, no high risk behaviors, denies illicit drug use.      Past Medical History:   Diagnosis Date    A-fib [facility])     Allergic rhinitis     Anxiety disorder     Cellulitis     post tattoo    Deep vein thrombosis (DVT) ([facility] [address]     [facility]  [facility] f/u note to manage Lovenox    Essential tremor     Laceration of arm 09/23/2017    Left cut by a chain saw.  Phlebitis     Pulmonary embolism ([facility] [address] 3/2011    multiple episodes    Rectal bleeding 10/2011    on coumadin @ the time &again 4/2017 4/12/17 note from Carla//colonoscopy report rec'd    Thromboembolus [facility])     Umbilical hernia     Vitamin D deficiency 04/2014    again 4/2017      Past Surgical History:   Procedure Laterality Date    HX APPENDECTOMY  2003    HX COLONOSCOPY  04/28/2017    Dr. Riddhi Duenas  0652    umbilical      Prior to Admission medications    Medication Sig Start Date End Date Taking? Authorizing Provider   warfarin (COUMADIN) 2.5 mg tablet Take 2.5 mg by mouth two (2) times daily on Sat & Sun. 2.5mg on Sat/Sun. TAKE 1 TAB (5MG) BY MOUTH EVERY DAY ON MONDAY THROUGH FRIDAY, AND 1/2 TAB (2.5MG) EVERY SATURDAY AND Sunday. Yes Provider, Historical   busPIRone (BUSPAR) 10 mg tablet TAKE 1 TO 2 TABLETS BY MOUTH THREE TIMES DAILY 2/6/20  Yes Ginna Sotomayor MD   fluticasone propionate (FLOVENT HFA) 220 mcg/actuation inhaler Take 2 Puffs by inhalation two (2) times a day. 1/14/20  Yes Ginna Sotomayor MD   albuterol sulfate (PROAIR RESPICLICK) 90 mcg/actuation aepb Take 2 Puffs by inhalation every six (6) hours as needed for Cough. 12/2/19  Yes Radha Pardo NP   warfarin (COUMADIN) 5 mg tablet TAKE 1 TABLET BY MOUTH EVERY DAY ON MONDAY THROUGH FRIDAY, AND 1/2 TABLET EVERY SATURDAY AND SUNDAY  Patient taking differently: Take 5 mg by mouth five (5) days a week. TAKE 1 TABLET (5MG) BY MOUTH EVERY DAY ON MONDAY THROUGH FRIDAY, AND 1/2 TABLET (2.5MG) EVERY SATURDAY AND Sunday.  11/4/19  Yes Ginna Sotomayor MD   rosuvastatin (CRESTOR) 20 mg tablet TAKE 1 TABLET BY MOUTH EVERY NIGHT 9/11/19  Yes Ginna Sotomayor MD   EPINEPHrine (EPIPEN) 0.3 mg/0.3 mL injection INJECT 0.3 ML BY INTRAMUSCULAR ROUTE ONCE PRN FOR ANAPHYLAXIS OR ALLERGIC RESPONSE FOR UP TO ONE DOSE 4/22/19  Yes Yohana Thomson MD MIN   sildenafil citrate (VIAGRA) 50 mg tablet Take 50 mg by mouth as needed. Yes Other, MD Gibson   krill-omega-3-dha-epa-lipids (KRILL OIL) 154-78-27-50 mg cap Take 1 Cap by mouth daily. Yes Provider, Historical     Allergies   Allergen Reactions    Bee Sting [Sting, Bee] Swelling    Neomycin Rash    Neosporin [Neomycin-Bacitracin-Polymyxin] Rash      Social History     Tobacco Use    Smoking status: Never Smoker    Smokeless tobacco: Current User     Types: Snuff   Substance Use Topics    Alcohol use: Yes     Alcohol/week: 0.0 standard drinks     Comment: weekends      Family History   Problem Relation Age of Onset    Heart Disease Father     Heart Disease Maternal Grandmother     Heart Disease Maternal Grandfather         Current Facility-Administered Medications   Medication Dose Route Frequency    warfarin (COUMADIN) tablet 5 mg  5 mg Oral ONCE    [START ON 2020] furosemide (LASIX) injection 40 mg  40 mg IntraVENous DAILY    atorvastatin (LIPITOR) tablet 40 mg  40 mg Oral QHS    WARFARIN INFORMATION NOTE (COUMADIN)   Other QPM    carvediloL (COREG) tablet 3.125 mg  3.125 mg Oral BID WITH MEALS       Review of Systems:  A comprehensive review of systems was negative except for that written in the HPI.     Objective:   Vital Signs:    Visit Vitals  BP 99/53 (BP 1 Location: Left arm, BP Patient Position: At rest)   Pulse 82   Temp 98.3 °F (36.8 °C)   Resp 16   Wt 103.4 kg (227 lb 14.4 oz)   SpO2 91%   BMI 29.26 kg/m²       O2 Device: Room air   O2 Flow Rate (L/min): 4 l/min   Temp (24hrs), Av °F (36.7 °C), Min:97.7 °F (36.5 °C), Max:98.3 °F (36.8 °C)       Intake/Output:   Last shift:       07 - 1900  In: -   Out: 400 [Urine:400]  Last 3 shifts: 1901 -  07  In: 2020 [P.O.:]  Out: 2350 [Urine:2350]    Intake/Output Summary (Last 24 hours) at 2020 1622  Last data filed at 2020 1221  Gross per 24 hour   Intake 1340 ml   Output 650 ml   Net 690 ml Physical Exam:   General:  Alert, cooperative, no distress, appears stated age. Head:  Normocephalic, without obvious abnormality, atraumatic. Eyes:  Conjunctivae/corneas clear. Nose: Nares normal. Septum midline. Mucosa normal.     Throat: Lips, mucosa, and tongue normal.    Neck: Supple, symmetrical, trachea midline    Back:   Symmetric, no curvature. ROM normal.   Lungs:   Clear to auscultation bilaterally. Chest wall:  No tenderness or deformity. Heart:  Regular rate and rhythm    Abdomen:   Soft, non-tender. Bowel sounds normal.     Extremities: Extremities normal, atraumatic, no cyanosis or clubbing   Skin: Skin color, texture, turgor normal. No rashes or lesions   Lymph nodes: Cervical, supraclavicular nodes normal.   Neurologic: Grossly nonfocal     Data review:     Recent Results (from the past 24 hour(s))   CBC WITH AUTOMATED DIFF    Collection Time: 02/19/20  3:08 AM   Result Value Ref Range    WBC 6.6 4.1 - 11.1 K/uL    RBC 6.48 (H) 4.10 - 5.70 M/uL    HGB 17.7 (H) 12.1 - 17.0 g/dL    HCT 55.8 (H) 36.6 - 50.3 %    MCV 86.1 80.0 - 99.0 FL    MCH 27.3 26.0 - 34.0 PG    MCHC 31.7 30.0 - 36.5 g/dL    RDW 19.0 (H) 11.5 - 14.5 %    PLATELET 634 257 - 264 K/uL    MPV 9.1 8.9 - 12.9 FL    NRBC 0.0 0  WBC    ABSOLUTE NRBC 0.00 0.00 - 0.01 K/uL    NEUTROPHILS 64 32 - 75 %    LYMPHOCYTES 21 12 - 49 %    MONOCYTES 10 5 - 13 %    EOSINOPHILS 4 0 - 7 %    BASOPHILS 1 0 - 1 %    IMMATURE GRANULOCYTES 0 0.0 - 0.5 %    ABS. NEUTROPHILS 4.2 1.8 - 8.0 K/UL    ABS. LYMPHOCYTES 1.4 0.8 - 3.5 K/UL    ABS. MONOCYTES 0.7 0.0 - 1.0 K/UL    ABS. EOSINOPHILS 0.2 0.0 - 0.4 K/UL    ABS. BASOPHILS 0.1 0.0 - 0.1 K/UL    ABS. IMM.  GRANS. 0.0 0.00 - 0.04 K/UL    DF AUTOMATED     METABOLIC PANEL, BASIC    Collection Time: 02/19/20  3:08 AM   Result Value Ref Range    Sodium 136 136 - 145 mmol/L    Potassium 4.0 3.5 - 5.1 mmol/L    Chloride 103 97 - 108 mmol/L    CO2 26 21 - 32 mmol/L    Anion gap 7 5 - 15 mmol/L Glucose 119 (H) 65 - 100 mg/dL    BUN 16 6 - 20 MG/DL    Creatinine 1.13 0.70 - 1.30 MG/DL    BUN/Creatinine ratio 14 12 - 20      GFR est AA >60 >60 ml/min/1.73m2    GFR est non-AA >60 >60 ml/min/1.73m2    Calcium 8.7 8.5 - 10.1 MG/DL   PROTHROMBIN TIME + INR    Collection Time: 02/19/20  3:08 AM   Result Value Ref Range    INR 2.4 (H) 0.9 - 1.1      Prothrombin time 23.9 (H) 9.0 - 11.1 sec       Imaging:  I have personally reviewed the patients radiographs and have reviewed the reports:  Diffuse GGOs with areas of air trapping        Scotty Tinsley MD

## 2020-02-19 NOTE — PROGRESS NOTES
Hospitalist Progress Note    NAME: Zulma Cartwright   :  1970   MRN:  [id number]     Admit date: 2020    Today's date: 20    PCP: MD Phoebe Alejandra M.D. Cell [phone]      Assessment / Plan:    Acute respiratory failure with hypoxia POA  ? New acute diastolic CHF POA  Hemoptysis POA  Increasing cough, ECHAVARRIA x months  No prior O2 requirements, not a cigarette smoker  ABG in ED on 4 liters 7.43, pCO2 38, pO2 50, RR to 29, ECHAVARRIA  Vaping 2 years ago, denies in past year  3 pillow orthopnea, some LE edema  CTA chest reviewed, extensive ground glass opacities, minimal effusions  Differential      ? CHF with edema      ? Recent pulmonary infection or atypical infection      ? ILD       Acute hypersensitivity pneumonitis, exposed to leaves and dust aerosolized at work             Construction work      ? Allergic reaction in lung       ? pulm hemosiderosis with the hemoptysis  O2 requirements improved, now on 1 liter  Procalcitonin < 0.05, pBNP 6  Respiratory pathogen PCR negative for pathogen  TTE LVEF 50-55%, mildly reduced RV function  Cardiology consult, feels CHF less likely  Recent steroids and inhalers at home  Seems to be improving but etiology of illness unclear  D/W pulmonary, they will evaluate the patient  Check HIV test     Paroxysmal Afib  sinus on EKG  Rate controlled   Continue on coumadin     History of PE and DVT  PE  Cont coumadin, pharm to dose  Watch with hemoptysis  CTA chest negative for PE    Overweight POA Body mass index is 29.26 kg/m².     Code Status: Full     DVT Prophylaxis: warfarin  GI Prophylaxis: not indicated       Subjective:     Chief Complaint / Reason for Physician Visit f/u acute hypoxic respiratory failure  \"My breathing feels better\". Discussed with RN events overnight.    Recent increasing ECHAVARRIA and cough x months  O2 weaned to 1 liter, still drops sats in 80s on room air with walking  Sleeps on 3 pillows    Review of Systems:  Symptom Y/N Comments  Symptom Y/N Comments   Fever/Chills n   Chest Pain n    Poor Appetite    Edema     Cough y   Abdominal Pain n    Sputum    Joint Pain     SOB/ECHAVARRIA y less  Headache n    Nausea/vomit n   Tolerating PT/OT     Diarrhea n   Tolerating Diet y    Constipation    Other       Could NOT obtain due to:      Objective:     VITALS:   Last 24hrs VS reviewed since prior progress note. Most recent are:  Patient Vitals for the past 24 hrs:   Temp Pulse Resp BP SpO2   02/19/20 1200 98.3 °F (36.8 °C) 82 16 99/53 91 %   02/19/20 0740 97.7 °F (36.5 °C) 73 16 109/69 95 %   02/19/20 0342 97.7 °F (36.5 °C) 86 16 102/63 97 %   02/18/20 2236 98.3 °F (36.8 °C) 83 16 107/64 92 %   02/18/20 1933 98 °F (36.7 °C) 72 18 97/51 94 %   02/18/20 1545 98.2 °F (36.8 °C) 74 18 117/64 94 %       Intake/Output Summary (Last 24 hours) at 2/19/2020 1225  Last data filed at 2/19/2020 1221  Gross per 24 hour   Intake 1340 ml   Output 650 ml   Net 690 ml        Wt Readings from Last 12 Encounters:   02/19/20 103.4 kg (227 lb 14.4 oz)   02/17/20 109.8 kg (242 lb)   01/14/20 107.5 kg (237 lb)   12/30/19 107 kg (236 lb)   12/02/19 109.8 kg (242 lb 1.6 oz)   11/04/19 109.3 kg (241 lb)   10/30/19 110.7 kg (244 lb)   09/30/19 110.7 kg (244 lb)   09/22/19 109.8 kg (242 lb)   08/09/19 115.2 kg (254 lb)   06/22/19 117.5 kg (259 lb 0.7 oz)   04/16/19 113.4 kg (250 lb)       PHYSICAL EXAM:  General: WD, WN. Alert, cooperative, no acute distress    EENT:  Anicteric sclerae. MMM  Resp:  Few crackles at bases bilaterally, generally clear, no wheezing. No accessory muscle use  CV:  Regular  rhythm,  No edema  GI:  Soft, Non distended, Non tender. +Bowel sounds, no rebound  Neurologic:  Alert and oriented X 3, normal speech, non focal motor exam  Psych:   Not anxious nor agitated  Skin:  No rashes.   No jaundice    Reviewed most current lab test results and cultures  YES  Reviewed most current radiology test results   YES  Review and summation of old records today    NO  Reviewed patient's current orders and MAR    YES  PMH/SH reviewed - no change compared to H&P  ________________________________________________________________________  Care Plan discussed with:    Comments   Patient y    Family      RN y    Care Manager     Consultant                        Multidiciplinary team rounds were held today with , nursing, pharmacist and clinical coordinator. Patient's plan of care was discussed; medications were reviewed and discharge planning was addressed. ________________________________________________________________________      Comments   >50% of visit spent in counseling and coordination of care     ________________________________________________________________________  Michael Ayon MD     Procedures: see electronic medical records for all procedures/Xrays and details which were not copied into this note but were reviewed prior to creation of Plan. LABS:  I reviewed today's most current labs and imaging studies.   Pertinent labs include:  Recent Labs     02/19/20 0308 02/18/20  0006 02/17/20  1109   WBC 6.6 6.4 5.0   HGB 17.7* 16.8 18.2*   HCT 55.8* 53.3* 57.7*    205 201     Recent Labs     02/19/20 0308 02/18/20  0006 02/17/20  1109    138 140   K 4.0 3.5 4.2    105 108   CO2 26 27 28   * 97 87   BUN 16 7 6   CREA 1.13 0.98 1.26   CA 8.7 9.1 9.0   ALB  --   --  3.5   TBILI  --   --  1.2*   SGOT  --   --  29   ALT  --   --  22   INR 2.4* 2.3* 2.4*

## 2020-02-19 NOTE — PROGRESS NOTES
[address]  [phone]      Cardiology Progress Note      2/19/2020 4:10 PM    Admit Date: 2/17/2020    Admit Diagnosis:   Acute respiratory failure with hypoxia (HCC) [J96.01]    Interval History/Subjective: Gillian Reinoso is a 47 y.o. male with PMH PE, PAF who was admitted for Acute respiratory failure with hypoxia ([address] [J96.01].    -BP borderline  -labs steady  -weight same; I/O +  -Mr. Wyatt Franks is feeling okay. He states he's walked in the hallways and feels back to normal, but his oxygen is still low. No c/o pain. Visit Vitals  BP 99/53 (BP 1 Location: Left arm, BP Patient Position: At rest)   Pulse 82   Temp 98.3 °F (36.8 °C)   Resp 16   Wt 103.4 kg (227 lb 14.4 oz)   SpO2 91%   BMI 29.26 kg/m²       Current Facility-Administered Medications   Medication Dose Route Frequency    warfarin (COUMADIN) tablet 5 mg  5 mg Oral ONCE    [START ON 2/20/2020] furosemide (LASIX) injection 40 mg  40 mg IntraVENous DAILY    atorvastatin (LIPITOR) tablet 40 mg  40 mg Oral QHS    WARFARIN INFORMATION NOTE (COUMADIN)   Other QPM    acetaminophen (TYLENOL) tablet 650 mg  650 mg Oral Q6H PRN    carvediloL (COREG) tablet 3.125 mg  3.125 mg Oral BID WITH MEALS       Objective:      Physical Exam:  General: pleasant adult AAM resting in bed in NAD   Heart: RRR, no m/S3/JVD  Lungs: clear   Abdomen: Soft, +BS, NTND   Extremities: LE dante +DP/PT, no edema   Neurologic: Grossly normal  Skin:  Warm and dry.      Data Review:   Recent Labs     02/19/20  0308 02/18/20  0006 02/17/20  1109   WBC 6.6 6.4 5.0   HGB 17.7* 16.8 18.2*   HCT 55.8* 53.3* 57.7*    205 201     Recent Labs     02/19/20  0308 02/18/20  0006 02/17/20  1109    138 140   K 4.0 3.5 4.2    105 108   CO2 26 27 28   * 97 87   BUN 16 7 6   CREA 1.13 0.98 1.26   CA 8.7 9.1 9.0   ALB  --   --  3.5   TBILI  --   --  1.2*   SGOT  --   --  29   ALT  --   --  22   INR 2.4* 2.3* 2.4* Recent Labs     02/18/20  1059 02/17/20  1109   TROIQ <0.05 <0.05         Intake/Output Summary (Last 24 hours) at 2/19/2020 1703  Last data filed at 2/19/2020 1221  Gross per 24 hour   Intake 1340 ml   Output 650 ml   Net 690 ml        Telemetry: SR  ECG:  NSR  Echocardiogram: EF 50-55%  CTA chest:  No PE.  ? Mild edema     Assessment:     Active Problems:    Tobacco abuse (9/8/2010)      Hx pulmonary embolism (11/14/2013)      Acute respiratory failure with hypoxia (HCC) (2/17/2020)      PAF (paroxysmal atrial fibrillation) ([facility] [address] (2/18/2020)      Overview: Per patient report        Plan:     Acute hypoxic respiratory failure:  Improving. Decreasing oxygen needs. Patient feels better. · ECHO with EF 50-55% and no new concerns on ECHO  · With low proBNP and ECHO results, find it unlikely that patient had diastolic heart failure, but patient did have perceived improvement with diuresis, so it's a possibility. · Switch diuretic to PO  · Appreciate pulm consult - bronch on Friday      PE/PAF history:  Remains sinus  · Coumadin on hold for report of hemoptysis and now planned bronch  · On BB        Ceasar Mercedes NP  DNP, RN, AGACNP-BC    Patient seen and examined by me with the above nurse practitioner. I personally performed all components of the history, physical, and medical decision making and agree with the assessment and plan with minor modifications as noted. Improved symptoms after diuresis, but persistent hypoxia with exercise. Normal proBNP and echo. Diagnostic bronchoscopy planned for Friday.

## 2020-02-19 NOTE — PROGRESS NOTES
Bedside shift change report GIVEN TO Dell Schmitt RN. Report included the following information SBAR. SIGNIFICANT CHANGES DURING SHIFT:  NA      CONCERNS TO ADDRESS WITH MD:  Na          [facility] NURSING NOTE   Admission Date 2/17/2020   Admission Diagnosis Acute respiratory failure with hypoxia (HCC) [J96.01]   Consults IP CONSULT TO CARDIOLOGY      Cardiac Monitoring [x] Yes [] No      Purposeful Hourly Rounding [x] Yes    Trino Score Total Score: 2   Trino score 3 or > [x] Bed Alarm [] Avasys [] 1:1 sitter [] Patient refused (Signed refusal form in chart)   Jason Score Jason Score: 20   Jason score 14 or < [] PMT consult [] Wound Care consult    []  Specialty bed  [] Nutrition consult      Influenza Vaccine Received Flu Vaccine for Current Season (usually Sept-March): No    Patient/Guardian Refused (Notify MD): Yes      Oxygen needs? [] Room air Oxygen @  []1L    []2L    []3L   [x]4L    []5L   []6L via  NC   Chronic home O2 use? [] Yes [x] No  Perform O2 challenge test and document in progress note using smartphrase (.Homeoxygen)      Last bowel movement Last Bowel Movement Date: 02/18/20      Urinary Catheter             LDAs               Peripheral IV 02/17/20 Right Antecubital (Active)   Site Assessment Clean, dry, & intact 2/19/2020  3:46 AM   Phlebitis Assessment 0 2/19/2020  3:46 AM   Infiltration Assessment 0 2/19/2020  3:46 AM   Dressing Status Clean, dry, & intact 2/19/2020  3:46 AM   Dressing Type Transparent 2/19/2020  3:46 AM   Hub Color/Line Status Pink 2/19/2020  3:46 AM   Action Taken Catheter retaped 2/17/2020 11:08 AM                         Readmission Risk Assessment Tool Score Low Risk            2       Total Score        2 Charlson Comorbidity Score (Age + Comorbid Conditions)        Criteria that do not apply:    Has Seen PCP in Last 6 Months (Yes=3, No=0)    . Living with Significant Other. Assisted Living. LTAC. SNF.  or   Rehab    Patient Length of Stay (>5 days = 3)    IP Visits Last 12 Months (1-3=4, 4=9, >4=11)    Pt.  Coverage (Medicare=5 , Medicaid, or Self-Pay=4)       Expected Length of Stay 3d 19h   Actual Length of Stay 2

## 2020-02-20 LAB
ALBUMIN SERPL-MCNC: 3.1 G/DL (ref 3.5–5)
ALBUMIN/GLOB SERPL: 0.7 {RATIO} (ref 1.1–2.2)
ALP SERPL-CCNC: 88 U/L (ref 45–117)
ALT SERPL-CCNC: 23 U/L (ref 12–78)
ANION GAP SERPL CALC-SCNC: 6 MMOL/L (ref 5–15)
AST SERPL-CCNC: 20 U/L (ref 15–37)
BASOPHILS # BLD: 0.1 K/UL (ref 0–0.1)
BASOPHILS NFR BLD: 1 % (ref 0–1)
BILIRUB SERPL-MCNC: 1 MG/DL (ref 0.2–1)
BUN SERPL-MCNC: 12 MG/DL (ref 6–20)
BUN/CREAT SERPL: 11 (ref 12–20)
CALCIUM SERPL-MCNC: 8.6 MG/DL (ref 8.5–10.1)
CHLORIDE SERPL-SCNC: 104 MMOL/L (ref 97–108)
CO2 SERPL-SCNC: 27 MMOL/L (ref 21–32)
CREAT SERPL-MCNC: 1.06 MG/DL (ref 0.7–1.3)
DIFFERENTIAL METHOD BLD: ABNORMAL
EOSINOPHIL # BLD: 0.2 K/UL (ref 0–0.4)
EOSINOPHIL NFR BLD: 4 % (ref 0–7)
ERYTHROCYTE [DISTWIDTH] IN BLOOD BY AUTOMATED COUNT: 18.7 % (ref 11.5–14.5)
GLOBULIN SER CALC-MCNC: 4.5 G/DL (ref 2–4)
GLUCOSE SERPL-MCNC: 130 MG/DL (ref 65–100)
HCT VFR BLD AUTO: 56.3 % (ref 36.6–50.3)
HGB BLD-MCNC: 17.6 G/DL (ref 12.1–17)
HIV 1+2 AB+HIV1 P24 AG SERPL QL IA: NONREACTIVE
HIV12 RESULT COMMENT, HHIVC: NORMAL
IMM GRANULOCYTES # BLD AUTO: 0 K/UL (ref 0–0.04)
IMM GRANULOCYTES NFR BLD AUTO: 0 % (ref 0–0.5)
INR PPP: 2.5 (ref 0.9–1.1)
LYMPHOCYTES # BLD: 1.4 K/UL (ref 0.8–3.5)
LYMPHOCYTES NFR BLD: 23 % (ref 12–49)
MCH RBC QN AUTO: 26.8 PG (ref 26–34)
MCHC RBC AUTO-ENTMCNC: 31.3 G/DL (ref 30–36.5)
MCV RBC AUTO: 85.8 FL (ref 80–99)
MONOCYTES # BLD: 0.7 K/UL (ref 0–1)
MONOCYTES NFR BLD: 11 % (ref 5–13)
NEUTS SEG # BLD: 3.7 K/UL (ref 1.8–8)
NEUTS SEG NFR BLD: 61 % (ref 32–75)
NRBC # BLD: 0 K/UL (ref 0–0.01)
NRBC BLD-RTO: 0 PER 100 WBC
PLATELET # BLD AUTO: 223 K/UL (ref 150–400)
PMV BLD AUTO: 8.9 FL (ref 8.9–12.9)
POTASSIUM SERPL-SCNC: 4 MMOL/L (ref 3.5–5.1)
PROT SERPL-MCNC: 7.6 G/DL (ref 6.4–8.2)
PROTHROMBIN TIME: 24.6 SEC (ref 9–11.1)
RBC # BLD AUTO: 6.56 M/UL (ref 4.1–5.7)
RHEUMATOID FACT SERPL-ACNC: <10 IU/ML
SODIUM SERPL-SCNC: 137 MMOL/L (ref 136–145)
WBC # BLD AUTO: 6.1 K/UL (ref 4.1–11.1)

## 2020-02-20 PROCEDURE — 85610 PROTHROMBIN TIME: CPT

## 2020-02-20 PROCEDURE — 83516 IMMUNOASSAY NONANTIBODY: CPT

## 2020-02-20 PROCEDURE — 77010033678 HC OXYGEN DAILY

## 2020-02-20 PROCEDURE — 74011250637 HC RX REV CODE- 250/637: Performed by: INTERNAL MEDICINE

## 2020-02-20 PROCEDURE — 80053 COMPREHEN METABOLIC PANEL: CPT

## 2020-02-20 PROCEDURE — 65660000000 HC RM CCU STEPDOWN

## 2020-02-20 PROCEDURE — 85025 COMPLETE CBC W/AUTO DIFF WBC: CPT

## 2020-02-20 PROCEDURE — 86200 CCP ANTIBODY: CPT

## 2020-02-20 PROCEDURE — 83520 IMMUNOASSAY QUANT NOS NONAB: CPT

## 2020-02-20 PROCEDURE — 87389 HIV-1 AG W/HIV-1&-2 AB AG IA: CPT

## 2020-02-20 PROCEDURE — 94760 N-INVAS EAR/PLS OXIMETRY 1: CPT

## 2020-02-20 PROCEDURE — 74011250637 HC RX REV CODE- 250/637: Performed by: NURSE PRACTITIONER

## 2020-02-20 PROCEDURE — 86431 RHEUMATOID FACTOR QUANT: CPT

## 2020-02-20 PROCEDURE — 86038 ANTINUCLEAR ANTIBODIES: CPT

## 2020-02-20 PROCEDURE — 36415 COLL VENOUS BLD VENIPUNCTURE: CPT

## 2020-02-20 PROCEDURE — 88312 SPECIAL STAINS GROUP 1: CPT

## 2020-02-20 PROCEDURE — 88112 CYTOPATH CELL ENHANCE TECH: CPT

## 2020-02-20 PROCEDURE — 86235 NUCLEAR ANTIGEN ANTIBODY: CPT

## 2020-02-20 RX ADMIN — ATORVASTATIN CALCIUM 40 MG: 40 TABLET, FILM COATED ORAL at 21:22

## 2020-02-20 RX ADMIN — FUROSEMIDE 20 MG: 20 TABLET ORAL at 10:21

## 2020-02-20 NOTE — PROGRESS NOTES
Problem: Breathing Pattern - Ineffective  Goal: *Absence of hypoxia  Outcome: Progressing Towards Goal     Problem: Patient Education: Go to Patient Education Activity  Goal: Patient/Family Education  Outcome: Progressing Towards Goal     Problem: Heart Failure: Day 3  Goal: Off Pathway (Use only if patient is Off Pathway)  Outcome: Progressing Towards Goal  Goal: Activity/Safety  Outcome: Progressing Towards Goal  Goal: Diagnostic Test/Procedures  Outcome: Progressing Towards Goal  Goal: Nutrition/Diet  Outcome: Progressing Towards Goal  Goal: Discharge Planning  Outcome: Progressing Towards Goal  Goal: Medications  Outcome: Progressing Towards Goal  Goal: Respiratory  Outcome: Progressing Towards Goal  Goal: Treatments/Interventions/Procedures  Outcome: Progressing Towards Goal  Goal: Psychosocial  Outcome: Progressing Towards Goal  Goal: *Oxygen saturation within defined limits  Outcome: Progressing Towards Goal  Goal: *Hemodynamically stable  Outcome: Progressing Towards Goal  Goal: *Optimal pain control at patient's stated goal  Outcome: Progressing Towards Goal  Goal: *Anxiety reduced or absent  Outcome: Progressing Towards Goal  Goal: *Demonstrates progressive activity  Outcome: Progressing Towards Goal

## 2020-02-20 NOTE — PROGRESS NOTES
[address] [facility]  [phone]      Cardiology Progress Note      2/20/2020 1130AM    Admit Date: 2/17/2020    Admit Diagnosis:   Acute respiratory failure with hypoxia (HCC) [J96.01]    Interval History/Subjective: Wilfredo Reed is a 47 y.o. male with PMH PE, PAF who was admitted for Acute respiratory failure with hypoxia ([address] [J96.01].    -VSS  -labs steady  -weight same; I/O +  -Mr. Chad Klein is feeling good today. Still on O2. No SOB or pain. Visit Vitals  /66 (BP 1 Location: Left arm, BP Patient Position: Sitting)   Pulse 73   Temp 97.8 °F (36.6 °C)   Resp 17   Wt 102.5 kg (225 lb 14.4 oz)   SpO2 94%   BMI 29.00 kg/m²       Current Facility-Administered Medications   Medication Dose Route Frequency    furosemide (LASIX) tablet 20 mg  20 mg Oral DAILY    atorvastatin (LIPITOR) tablet 40 mg  40 mg Oral QHS    WARFARIN INFORMATION NOTE (COUMADIN)   Other QPM    acetaminophen (TYLENOL) tablet 650 mg  650 mg Oral Q6H PRN    carvediloL (COREG) tablet 3.125 mg  3.125 mg Oral BID WITH MEALS       Objective:      Physical Exam:  General: pleasant adult AAM resting in bed in NAD   Heart: RRR, no m/S3/JVD  Lungs: clear   Abdomen: Soft, +BS, NTND   Extremities: LE dante +DP/PT, no edema   Neurologic: Grossly normal  Skin:  Warm and dry.      Data Review:   Recent Labs     02/20/20 0358 02/19/20 0308 02/18/20  0006   WBC 6.1 6.6 6.4   HGB 17.6* 17.7* 16.8   HCT 56.3* 55.8* 53.3*    221 205     Recent Labs     02/20/20 0358 02/19/20 0308 02/18/20  0006    136 138   K 4.0 4.0 3.5    103 105   CO2 27 26 27   * 119* 97   BUN 12 16 7   CREA 1.06 1.13 0.98   CA 8.6 8.7 9.1   ALB 3.1*  --   --    TBILI 1.0  --   --    SGOT 20  --   --    ALT 23  --   --    INR 2.5* 2.4* 2.3*       Recent Labs     02/18/20  1059   TROIQ <0.05         Intake/Output Summary (Last 24 hours) at 2/20/2020 1221  Last data filed at 2/20/2020 0433  Gross per 24 hour   Intake 180 ml   Output 400 ml   Net -220 ml        Telemetry: SR  ECG:  NSR  Echocardiogram: EF 50-55%  CTA chest:  No PE.  ? Mild edema     Assessment:     Active Problems:    Tobacco abuse (9/8/2010)      Hx pulmonary embolism (11/14/2013)      Acute respiratory failure with hypoxia (HCC) (2/17/2020)      PAF (paroxysmal atrial fibrillation) ([facility] (2/18/2020)      Overview: Per patient report        Plan:     Acute hypoxic respiratory failure:  Improving/steady. Remains on 2lnc. Patient feels better. ECHO with EF 50-55% and no new concerns on ECHO  · With low proBNP and ECHO results, find it unlikely that patient had diastolic heart failure, but patient did have perceived improvement with diuresis, so it's a possibility. · Continue low dose PO diuretic   · Appreciate pulm consult - bronch on Friday      PE/PAF history:  Remains sinus  · Coumadin on hold for report of hemoptysis and now planned bronch  · On BB        Malcolm Rendon NP  DNP, RN, AGACNP-BC    Patient seen and examined by me with the above nurse practitioner. I personally performed all components of the history, physical, and medical decision making and agree with the assessment and plan with minor modifications as noted. Continue low-dose diuretic for now with perceived improvement in symptoms rapidly with diuresis. Proceed with pulmonary evaluation and treatment. If there is a definitive pulmonary diagnosis and treatable respiratory illness, could consider discontinuation of diuretic in the future. Thanks for the consult. We will sign off for now. I recommend he follow-up in my office in about 2 to 4 weeks, sooner as needed.

## 2020-02-20 NOTE — PROGRESS NOTES
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 No new complaints. Coughed up some thick whitish mucus this morning. Initial history: This patient has been seen and evaluated at the request of Dr. Katalina Ireland for hypoxia and abnormal chest CT. Patient is a 47 y.o. male nonsmoker (uses snuff), who has a 5 month history of chest congestion and cough. He has had streaky hemoptysis at times. Had increasing dyspnea on exertion and LE edema prompting presentation to the hospital.  Found to have significant hypoxia and diffuse GGOs on imaging. Currently denies shortness of breath but he was on room air with SpO2 in the low 80s. He reports his SpO2 has been in the 70s at times during this illness. He is on chronic warfarin due to recurrent pulmonary emboli as well as afib. No travel, no high risk behaviors, denies illicit drug use.      Past Medical History:   Diagnosis Date    A-fib [facility])     Allergic rhinitis     Anxiety disorder     Cellulitis     post tattoo    Deep vein thrombosis (DVT) (HCC)     [facility]  4/24/17 f/u note to manage Lovenox    Essential tremor     Laceration of arm 09/23/2017    Left cut by a chain saw.  Phlebitis     Pulmonary embolism ([facility] 3/2011    multiple episodes    Rectal bleeding 10/2011    on coumadin @ the time &again 4/2017 4/12/17 note from Carla//colonoscopy report rec'd    Thromboembolus [facility])     Umbilical hernia     Vitamin D deficiency 04/2014    again 4/2017      Past Surgical History:   Procedure Laterality Date    HX APPENDECTOMY  2003    HX COLONOSCOPY  04/28/2017    Dr. Pamela Mendez  [id number]    umbilical      Prior to Admission medications    Medication Sig Start Date End Date Taking? Authorizing Provider   warfarin (COUMADIN) 2.5 mg tablet Take 2.5 mg by mouth two (2) times daily on Sat & Sun. 2.5mg on Sat/Sun. TAKE 1 TAB (5MG) BY MOUTH EVERY DAY ON MONDAY THROUGH FRIDAY, AND 1/2 TAB (2.5MG) EVERY SATURDAY AND Sunday. Yes Provider, Historical   busPIRone (BUSPAR) 10 mg tablet TAKE 1 TO 2 TABLETS BY MOUTH THREE TIMES DAILY 2/6/20  Yes Rubi Sotomayor MD   fluticasone propionate (FLOVENT HFA) 220 mcg/actuation inhaler Take 2 Puffs by inhalation two (2) times a day. 1/14/20  Yes Rubi Sotomayor MD   albuterol sulfate (PROAIR RESPICLICK) 90 mcg/actuation aepb Take 2 Puffs by inhalation every six (6) hours as needed for Cough. 12/2/19  Yes Dia Webb NP   warfarin (COUMADIN) 5 mg tablet TAKE 1 TABLET BY MOUTH EVERY DAY ON MONDAY THROUGH FRIDAY, AND 1/2 TABLET EVERY SATURDAY AND SUNDAY  Patient taking differently: Take 5 mg by mouth five (5) days a week. TAKE 1 TABLET (5MG) BY MOUTH EVERY DAY ON MONDAY THROUGH FRIDAY, AND 1/2 TABLET (2.5MG) EVERY SATURDAY AND Sunday.  11/4/19  Yes Rubi Sotomayor MD   rosuvastatin (CRESTOR) 20 mg tablet TAKE 1 TABLET BY MOUTH EVERY NIGHT 9/11/19  Yes Rubi Sotomayor MD   EPINEPHrine (EPIPEN) 0.3 mg/0.3 mL injection INJECT 0.3 ML BY INTRAMUSCULAR ROUTE ONCE PRN FOR ANAPHYLAXIS OR ALLERGIC RESPONSE FOR UP TO ONE DOSE 19  Yes Read, Ginna Deng MD   sildenafil citrate (VIAGRA) 50 mg tablet Take 50 mg by mouth as needed. Yes Other, MD Gibson   krill-omega-3-dha-epa-lipids (KRILL OIL) 363-22-09-50 mg cap Take 1 Cap by mouth daily. Yes Provider, Historical     Allergies   Allergen Reactions    Bee Sting [Sting, Bee] Swelling    Neomycin Rash    Neosporin [Neomycin-Bacitracin-Polymyxin] Rash      Social History     Tobacco Use    Smoking status: Never Smoker    Smokeless tobacco: Current User     Types: Snuff   Substance Use Topics    Alcohol use: Yes     Alcohol/week: 0.0 standard drinks     Comment: weekends      Family History   Problem Relation Age of Onset    Heart Disease Father     Heart Disease Maternal Grandmother     Heart Disease Maternal Grandfather         Current Facility-Administered Medications   Medication Dose Route Frequency    furosemide (LASIX) tablet 20 mg  20 mg Oral DAILY    atorvastatin (LIPITOR) tablet 40 mg  40 mg Oral QHS    WARFARIN INFORMATION NOTE (COUMADIN)   Other QPM    carvediloL (COREG) tablet 3.125 mg  3.125 mg Oral BID WITH MEALS       Review of Systems:  A comprehensive review of systems was negative except for that written in the HPI. Objective:   Vital Signs:    Visit Vitals  /60 (BP 1 Location: Right arm, BP Patient Position: At rest)   Pulse 68   Temp 98.2 °F (36.8 °C)   Resp 18   Wt 102.5 kg (225 lb 14.4 oz)   SpO2 96%   BMI 29.00 kg/m²       O2 Device: Nasal cannula   O2 Flow Rate (L/min): 2 l/min   Temp (24hrs), Av.2 °F (36.8 °C), Min:97.9 °F (36.6 °C), Max:98.5 °F (36.9 °C)       Intake/Output:   Last shift:      No intake/output data recorded.   Last 3 shifts:  1901 -  0700  In: 1280 [P.O.:1280]  Out: 1050 [Urine:1050]    Intake/Output Summary (Last 24 hours) at 2020 0900  Last data filed at 2020 0433  Gross per 24 hour   Intake 180 ml   Output 800 ml   Net -620 ml Physical Exam:   General:  Alert, cooperative, no distress, appears stated age. Head:  Normocephalic, without obvious abnormality, atraumatic. Eyes:  Conjunctivae/corneas clear. Nose: Nares normal. Septum midline. Mucosa normal.     Throat: Lips, mucosa, and tongue normal.    Neck: Supple, symmetrical, trachea midline    Back:   Symmetric, no curvature. ROM normal.   Lungs:   Scattered bilateral rales   Chest wall:  No tenderness or deformity. Heart:  Regular rate and rhythm    Abdomen:   Soft, non-tender.  Bowel sounds normal.     Extremities: Extremities normal, atraumatic, no cyanosis or clubbing   Skin: Skin color, texture, turgor normal. No rashes or lesions   Neurologic: Grossly nonfocal     Data:   Labs:  Recent Labs     02/20/20  0358 02/19/20  0308 02/18/20  0006   WBC 6.1 6.6 6.4   HGB 17.6* 17.7* 16.8   HCT 56.3* 55.8* 53.3*    221 205     Recent Labs     02/20/20  0358 02/19/20  0308 02/18/20  0006 02/17/20  1109    136 138 140   K 4.0 4.0 3.5 4.2    103 105 108   CO2 27 26 27 28   * 119* 97 87   BUN 12 16 7 6   CREA 1.06 1.13 0.98 1.26   CA 8.6 8.7 9.1 9.0   ALB 3.1*  --   --  3.5   TBILI 1.0  --   --  1.2*   SGOT 20  --   --  29   ALT 23  --   --  22   INR 2.5* 2.4* 2.3* 2.4*     Recent Labs     02/17/20  1452   PHI 7.432   PCO2I 37.9   PO2I 50*   HCO3I 25.3       Imaging:  I have personally reviewed the patients radiographs:  None today        Weston Sargent MD

## 2020-02-20 NOTE — ROUTINE PROCESS
Bedside shift change report GIVEN TO Chris Ontiveros RN. Report included the following information SBAR, Kardex, ED Summary, Intake/Output, MAR, Recent Results and Cardiac Rhythm NSR.  
 
 
 
SIGNIFICANT CHANGES DURING SHIFT: 
Patient required 1L during O2 challenge, and remained on 1L until 16:30, when Dr. Bam Herbert increased O2 to 2L. Patient's SATS were in mid-90s at that time. CONCERNS TO ADDRESS WITH MD:  
Patient does not want to be discharged with O2. 
 
 
 
 
[address] [place] [address] NOTE Admission Date 2/17/2020 Admission Diagnosis Acute respiratory failure with hypoxia ([facility] [address] [J96.01] Consults IP CONSULT TO CARDIOLOGY 
IP CONSULT TO INTENSIVIST Cardiac Monitoring [x] Yes [] No  
  
Purposeful Hourly Rounding [x] Yes   
Trino Score Total Score: 2 Trino score 3 or > [] Bed Alarm [] Avasys [] 1:1 sitter [] Patient refused (Signed refusal form in chart) Jason Score Jason Score: 20 Jason score 14 or < [] PMT consult [] Wound Care consult  
 []  Specialty bed  [] Nutrition consult Influenza Vaccine Received Flu Vaccine for Current Season (usually Sept-March): No  
 Patient/Guardian Refused (Notify MD): Yes Oxygen needs? [] Room air Oxygen @  []1L    [x]2L    []3L   []4L    []5L   []6L via NC Chronic home O2 use? [] Yes [x] No 
Perform O2 challenge test and document in progress note using smartphrase (.Homeoxygen) Last bowel movement Last Bowel Movement Date: 02/18/20 Urinary Catheter LDAs Peripheral IV 02/17/20 Right Antecubital (Active) Site Assessment Clean, dry, & intact 2/19/2020  7:35 PM  
Phlebitis Assessment 0 2/19/2020  7:35 PM  
Infiltration Assessment 0 2/19/2020  7:35 PM  
Dressing Status Clean, dry, & intact 2/19/2020  7:35 PM  
Dressing Type Transparent 2/19/2020  7:35 PM  
Hub Color/Line Status Pink;Capped 2/19/2020  7:35 PM  
Action Taken Catheter retaped 2/17/2020 11:08 AM  
                  
  
 Readmission Risk Assessment Tool Score Low Risk 2 Total Score 2 Charlson Comorbidity Score (Age + Comorbid Conditions) Criteria that do not apply:  
 Has Seen PCP in Last 6 Months (Yes=3, No=0) . Living with Significant Other. Assisted Living. LTAC. SNF. or  
Rehab Patient Length of Stay (>5 days = 3) IP Visits Last 12 Months (1-3=4, 4=9, >4=11) Pt. Coverage (Medicare=5 , Medicaid, or Self-Pay=4) Expected Length of Stay 3d 19h Actual Length of Stay 2

## 2020-02-20 NOTE — PROGRESS NOTES
Hospitalist Progress Note    NAME: Caio Burdick   :  1970   MRN:  [id number]     Admit date: 2020    Today's date: 20    PCP: MD Mark Miramontes M.D. Cell [phone]      Assessment / Plan:    Acute respiratory failure with hypoxia POA  ? New acute diastolic CHF POA  Hemoptysis POA  Increasing cough, ECHAVARRIA x months  Sats at rest in 80s, does not look SOB, suspects more chronic component  No prior O2 requirements, not a cigarette smoker  ABG in ED on 4 liters 7.43, pCO2 38, pO2 50, RR to 29, ECHAVARRIA  Vaping 2 years ago, denies in past year  3 pillow orthopnea, some LE edema  CTA chest reviewed, extensive ground glass opacities, minimal effusions  Differential      ? CHF with edema, seems less likely      ? Recent pulmonary infection or atypical infection      ? ILD       Acute hypersensitivity pneumonitis, exposed to leaves and dust aerosolized at work             Construction work      ? Allergic reaction in lung      ? pulm hemosiderosis with the hemoptysis  Not SOB, but still hypoxic  Procalcitonin < 0.05, pBNP 6  Respiratory PCR negative for pathogens  TTE LVEF 50-55%, mildly reduced RV function  Cardiology consult, feels CHF less likely  Recent steroids and inhalers at home  Seems to be improving but etiology of illness unclear  Pulmonary following, bronch in AM  May ultimately require open lung biopsy  Hold coumadin tonight  HIV test, d/w patient today, he gave me verbal permission     Paroxysmal Afib  sinus on EKG  Rate controlled   Continue on coumadin     History of PE and DVT  PE POA  Hold coumadin tonight, ? Stop if lung biopsy needed  Watch with hemoptysis  CTA chest negative for PE    Overweight POA Body mass index is 29 kg/m².     Code Status: Full     DVT Prophylaxis: warfarin  GI Prophylaxis: not indicated       Subjective:     Chief Complaint / Reason for Physician Visit f/u acute hypoxic respiratory failure  \"No problems.   Discussed with RN events overnight. Recent increasing ECHAVARRIA and cough x months  Still on O2, drops sats into 80s  Bronch in AM    Review of Systems:  Symptom Y/N Comments  Symptom Y/N Comments   Fever/Chills n   Chest Pain n    Poor Appetite    Edema     Cough y   Abdominal Pain n    Sputum    Joint Pain     SOB/ECHAVARRIA n   Headache n    Nausea/vomit n   Tolerating PT/OT     Diarrhea n   Tolerating Diet y    Constipation    Other       Could NOT obtain due to:      Objective:     VITALS:   Last 24hrs VS reviewed since prior progress note. Most recent are:  Patient Vitals for the past 24 hrs:   Temp Pulse Resp BP SpO2   02/20/20 1033 97.8 °F (36.6 °C) 73 17 108/66 94 %   02/20/20 1019  80  104/61 97 %   02/20/20 0710 98.2 °F (36.8 °C) 68 18 105/60 96 %   02/20/20 0310 98 °F (36.7 °C) 69 15 103/63 90 %   02/19/20 2303 98.4 °F (36.9 °C) 82 18 95/66 95 %   02/19/20 1912 98.5 °F (36.9 °C) 79 18 101/66 94 %   02/19/20 1756 97.9 °F (36.6 °C) 87 16 106/61 96 %       Intake/Output Summary (Last 24 hours) at 2/20/2020 1544  Last data filed at 2/20/2020 0433  Gross per 24 hour   Intake 180 ml   Output 400 ml   Net -220 ml        Wt Readings from Last 12 Encounters:   02/20/20 102.5 kg (225 lb 14.4 oz)   02/17/20 109.8 kg (242 lb)   01/14/20 107.5 kg (237 lb)   12/30/19 107 kg (236 lb)   12/02/19 109.8 kg (242 lb 1.6 oz)   11/04/19 109.3 kg (241 lb)   10/30/19 110.7 kg (244 lb)   09/30/19 110.7 kg (244 lb)   09/22/19 109.8 kg (242 lb)   08/09/19 115.2 kg (254 lb)   06/22/19 117.5 kg (259 lb 0.7 oz)   04/16/19 113.4 kg (250 lb)       PHYSICAL EXAM:  General: WD, WN. Alert, cooperative, no acute distress    EENT:  Anicteric sclerae. MMM  Resp:  Few crackles at bases bilaterally, generally clear, no wheezing. No accessory muscle use  CV:  Regular  rhythm,  No edema  GI:  Soft, Non distended, Non tender.   +Bowel sounds, no rebound  Neurologic:  Alert and oriented X 3, normal speech, non focal motor exam  Psych:   Not anxious nor agitated  Skin:  No stacia. No jaundice    Reviewed most current lab test results and cultures  YES  Reviewed most current radiology test results   YES  Review and summation of old records today    NO  Reviewed patient's current orders and MAR    YES  PMH/SH reviewed - no change compared to H&P  ________________________________________________________________________  Care Plan discussed with:    Comments   Patient y    Family      RN y    Care Manager     Consultant                        Multidiciplinary team rounds were held today with , nursing, pharmacist and clinical coordinator. Patient's plan of care was discussed; medications were reviewed and discharge planning was addressed. ________________________________________________________________________      Comments   >50% of visit spent in counseling and coordination of care     ________________________________________________________________________  Burnice Carrel, MD     Procedures: see electronic medical records for all procedures/Xrays and details which were not copied into this note but were reviewed prior to creation of Plan. LABS:  I reviewed today's most current labs and imaging studies.   Pertinent labs include:  Recent Labs     02/20/20 0358 02/19/20  0308 02/18/20  0006   WBC 6.1 6.6 6.4   HGB 17.6* 17.7* 16.8   HCT 56.3* 55.8* 53.3*    221 205     Recent Labs     02/20/20 0358 02/19/20  0308 02/18/20  0006    136 138   K 4.0 4.0 3.5    103 105   CO2 27 26 27   * 119* 97   BUN 12 16 7   CREA 1.06 1.13 0.98   CA 8.6 8.7 9.1   ALB 3.1*  --   --    TBILI 1.0  --   --    SGOT 20  --   --    ALT 23  --   --    INR 2.5* 2.4* 2.3*

## 2020-02-20 NOTE — PROGRESS NOTES
1900 report received from Paige DAVISON. Bedside shift change report GIVEN TO SAMAN Gibson. Report included the following information SBAR, Kardex, Intake/Output, MAR, Med Rec Status and Cardiac Rhythm nsr. SIGNIFICANT CHANGES DURING SHIFT:        CONCERNS TO ADDRESS WITH MD:            [facility] NURSING NOTE   Admission Date 2/17/2020   Admission Diagnosis Acute respiratory failure with hypoxia ([address] [J96.01]   Consults IP CONSULT TO CARDIOLOGY  IP CONSULT TO INTENSIVIST      Cardiac Monitoring [x] Yes [] No      Purposeful Hourly Rounding [x] Yes    Trino Score Total Score: 2   Trino score 3 or > [] Bed Alarm [] Avasys [] 1:1 sitter [] Patient refused (Signed refusal form in chart)   Jason Score Jason Score: 20   Jason score 14 or < [] PMT consult [] Wound Care consult    []  Specialty bed  [] Nutrition consult      Influenza Vaccine Received Flu Vaccine for Current Season (usually Sept-March): No    Patient/Guardian Refused (Notify MD): Yes      Oxygen needs? [] Room air Oxygen @  []1L    [x]2L    []3L   []4L    []5L   []6L via  NC   Chronic home O2 use?  [] Yes [] No  Perform O2 challenge test and document in progress note using smartphrase (.Homeoxygen)      Last bowel movement Last Bowel Movement Date: 02/18/20      Urinary Catheter             LDAs               Peripheral IV 02/17/20 Right Antecubital (Active)   Site Assessment Clean, dry, & intact 2/19/2020  7:35 PM   Phlebitis Assessment 0 2/19/2020  7:35 PM   Infiltration Assessment 0 2/19/2020  7:35 PM   Dressing Status Clean, dry, & intact 2/19/2020  7:35 PM   Dressing Type Transparent 2/19/2020  7:35 PM   Hub Color/Line Status Pink;Capped 2/19/2020  7:35 PM   Action Taken Catheter retaped 2/17/2020 11:08 AM                         Readmission Risk Assessment Tool Score Low Risk            2       Total Score        2 Charlson Comorbidity Score (Age + Comorbid Conditions)        Criteria that do not apply:    Has Seen PCP in Last 6 Months (Yes=3, No=0)    . Living with Significant Other. Assisted Living. LTAC. SNF. or   Rehab    Patient Length of Stay (>5 days = 3)    IP Visits Last 12 Months (1-3=4, 4=9, >4=11)    Pt.  Coverage (Medicare=5 , Medicaid, or Self-Pay=4)       Expected Length of Stay 3d 19h   Actual Length of Stay 2

## 2020-02-21 LAB
ANA SER QL: NEGATIVE
ANION GAP SERPL CALC-SCNC: 3 MMOL/L (ref 5–15)
BASOPHILS # BLD: 0.1 K/UL (ref 0–0.1)
BASOPHILS NFR BLD: 1 % (ref 0–1)
BUN SERPL-MCNC: 14 MG/DL (ref 6–20)
BUN/CREAT SERPL: 13 (ref 12–20)
CALCIUM SERPL-MCNC: 8.8 MG/DL (ref 8.5–10.1)
CCP IGA+IGG SERPL IA-ACNC: 8 UNITS (ref 0–19)
CHLORIDE SERPL-SCNC: 107 MMOL/L (ref 97–108)
CO2 SERPL-SCNC: 28 MMOL/L (ref 21–32)
CREAT SERPL-MCNC: 1.11 MG/DL (ref 0.7–1.3)
DIFFERENTIAL METHOD BLD: ABNORMAL
ENA SCL70 AB SER-ACNC: <0.2 AI (ref 0–0.9)
EOSINOPHIL # BLD: 0.2 K/UL (ref 0–0.4)
EOSINOPHIL NFR BLD: 4 % (ref 0–7)
ERYTHROCYTE [DISTWIDTH] IN BLOOD BY AUTOMATED COUNT: 18 % (ref 11.5–14.5)
GBM IGG SER-ACNC: 3 UNITS (ref 0–20)
GLUCOSE SERPL-MCNC: 118 MG/DL (ref 65–100)
HCT VFR BLD AUTO: 56.6 % (ref 36.6–50.3)
HGB BLD-MCNC: 17.6 G/DL (ref 12.1–17)
IMM GRANULOCYTES # BLD AUTO: 0 K/UL (ref 0–0.04)
IMM GRANULOCYTES NFR BLD AUTO: 0 % (ref 0–0.5)
INR PPP: 2.6 (ref 0.9–1.1)
LYMPHOCYTES # BLD: 1.6 K/UL (ref 0.8–3.5)
LYMPHOCYTES NFR BLD: 28 % (ref 12–49)
MCH RBC QN AUTO: 27 PG (ref 26–34)
MCHC RBC AUTO-ENTMCNC: 31.1 G/DL (ref 30–36.5)
MCV RBC AUTO: 86.7 FL (ref 80–99)
MONOCYTES # BLD: 0.6 K/UL (ref 0–1)
MONOCYTES NFR BLD: 10 % (ref 5–13)
NEUTS SEG # BLD: 3.3 K/UL (ref 1.8–8)
NEUTS SEG NFR BLD: 57 % (ref 32–75)
NRBC # BLD: 0 K/UL (ref 0–0.01)
NRBC BLD-RTO: 0 PER 100 WBC
PLATELET # BLD AUTO: 205 K/UL (ref 150–400)
PMV BLD AUTO: 8.7 FL (ref 8.9–12.9)
POTASSIUM SERPL-SCNC: 4.1 MMOL/L (ref 3.5–5.1)
PROTHROMBIN TIME: 25 SEC (ref 9–11.1)
RBC # BLD AUTO: 6.53 M/UL (ref 4.1–5.7)
SODIUM SERPL-SCNC: 138 MMOL/L (ref 136–145)
WBC # BLD AUTO: 5.7 K/UL (ref 4.1–11.1)

## 2020-02-21 PROCEDURE — 0BJ08ZZ INSPECTION OF TRACHEOBRONCHIAL TREE, VIA NATURAL OR ARTIFICIAL OPENING ENDOSCOPIC: ICD-10-PCS | Performed by: INTERNAL MEDICINE

## 2020-02-21 PROCEDURE — 80048 BASIC METABOLIC PNL TOTAL CA: CPT

## 2020-02-21 PROCEDURE — 74011250636 HC RX REV CODE- 250/636: Performed by: INTERNAL MEDICINE

## 2020-02-21 PROCEDURE — 77010033678 HC OXYGEN DAILY

## 2020-02-21 PROCEDURE — 65660000000 HC RM CCU STEPDOWN

## 2020-02-21 PROCEDURE — 76040000007: Performed by: INTERNAL MEDICINE

## 2020-02-21 PROCEDURE — 36415 COLL VENOUS BLD VENIPUNCTURE: CPT

## 2020-02-21 PROCEDURE — 77030018836 HC SOL IRR NACL ICUM -A: Performed by: INTERNAL MEDICINE

## 2020-02-21 PROCEDURE — 74011250637 HC RX REV CODE- 250/637: Performed by: NURSE PRACTITIONER

## 2020-02-21 PROCEDURE — 85610 PROTHROMBIN TIME: CPT

## 2020-02-21 PROCEDURE — 74011000250 HC RX REV CODE- 250: Performed by: INTERNAL MEDICINE

## 2020-02-21 PROCEDURE — 85025 COMPLETE CBC W/AUTO DIFF WBC: CPT

## 2020-02-21 PROCEDURE — 87070 CULTURE OTHR SPECIMN AEROBIC: CPT

## 2020-02-21 PROCEDURE — 87116 MYCOBACTERIA CULTURE: CPT

## 2020-02-21 PROCEDURE — 94760 N-INVAS EAR/PLS OXIMETRY 1: CPT

## 2020-02-21 PROCEDURE — 77030012699 HC VLV SUC CNTRL OCOA -A: Performed by: INTERNAL MEDICINE

## 2020-02-21 PROCEDURE — 74011250637 HC RX REV CODE- 250/637: Performed by: INTERNAL MEDICINE

## 2020-02-21 PROCEDURE — 87102 FUNGUS ISOLATION CULTURE: CPT

## 2020-02-21 RX ORDER — NALOXONE HYDROCHLORIDE 0.4 MG/ML
0.4 INJECTION, SOLUTION INTRAMUSCULAR; INTRAVENOUS; SUBCUTANEOUS
Status: DISCONTINUED | OUTPATIENT
Start: 2020-02-21 | End: 2020-02-21 | Stop reason: HOSPADM

## 2020-02-21 RX ORDER — SODIUM CHLORIDE 0.9 % (FLUSH) 0.9 %
5-40 SYRINGE (ML) INJECTION AS NEEDED
Status: DISCONTINUED | OUTPATIENT
Start: 2020-02-21 | End: 2020-02-22 | Stop reason: HOSPADM

## 2020-02-21 RX ORDER — DIPHENHYDRAMINE HYDROCHLORIDE 50 MG/ML
50 INJECTION, SOLUTION INTRAMUSCULAR; INTRAVENOUS ONCE
Status: COMPLETED | OUTPATIENT
Start: 2020-02-21 | End: 2020-02-21

## 2020-02-21 RX ORDER — SODIUM CHLORIDE 0.9 % (FLUSH) 0.9 %
5-40 SYRINGE (ML) INJECTION EVERY 8 HOURS
Status: DISCONTINUED | OUTPATIENT
Start: 2020-02-21 | End: 2020-02-22 | Stop reason: HOSPADM

## 2020-02-21 RX ORDER — LIDOCAINE HYDROCHLORIDE 20 MG/ML
10 INJECTION, SOLUTION INFILTRATION; PERINEURAL ONCE
Status: COMPLETED | OUTPATIENT
Start: 2020-02-21 | End: 2020-02-21

## 2020-02-21 RX ORDER — LIDOCAINE HYDROCHLORIDE 20 MG/ML
JELLY TOPICAL AS NEEDED
Status: DISCONTINUED | OUTPATIENT
Start: 2020-02-21 | End: 2020-02-21 | Stop reason: HOSPADM

## 2020-02-21 RX ORDER — SODIUM CHLORIDE 9 MG/ML
100 INJECTION, SOLUTION INTRAVENOUS CONTINUOUS
Status: DISCONTINUED | OUTPATIENT
Start: 2020-02-21 | End: 2020-02-21

## 2020-02-21 RX ORDER — LIDOCAINE HYDROCHLORIDE 10 MG/ML
40 INJECTION INFILTRATION; PERINEURAL ONCE
Status: COMPLETED | OUTPATIENT
Start: 2020-02-21 | End: 2020-02-21

## 2020-02-21 RX ORDER — MIDAZOLAM HYDROCHLORIDE 1 MG/ML
.25-5 INJECTION, SOLUTION INTRAMUSCULAR; INTRAVENOUS
Status: DISCONTINUED | OUTPATIENT
Start: 2020-02-21 | End: 2020-02-21 | Stop reason: HOSPADM

## 2020-02-21 RX ORDER — FLUMAZENIL 0.1 MG/ML
0.2 INJECTION INTRAVENOUS
Status: DISCONTINUED | OUTPATIENT
Start: 2020-02-21 | End: 2020-02-21 | Stop reason: HOSPADM

## 2020-02-21 RX ORDER — FENTANYL CITRATE 50 UG/ML
25-50 INJECTION, SOLUTION INTRAMUSCULAR; INTRAVENOUS
Status: DISCONTINUED | OUTPATIENT
Start: 2020-02-21 | End: 2020-02-21 | Stop reason: HOSPADM

## 2020-02-21 RX ORDER — ATROPINE SULFATE 0.1 MG/ML
0.5 INJECTION INTRAVENOUS
Status: DISCONTINUED | OUTPATIENT
Start: 2020-02-21 | End: 2020-02-21 | Stop reason: HOSPADM

## 2020-02-21 RX ORDER — EPINEPHRINE 1 MG/ML
1 INJECTION, SOLUTION, CONCENTRATE INTRAVENOUS ONCE
Status: DISCONTINUED | OUTPATIENT
Start: 2020-02-21 | End: 2020-02-21 | Stop reason: HOSPADM

## 2020-02-21 RX ADMIN — SODIUM CHLORIDE 100 ML/HR: 900 INJECTION, SOLUTION INTRAVENOUS at 07:45

## 2020-02-21 RX ADMIN — FENTANYL CITRATE 50 MCG: 50 INJECTION, SOLUTION INTRAMUSCULAR; INTRAVENOUS at 08:26

## 2020-02-21 RX ADMIN — FENTANYL CITRATE 50 MCG: 50 INJECTION, SOLUTION INTRAMUSCULAR; INTRAVENOUS at 08:20

## 2020-02-21 RX ADMIN — MIDAZOLAM 1 MG: 1 INJECTION INTRAMUSCULAR; INTRAVENOUS at 08:25

## 2020-02-21 RX ADMIN — WARFARIN SODIUM 3 MG: 2 TABLET ORAL at 18:23

## 2020-02-21 RX ADMIN — DIPHENHYDRAMINE HYDROCHLORIDE 50 MG: 50 INJECTION, SOLUTION INTRAMUSCULAR; INTRAVENOUS at 08:17

## 2020-02-21 RX ADMIN — Medication 10 ML: at 18:25

## 2020-02-21 RX ADMIN — CARVEDILOL 3.12 MG: 3.12 TABLET, FILM COATED ORAL at 18:23

## 2020-02-21 RX ADMIN — MIDAZOLAM 2 MG: 1 INJECTION INTRAMUSCULAR; INTRAVENOUS at 08:19

## 2020-02-21 RX ADMIN — ATORVASTATIN CALCIUM 40 MG: 40 TABLET, FILM COATED ORAL at 20:31

## 2020-02-21 RX ADMIN — Medication 10 ML: at 10:39

## 2020-02-21 RX ADMIN — Medication 10 ML: at 20:31

## 2020-02-21 RX ADMIN — Medication 25 ML: at 07:36

## 2020-02-21 RX ADMIN — LIDOCAINE HYDROCHLORIDE: 20 JELLY TOPICAL at 08:24

## 2020-02-21 RX ADMIN — LIDOCAINE HYDROCHLORIDE 200 MG: 20 INJECTION, SOLUTION INFILTRATION; PERINEURAL at 07:36

## 2020-02-21 RX ADMIN — FUROSEMIDE 20 MG: 20 TABLET ORAL at 10:37

## 2020-02-21 NOTE — ROUTINE PROCESS
The following appointments have been successfully scheduled: 
 
Date/time Monday, February 24, 2020 08:00 AM 
Patient  Zohra Groves 1970 (52PK M) #[id number] Department [facility]-MAIN OFFICE-PCP Appointment type Transitional Care Provider [facility] [place]

## 2020-02-21 NOTE — PROGRESS NOTES
Patient spo2 on room air at rest was 98%. Patient spo2 while ambulating was 94%.   Patient spo2 after ambulation on room air at rest was 97%

## 2020-02-21 NOTE — PROGRESS NOTES
Pharmacy Daily Dosing of Warfarin    Indication: PE/A fib  Goal INR: 2-3    PTA Warfarin Dose: 5 mg M-F, 2.5 mg S/Sun    Concurrent anticoagulants: None  Concurrent antiplatelet: None    Major Interacting Medications   Drugs that may increase INR: None  Drugs that may decrease INR: None    Conditions that may increase/decrease INR (CHF, C. diff, cirrhosis, thyroid disorder, hypoalbuminemia): None    Labs:  Recent Labs     02/21/20  0344 02/20/20  0358 02/19/20  0308   INR 2.6* 2.5* 2.4*   HGB 17.6* 17.6* 17.7*    223 221   SGOT  --  20  --    TBILI  --  1.0  --    ALB  --  3.1*  --            Impression/Plan:   Warfarin 3 mg PO x 1 dose. Daily INR  CBC w/o diff every other day      Pharmacy will follow daily and adjust the dose as appropriate. Thanks  Christine Denney PHARMD      [email or web address]. pdf

## 2020-02-21 NOTE — PROGRESS NOTES
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 Ready for bronchoscopy. 20: no events. No new complaints. Coughed up some thick whitish mucus this morning. Initial history: This patient has been seen and evaluated at the request of Dr. Tam Crockett for hypoxia and abnormal chest CT. Patient is a 47 y.o. male nonsmoker (uses snuff), who has a 5 month history of chest congestion and cough. He has had streaky hemoptysis at times. Had increasing dyspnea on exertion and LE edema prompting presentation to the hospital.  Found to have significant hypoxia and diffuse GGOs on imaging. Currently denies shortness of breath but he was on room air with SpO2 in the low 80s. He reports his SpO2 has been in the 70s at times during this illness. He is on chronic warfarin due to recurrent pulmonary emboli as well as afib. No travel, no high risk behaviors, denies illicit drug use.      Past Medical History:   Diagnosis Date    A-fib [facility])     Allergic rhinitis     Anxiety disorder     Cellulitis     post tattoo    Deep vein thrombosis (DVT) ([facility]     [facility]  4/24/17 f/u note to manage Lovenox    Essential tremor     Laceration of arm 09/23/2017    Left cut by a chain saw.  Phlebitis     Pulmonary embolism ([facility] [facility] 3/2011    multiple episodes    Rectal bleeding 10/2011    on coumadin @ the time &again 4/2017 4/12/17 note from Carla//colonoscopy report rec'd    Thromboembolus [facility])     Umbilical hernia     Vitamin D deficiency 04/2014    again 4/2017      Past Surgical History:   Procedure Laterality Date    HX APPENDECTOMY  2003    HX COLONOSCOPY  04/28/2017    Dr. José Miguel Galvan  [id number]    umbilical      Prior to Admission medications    Medication Sig Start Date End Date Taking? Authorizing Provider   warfarin (COUMADIN) 2.5 mg tablet Take 2.5 mg by mouth two (2) times daily on Sat & Sun. 2.5mg on Sat/Sun. TAKE 1 TAB (5MG) BY MOUTH EVERY DAY ON MONDAY THROUGH FRIDAY, AND 1/2 TAB (2.5MG) EVERY SATURDAY AND Sunday. Yes Provider, Historical   busPIRone (BUSPAR) 10 mg tablet TAKE 1 TO 2 TABLETS BY MOUTH THREE TIMES DAILY 2/6/20  Yes Sayra Sotomayor MD   fluticasone propionate (FLOVENT HFA) 220 mcg/actuation inhaler Take 2 Puffs by inhalation two (2) times a day. 1/14/20  Yes Sayra Sotomayor MD   albuterol sulfate (PROAIR RESPICLICK) 90 mcg/actuation aepb Take 2 Puffs by inhalation every six (6) hours as needed for Cough. 12/2/19  Yes Wojciech Shoemaker NP   warfarin (COUMADIN) 5 mg tablet TAKE 1 TABLET BY MOUTH EVERY DAY ON MONDAY THROUGH FRIDAY, AND 1/2 TABLET EVERY SATURDAY AND SUNDAY  Patient taking differently: Take 5 mg by mouth five (5) days a week. TAKE 1 TABLET (5MG) BY MOUTH EVERY DAY ON MONDAY THROUGH FRIDAY, AND 1/2 TABLET (2.5MG) EVERY SATURDAY AND Sunday.  11/4/19  Yes Sayra Sotomayor MD   rosuvastatin (CRESTOR) 20 mg tablet TAKE 1 TABLET BY MOUTH EVERY NIGHT 9/11/19  Yes Sayra Sotomayor MD   EPINEPHrine (EPIPEN) 0.3 mg/0.3 mL injection INJECT 0.3 ML BY INTRAMUSCULAR ROUTE ONCE PRN FOR ANAPHYLAXIS OR ALLERGIC RESPONSE FOR UP TO ONE DOSE 4/22/19  Yes Read, Lee Asher MD   sildenafil citrate (VIAGRA) 50 mg tablet Take 50 mg by mouth as needed. Yes Other, MD Gibson   krill-omega-3-dha-epa-lipids (KRILL OIL) 191-45-44-50 mg cap Take 1 Cap by mouth daily. Yes Provider, Historical     Allergies   Allergen Reactions    Bee Sting [Sting, Bee] Swelling    Neomycin Rash    Neosporin [Neomycin-Bacitracin-Polymyxin] Rash      Social History     Tobacco Use    Smoking status: Never Smoker    Smokeless tobacco: Current User     Types: Snuff   Substance Use Topics    Alcohol use: Yes     Alcohol/week: 0.0 standard drinks     Comment: weekends      Family History   Problem Relation Age of Onset    Heart Disease Father     Heart Disease Maternal Grandmother     Heart Disease Maternal Grandfather         Current Facility-Administered Medications   Medication Dose Route Frequency    0.9% sodium chloride infusion  100 mL/hr IntraVENous CONTINUOUS    sodium chloride (NS) flush 5-40 mL  5-40 mL IntraVENous Q8H    diphenhydrAMINE (BENADRYL) injection 50 mg  50 mg IntraVENous ONCE    lidocaine (XYLOCAINE) 20 mg/mL (2 %) injection 200 mg  10 mL Via Airway ONCE    lidocaine (XYLOCAINE) 10 mg/mL (1 %) injection 40 mL  40 mL Via Airway ONCE    EPINEPHrine HCl (PF) (ADRENALIN) 1 mg/mL (1 mL) injection 1 mg  1 mg Endotracheal ONCE    furosemide (LASIX) tablet 20 mg  20 mg Oral DAILY    atorvastatin (LIPITOR) tablet 40 mg  40 mg Oral QHS    WARFARIN INFORMATION NOTE (COUMADIN)   Other QPM    carvediloL (COREG) tablet 3.125 mg  3.125 mg Oral BID WITH MEALS       Review of Systems:  A comprehensive review of systems was negative except for that written in the HPI.     Objective:   Vital Signs:    Visit Vitals  /73   Pulse 71   Temp 97.8 °F (36.6 °C)   Resp 12   Wt 103.6 kg (228 lb 4.8 oz)   SpO2 99%   BMI 29.31 kg/m²       O2 Device: Nasal cannula   O2 Flow Rate (L/min): 4 l/min   Temp (24hrs), Av.9 °F (36.6 °C), Min:97.4 °F (36.3 °C), Max:98.4 °F (36.9 °C)       Intake/Output:   Last shift:      No intake/output data recorded. Last 3 shifts:  1901 -  0700  In: 180 [P.O.:180]  Out: 400 [Urine:400]    Intake/Output Summary (Last 24 hours) at 2020 0813  Last data filed at 2020 0555  Gross per 24 hour   Intake 0 ml   Output    Net 0 ml      Physical Exam:   General:  Alert, cooperative, no distress, appears stated age. Head:  Normocephalic, without obvious abnormality, atraumatic. Eyes:  Conjunctivae/corneas clear. Nose: Nares normal. Septum midline. Mucosa normal.     Throat: Lips, mucosa, and tongue normal.    Neck: Supple, symmetrical, trachea midline    Back:   Symmetric, no curvature. ROM normal.   Lungs:   Scattered bilateral rales   Chest wall:  No tenderness or deformity. Heart:  Regular rate and rhythm    Abdomen:   Soft, non-tender. Bowel sounds normal.     Extremities: Extremities normal, atraumatic, no cyanosis or clubbing   Skin: Skin color, texture, turgor normal. No rashes or lesions   Neurologic: Grossly nonfocal     Data:   Labs:  Recent Labs     20  0344 20  0358 20  0308   WBC 5.7 6.1 6.6   HGB 17.6* 17.6* 17.7*   HCT 56.6* 56.3* 55.8*    223 221     Recent Labs     20  0344 20  0358 20  0308    137 136   K 4.1 4.0 4.0    104 103   CO2 28 27 26   * 130* 119*   BUN 14 12 16   CREA 1.11 1.06 1.13   CA 8.8 8.6 8.7   ALB  --  3.1*  --    TBILI  --  1.0  --    SGOT  --  20  --    ALT  --  23  --    INR 2.6* 2.5* 2.4*     No results for input(s): PHI, PCO2I, PO2I, HCO3I, FIO2I in the last 72 hours.     Imaging:  I have personally reviewed the patients radiographs:  None today        Weston Sargent MD

## 2020-02-21 NOTE — PROGRESS NOTES
EL: Home with follow up appointments   PCP appointment scheduled for Monday, February 24, 2020 08:00 AM   Cardiology follow up???      CM met with patient after having a O2 walk test.  Pt remaining in the high to mid 90's for O2 sats. CM informed patient that at this time, he would not qualify for home O2, which patient is pleased with. Did speak with patient about ability to purchase O2 out of pocket, however he has no current need or desire for this. Pt reports that she is researching a pulse ox machine for home use when he discharges. Pt reports he will inform his friend to come pick him up around 10am tomorrow if he continues to progress through the night and is ready for discharge on 2/22/20. CM to assist with any additional services if needed. Care Management Interventions  PCP Verified by CM: Yes(Pt's PCP is Dr. Gwen Dubin. Pt sees PCP as needed. )  Palliative Care Criteria Met (RRAT>21 & CHF Dx)?: No  Mode of Transport at Discharge: Other (see comment)( Pt's cousin Gerardo Herbert ([phone]) will transport at d/c. )  Transition of Care Consult (CM Consult): Discharge Planning  Discharge Durable Medical Equipment: No(No DME)  Physical Therapy Consult: Yes  Occupational Therapy Consult: No  Speech Therapy Consult: No  Current Support Network: Own Home, Lives Alone(Pt resides alone in an one level home with 4 SOURAV. )  Confirm Follow Up Transport: Self(Pt does drive)  [employer] Provided?: No  Discharge Location  Discharge Placement: (Home with Follow-up Appointments)    Shreyas Godoy, [facility] [place] - [facility]  Advanced Steps ACP Facilitator  Zone Phone: [phone]      Mobile: [phone]

## 2020-02-21 NOTE — PROGRESS NOTES
Problem: Falls - Risk of  Goal: *Absence of Falls  Description  Document Lanny President Fall Risk and appropriate interventions in the flowsheet.   Outcome: Progressing Towards Goal  Note: Fall Risk Interventions:  Mobility Interventions: Communicate number of staff needed for ambulation/transfer, Mechanical lift, OT consult for ADLs, Patient to call before getting OOB, PT Consult for mobility concerns, PT Consult for assist device competence         Medication Interventions: Evaluate medications/consider consulting pharmacy, Patient to call before getting OOB, Teach patient to arise slowly, Utilize gait belt for transfers/ambulation                   Problem: Patient Education: Go to Patient Education Activity  Goal: Patient/Family Education  Outcome: Progressing Towards Goal

## 2020-02-21 NOTE — PERIOP NOTES
TRANSFER - IN REPORT:    Verbal report received from Gay Russo rn(name) on Ladona Credit  being received from 2205(unit) for ordered procedure      Report consisted of patients Situation, Background, Assessment and   Recommendations(SBAR). Information from the following report(s) SBAR was reviewed with the receiving nurse. Opportunity for questions and clarification was provided. Assessment completed upon patients arrival to unit and care assumed.

## 2020-02-21 NOTE — ROUTINE PROCESS
Katarina Shipley 
1970 
[id number] Situation: 
Verbal report received from: Enrique Gamez Procedure: Procedure(s): BRONCHOSCOPY 
BRONCHIAL ALVEOLAR LAVAGE Background: 
 
Preoperative diagnosis: Bronchoscopy Postoperative diagnosis: hemoptysis :  Dr. Joy Sandoval Assistant(s): Endoscopy RN-1: Sunshine Reid, RN Endoscopy RN-2: Leila Murdock RN; Mikayla Crenshaw Specimens:  
ID Type Source Tests Collected by Time Destination 1 : right middle lobe BAL for microbiology Bronchoalveolar Lavage Bronch lavage right middle lobe CULTURE, RESPIRATORY/SPUTUM/BRONCH W GRAM STAIN, CULTURE, FUNGUS Maddie Johnson MD 2/21/2020 6010 Microbiology 1 : right middle lobe BAL for cytology Fresh Bronchoalveolar Lavage (BAL)  Briseyda Lundberg MD 2/21/2020 1733 Cytology H. Pylori  no Assessment: 
Intra-procedure medications Anesthesia gave intra-procedure sedation and medications, see anesthesia flow sheet yes Intravenous fluids: NS@ Donnamae Days Vital signs stable Abdominal assessment: round and soft Recommendation: 
Discharge patient per MD order. Return to [address] [place] [address] Family or Friend Permission to share finding with family or friend yes

## 2020-02-21 NOTE — PERIOP NOTES
TRANSFER - OUT REPORT:    Verbal report given to Amando Wheeler rn(name) on Tyrone Yang  being transferred to 2205(unit) for routine progression of care       Report consisted of patients Situation, Background, Assessment and   Recommendations(SBAR). Information from the following report(s) Procedure Summary was reviewed with the receiving nurse. Lines:   Peripheral IV 02/20/20 Distal;Posterior;Right Forearm (Active)   Site Assessment Clean, dry, & intact 2/21/2020  7:44 AM   Phlebitis Assessment 0 2/21/2020  7:44 AM   Infiltration Assessment 0 2/21/2020  7:44 AM   Dressing Status Clean, dry, & intact 2/21/2020  7:44 AM   Dressing Type Transparent;Tape 2/21/2020  7:44 AM   Hub Color/Line Status Blue 2/21/2020  7:44 AM        Opportunity for questions and clarification was provided.       Patient transported with:   O2 @ 4 liters

## 2020-02-21 NOTE — PROCEDURES
[facility]  Pulmonary, Critical Care, and Sleep Medicine    Name: Tyrone Yang MRN: [id number]   : 1970 Hospital: [facility]   Date: 2020        Bronchoscopy Report    Procedure: Diagnostic bronchoscopy. Indication: Abnormal chest imaging    Consent/Treatment: Informed consent was obtained from the  patient after risks, benefits and alternatives were explained. Timeout verified the correct patient and correct procedure. Anesthesia:   Topical sedation to nares, mouth, and tracheobronchial tree with lidocaine  Moderate sedation with Fentanyl 100 mcg, Versed 3mg and Benadryl 50 mg was used  Moderate (conscious) sedation was administered by the endoscopy nurse and supervised by the endoscopist.  The following parameters were monitored: oxygen saturation, heart rate, blood pressure, respiratory rate, EKG, adequacy of pulmonary ventilation, and response to care. Total physician intraservice time was 8 minutes. Procedure Details:   -- The bronchoscope was introduced through the  right nare. -- The vocal cords were found to be normal.  -- The trachea and enrique were completely inspected and were found to be normal.  -- The right-sided endobronchial anatomy was completely inspected and was found to be normal.  -- The left-sided endobronchial anatomy was completely inspected and was found to be normal.     Specimens:    The bronchoscope was wedged in the right middle lobe and bronchoalveolar lavage was performed; material was sent for  microbiology, cytology, AFB smear and culture and fungal culture    Complications: none    Estimated Blood Loss: Minimal    Charley Simeon MD

## 2020-02-21 NOTE — PROGRESS NOTES
1900 report received from Paige RN. Bedside shift change report GIVEN TO Shanell Anguiano RN. Report included the following information SBAR, Kardex, Intake/Output, MAR, Med Rec Status and Cardiac Rhythm nsr. SIGNIFICANT CHANGES DURING SHIFT:        CONCERNS TO ADDRESS WITH MD:            [address] NURSING NOTE   Admission Date 2/17/2020   Admission Diagnosis Acute respiratory failure with hypoxia ([address] [J96.01]   Consults IP CONSULT TO CARDIOLOGY  IP CONSULT TO INTENSIVIST      Cardiac Monitoring [x] Yes [] No      Purposeful Hourly Rounding [x] Yes    Trino Score Total Score: 1   Trino score 3 or > [] Bed Alarm [] Avasys [] 1:1 sitter [] Patient refused (Signed refusal form in chart)   Jason Score Jason Score: 20   Jason score 14 or < [] PMT consult [] Wound Care consult    []  Specialty bed  [] Nutrition consult      Influenza Vaccine Received Flu Vaccine for Current Season (usually Sept-March): No    Patient/Guardian Refused (Notify MD): Yes      Oxygen needs? [] Room air Oxygen @  []1L    [x]2L    []3L   []4L    []5L   []6L via  NC   Chronic home O2 use?  [] Yes [] No  Perform O2 challenge test and document in progress note using smartphrase (.Homeoxygen)      Last bowel movement Last Bowel Movement Date: 02/18/20      Urinary Catheter             LDAs               Peripheral IV 02/17/20 Right Antecubital (Active)   Site Assessment Clean, dry, & intact 2/19/2020  7:35 PM   Phlebitis Assessment 0 2/19/2020  7:35 PM   Infiltration Assessment 0 2/19/2020  7:35 PM   Dressing Status Clean, dry, & intact 2/19/2020  7:35 PM   Dressing Type Transparent 2/19/2020  7:35 PM   Hub Color/Line Status Pink;Capped 2/19/2020  7:35 PM   Action Taken Catheter retaped 2/17/2020 11:08 AM                         Readmission Risk Assessment Tool Score Low Risk            2       Total Score        2 Charlson Comorbidity Score (Age + Comorbid Conditions)        Criteria that do not apply:    Has Seen PCP in Last 6 Months (Yes=3, No=0)    . Living with Significant Other. Assisted Living. LTAC. SNF. or   Rehab    Patient Length of Stay (>5 days = 3)    IP Visits Last 12 Months (1-3=4, 4=9, >4=11)    Pt.  Coverage (Medicare=5 , Medicaid, or Self-Pay=4)       Expected Length of Stay 3d 19h   Actual Length of Stay 3

## 2020-02-21 NOTE — PROGRESS NOTES
Problem: Heart Failure: Day 5  Goal: Off Pathway (Use only if patient is Off Pathway)  Outcome: Progressing Towards Goal  Goal: Activity/Safety  Outcome: Progressing Towards Goal  Goal: Diagnostic Test/Procedures  Outcome: Progressing Towards Goal  Goal: Nutrition/Diet  Outcome: Progressing Towards Goal  Goal: Discharge Planning  Outcome: Progressing Towards Goal  Goal: Medications  Outcome: Progressing Towards Goal  Goal: Respiratory  Outcome: Progressing Towards Goal  Goal: Treatments/Interventions/Procedures  Outcome: Progressing Towards Goal  Goal: Psychosocial  Outcome: Progressing Towards Goal

## 2020-02-21 NOTE — PROGRESS NOTES
Hospitalist Progress Note    NAME: Kathryn Cheung   :  1970   MRN:  [id number]     Admit date: 2020    Today's date: 20    PCP: MD Tran Bliss M.D. Cell [phone]    Assessment / Plan:    Acute respiratory failure with hypoxia POA  ? Interstitial and alveolar pneumonitis  Hemoptysis POA  Increasing cough, ECHAVARRIA x months  Sats at rest in 80s, does not look SOB, suspects more chronic component  No prior O2 requirements, not a cigarette smoker  ABG in ED on 4 liters 7.43, pCO2 38, pO2 50, RR to 29, ECHAVARRIA  Vaping 2 years ago, denies in past year  3 pillow orthopnea, some LE edema  CTA chest reviewed, extensive ground glass opacities, minimal effusions  Differential      ? CHF with edema, seems less likely      ? Recent pulmonary infection or atypical infection      ?  ILD or pneumonitis      Acute hypersensitivity pneumonitis, exposed to leaves and dust aerosolized at work             Construction work      ? pulm hemosiderosis with the hemoptysis  Not SOB, but still hypoxic, will need home O2  Procalcitonin < 0.05, pBNP 6  Respiratory PCR negative for pathogens  TTE LVEF 50-55%, mildly reduced RV function  Cardiology consult, feels CHF less likely  Clinically stable despite the hypoxia, has been going on for months  Bronch today, stains are pending, cytology pending  May ultimately require open lung biopsy  HIV test negative  Awaiting cytology and smears, clinically looks stable  D/W Dr Dennis Espinoza, if stable overnight and smears negative, set up outpatient follow up with decision about VATS then, D/C on home O2  Hopefully discharge in AM     Paroxysmal Afib  sinus on EKG  Rate controlled   Continue on coumadin     History of PE and DVT POA  Continue coumadin, INR therapeutic  No further hemoptysis  CTA chest negative for PE    Overweight POA Body mass index is 29.31 kg/m².     Code Status: Full     DVT Prophylaxis: warfarin  GI Prophylaxis: not indicated Subjective:     Chief Complaint / Reason for Physician Visit f/u acute hypoxic respiratory failure  \"No problems. Discussed with RN events overnight. Bronch today  No new complaints, still requiring O2    Review of Systems:  Symptom Y/N Comments  Symptom Y/N Comments   Fever/Chills n   Chest Pain n    Poor Appetite    Edema     Cough y   Abdominal Pain n    Sputum    Joint Pain     SOB/ECHAVARRIA n   Headache n    Nausea/vomit n   Tolerating PT/OT     Diarrhea n   Tolerating Diet y    Constipation    Other       Could NOT obtain due to:      Objective:     VITALS:   Last 24hrs VS reviewed since prior progress note.  Most recent are:  Patient Vitals for the past 24 hrs:   Temp Pulse Resp BP SpO2   02/21/20 1101    105/64    02/21/20 1035 97.7 °F (36.5 °C) 65 20 115/69 93 %   02/21/20 1005 97.9 °F (36.6 °C) 71 20 92/69 94 %   02/21/20 0940  81 22 106/67 92 %   02/21/20 0925  85 22 124/69 92 %   02/21/20 0910  86 22 120/70 92 %   02/21/20 0900 97.7 °F (36.5 °C) 87 20 104/72 92 %   02/21/20 0847  74 16 111/66 96 %   02/21/20 0842  81 20 125/64 (!) 89 %   02/21/20 0837  82 16 136/72 91 %   02/21/20 0832  84 25 131/76 (!) 82 %   02/21/20 0827  81 16 128/87 91 %   02/21/20 0823  77 21 131/86 95 %   02/21/20 0819  81 15 128/86 96 %   02/21/20 0743  71 12 116/73 99 %   02/21/20 0353 97.8 °F (36.6 °C) 80 18 105/68 94 %   02/20/20 2312 98.4 °F (36.9 °C) 71 18 106/65 97 %   02/20/20 1941 98.3 °F (36.8 °C) 78 18 117/67 93 %   02/20/20 1639  75  104/65    02/20/20 1605 97.4 °F (36.3 °C) 72 18 102/71 96 %       Intake/Output Summary (Last 24 hours) at 2/21/2020 1322  Last data filed at 2/21/2020 0555  Gross per 24 hour   Intake 0 ml   Output    Net 0 ml        Wt Readings from Last 12 Encounters:   02/21/20 103.6 kg (228 lb 4.8 oz)   02/17/20 109.8 kg (242 lb)   01/14/20 107.5 kg (237 lb)   12/30/19 107 kg (236 lb)   12/02/19 109.8 kg (242 lb 1.6 oz)   11/04/19 109.3 kg (241 lb)   10/30/19 110.7 kg (244 lb) 09/30/19 110.7 kg (244 lb)   09/22/19 109.8 kg (242 lb)   08/09/19 115.2 kg (254 lb)   06/22/19 117.5 kg (259 lb 0.7 oz)   04/16/19 113.4 kg (250 lb)       PHYSICAL EXAM:  General: WD, WN. Alert, cooperative, no acute distress    EENT:  Anicteric sclerae. MMM  Resp:  Few crackles at bases bilaterally, generally clear, no wheezing. No accessory muscle use  CV:  Regular  rhythm,  No edema  GI:  Soft, Non distended, Non tender. +Bowel sounds, no rebound  Neurologic:  Alert and oriented X 3, normal speech, non focal motor exam  Psych:   Not anxious nor agitated  Skin:  No rashes. No jaundice    Reviewed most current lab test results and cultures  YES  Reviewed most current radiology test results   YES  Review and summation of old records today    NO  Reviewed patient's current orders and MAR    YES  PMH/SH reviewed - no change compared to H&P  ________________________________________________________________________  Care Plan discussed with:    Comments   Patient y    Family      RN y    Care Manager     Consultant  y Dr Eusebio Cruz team rounds were held today with , nursing, pharmacist and clinical coordinator. Patient's plan of care was discussed; medications were reviewed and discharge planning was addressed. ________________________________________________________________________      Comments   >50% of visit spent in counseling and coordination of care     ________________________________________________________________________  Nikia Medeiros MD     Procedures: see electronic medical records for all procedures/Xrays and details which were not copied into this note but were reviewed prior to creation of Plan. LABS:  I reviewed today's most current labs and imaging studies.   Pertinent labs include:  Recent Labs     02/21/20  0344 02/20/20  0358 02/19/20  0308   WBC 5.7 6.1 6.6   HGB 17.6* 17.6* 17.7*   HCT 56.6* 56.3* 55.8*    223 221 Recent Labs     02/21/20  0344 02/20/20  0358 02/19/20  0308    137 136   K 4.1 4.0 4.0    104 103   CO2 28 27 26   * 130* 119*   BUN 14 12 16   CREA 1.11 1.06 1.13   CA 8.8 8.6 8.7   ALB  --  3.1*  --    TBILI  --  1.0  --    SGOT  --  20  --    ALT  --  23  --    INR 2.6* 2.5* 2.4*

## 2020-02-21 NOTE — PROGRESS NOTES
Beside [facility]. SBAR, Kardex, and MAR were discussed. Morning dose of Coreg held because BP was 105/60, related to parameters of SBP < 110. Spoke to [employer], NP, and parameters were changed to SBP < 105. Evening dose of Coreg held because BP was 104/65. Bedside shift change report GIVEN TO Louis Chin RN. Report included the following information SBAR, Kardex, ED Summary, Intake/Output, MAR, Recent Results and Cardiac Rhythm NSR. [facility] NURSING NOTE   Admission Date 2/17/2020   Admission Diagnosis Acute respiratory failure with hypoxia ([facility] [address] [J96.01]   Consults IP CONSULT TO CARDIOLOGY  IP CONSULT TO INTENSIVIST      Cardiac Monitoring [x] Yes [] No      Purposeful Hourly Rounding [x] Yes    Trino Score Total Score: 1   Trino score 3 or > [] Bed Alarm [] Avasys [] 1:1 sitter [] Patient refused (Signed refusal form in chart)   Jason Score Jason Score: 20   Jason score 14 or < [] PMT consult [] Wound Care consult    []  Specialty bed  [] Nutrition consult      Influenza Vaccine Received Flu Vaccine for Current Season (usually Sept-March): No    Patient/Guardian Refused (Notify MD): Yes      Oxygen needs? [] Room air Oxygen @  []1L    [x]2L    []3L   []4L    []5L   []6L via  NC   Chronic home O2 use?  [] Yes [x] No  Perform O2 challenge test and document in progress note using smartphrase (.Homeoxygen)      Last bowel movement Last Bowel Movement Date: 02/18/20      Urinary Catheter             LDAs               Peripheral IV 02/20/20 Distal;Posterior;Right Forearm (Active)   Site Assessment Clean, dry, & intact 2/20/2020  7:41 PM   Phlebitis Assessment 0 2/20/2020  7:41 PM   Infiltration Assessment 0 2/20/2020  7:41 PM   Dressing Status Clean, dry, & intact 2/20/2020  7:41 PM   Dressing Type Transparent 2/20/2020  7:41 PM   Hub Color/Line Status Blue;Capped 2/20/2020  7:41 PM                         Readmission Risk Assessment Tool Score Low Risk            2 Total Score        2 Charlson Comorbidity Score (Age + Comorbid Conditions)        Criteria that do not apply:    Has Seen PCP in Last 6 Months (Yes=3, No=0)    . Living with Significant Other. Assisted Living. LTAC. SNF. or   Rehab    Patient Length of Stay (>5 days = 3)    IP Visits Last 12 Months (1-3=4, 4=9, >4=11)    Pt.  Coverage (Medicare=5 , Medicaid, or Self-Pay=4)       Expected Length of Stay 3d 19h   Actual Length of Stay 3

## 2020-02-22 VITALS
RESPIRATION RATE: 18 BRPM | DIASTOLIC BLOOD PRESSURE: 68 MMHG | HEART RATE: 76 BPM | BODY MASS INDEX: 29.16 KG/M2 | WEIGHT: 227.1 LBS | TEMPERATURE: 97.4 F | OXYGEN SATURATION: 96 % | SYSTOLIC BLOOD PRESSURE: 126 MMHG

## 2020-02-22 LAB
C-ANCA TITR SER IF: NORMAL TITER
INR PPP: 2.4 (ref 0.9–1.1)
MYELOPEROXIDASE AB SER IA-ACNC: <9 U/ML (ref 0–9)
P-ANCA ATYPICAL TITR SER IF: NORMAL TITER
P-ANCA TITR SER IF: NORMAL TITER
PROTEINASE3 AB SER IA-ACNC: <3.5 U/ML (ref 0–3.5)
PROTHROMBIN TIME: 23.5 SEC (ref 9–11.1)

## 2020-02-22 PROCEDURE — 74011250637 HC RX REV CODE- 250/637: Performed by: NURSE PRACTITIONER

## 2020-02-22 PROCEDURE — 85610 PROTHROMBIN TIME: CPT

## 2020-02-22 PROCEDURE — 36415 COLL VENOUS BLD VENIPUNCTURE: CPT

## 2020-02-22 PROCEDURE — 94760 N-INVAS EAR/PLS OXIMETRY 1: CPT

## 2020-02-22 RX ORDER — CARVEDILOL 3.12 MG/1
3.12 TABLET ORAL 2 TIMES DAILY WITH MEALS
Qty: 60 TAB | Refills: 3 | Status: SHIPPED | OUTPATIENT
Start: 2020-02-22 | End: 2020-06-18 | Stop reason: SDUPTHER

## 2020-02-22 RX ORDER — FUROSEMIDE 20 MG/1
20 TABLET ORAL DAILY
Qty: 30 TAB | Refills: 3 | Status: SHIPPED | OUTPATIENT
Start: 2020-02-22 | End: 2020-06-08

## 2020-02-22 RX ADMIN — FUROSEMIDE 20 MG: 20 TABLET ORAL at 08:37

## 2020-02-22 RX ADMIN — CARVEDILOL 3.12 MG: 3.12 TABLET, FILM COATED ORAL at 08:37

## 2020-02-22 RX ADMIN — Medication 10 ML: at 06:10

## 2020-02-22 NOTE — DISCHARGE INSTRUCTIONS
Patient Discharge Instructions    Norberto Crespo / [id number] : 1970    Admitted 2020 Discharged: 2020         DISCHARGE DIAGNOSIS:      Pneumonitis unclear etiology     Acute respiratory failure with hypoxia ([facility] [address] (2020)     Hx pulmonary embolism (2013)     PAF (paroxysmal atrial fibrillation) ([facility] (2020)      Overview: Per patient report           What to do at Home    1. Recommended diet: Cardiac Diet    2. Recommended activity: Activity as tolerated    3. If you experience any of the following symptoms then please call your primary care physician or return to the emergency room if you cannot get hold of your doctor:   Fevers > 100.5, chills   Nausea or vomiting, persistent diarrhea > 24 hours   Blood in stool or black stools   Chest pain or SOB      Follow-up Information     Follow up With Specialties Details Why Contact Info    Delmer Sotomayor MD [facility] Go on 2020 For hospital follow up appointment at [place] [address] [phone]      Angélica Hendrickson MD Pulmonary Disease Schedule an appointment as soon as possible for a visit in 1 week call office if you do not here from them by Imelda Rosas about the appointment [phone] [facility]  [facility]  [address] [place] [address]  [phone]                Information obtained by :  I understand that if any problems occur once I am at home I am to contact my physician. I understand and acknowledge receipt of the instructions indicated above.                                                                                                                                            Physician's or R.N.'s Signature                                                                  Date/Time                                                                                                                                              Patient or Representative Signature                                                          Date/Time        Smoking Cessation Program:   This is a free, phone/text/email based, smoking cessation program. The program is individualized to meet each patient's needs. To enroll use this link [email or web address]

## 2020-02-22 NOTE — DISCHARGE SUMMARY
Hospitalist Discharge Note    NAME: Sukhdeep Rider   :  1970   MRN:  [id number]     Admit date: 2020    Discharge date: 20    PCP: Desi Vasquez MD    Discharge Diagnoses:    Acute respiratory failure with hypoxia POA    ? Interstitial and alveolar pneumonitis POA etiology unclear    Hemoptysis POA     Paroxysmal A-fib POA     History of PE and DVT POA    Overweight POA Body mass index is 29.16 kg/m².     Code Status: Full      Discharge Medications:  Current Discharge Medication List      START taking these medications    Details   carvediloL (COREG) 3.125 mg tablet Take 1 Tab by mouth two (2) times daily (with meals). Qty: 60 Tab, Refills: 3      furosemide (LASIX) 20 mg tablet Take 1 Tab by mouth daily. Qty: 30 Tab, Refills: 3         CONTINUE these medications which have NOT CHANGED    Details   Warfarin (COUMADIN) 2.5 mg tablet Take 2.5 mg by mouth two (2) times daily on Sat & Sun. 2.5mg on Sat/Sun. TAKE 1 TAB (5MG) BY MOUTH EVERY DAY ON MONDAY THROUGH FRIDAY, AND 1/2 TAB (2.5MG) EVERY SATURDAY AND . busPIRone (BUSPAR) 10 mg tablet TAKE 1 TO 2 TABLETS BY MOUTH THREE TIMES DAILY  Qty: 50 Tab, Refills: 0    Associated Diagnoses: Panic anxiety syndrome      Warfarin (COUMADIN) 5 mg tablet TAKE 1 TABLET BY MOUTH EVERY DAY ON MONDAY THROUGH FRIDAY, AND 1/2 TABLET EVERY SATURDAY AND   Qty: 90 Tab, Refills: 0    Associated Diagnoses: Anticoagulant long-term use      rosuvastatin (CRESTOR) 20 mg tablet TAKE 1 TABLET BY MOUTH EVERY NIGHT  Qty: 90 Tab, Refills: 3      EPINEPHrine (EPIPEN) 0.3 mg/0.3 mL injection INJECT 0.3 ML BY INTRAMUSCULAR ROUTE ONCE PRN FOR ANAPHYLAXIS OR ALLERGIC RESPONSE FOR UP TO ONE DOSE  Qty: 1 Syringe, Refills: 0      sildenafil citrate (VIAGRA) 50 mg tablet Take 50 mg by mouth as needed.             STOP taking these medications       fluticasone propionate (FLOVENT HFA) 220 mcg/actuation inhaler Comments:   Reason for Stopping:         albuterol sulfate (PROAIR RESPICLICK) 90 mcg/actuation aepb Comments:   Reason for Stopping:         krill-omega-3-dha-epa-lipids (KRILL OIL) 278-28-69-50 mg cap Comments:   Reason for Stopping: Follow-up Information     Follow up With Specialties Details Why Contact Info    Bran, Octavia Gao MD [facility] Go on 2/24/2020 For hospital follow up appointment at [facility] [address]      Khanh Vargas MD Pulmonary Disease Schedule an appointment as soon as possible for a visit in 1 week call office if you do not here from them by Octavia James about the appointment [phone] [facility]  [facility]  Adryan Hunt [address] [place] [address]  [phone]            Time spent on discharge:   I spent greater than 30 minutes on discharge, seeing and examining the patient, reconciling home meds and new meds, coordinating care with case management, doing the discharge papers and the D/C summary    Discharge disposition: home    Discharge Condition: Stable    Summary of admission H+P(copied from Dr Pelon Gracia Note):     CHIEF COMPLAINT: SOB, lower extremity swelling     HISTORY OF PRESENT ILLNESS:     Claudia Diaz is a 47 y.o. hospital history of A. fib, DVT and PE, which occurred a few years apart, but more than 9 years ago, for which he is on coumadin. patient admits to cough which is been going on since about October with productive mostly of clear sputum. In the last several days he has noticed also a small clots of blood that come up with a cough. He is also noticed lower extremity edema ongoing for about 2 to 3 days. 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 He has not required oxygen in the past and is not known to have lung disease.   He admits to using snuff but denies any smoking history.     We were asked to admit for work up and evaluation of the above problems.           Past Medical History:   Diagnosis Date    A-fib ([facility] [address]      Allergic rhinitis      Anxiety disorder      Cellulitis       post tattoo    Deep vein thrombosis (DVT) ([facility] [address]       [facility]  4/24/17 f/u note to manage Lovenox    Essential tremor      Laceration of arm 09/23/2017     Left cut by a chain saw.  Phlebitis      Pulmonary embolism ([facility] [address] 3/2011     multiple episodes    Rectal bleeding 10/2011     on coumadin @ the time &again 4/2017 4/12/17 note from Carla//colonoscopy report rec'd    Thromboembolus [facility])      Umbilical hernia      Vitamin D deficiency 04/2014     again 4/2017      CTA chest read by radiology FINDINGS:  CHEST:  THYROID: No nodule. MEDIASTINUM: No mass or lymphadenopathy. ALICIA: No mass or lymphadenopathy. THORACIC AORTA: No dissection or aneurysm. MAIN PULMONARY ARTERY: There is no evidence of pulmonary embolism. TRACHEA/BRONCHI: Patent. ESOPHAGUS: No wall thickening or dilatation. HEART: Normal in size. PLEURA: No effusion or pneumothorax. LUNGS: Diffuse groundglass opacity with focal areas of air trapping. INCIDENTALLY IMAGED UPPER ABDOMEN: No focal abnormality. BONES: No destructive bone lesion. IMPRESSION: Diffuse groundglass opacity with focal areas of air-trapping may  represent mild edema or other acute abnormality. No evidence of pulmonary  Embolism. Echo read by cardiology   Left Ventricle Normal cavity size. Mild concentric hypertrophy . Low normal systolic function. The estimated ejection fraction is 50 - 55%. Left Atrium Normal cavity size. Right Ventricle Normal cavity size. Mildly reduced systolic function. Right Atrium Normal cavity size. Aortic Valve Trileaflet valve structure, no stenosis and no regurgitation. Mitral Valve No stenosis. Mitral valve thickening. Trace regurgitation.    Tricuspid Valve Normal valve structure and no stenosis. Mild regurgitation. Pulmonary arterial systolic pressure is 05.9 mmHg. Pulmonic Valve Pulmonic valve not well visualized. Pericardium No evidence of pericardial effusion. LE doppler read by radiology   · No evidence of acute deep vein thrombosis in the right common femoral, superficial femoral, popliteal, posterior tibial, and peroneal veins. The veins were imaged in the transverse and longitudinal planes. The vessels showed normal color filling and compressibility. Doppler interrogation showed phasic and spontaneous flow. · No evidence of deep vein thrombosis in the right lower extremity. · No evidence of acute deep vein thrombosis in the left common femoral, superficial femoral, popliteal, posterior tibial, and peroneal veins. The veins were imaged in the transverse and longitudinal planes. The vessels showed normal color filling and compressibility. Doppler interrogation showed phasic and spontaneous flow. · No evidence of deep vein thrombosis in the left lower extremity. Bilateral duplex venous Doppler examination was performed from the inguinal ligament to the midcalf. Deep venous structures are assessed for compressibility and augmentation. Both wave form and color flow analyses are performed. FINDINGS:  There is no thrombus. IMPRESSION:  NO DVT OF THE LEGS. Hospital course:       Acute respiratory failure with hypoxia POA  ? Interstitial and alveolar pneumonitis POA etiology unclear  Hemoptysis POA  Increasing cough, ECHAVARRIA x months  Sats at rest in 80s at admit, does not look SOB, suspects more chronic component  No prior O2 requirements, not a cigarette smoker  ABG in ED on 4 liters 7.43, pCO2 38, pO2 50, RR to 29  Vaping 2 years ago, denies in past year  3 pillow orthopnea, some LE edema  CTA chest reviewed, extensive ground glass opacities, minimal effusions  Differential      ? CHF with edema, seems less likely, but improved with diuretics      ?  Recent pulmonary infection or atypical infection      ? ILD or pneumonitis      Acute hypersensitivity pneumonitis, exposed to leaves and dust aerosolized at work             Construction work      ? pulm hemosiderosis with the hemoptysis  Procalcitonin < 0.05, pBNP 6  Respiratory PCR negative for pathogens  TTE LVEF 50-55%, mildly reduced RV function  Cardiology consult, feels CHF less likely  Seen by Dr Jimmy Sousa from Pulmonary  Bronch done 2/21 with BAL        Gram stain few WBC, no bacteria, culture with rare normal respiratory judit        AFB smear was negative        AFB culture, fungal culture, cytology are pending  May ultimately require open lung biopsy  HIV test negative  O2 weaned off, maintained sats > 93% on RA at rest and walking        Ultimately did not qualify for home O2  Not clear why he improved       ? Diuresis       ? Removed for substance at home that triggered allergic reaction in lungs       Never received antibiotics in the hospital  Dr Jimmy Sousa will set up pulmonary follow up in 1-2 weeks     If ASD and hypoxia persist and BAL unrevealing, may need open lung biopsy/VATS     Paroxysmal Afib  sinus on EKG  Rate controlled   Continue on coumadin, therapeutic in hospital     History of PE and DVT POA  Continue coumadin, INR therapeutic in hospital  No further hemoptysis  CTA chest negative for PE    Overweight POA Body mass index is 29.16 kg/m².     Code Status: Full     DVT Prophylaxis: warfarin  GI Prophylaxis: not indicated       Subjective:     Chief Complaint / Reason for Physician Visit f/u acute hypoxic respiratory failure  \"No problems. Discussed with RN events overnight.    Bronch today  No new complaints, still requiring O2    Review of Systems:  Symptom Y/N Comments  Symptom Y/N Comments   Fever/Chills n   Chest Pain n    Poor Appetite    Edema     Cough y   Abdominal Pain n    Sputum    Joint Pain     SOB/ECHAVARRIA n   Headache n    Nausea/vomit n   Tolerating PT/OT     Diarrhea n   Tolerating Diet y Constipation    Other       Could NOT obtain due to:      Objective:     VITALS:   Last 24hrs VS reviewed since prior progress note. Most recent are:  Patient Vitals for the past 24 hrs:   Temp Pulse Resp BP SpO2   02/22/20 0837  78  118/66    02/22/20 0748 97.2 °F (36.2 °C) 75 18 106/63 94 %   02/22/20 0454 97.8 °F (36.6 °C) 72 18 119/71 96 %   02/21/20 2325 98.6 °F (37 °C) 77 18 116/80 93 %   02/21/20 1949 98.2 °F (36.8 °C) 84 20 126/66 99 %   02/21/20 1823  80  123/77    02/21/20 1503 97.8 °F (36.6 °C) 67 20 105/60 97 %   02/21/20 1101    105/64    02/21/20 1035 97.7 °F (36.5 °C) 65 20 115/69 93 %     No intake or output data in the 24 hours ending 02/22/20 1031     Wt Readings from Last 12 Encounters:   02/22/20 103 kg (227 lb 1.6 oz)   02/17/20 109.8 kg (242 lb)   01/14/20 107.5 kg (237 lb)   12/30/19 107 kg (236 lb)   12/02/19 109.8 kg (242 lb 1.6 oz)   11/04/19 109.3 kg (241 lb)   10/30/19 110.7 kg (244 lb)   09/30/19 110.7 kg (244 lb)   09/22/19 109.8 kg (242 lb)   08/09/19 115.2 kg (254 lb)   06/22/19 117.5 kg (259 lb 0.7 oz)   04/16/19 113.4 kg (250 lb)       PHYSICAL EXAM:  General: WD, WN. Alert, cooperative, no acute distress    EENT:  Anicteric sclerae. MMM  Resp:  Few crackles at bases bilaterally, generally clear, no wheezing. No accessory muscle use  CV:  Regular  rhythm,  No edema  GI:  Soft, Non distended, Non tender. +Bowel sounds, no rebound  Neurologic:  Alert and oriented X 3, normal speech, non focal motor exam  Psych:   Not anxious nor agitated  Skin:  No rashes.   No jaundice    Reviewed most current lab test results and cultures  YES  Reviewed most current radiology test results   YES  Review and summation of old records today    NO  Reviewed patient's current orders and MAR    YES  PMH/SH reviewed - no change compared to H&P  ________________________________________________________________________  Care Plan discussed with:    Comments   Patient y    Family      RN y    Care Manager     Consultant  ancelmo Silva team rounds were held today with , nursing, pharmacist and clinical coordinator. Patient's plan of care was discussed; medications were reviewed and discharge planning was addressed. ________________________________________________________________________      Comments   >50% of visit spent in counseling and coordination of care     ________________________________________________________________________  Francisco Ho MD     Procedures: see electronic medical records for all procedures/Xrays and details which were not copied into this note but were reviewed prior to creation of Plan. LABS:  I reviewed today's most current labs and imaging studies.   Pertinent labs include:  Recent Labs     02/21/20  0344 02/20/20  0358   WBC 5.7 6.1   HGB 17.6* 17.6*   HCT 56.6* 56.3*    223     Recent Labs     02/22/20  0457 02/21/20  0344 02/20/20  0358   NA  --  138 137   K  --  4.1 4.0   CL  --  107 104   CO2  --  28 27   GLU  --  118* 130*   BUN  --  14 12   CREA  --  1.11 1.06   CA  --  8.8 8.6   ALB  --   --  3.1*   TBILI  --   --  1.0   SGOT  --   --  20   ALT  --   --  23   INR 2.4* 2.6* 2.5*

## 2020-02-22 NOTE — PROGRESS NOTES
EL  Discharge home   Follow up appointments   Cousin to provide transport at discharge    CM will continue to follow patient for discharge planning needs and arrange for services as deemed necessary. Kim Harris, MSN, RN  Care Manager   ext.  [phone]

## 2020-02-22 NOTE — PROGRESS NOTES
1900 report received from Paige RN. Bedside shift change report GIVEN TO [address] [place] [address], RN. Report included the following information SBAR, Kardex, Intake/Output, MAR, Med Rec Status and Cardiac Rhythm nsr. SIGNIFICANT CHANGES DURING SHIFT:        CONCERNS TO ADDRESS WITH MD:            [facility] NURSING NOTE   Admission Date 2/17/2020   Admission Diagnosis Acute respiratory failure with hypoxia ([address] [J96.01]   Consults IP CONSULT TO CARDIOLOGY  IP CONSULT TO INTENSIVIST  IP CONSULT TO GASTROENTEROLOGY      Cardiac Monitoring [x] Yes [] No      Purposeful Hourly Rounding [x] Yes    Trino Score Total Score: 1   Trino score 3 or > [] Bed Alarm [] Avasys [] 1:1 sitter [] Patient refused (Signed refusal form in chart)   Jason Score Jason Score: 20   Jason score 14 or < [] PMT consult [] Wound Care consult    []  Specialty bed  [] Nutrition consult      Influenza Vaccine Received Flu Vaccine for Current Season (usually Sept-March): No    Patient/Guardian Refused (Notify MD): Yes      Oxygen needs? [] Room air Oxygen @  []1L    [x]2L    []3L   []4L    []5L   []6L via  NC   Chronic home O2 use?  [] Yes [] No  Perform O2 challenge test and document in progress note using smartphrase (.Home[employer])      Last bowel movement Last Bowel Movement Date: 02/21/20      Urinary Catheter             LDAs               Peripheral IV 02/17/20 Right Antecubital (Active)   Site Assessment Clean, dry, & intact 2/19/2020  7:35 PM   Phlebitis Assessment 0 2/19/2020  7:35 PM   Infiltration Assessment 0 2/19/2020  7:35 PM   Dressing Status Clean, dry, & intact 2/19/2020  7:35 PM   Dressing Type Transparent 2/19/2020  7:35 PM   Hub Color/Line Status Pink;Capped 2/19/2020  7:35 PM   Action Taken Catheter retaped 2/17/2020 11:08 AM                         Readmission Risk Assessment Tool Score Low Risk            2       Total Score        2 Charlson Comorbidity Score (Age + Comorbid Conditions)        Criteria that do not apply:    Has Seen PCP in Last 6 Months (Yes=3, No=0)    . Living with Significant Other. Assisted Living. LTAC. SNF. or   Rehab    Patient Length of Stay (>5 days = 3)    IP Visits Last 12 Months (1-3=4, 4=9, >4=11)    Pt.  Coverage (Medicare=5 , Medicaid, or Self-Pay=4)       Expected Length of Stay 3d 19h   Actual Length of Stay 5

## 2020-02-22 NOTE — PROGRESS NOTES
DISCHARGE SUMMARY FROM NURSE    The patient is stable for discharge. I have reviewed the discharge instructions with the patient. The patient verbalized understanding. All questions were fully answered. The  patient denies any complaints. There were no personal belongings, valuables or home medications left at patients bedside,  or safe. Hard scripts and medication handouts were given and reviewed with the patient. Appropriate educational materials and medication side effects teaching were provided. Cardiac monitor and IV line(s) were removed.

## 2020-02-23 LAB
BACTERIA SPEC CULT: NORMAL
GRAM STN SPEC: NORMAL
SERVICE CMNT-IMP: NORMAL

## 2020-02-24 ENCOUNTER — PATIENT OUTREACH (OUTPATIENT)
Dept: INTERNAL MEDICINE CLINIC | Age: 50
End: 2020-02-24

## 2020-02-24 ENCOUNTER — OFFICE VISIT (OUTPATIENT)
Dept: FAMILY MEDICINE CLINIC | Age: 50
End: 2020-02-24

## 2020-02-24 VITALS
RESPIRATION RATE: 16 BRPM | WEIGHT: 227 LBS | HEART RATE: 80 BPM | DIASTOLIC BLOOD PRESSURE: 74 MMHG | BODY MASS INDEX: 29.13 KG/M2 | TEMPERATURE: 97.1 F | HEIGHT: 74 IN | SYSTOLIC BLOOD PRESSURE: 117 MMHG | OXYGEN SATURATION: 95 %

## 2020-02-24 DIAGNOSIS — I48.0 PAROXYSMAL ATRIAL FIBRILLATION (HCC): ICD-10-CM

## 2020-02-24 DIAGNOSIS — Z79.01 ANTICOAGULANT LONG-TERM USE: ICD-10-CM

## 2020-02-24 DIAGNOSIS — R04.2 HEMOPTYSIS: ICD-10-CM

## 2020-02-24 DIAGNOSIS — Z86.711 HX PULMONARY EMBOLISM: ICD-10-CM

## 2020-02-24 DIAGNOSIS — M79.89 LEFT LEG SWELLING: ICD-10-CM

## 2020-02-24 DIAGNOSIS — Z86.718 PERSONAL HISTORY OF DVT (DEEP VEIN THROMBOSIS): ICD-10-CM

## 2020-02-24 DIAGNOSIS — R93.89 ABNORMAL CHEST CT: ICD-10-CM

## 2020-02-24 DIAGNOSIS — R06.09 DYSPNEA ON EXERTION: Primary | ICD-10-CM

## 2020-02-24 NOTE — PROGRESS NOTES
Mr. Adarsh Chavira is a 47y.o. year old male, he is seen today for Transition of Care services following a hospital discharge for acute respiratory failure with hypoxia on 2/17/2020 - 2/22/2020. Our office Nurse Navigator did not perform an outreach to Mr. Freya Fothergill     See my last note 2/17/2020 where he came in the office complaining of ECHAVARRIA and hemoptysis, I sent him to the ER and he was admitted. Per review of the hospital record, while he was an inpatient:    As excerpted from hospital discharge summary:  Acute respiratory failure with hypoxia POA  ? Interstitial and alveolar pneumonitis POA etiology unclear  Hemoptysis POA  Increasing cough, ECHAVARRIA x months  Sats at rest in 80s at admit, does not look SOB, suspects more chronic component  No prior O2 requirements, not a cigarette smoker  ABG in ED on 4 liters 7.43, pCO2 38, pO2 50, RR to 29  Vaping 2 years ago, denies in past year  3 pillow orthopnea, some LE edema  CTA chest reviewed, extensive ground glass opacities, minimal effusions  Differential      ? CHF with edema, seems less likely, but improved with diuretics      ? Recent pulmonary infection or atypical infection      ? ILD or pneumonitis      Acute hypersensitivity pneumonitis, exposed to leaves and dust aerosolized at work             Construction work      ? pulm hemosiderosis with the hemoptysis  Procalcitonin < 0.05, pBNP 6  Respiratory PCR negative for pathogens  TTE LVEF 50-55%, mildly reduced RV function  Cardiology consult, feels CHF less likely  Seen by Dr Emmanuel Smith from Pulmonary  Bronch done 2/21 with BAL        Gram stain few WBC, no bacteria, culture with rare normal respiratory judit        AFB smear was negative        AFB culture, fungal culture, cytology are pending  May ultimately require open lung biopsy  HIV test negative  O2 weaned off, maintained sats > 93% on RA at rest and walking        Ultimately did not qualify for home O2  Not clear why he improved       ?  Diuresis ? Removed for substance at home that triggered allergic reaction in lungs       Never received antibiotics in the hospital  Dr Papi Godinez will set up pulmonary follow up in 1-2 weeks     If ASD and hypoxia persist and BAL unrevealing, may need open lung biopsy/VATS     Paroxysmal Afib  sinus on EKG  Rate controlled   Continue on coumadin, therapeutic in hospital     History of PE and DVT POA  Continue coumadin, INR therapeutic in hospital  No further hemoptysis  CTA chest negative for PE     Overweight POA Body mass index is 29.16 kg/m².     Code Status: Full     DVT Prophylaxis: warfarin  GI Prophylaxis: not indicated      Seen by several specialists in the hospital  Cardiology Dr. SALUD GREEN (outpt f/u in 2-4 wks)  Pulmonary Dr. Papi Godinez (outpt f/u in 1-2 wks)  Infectious disease Dr. Tom Garcia medicines on hospital discharge:  Coreg 3.125 mg BID  Lasix 20 mg daily    Told to stop these medications:  Albuterol  Flovent  Krill oil    he was discharged on the following medications:  Current Outpatient Medications   Medication Sig Dispense Refill    carvediloL (COREG) 3.125 mg tablet Take 1 Tab by mouth two (2) times daily (with meals). 60 Tab 3    furosemide (LASIX) 20 mg tablet Take 1 Tab by mouth daily. 30 Tab 3    warfarin (COUMADIN) 2.5 mg tablet Take 2.5 mg by mouth two (2) times daily on Sat & Sun. 2.5mg on Sat/Sun. TAKE 1 TAB (5MG) BY MOUTH EVERY DAY ON MONDAY THROUGH FRIDAY, AND 1/2 TAB (2.5MG) EVERY SATURDAY AND Sunday.  busPIRone (BUSPAR) 10 mg tablet TAKE 1 TO 2 TABLETS BY MOUTH THREE TIMES DAILY 50 Tab 0    warfarin (COUMADIN) 5 mg tablet TAKE 1 TABLET BY MOUTH EVERY DAY ON MONDAY THROUGH FRIDAY, AND 1/2 TABLET EVERY SATURDAY AND SUNDAY (Patient taking differently: Take 5 mg by mouth five (5) days a week.  TAKE 1 TABLET (5MG) BY MOUTH EVERY DAY ON MONDAY THROUGH FRIDAY, AND 1/2 TABLET (2.5MG) EVERY SATURDAY AND Sunday.) 90 Tab 0    rosuvastatin (CRESTOR) 20 mg tablet TAKE 1 TABLET BY MOUTH EVERY NIGHT 90 Tab 3    EPINEPHrine (EPIPEN) 0.3 mg/0.3 mL injection INJECT 0.3 ML BY INTRAMUSCULAR ROUTE ONCE PRN FOR ANAPHYLAXIS OR ALLERGIC RESPONSE FOR UP TO ONE DOSE 1 Syringe 0    sildenafil citrate (VIAGRA) 50 mg tablet Take 50 mg by mouth as needed. Home health services are not in place    Since discharge:     Feels well at home  Does not feel short of breath at rest, but he still complains of dyspnea on exertion with walking distances such as in to buildings from parking lot; he felt well while walking around [facility] the other day to his surprise, but took it slowly  He is performing incentive spirometry at home twice daily  No CP, no dizziness  He reports his heart beat has been irregular, states has been doing that for 5-6 years, questionable known h/o Afib, had Afib on POA; did not have prior history of seeing cardiology  Eating and drinking well at home, trying to cut out sodas and breads    There was a question of possible allergy in his home  He has an occasional cough and sneeze, states it's better since being home from the hospital and attributes this to being off of the inhalers    No more hemoptysis    He works for [employer]  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       Visit Vitals  /74 (BP 1 Location: Left arm, BP Patient Position: Sitting)   Pulse 80   Temp 97.1 °F (36.2 °C) (Oral)   Resp 16   Ht 6' 2\" (1.88 m)   Wt 227 lb (103 kg)   SpO2 95% Comment: with deep breathing   BMI 29.15 kg/m²     SpO2 initially 87%, but improved to 95% with deep breathing    Gen: alert, oriented, no acute distress  Eyes: pupils equal round reactive to light, sclera clear, conjunctiva clear  Oral: moist mucus membranes, no oral lesions, no pharyngeal inflammation or exudate  Neck: supple  Resp: no increase work of breathing, lungs clear to ausculation bilaterally, no wheezing, rales or rhonchi, no tachypnea, appears comfortable  CV: S1, S2 normal. No murmurs, rubs, or gallops. Abd: soft, not tender, not distended. Skin: no lesion or rash  Extremities: mild bilateral pitting ankle edema greatly improved from last visit    Lab Results   Component Value Date/Time    INR 2.4 (H) 02/22/2020 04:57 AM    INR 2.6 (H) 02/21/2020 03:44 AM    INR 2.5 (H) 02/20/2020 03:58 AM    Prothrombin time 23.5 (H) 02/22/2020 04:57 AM    Prothrombin time 25.0 (H) 02/21/2020 03:44 AM    Prothrombin time 24.6 (H) 02/20/2020 03:58 AM       Lab Results   Component Value Date/Time    WBC 5.7 02/21/2020 03:44 AM    HGB 17.6 (H) 02/21/2020 03:44 AM    HCT 56.6 (H) 02/21/2020 03:44 AM    PLATELET 256 03/06/1699 03:44 AM    MCV 86.7 02/21/2020 03:44 AM     Lab Results   Component Value Date/Time    Sodium 138 02/21/2020 03:44 AM    Potassium 4.1 02/21/2020 03:44 AM    Chloride 107 02/21/2020 03:44 AM    CO2 28 02/21/2020 03:44 AM    Anion gap 3 (L) 02/21/2020 03:44 AM    Glucose 118 (H) 02/21/2020 03:44 AM    BUN 14 02/21/2020 03:44 AM    Creatinine 1.11 02/21/2020 03:44 AM    BUN/Creatinine ratio 13 02/21/2020 03:44 AM    GFR est AA >60 02/21/2020 03:44 AM    GFR est non-AA >60 02/21/2020 03:44 AM    Calcium 8.8 02/21/2020 03:44 AM    Bilirubin, total 1.0 02/20/2020 03:58 AM    AST (SGOT) 20 02/20/2020 03:58 AM    Alk. phosphatase 88 02/20/2020 03:58 AM    Protein, total 7.6 02/20/2020 03:58 AM    Albumin 3.1 (L) 02/20/2020 03:58 AM    Globulin 4.5 (H) 02/20/2020 03:58 AM    A-G Ratio 0.7 (L) 02/20/2020 03:58 AM    ALT (SGPT) 23 02/20/2020 03:58 AM         Assessment/Plan:        ICD-10-CM ICD-9-CM    1. Dyspnea on exertion R06.09 786.09 REFERRAL TO CARDIOLOGY      REFERRAL TO PULMONARY DISEASE   2. Abnormal chest CT R93.89 793.2 REFERRAL TO PULMONARY DISEASE   3. Hx pulmonary embolism Z86.711 V12.55    4. Hemoptysis R04.2 786.30    5. Left leg swelling M79.89 729.81    6.  Personal history of DVT (deep vein thrombosis) Z86.718 V12.51    7. Anticoagulant long-term use Z79.01 V58.61    8. Paroxysmal atrial fibrillation (HCC) I48.0 427.31 REFERRAL TO CARDIOLOGY       Dyspnea on exertion of chronic nature, SpO2 95% in office today, performing spirometry at home and I reviewed with him to continue this, with deep breathing every hour. Ground glass opacities on chest CT of uncertain etiology, still being evaluated by pulmonary, bronchoscopy results pending. He is to schedule f/u with pulmonary this week, and cardiology in 2 weeks, reprinted referrals with phone numbers for him. I do not feel comfortable releasing him back to work until he has f/u with pulmonary, work note given, advised if he needs forms filled out for work he will need to bring them in. INR at goal, due for recheck next month. Continue coreg and lasix until seen by cardiology again. Schedule 4 wk f/u with PCP. Orders Placed This Encounter    Collette Falcon Card [facility]     Referral Priority:   Routine     Referral Type:   Consultation     Referral Reason:   Specialty Services Required     Referred to Provider:   Ai Barry MD     Number of Visits Requested:   1    Alex Pulmonary [facility]     Referral Priority:   Routine     Referral Type:   Consultation     Referral Reason:   Specialty Services Required     Referral Location:   [facility] [address].     Referred to Provider:   Tamie Hagen MD     Number of Visits Requested:   1       There are no discontinued medications. Current Outpatient Medications   Medication Sig Dispense Refill    carvediloL (COREG) 3.125 mg tablet Take 1 Tab by mouth two (2) times daily (with meals). 60 Tab 3    furosemide (LASIX) 20 mg tablet Take 1 Tab by mouth daily.  30 Tab 3    warfarin (COUMADIN) 2.5 mg tablet Take 2.5 mg by mouth two (2) times daily on Sat & Sun. 2.5mg on Sat/Sun. TAKE 1 TAB (5MG) BY MOUTH EVERY DAY ON MONDAY THROUGH FRIDAY, AND 1/2 TAB (2.5MG) EVERY SATURDAY AND Sunday.  busPIRone (BUSPAR) 10 mg tablet TAKE 1 TO 2 TABLETS BY MOUTH THREE TIMES DAILY 50 Tab 0    warfarin (COUMADIN) 5 mg tablet TAKE 1 TABLET BY MOUTH EVERY DAY ON MONDAY THROUGH FRIDAY, AND 1/2 TABLET EVERY SATURDAY AND SUNDAY (Patient taking differently: Take 5 mg by mouth five (5) days a week. TAKE 1 TABLET (5MG) BY MOUTH EVERY DAY ON MONDAY THROUGH FRIDAY, AND 1/2 TABLET (2.5MG) EVERY SATURDAY AND Sunday.) 90 Tab 0    rosuvastatin (CRESTOR) 20 mg tablet TAKE 1 TABLET BY MOUTH EVERY NIGHT 90 Tab 3    EPINEPHrine (EPIPEN) 0.3 mg/0.3 mL injection INJECT 0.3 ML BY INTRAMUSCULAR ROUTE ONCE PRN FOR ANAPHYLAXIS OR ALLERGIC RESPONSE FOR UP TO ONE DOSE 1 Syringe 0    sildenafil citrate (VIAGRA) 50 mg tablet Take 50 mg by mouth as needed. Verbal and written instructions (see AVS) provided. Patient expresses understanding of diagnosis and treatment plan. Unable to bill as EL since there was not nurse navigator outreach. Follow-up and Dispositions    · Return in about 4 weeks (around 3/23/2020) for f/u hypoxia, with PCP Dr. Courtney Corral. Future Appointments   Date Time Provider Sabi Parker   4/8/2020  2:30 PM Jr Salas MD [facility] SCHED       ADDENDUM: Nurse Navigator outreach was performed on 2/25/2020 (within 2 business days of discharge), the day following his visit with me which was within 1 business day of discharge. This meets criteria for EL.

## 2020-02-24 NOTE — Clinical Note
Good Afternoon, I am the care transitions nurse following Mr. Garber Conner, I did perform EL outreach today (within 2 business days), I reviewed your note from his visit yesterday and wanted to let you know that you could bill as EL now.  Thanks

## 2020-02-24 NOTE — PROGRESS NOTES
Chief Complaint   Patient presents with   [facility] Follow Up     in ED [facility] for SOB, left leg swelling       1. Have you been to the ER, urgent care clinic since your last visit? Hospitalized since your last visit? Yes ER at ED [facility] admitted x 6 days for SOB and leg swelling. 2. Have you seen or consulted any other health care providers outside of the [address] [place] [address] since your last visit? Include any pap smears or colon screening.   no

## 2020-02-24 NOTE — PATIENT INSTRUCTIONS
Schedule appointments with the pulmonologist and cardiologist.    I want you to see the pulmonologist, Dr Emmanuel Smith, before returning to work on Monday March 2.

## 2020-02-24 NOTE — LETTER
NOTIFICATION RETURN TO WORK / SCHOOL 
 
2/24/2020 9:14 AM 
 
Mr. Kiran Dale [address] [place] [id number] To Whom It May Concern: Kiran Dale is currently under the care of Beau Mora. He will return to work/school on: Monday, 3/02/2020 If there are questions or concerns please have the patient contact our office. Sincerely, Sherly Garcia PA-C

## 2020-02-25 NOTE — PROGRESS NOTES
Hospital Discharge Follow-Up      Date/Time:  2020 4:17 PM  Madhuri Ovalles, MSN, RN, [facility], Natalie Caldwell [address] Transitions Nurse  [phone]    Patient was admitted to [facility] on 2020 and discharged on 2020 for Acute Respiratory Failure. The physician discharge summary was available at the time of outreach. Patient was contacted within 2 business days of discharge. Patient saw PCP within 1 business day of discharge. Top Challenges reviewed with the provider   Acute Resp Failure   - pulmonary f/u Dr. Valentino Massa   - patient works full time as    - monitoring vitals with smart watch and [address]:   Does patient have an Advance Directive: patient states he received info in hospital and is reviewing with family        Method of communication with provider :chart routing    Was this a readmission? no   Patient stated reason for the readmission: n/a    Care Transition Nurse (CTN) contacted the patient by telephone to perform post hospital discharge assessment. Verified name and  with patient as identifiers. Provided introduction to self, and explanation of the CTN role. Patient received hospital discharge instructions. CTN reviewed discharge instructions and red flags with patient who verbalized understanding. Patient given an opportunity to ask questions and does not have any further questions or concerns at this time. The patient agrees to contact the PCP office for questions related to their healthcare. CTN provided contact information for future reference. Disease Specific:   N/A    Patients top risk factors for readmission:  lack of knowledge about disease works full time    [address] orders at discharge: none      [facility] ordered at discharge: none      Medication(s):   New Medications at Discharge:  carvediloL (COREG) 3.125 mg tablet Take 1 Tab by mouth two (2) times daily (with meals).   Qty: 60 Tab, Refills: 3       furosemide (LASIX) 20 mg tablet Take 1 Tab by mouth daily. Qty: 30 Tab, Refills: 3           Changed Medications at Discharge: n/a  Discontinued Medications at Discharge:    fluticasone propionate (FLOVENT HFA) 220 mcg/actuation inhaler Comments:   Reason for Stopping:            albuterol sulfate (PROAIR RESPICLICK) 90 mcg/actuation aepb Comments:   Reason for Stopping:            krill-omega-3-dha-epa-lipids (KRILL OIL) 661-01-62-50 mg cap Comments:   Reason for Stopping:                    Medication reconciliation was performed with patient, who verbalizes understanding of administration of home medications. There were no barriers to obtaining medications identified at this time. Referral to Pharm D needed: no     Current Outpatient Medications   Medication Sig    carvediloL (COREG) 3.125 mg tablet Take 1 Tab by mouth two (2) times daily (with meals).  furosemide (LASIX) 20 mg tablet Take 1 Tab by mouth daily.  warfarin (COUMADIN) 2.5 mg tablet Take 2.5 mg by mouth two (2) times daily on Sat & Sun. 2.5mg on Sat/Sun. TAKE 1 TAB (5MG) BY MOUTH EVERY DAY ON MONDAY THROUGH FRIDAY, AND 1/2 TAB (2.5MG) EVERY SATURDAY AND Sunday.  busPIRone (BUSPAR) 10 mg tablet TAKE 1 TO 2 TABLETS BY MOUTH THREE TIMES DAILY    warfarin (COUMADIN) 5 mg tablet TAKE 1 TABLET BY MOUTH EVERY DAY ON MONDAY THROUGH FRIDAY, AND 1/2 TABLET EVERY SATURDAY AND SUNDAY (Patient taking differently: Take 5 mg by mouth five (5) days a week. TAKE 1 TABLET (5MG) BY MOUTH EVERY DAY ON MONDAY THROUGH FRIDAY, AND 1/2 TABLET (2.5MG) EVERY SATURDAY AND Sunday.)    rosuvastatin (CRESTOR) 20 mg tablet TAKE 1 TABLET BY MOUTH EVERY NIGHT    EPINEPHrine (EPIPEN) 0.3 mg/0.3 mL injection INJECT 0.3 ML BY INTRAMUSCULAR ROUTE ONCE PRN FOR ANAPHYLAXIS OR ALLERGIC RESPONSE FOR UP TO ONE DOSE    sildenafil citrate (VIAGRA) 50 mg tablet Take 50 mg by mouth as needed.      No current facility-administered medications for this visit.        There are no discontinued medications. [facility] follow up appointment(s):   Future Appointments   Date Time Provider Sabi Singhi   4/8/2020  2:30 PM Clemencia Wheeler MD [facility]      Non-[facility] follow up appointment(s): Dr. Raymundo Matthews 2/27/2020  [facility]:  information provided as a resource     Goals      Prevent complications post hospitalization. 2/25/2020    - Spoke to patient today. Patient lives alone but has family and friend support. Patient works full time as . - patient attended PCP appt on 2/24/2020 and PCP wants patient to see pulmonary before he is released back to work  - patient has pulmonary appt on 2/27  - carrdiology 4/8  - patient report that he has [employer] that he uses to track his vitals including 02. He reports today BP as 125/75, 02 98%, HR 78. Advised patient to contact MD if 02 drops below 90%. - patient reports that his SOB is mild, often only occurring with exertion but he states that his 02 has not dropped below 90%. Reviewed with patient that if 02 drops below 90% to take a break from activity, use pursed lip breathing and monitor 02. - Reviewed red flag s/s with patient, nausea, vomiting, inability to pass urine/stool, SOB, mental status change, chest pain, fever, decrease in 02 sats. -confirmed medications      Patient will contact Md/CTN if red flag s/s arise and keep log of vitals. CTN to f/u ~ 7-10 days.  AR

## 2020-03-05 DIAGNOSIS — Z79.01 ANTICOAGULANT LONG-TERM USE: ICD-10-CM

## 2020-03-09 RX ORDER — WARFARIN SODIUM 5 MG/1
5 TABLET ORAL
Qty: 90 TAB | Refills: 0 | Status: ON HOLD | OUTPATIENT
Start: 2020-03-09 | End: 2020-06-19 | Stop reason: SDUPTHER

## 2020-03-11 ENCOUNTER — PATIENT OUTREACH (OUTPATIENT)
Dept: INTERNAL MEDICINE CLINIC | Age: 50
End: 2020-03-11

## 2020-03-12 ENCOUNTER — OFFICE VISIT (OUTPATIENT)
Dept: SURGERY | Age: 50
End: 2020-03-12

## 2020-03-12 VITALS
SYSTOLIC BLOOD PRESSURE: 103 MMHG | RESPIRATION RATE: 20 BRPM | HEIGHT: 74 IN | WEIGHT: 227 LBS | BODY MASS INDEX: 29.13 KG/M2 | DIASTOLIC BLOOD PRESSURE: 60 MMHG | OXYGEN SATURATION: 91 % | HEART RATE: 88 BPM

## 2020-03-12 DIAGNOSIS — J96.01 ACUTE RESPIRATORY FAILURE WITH HYPOXIA (HCC): Primary | ICD-10-CM

## 2020-03-12 DIAGNOSIS — J84.9 ILD (INTERSTITIAL LUNG DISEASE) (HCC): ICD-10-CM

## 2020-03-12 DIAGNOSIS — Z86.711 HX PULMONARY EMBOLISM: ICD-10-CM

## 2020-03-12 RX ORDER — FAMOTIDINE 20 MG/1
TABLET, FILM COATED ORAL
COMMUNITY
Start: 2020-02-22 | End: 2020-03-17

## 2020-03-12 NOTE — PROGRESS NOTES
New Patient. ILD; Discuss Lung Biopsy. 1. Have you been to the ER, urgent care clinic since your last visit? Hospitalized since your last visit? Yes, [facility] for ARF on 2/17/2020.    2. Have you seen or consulted any other health care providers outside of the [address] [place] [address] since your last visit? Include any pap smears or colon screening. [facility]

## 2020-03-12 NOTE — PROGRESS NOTES
Goals      Prevent complications post hospitalization. 2/25/2020    - Spoke to patient today. Patient lives alone but has family and friend support. Patient works full time as . - patient attended PCP appt on 2/24/2020 and PCP wants patient to see pulmonary before he is released back to work  - patient has pulmonary appt on 2/27  - carrdiology 4/8  - patient report that he has [employer] that he uses to track his vitals including 02. He reports today BP as 125/75, 02 98%, HR 78. Advised patient to contact MD if 02 drops below 90%. - patient reports that his SOB is mild, often only occurring with exertion but he states that his 02 has not dropped below 90%. Reviewed with patient that if 02 drops below 90% to take a break from activity, use pursed lip breathing and monitor 02. - Reviewed red flag s/s with patient, nausea, vomiting, inability to pass urine/stool, SOB, mental status change, chest pain, fever, decrease in 02 sats. -confirmed medications      Patient will contact Md/CTN if red flag s/s arise and keep log of vitals. CTN to f/u ~ 7-10 days. AR      03/12/20  - patient attended f/u with pulmonary on 2/27. Patient was started on home 02. Supplied by [facility]. Patient reports they just brought him 9 more tanks, he is currently on 4LPM and stating 94-99%. He continues to monitor using his watch which also keeps a log. 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 Patient has f/u this afternoon with pulmonary, Dr. Emily Nguyen. Patient requested that CTN contact him tomorrow to discuss his visit this afternoon. CTN to f/u 1 day.  AR

## 2020-03-12 NOTE — PROGRESS NOTES
Asked to see Mr Eitenne Palencia re: possible lung biopsy    Referred by Dr. Renita Ingram    Mr Etienne Palencia is a pleasant 46 y/o gentleman with a past medical history significant for DVT, PE and hypoxic respiratory failure. Since Oct 2019 his respiratory status has been in decline. He has had a full Pulmonary evaluation including bronchoscopy which has not revealed a diagnosis/etiology. He cannot come off his home O2 with profound desaturations. He feels fatigued and depressed. Allergies   Allergen Reactions    Bee Sting [Sting, Bee] Swelling    Neomycin Rash    Neosporin [Neomycin-Bacitracin-Polymyxin] Rash       PMHx: PE, DVT, pAF    PSHx: appendectomy, UHR    SocHx: previous Vaping    Family History   Problem Relation Age of Onset    Heart Disease Father     Heart Disease Maternal Grandmother     Heart Disease Maternal Grandfather        Outpatient Medications Marked as Taking for the 3/12/20 encounter (Office Visit) with Marvin Dowling MD   Medication Sig Dispense Refill    famotidine (PEPCID) 20 mg tablet TK ONE T PO ONCE DAILY      warfarin (COUMADIN) 5 mg tablet Take 1 Tab by mouth five (5) days a week. TAKE 1 TABLET (5MG) BY MOUTH EVERY DAY ON MONDAY THROUGH FRIDAY, AND 1/2 TABLET (2.5MG) EVERY SATURDAY AND Sunday. 90 Tab 0    carvediloL (COREG) 3.125 mg tablet Take 1 Tab by mouth two (2) times daily (with meals). 60 Tab 3    furosemide (LASIX) 20 mg tablet Take 1 Tab by mouth daily. 30 Tab 3    warfarin (COUMADIN) 2.5 mg tablet Take 2.5 mg by mouth two (2) times daily on Sat & Sun. 2.5mg on Sat/Sun. TAKE 1 TAB (5MG) BY MOUTH EVERY DAY ON MONDAY THROUGH FRIDAY, AND 1/2 TAB (2.5MG) EVERY SATURDAY AND Sunday.       rosuvastatin (CRESTOR) 20 mg tablet TAKE 1 TABLET BY MOUTH EVERY NIGHT 90 Tab 3    EPINEPHrine (EPIPEN) 0.3 mg/0.3 mL injection INJECT 0.3 ML BY INTRAMUSCULAR ROUTE ONCE PRN FOR ANAPHYLAXIS OR ALLERGIC RESPONSE FOR UP TO ONE DOSE 1 Syringe 0    sildenafil citrate (VIAGRA) 50 mg tablet Take 50 mg by mouth as needed. ROS:    Constitutional- easily fatigued  HEENT- denies dysphagia  Neuro- denies syncope  Optho- no recent changes in vision  Resp- persistent dyspnea  CV- denies angina or palpitations  GI- denies abd pain  - no complaints  ID- denies recent fevers  Vasc- denies claudication    Blood pressure 103/60, pulse 88, resp. rate 20, height 6' 2\" (1.88 m), weight 227 lb (103 kg), SpO2 91 %. On exam he is seated  Alert and oriented  Appears his stated age  Appears sad  Dyspneic and tachypneic  Normal speech  No goiter  Lungs CTA b/l  Chest wall non tender  Rrr, no murmurs  Radial pulses palp b/l  abd sntnd, no organomegaly  LE non edematous    ==============================    WBC 5.7  Hgb 17.6  Plts 205    Cr 1.11    INR 2.4  ==============================    I have personally reviewed his chest CT. Diffuse GGO throughout both lung fields, no lymphadenopathy, no PE    ==============================    PFTs- FeV1 46%    ==============================    ECHO EF 31%  RV systolic function mildly depressed    ===========================    Diagnoses  1: Hypoxic respiratory failure  2: ILD  3: h/o PE    =============================    Mr. Jeanne Sherman has chronic hypoxic respiratory failure which is progressing and somewhat unusual for his age. He has had various environmental exposures. In addition he did vape for a while. I think he will benefit from a surgical lung biopsy. I will recommend a L VATS. I discussed at [facility] the risk/benefits of a lung biopsy and that the surgery will not \"fix\" his lungs and that it is a fact finding mission only. Based on his O2 requirements and his profound desaturations he is high risk for surgery. We discussed prolonged post op intubation if he can't be weaned from the vent and that his lungs may react poorly to manipulation. At his young age he and his wife feel this risks are worth it to \"get some answers\".     He will need a lovenox bridge. He needs a DLCO. Post operative I will have PAR assist in his management. Thank you for allowing us to care for Mr Sade Boone spent 60 minutes with Carlos Wellington and their family, greater than 50% of which was spent in counseling and education reviewing their films, discussing surgical options and formulating a future care plan.

## 2020-03-13 ENCOUNTER — PATIENT OUTREACH (OUTPATIENT)
Dept: INTERNAL MEDICINE CLINIC | Age: 50
End: 2020-03-13

## 2020-03-16 NOTE — PROGRESS NOTES
Goals      Prevent complications post hospitalization. 2/25/2020    - Spoke to patient today. Patient lives alone but has family and friend support. Patient works full time as . - patient attended PCP appt on 2/24/2020 and PCP wants patient to see pulmonary before he is released back to work  - patient has pulmonary appt on 2/27  - carrdiology 4/8  - patient report that he has [employer] that he uses to track his vitals including 02. He reports today BP as 125/75, 02 98%, HR 78. Advised patient to contact MD if 02 drops below 90%. - patient reports that his SOB is mild, often only occurring with exertion but he states that his 02 has not dropped below 90%. Reviewed with patient that if 02 drops below 90% to take a break from activity, use pursed lip breathing and monitor 02. - Reviewed red flag s/s with patient, nausea, vomiting, inability to pass urine/stool, SOB, mental status change, chest pain, fever, decrease in 02 sats. -confirmed medications      Patient will contact Md/CTN if red flag s/s arise and keep log of vitals. CTN to f/u ~ 7-10 days. AR      03/12/20  - patient attended f/u with pulmonary on 2/27. Patient was started on home 02. Supplied by [facility]. Patient reports they just brought him 9 more tanks, he is currently on 4LPM and stating 94-99%. He continues to monitor using his watch which also keeps a log. 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 Patient has f/u this afternoon with pulmonary, Dr. Melvin Cesar. Patient requested that CTN contact him tomorrow to discuss his visit this afternoon. CTN to f/u 1 day. AR    03/13/20  - Spoke to patient today. Patient attended pulmonary f/u yesterday. Patient has biopsy on 3/25 and will potentially try to wean 02 after biopsy. Patient continues to be on 4LPM 02. Discussed patients family support and emotional reaction to his situation. Patient reports that his family is supportive and all he has to do is contact them and they are there to help. Discussed potential impacts due to patient not being able to work and patient stated no problems. CTN currently building rapport with patient so that patient will feel comfortable voicing concerns to CTN. Patient reports he is very thankful for CTN calls and checking in on him. Encouraged patient to sign up for my chart so he has access to all his [facility]. Encouraged patient to reach out for resources if assistance is needed. - patient reports small amount of swelling in his legs, he did report eating ramen noodles earlier in the week. Reviewed low sodium diet with patient and how increased sodium can lead to swelling which can also impact his lungs. Discussed daily weights and patient does not currently have a scale but he states he can get one. Discussed daily weights and how they can be first indicator of increased fluid on board. - patient reports that his edema is not pitting and that his shoes/socks fit without feeling tight. Advised patient to elevate extremities, avoid high salt diet (reviewed foods high in salt), and to contact PCP if swelling gets worse or if it has not improved by Monday. Patient verbalized understanding.   - considering patients current respiratory situation reviewed Covid - 19 precautions such as hand washing, avoiding crowds, reporting cough or increased SOB. Patient verbalized understanding of the above. CTN to f/u with patient in 1 week.  AR

## 2020-03-17 ENCOUNTER — HOSPITAL ENCOUNTER (OUTPATIENT)
Dept: PREADMISSION TESTING | Age: 50
Discharge: HOME | End: 2020-03-17
Payer: COMMERCIAL

## 2020-03-17 ENCOUNTER — TELEPHONE (OUTPATIENT)
Dept: SURGERY | Age: 50
End: 2020-03-17

## 2020-03-17 VITALS
OXYGEN SATURATION: 90 % | BODY MASS INDEX: 30.67 KG/M2 | SYSTOLIC BLOOD PRESSURE: 116 MMHG | TEMPERATURE: 97.8 F | WEIGHT: 239 LBS | HEART RATE: 74 BPM | HEIGHT: 74 IN | RESPIRATION RATE: 20 BRPM | DIASTOLIC BLOOD PRESSURE: 76 MMHG

## 2020-03-17 LAB
ABO + RH BLD: NORMAL
ANION GAP SERPL CALC-SCNC: 5 MMOL/L (ref 5–15)
APTT PPP: 44.4 SEC (ref 22.1–32)
BLOOD GROUP ANTIBODIES SERPL: NORMAL
BUN SERPL-MCNC: 11 MG/DL (ref 6–20)
BUN/CREAT SERPL: 12 (ref 12–20)
CALCIUM SERPL-MCNC: 8.5 MG/DL (ref 8.5–10.1)
CHLORIDE SERPL-SCNC: 105 MMOL/L (ref 97–108)
CO2 SERPL-SCNC: 30 MMOL/L (ref 21–32)
CREAT SERPL-MCNC: 0.92 MG/DL (ref 0.7–1.3)
ERYTHROCYTE [DISTWIDTH] IN BLOOD BY AUTOMATED COUNT: 16.8 % (ref 11.5–14.5)
GLUCOSE SERPL-MCNC: 106 MG/DL (ref 65–100)
HCT VFR BLD AUTO: 51.5 % (ref 36.6–50.3)
HGB BLD-MCNC: 15.9 G/DL (ref 12.1–17)
INR PPP: 4.3 (ref 0.9–1.1)
MCH RBC QN AUTO: 26.7 PG (ref 26–34)
MCHC RBC AUTO-ENTMCNC: 30.9 G/DL (ref 30–36.5)
MCV RBC AUTO: 86.6 FL (ref 80–99)
NRBC # BLD: 0 K/UL (ref 0–0.01)
NRBC BLD-RTO: 0 PER 100 WBC
PLATELET # BLD AUTO: 204 K/UL (ref 150–400)
PMV BLD AUTO: 10 FL (ref 8.9–12.9)
POTASSIUM SERPL-SCNC: 4 MMOL/L (ref 3.5–5.1)
PROTHROMBIN TIME: 39.5 SEC (ref 9–11.1)
RBC # BLD AUTO: 5.95 M/UL (ref 4.1–5.7)
SODIUM SERPL-SCNC: 140 MMOL/L (ref 136–145)
SPECIMEN EXP DATE BLD: NORMAL
THERAPEUTIC RANGE,PTTT: ABNORMAL SECS (ref 58–77)
WBC # BLD AUTO: 6.6 K/UL (ref 4.1–11.1)

## 2020-03-17 PROCEDURE — 36415 COLL VENOUS BLD VENIPUNCTURE: CPT

## 2020-03-17 PROCEDURE — 80048 BASIC METABOLIC PNL TOTAL CA: CPT

## 2020-03-17 PROCEDURE — 86900 BLOOD TYPING SEROLOGIC ABO: CPT

## 2020-03-17 PROCEDURE — 85730 THROMBOPLASTIN TIME PARTIAL: CPT

## 2020-03-17 PROCEDURE — 85610 PROTHROMBIN TIME: CPT

## 2020-03-17 PROCEDURE — 85027 COMPLETE CBC AUTOMATED: CPT

## 2020-03-17 NOTE — TELEPHONE ENCOUNTER
I called Mr. Chris Rice and spoke to him about delaying his scheduled lung biopsy for March 25th. His biopsy is an elective procedure and with his severely compromised respiratory status he is at very high risk of complications from any exposure in the hospital.    He is in agreement. Will reschedule once the Covid 19 restrictions are lifted.

## 2020-03-18 ENCOUNTER — OFFICE VISIT (OUTPATIENT)
Dept: FAMILY MEDICINE CLINIC | Age: 50
End: 2020-03-18

## 2020-03-18 VITALS
SYSTOLIC BLOOD PRESSURE: 90 MMHG | TEMPERATURE: 99.6 F | OXYGEN SATURATION: 93 % | HEIGHT: 74 IN | RESPIRATION RATE: 16 BRPM | BODY MASS INDEX: 30.42 KG/M2 | WEIGHT: 237 LBS | HEART RATE: 94 BPM | DIASTOLIC BLOOD PRESSURE: 54 MMHG

## 2020-03-18 DIAGNOSIS — Z99.81 OXYGEN DEPENDENT: ICD-10-CM

## 2020-03-18 DIAGNOSIS — R60.9 EDEMA, UNSPECIFIED TYPE: Primary | ICD-10-CM

## 2020-03-18 NOTE — PERIOP NOTES
PAT LAB RESULTS ROUTED TO PATIENT'S PCP, DR AGUIAR. FOLLOW-UP PHONE CALL TO DR Bk Burks OFFICE RE: ABNORMAL LAB RESULTS, OF SIGNIFICANCE INR 4.3. NOTED THAT SURGERY HAS BEEN CANCELLED. LEFT MESSAGE FOR DR Bk Burks OFFICE RE: ABOVE AND LEFT CALL BACK #.

## 2020-03-18 NOTE — PROGRESS NOTES
Identified pt with two pt identifiers(name and ). Reviewed record in preparation for visit and have obtained necessary documentation. Chief Complaint   Patient presents with    Foot Swelling     bilateral since Cristian 3/15/20    Cough     dry cough since leaving hospital, began upon using O2        Health Maintenance Due   Topic    Pneumococcal 0-64 years (1 of 1 - PPSV23)    Shingrix Vaccine Age 50> (1 of 2)       Coordination of Care Questionnaire:  :   1) Have you been to an emergency room, urgent care, or hospitalized since your last visit? If yes, where when, and reason for visit? yes   ED [facility] -20 for SOB    2. Have seen or consulted any other health care provider since your last visit? If yes, where when, and reason for visit? Yes  - Dr. Radha Iglesias (pulm)  - Dr. Surinder Faustin (throat)      Patient is accompanied by self I have received verbal consent from Gillian Reinoso to discuss any/all medical information while they are present in the room.

## 2020-03-23 LAB
BACTERIA SPEC CULT: NORMAL
SERVICE CMNT-IMP: NORMAL

## 2020-03-25 DIAGNOSIS — Z79.01 ANTICOAGULANT LONG-TERM USE: Primary | ICD-10-CM

## 2020-04-05 LAB
ACID FAST STN SPEC: NEGATIVE
MYCOBACTERIUM SPEC QL CULT: NEGATIVE
SPECIMEN PREPARATION: NORMAL
SPECIMEN SOURCE: NORMAL

## 2020-04-17 ENCOUNTER — APPOINTMENT (OUTPATIENT)
Dept: CT IMAGING | Age: 50
End: 2020-04-17
Attending: EMERGENCY MEDICINE
Payer: COMMERCIAL

## 2020-04-17 ENCOUNTER — HOSPITAL ENCOUNTER (EMERGENCY)
Age: 50
Discharge: HOME | End: 2020-04-17
Attending: EMERGENCY MEDICINE | Admitting: EMERGENCY MEDICINE
Payer: COMMERCIAL

## 2020-04-17 VITALS
SYSTOLIC BLOOD PRESSURE: 123 MMHG | DIASTOLIC BLOOD PRESSURE: 81 MMHG | TEMPERATURE: 98.3 F | RESPIRATION RATE: 15 BRPM | OXYGEN SATURATION: 98 % | HEART RATE: 60 BPM

## 2020-04-17 DIAGNOSIS — R31.9 HEMATURIA, UNSPECIFIED TYPE: Primary | ICD-10-CM

## 2020-04-17 DIAGNOSIS — K76.0 FATTY LIVER: ICD-10-CM

## 2020-04-17 DIAGNOSIS — R91.8 GROUND GLASS OPACITY PRESENT ON IMAGING OF LUNG: ICD-10-CM

## 2020-04-17 DIAGNOSIS — R10.9 FLANK PAIN: ICD-10-CM

## 2020-04-17 LAB
ALBUMIN SERPL-MCNC: 2.6 G/DL (ref 3.5–5)
ALBUMIN/GLOB SERPL: 0.5 {RATIO} (ref 1.1–2.2)
ALP SERPL-CCNC: 72 U/L (ref 45–117)
ALT SERPL-CCNC: 102 U/L (ref 12–78)
ANION GAP SERPL CALC-SCNC: 3 MMOL/L (ref 5–15)
APPEARANCE UR: ABNORMAL
AST SERPL-CCNC: 30 U/L (ref 15–37)
BACTERIA URNS QL MICRO: NEGATIVE /HPF
BASOPHILS # BLD: 0 K/UL (ref 0–0.1)
BASOPHILS NFR BLD: 0 % (ref 0–1)
BILIRUB SERPL-MCNC: 1 MG/DL (ref 0.2–1)
BILIRUB UR QL: NEGATIVE
BUN SERPL-MCNC: 16 MG/DL (ref 6–20)
BUN/CREAT SERPL: 20 (ref 12–20)
CALCIUM SERPL-MCNC: 8.8 MG/DL (ref 8.5–10.1)
CHLORIDE SERPL-SCNC: 102 MMOL/L (ref 97–108)
CO2 SERPL-SCNC: 30 MMOL/L (ref 21–32)
COLOR UR: ABNORMAL
CREAT SERPL-MCNC: 0.82 MG/DL (ref 0.7–1.3)
DIFFERENTIAL METHOD BLD: ABNORMAL
EOSINOPHIL # BLD: 0.1 K/UL (ref 0–0.4)
EOSINOPHIL NFR BLD: 1 % (ref 0–7)
EPITH CASTS URNS QL MICRO: ABNORMAL /LPF
ERYTHROCYTE [DISTWIDTH] IN BLOOD BY AUTOMATED COUNT: 18.1 % (ref 11.5–14.5)
GLOBULIN SER CALC-MCNC: 5 G/DL (ref 2–4)
GLUCOSE SERPL-MCNC: 100 MG/DL (ref 65–100)
GLUCOSE UR STRIP.AUTO-MCNC: NEGATIVE MG/DL
HCT VFR BLD AUTO: 48 % (ref 36.6–50.3)
HGB BLD-MCNC: 15.5 G/DL (ref 12.1–17)
HGB UR QL STRIP: ABNORMAL
HYALINE CASTS URNS QL MICRO: ABNORMAL /LPF (ref 0–5)
IMM GRANULOCYTES # BLD AUTO: 0.1 K/UL (ref 0–0.04)
IMM GRANULOCYTES NFR BLD AUTO: 1 % (ref 0–0.5)
KETONES UR QL STRIP.AUTO: NEGATIVE MG/DL
LEUKOCYTE ESTERASE UR QL STRIP.AUTO: ABNORMAL
LIPASE SERPL-CCNC: 134 U/L (ref 73–393)
LYMPHOCYTES # BLD: 1.8 K/UL (ref 0.8–3.5)
LYMPHOCYTES NFR BLD: 14 % (ref 12–49)
MCH RBC QN AUTO: 27.5 PG (ref 26–34)
MCHC RBC AUTO-ENTMCNC: 32.3 G/DL (ref 30–36.5)
MCV RBC AUTO: 85.3 FL (ref 80–99)
MONOCYTES # BLD: 1.3 K/UL (ref 0–1)
MONOCYTES NFR BLD: 10 % (ref 5–13)
NEUTS SEG # BLD: 9.6 K/UL (ref 1.8–8)
NEUTS SEG NFR BLD: 74 % (ref 32–75)
NITRITE UR QL STRIP.AUTO: NEGATIVE
NRBC # BLD: 0 K/UL (ref 0–0.01)
NRBC BLD-RTO: 0 PER 100 WBC
PH UR STRIP: 6.5 [PH] (ref 5–8)
PLATELET # BLD AUTO: 217 K/UL (ref 150–400)
PMV BLD AUTO: 8.8 FL (ref 8.9–12.9)
POTASSIUM SERPL-SCNC: 3.9 MMOL/L (ref 3.5–5.1)
PROT SERPL-MCNC: 7.6 G/DL (ref 6.4–8.2)
PROT UR STRIP-MCNC: ABNORMAL MG/DL
RBC # BLD AUTO: 5.63 M/UL (ref 4.1–5.7)
RBC #/AREA URNS HPF: >100 /HPF (ref 0–5)
SODIUM SERPL-SCNC: 135 MMOL/L (ref 136–145)
SP GR UR REFRACTOMETRY: 1.02 (ref 1–1.03)
UA: UC IF INDICATED,UAUC: ABNORMAL
UROBILINOGEN UR QL STRIP.AUTO: 0.2 EU/DL (ref 0.2–1)
WBC # BLD AUTO: 12.9 K/UL (ref 4.1–11.1)
WBC URNS QL MICRO: ABNORMAL /HPF (ref 0–4)

## 2020-04-17 PROCEDURE — 96367 TX/PROPH/DG ADDL SEQ IV INF: CPT

## 2020-04-17 PROCEDURE — 80053 COMPREHEN METABOLIC PANEL: CPT

## 2020-04-17 PROCEDURE — 85025 COMPLETE CBC W/AUTO DIFF WBC: CPT

## 2020-04-17 PROCEDURE — 74011000258 HC RX REV CODE- 258: Performed by: EMERGENCY MEDICINE

## 2020-04-17 PROCEDURE — 74011250636 HC RX REV CODE- 250/636: Performed by: EMERGENCY MEDICINE

## 2020-04-17 PROCEDURE — 74177 CT ABD & PELVIS W/CONTRAST: CPT

## 2020-04-17 PROCEDURE — 96365 THER/PROPH/DIAG IV INF INIT: CPT

## 2020-04-17 PROCEDURE — 96375 TX/PRO/DX INJ NEW DRUG ADDON: CPT

## 2020-04-17 PROCEDURE — 74011636320 HC RX REV CODE- 636/320: Performed by: EMERGENCY MEDICINE

## 2020-04-17 PROCEDURE — 77010033678 HC OXYGEN DAILY

## 2020-04-17 PROCEDURE — 36415 COLL VENOUS BLD VENIPUNCTURE: CPT

## 2020-04-17 PROCEDURE — 96376 TX/PRO/DX INJ SAME DRUG ADON: CPT

## 2020-04-17 PROCEDURE — 81001 URINALYSIS AUTO W/SCOPE: CPT

## 2020-04-17 PROCEDURE — 99285 EMERGENCY DEPT VISIT HI MDM: CPT

## 2020-04-17 PROCEDURE — 83690 ASSAY OF LIPASE: CPT

## 2020-04-17 RX ORDER — AZITHROMYCIN 250 MG/1
250 TABLET, FILM COATED ORAL DAILY
Qty: 4 TAB | Refills: 0 | Status: SHIPPED | OUTPATIENT
Start: 2020-04-17 | End: 2020-04-21

## 2020-04-17 RX ORDER — MORPHINE SULFATE 2 MG/ML
4 INJECTION, SOLUTION INTRAMUSCULAR; INTRAVENOUS
Status: COMPLETED | OUTPATIENT
Start: 2020-04-17 | End: 2020-04-17

## 2020-04-17 RX ORDER — SODIUM CHLORIDE 0.9 % (FLUSH) 0.9 %
10 SYRINGE (ML) INJECTION
Status: COMPLETED | OUTPATIENT
Start: 2020-04-17 | End: 2020-04-17

## 2020-04-17 RX ORDER — ONDANSETRON 4 MG/1
4 TABLET, ORALLY DISINTEGRATING ORAL
Qty: 10 TAB | Refills: 0 | Status: SHIPPED | OUTPATIENT
Start: 2020-04-17 | End: 2020-06-08

## 2020-04-17 RX ORDER — PREDNISONE 10 MG/1
10 TABLET ORAL DAILY
COMMUNITY
End: 2020-05-26 | Stop reason: ALTCHOICE

## 2020-04-17 RX ORDER — TAMSULOSIN HYDROCHLORIDE 0.4 MG/1
0.4 CAPSULE ORAL DAILY
Qty: 7 CAP | Refills: 0 | Status: SHIPPED | OUTPATIENT
Start: 2020-04-17 | End: 2020-04-24

## 2020-04-17 RX ORDER — MORPHINE SULFATE 2 MG/ML
INJECTION, SOLUTION INTRAMUSCULAR; INTRAVENOUS
Status: DISCONTINUED
Start: 2020-04-17 | End: 2020-04-17 | Stop reason: HOSPADM

## 2020-04-17 RX ORDER — MORPHINE SULFATE 10 MG/ML
4 INJECTION, SOLUTION INTRAMUSCULAR; INTRAVENOUS
Status: COMPLETED | OUTPATIENT
Start: 2020-04-17 | End: 2020-04-17

## 2020-04-17 RX ORDER — AMOXICILLIN AND CLAVULANATE POTASSIUM 875; 125 MG/1; MG/1
1 TABLET, FILM COATED ORAL 2 TIMES DAILY
Qty: 10 TAB | Refills: 0 | Status: SHIPPED | OUTPATIENT
Start: 2020-04-17 | End: 2020-04-22

## 2020-04-17 RX ADMIN — IOPAMIDOL 100 ML: 755 INJECTION, SOLUTION INTRAVENOUS at 13:41

## 2020-04-17 RX ADMIN — Medication 10 ML: at 13:41

## 2020-04-17 RX ADMIN — CEFTRIAXONE 1 G: 1 INJECTION, POWDER, FOR SOLUTION INTRAMUSCULAR; INTRAVENOUS at 14:20

## 2020-04-17 RX ADMIN — AZITHROMYCIN MONOHYDRATE 500 MG: 500 INJECTION, POWDER, LYOPHILIZED, FOR SOLUTION INTRAVENOUS at 15:05

## 2020-04-17 RX ADMIN — MORPHINE SULFATE 4 MG: 2 INJECTION, SOLUTION INTRAMUSCULAR; INTRAVENOUS at 12:09

## 2020-04-17 RX ADMIN — MORPHINE SULFATE 4 MG: 10 INJECTION INTRAVENOUS at 14:28

## 2020-04-17 NOTE — DISCHARGE INSTRUCTIONS
Patient Education        Blood in the Urine: Care Instructions  Your Care Instructions    Blood in the urine, or hematuria, may make the urine look red, brown, or pink. There may be blood every time you urinate or just from time to time. You cannot always see blood in the urine, but it will show up in a urine test.  Blood in the urine may be serious. It should always be checked by a doctor. Your doctor may recommend more tests, including an X-ray, a CT scan, or a cystoscopy (which lets a doctor look inside the urethra and bladder). Blood in the urine can be a sign of another problem. Common causes are bladder infections and kidney stones. An injury to your groin or your genital area can also cause bleeding in the urinary tract. Very hard exercise--such as running a marathon--can cause blood in the urine. Blood in the urine can also be a sign of kidney disease or cancer in the bladder or kidney. Many cases of blood in the urine are caused by a harmless condition that runs in families. This is called benign familial hematuria. It does not need any treatment. Sometimes your urine may look red or brown even though it does not contain blood. For example, not getting enough fluids (dehydration), taking certain medicines, or having a liver problem can change the color of your urine. Eating foods such as beets, rhubarb, or blackberries or foods with red food coloring can make your urine look red or pink. Follow-up care is a key part of your treatment and safety. Be sure to make and go to all appointments, and call your doctor if you are having problems. It's also a good idea to know your test results and keep a list of the medicines you take. When should you call for help? Call your doctor now or seek immediate medical care if:    · You have symptoms of a urinary infection. For example:  ? You have pus in your urine. ? You have pain in your back just below your rib cage. This is called flank pain. ?  You have a fever, chills, or body aches. ? It hurts to urinate. ? You have groin or belly pain.     · You have more blood in your urine.    Watch closely for changes in your health, and be sure to contact your doctor if:    · You have new urination problems.     · You do not get better as expected. Where can you learn more? Go to http://tito-ludmila.info/  Enter I400 in the search box to learn more about \"Blood in the Urine: Care Instructions. \"  Current as of: August 21, 2019Content Version: 12.4  © 6141-3704 [employer]. Care instructions adapted under license by [employer] (which disclaims liability or warranty for this information). If you have questions about a medical condition or this instruction, always ask your healthcare professional. [address] any warranty or liability for your use of this information. Patient Education        Kidney Stone: Care Instructions  Your Care Instructions    Kidney stones are formed when salts, minerals, and other substances normally found in the urine clump together. They can be as small as grains of sand or, rarely, as large as golf balls. While the stone is traveling through the ureter, which is the tube that carries urine from the kidney to the bladder, you will probably feel pain. The pain may be mild or very severe. You may also have some blood in your urine. As soon as the stone reaches the bladder, any intense pain should go away. If a stone is too large to pass on its own, you may need a medical procedure to help you pass the stone. The doctor has checked you carefully, but problems can develop later. If you notice any problems or new symptoms, get medical treatment right away. Follow-up care is a key part of your treatment and safety. Be sure to make and go to all appointments, and call your doctor if you are having problems.  It's also a good idea to know your test results and keep a list of the medicines you take. How can you care for yourself at home? · Drink plenty of fluids, enough so that your urine is light yellow or clear like water. If you have kidney, heart, or liver disease and have to limit fluids, talk with your doctor before you increase the amount of fluids you drink. · Take pain medicines exactly as directed. Call your doctor if you think you are having a problem with your medicine. ? If the doctor gave you a prescription medicine for pain, take it as prescribed. ? If you are not taking a prescription pain medicine, ask your doctor if you can take an over-the-counter medicine. Read and follow all instructions on the label. · Your doctor may ask you to strain your urine so that you can collect your kidney stone when it passes. You can use a kitchen strainer or a tea strainer to catch the stone. Store it in a plastic bag until you see your doctor again. Preventing future kidney stones  Some changes in your diet may help prevent kidney stones. Depending on the cause of your stones, your doctor may recommend that you:  · Drink plenty of fluids, enough so that your urine is light yellow or clear like water. If you have kidney, heart, or liver disease and have to limit fluids, talk with your doctor before you increase the amount of fluids you drink. · Limit coffee, tea, and alcohol. Also avoid grapefruit juice. · Do not take more than the recommended daily dose of vitamins C and D.  · Avoid antacids such as Gaviscon, Maalox, Mylanta, or Tums. · Limit the amount of salt (sodium) in your diet. · Eat a balanced diet that is not too high in protein. · Limit foods that are high in a substance called oxalate, which can cause kidney stones. These foods include dark green vegetables, rhubarb, chocolate, wheat bran, nuts, cranberries, and beans. When should you call for help? Call your doctor now or seek immediate medical care if:    · You cannot keep down fluids.     · Your pain gets worse.   · You have a fever or chills.     · You have new or worse pain in your back just below your rib cage (the flank area).     · You have new or more blood in your urine.    Watch closely for changes in your health, and be sure to contact your doctor if:    · You do not get better as expected. Where can you learn more? Go to http://tito-ludmila.info/  Enter U449 in the search box to learn more about \"Kidney Stone: Care Instructions. \"  Current as of: August 11, 2019Content Version: 12.4  © 9140-8259 [employer]. Care instructions adapted under license by [employer] (which disclaims liability or warranty for this information). If you have questions about a medical condition or this instruction, always ask your healthcare professional. [address] any warranty or liability for your use of this information.

## 2020-04-17 NOTE — PROGRESS NOTES
Reviewed phone consult for CT findings of inflammation  Clinical story cw stone passage event pain, migrating  No FCNV  UA no infection + blood  CT wo hydro or stone - prior chest CT 2/20 maybe faint Rt upper renal ca+ 1 mm  Ok to DC home with flowmax no ABX, hematuria warnings  Reasonable FU  2 weeks, call if blood seen  Reviewed with Dr Ruddy Wiseman in ER

## 2020-04-17 NOTE — PROGRESS NOTES
Nurse Clarification of the Prior to Admission Medication Regimen     The patient was interviewed regarding clarification of the prior to admission medication regimen. Also spoke with pharmacist regarding patient's dosage of Prednisone. The individual(s) listed above were questioned regarding the patient's use of any other inhalers, topical products, over the counter medications, herbal medications, vitamin products or ophthalmic/nasal/otic medication use. Information Obtained From: Patient and Pharmacist    Recommendations/Findings: The following amendments were made to the patient's active medication list on file at [facility]:     1) Additions:    Prednisone 10mg    2) Removals:    None    3) Changes:   None       PTA medication list was corrected to the following:     Prior to Admission Medications   Prescriptions Last Dose Informant Taking? EPINEPHrine (EPIPEN) 0.3 mg/0.3 mL injection  at Unknown time Self Yes   Sig: INJECT 0.3 ML BY INTRAMUSCULAR ROUTE ONCE PRN FOR ANAPHYLAXIS OR ALLERGIC RESPONSE FOR UP TO ONE DOSE   carvediloL (COREG) 3.125 mg tablet  at Unknown time  Yes   Sig: Take 1 Tab by mouth two (2) times daily (with meals). furosemide (LASIX) 20 mg tablet  at Unknown time  Yes   Sig: Take 1 Tab by mouth daily. Patient taking differently: Take 20 mg by mouth nightly. predniSONE (DELTASONE) 10 mg tablet   Yes   Sig: Take 10 mg by mouth daily. Beginning 4/07/2020: Take 40mg for 4 days; 30mg for 4 days; 20mg for 4 days; then 10mg for 4 days. rosuvastatin (CRESTOR) 20 mg tablet  at Unknown time Self Yes   Sig: TAKE 1 TABLET BY MOUTH EVERY NIGHT   sildenafil citrate (VIAGRA) 50 mg tablet  at Unknown time Self Yes   Sig: Take 50 mg by mouth as needed.    warfarin (COUMADIN) 2.5 mg tablet  at Unknown time Self Yes   Sig: Take 2.5 mg by mouth two (2) times daily on Sat & Sun. 2.5mg on Sat/Sun. TAKE 1 TAB (5MG) BY MOUTH EVERY DAY ON MONDAY THROUGH FRIDAY, AND 1/2 TAB (2.5MG) EVERY SATURDAY AND Sunday. warfarin (COUMADIN) 5 mg tablet  at Unknown time  Yes   Sig: Take 1 Tab by mouth five (5) days a week. TAKE 1 TABLET (5MG) BY MOUTH EVERY DAY ON MONDAY THROUGH FRIDAY, AND 1/2 TABLET (2.5MG) EVERY SATURDAY AND Sunday.       Facility-Administered Medications: None        Thank you,  Ayad Randolph RN

## 2020-04-17 NOTE — ED PROVIDER NOTES
EMERGENCY DEPARTMENT HISTORY AND PHYSICAL EXAM      Date: 4/17/2020  Patient Name: Alexandria Bowser    History of Presenting Illness     Chief Complaint   Patient presents with    Abdominal Pain     pt reports RLQ abd pain x3-4 days. denies N/V/D. denies urinary symptoms. reports normal BMs but saw a very small amount of bright red blood in stool yesterday       History Provided By: Patient    HPI: Alexandria Bowser, 47 y.o. male with PMHx\as noted below presents the emergency department for evaluation of abdominal pain. Patient notes onset approximately 3 to 4 days ago of a right lower quadrant abdominal pain. Patient states that been intermittent until today developed severe right lower quadrant pain radiating to his flank. Describes it sharp, stabbing and without relieving or exacerbating factors. He is also had some associated nausea and vomiting. Also complaining of cough for several day. Denying any diarrhea, urinary symptoms, testicular pain. PCP: Jorge Sotomayor MD    Current Facility-Administered Medications   Medication Dose Route Frequency Provider Last Rate Last Dose    morphine 2 mg/mL injection             azithromycin (ZITHROMAX) 500 mg in 0.9% sodium chloride (MBP/ADV) 250 mL  500 mg IntraVENous NOW Rachelle Cruz  mL/hr at 04/17/20 1505 500 mg at 04/17/20 1505     Current Outpatient Medications   Medication Sig Dispense Refill    predniSONE (DELTASONE) 10 mg tablet Take 10 mg by mouth daily. Beginning 4/07/2020: Take 40mg for 4 days; 30mg for 4 days; 20mg for 4 days; then 10mg for 4 days.  azithromycin (ZITHROMAX) 250 mg tablet Take 1 Tab by mouth daily for 4 days. 4 Tab 0    amoxicillin-clavulanate (Augmentin) 875-125 mg per tablet Take 1 Tab by mouth two (2) times a day for 5 days. 10 Tab 0    tamsulosin (Flomax) 0.4 mg capsule Take 1 Cap by mouth daily for 7 doses.  7 Cap 0    ondansetron (Zofran ODT) 4 mg disintegrating tablet Take 1 Tab by mouth every eight (8) hours as needed for Nausea. 10 Tab 0    warfarin (COUMADIN) 5 mg tablet Take 1 Tab by mouth five (5) days a week. TAKE 1 TABLET (5MG) BY MOUTH EVERY DAY ON MONDAY THROUGH FRIDAY, AND 1/2 TABLET (2.5MG) EVERY SATURDAY AND Sunday. 90 Tab 0    carvediloL (COREG) 3.125 mg tablet Take 1 Tab by mouth two (2) times daily (with meals). 60 Tab 3    furosemide (LASIX) 20 mg tablet Take 1 Tab by mouth daily. (Patient taking differently: Take 20 mg by mouth nightly.) 30 Tab 3    warfarin (COUMADIN) 2.5 mg tablet Take 2.5 mg by mouth two (2) times daily on Sat & Sun. 2.5mg on Sat/Sun. TAKE 1 TAB (5MG) BY MOUTH EVERY DAY ON MONDAY THROUGH FRIDAY, AND 1/2 TAB (2.5MG) EVERY SATURDAY AND Sunday.  rosuvastatin (CRESTOR) 20 mg tablet TAKE 1 TABLET BY MOUTH EVERY NIGHT 90 Tab 3    EPINEPHrine (EPIPEN) 0.3 mg/0.3 mL injection INJECT 0.3 ML BY INTRAMUSCULAR ROUTE ONCE PRN FOR ANAPHYLAXIS OR ALLERGIC RESPONSE FOR UP TO ONE DOSE 1 Syringe 0    sildenafil citrate (VIAGRA) 50 mg tablet Take 50 mg by mouth as needed. Past History     Past Medical History:  Past Medical History:   Diagnosis Date    A-fib ([facility]     Allergic rhinitis     Anxiety disorder     Cellulitis     post tattoo    Deep vein thrombosis (DVT) ([facility]     [facility]  4/24/17 f/u note to manage Lovenox    Essential tremor     BILATERAL HANDS    Laceration of arm 09/23/2017    Left cut by a chain saw.     Nicotine vapor product user     STOPPED 2018-ONLY USED 2 MONTHS    Phlebitis     Pulmonary embolism (HCC) 3/2011    multiple episodes    Rectal bleeding 10/2011    on coumadin @ the time &again 4/2017 4/12/17 note from [facility]//colonoscopy report rec'd    Shortness of breath     Thromboembolus ([facility]     Umbilical hernia     Vitamin D deficiency 04/2014    again 4/2017       Past Surgical History:  Past Surgical History:   Procedure Laterality Date    HX APPENDECTOMY  2003    HX COLONOSCOPY  04/28/2017    Dr. Anthony Lott BRONCHOSCOPY    HX HERNIA REPAIR  1196    umbilical       Family History:  Family History   Problem Relation Age of Onset    Heart Disease Father     Heart Disease Maternal Grandmother     Heart Disease Maternal Grandfather     Asthma Neg Hx     Bleeding Prob Neg Hx     Diabetes Neg Hx        Social History:  Social History     Tobacco Use    Smoking status: Never Smoker    Smokeless tobacco: Current User     Types: Snuff    Tobacco comment: former vaper   Substance Use Topics    Alcohol use: Not Currently     Alcohol/week: 0.0 standard drinks    Drug use: No       Allergies: Allergies   Allergen Reactions    Bee Sting [Sting, Bee] Swelling    Neomycin Rash    Neosporin [Neomycin-Bacitracin-Polymyxin] Rash         Review of Systems   Review of Systems  Constitutional: Negative for fever, chills, and fatigue. HENT: Negative for congestion, sore throat, rhinorrhea, sneezing and neck stiffness   Eyes: Negative for discharge and redness. Respiratory: Negative for  shortness of breath, wheezing   Cardiovascular: Negative for chest pain, palpitations   Gastrointestinal: Positive for nausea, vomiting, abdominal pain. neg constipation, diarrhea and blood in stool. Genitourinary: Negative for dysuria, urgency, frequency, hematuria, flank pain, decreased urine volume, discharge,   Musculoskeletal: Negative for myalgias or joint pain . Skin: Negative for rash or lesions. Neurological: Negative weakness, light-headedness, numbness and headaches. Physical Exam   Physical Exam    GENERAL: alert and oriented, no acute distress  EYES: PEERL, No injection, discharge or icterus. ENT: Mucous membranes pink and moist.  NECK: Supple  LUNGS: Airway patent. Non-labored respirations. Breath sounds clear with good air entry bilaterally. HEART: Regular rate and rhythm. No peripheral edema   ABDOMEN: Non-distended, mild right lower quadrant tenderness without guarding or rebound.   SKIN:  warm, dry  EXTREMITIES: Without swelling, tenderness or deformity, symmetric with normal ROM  NEUROLOGICAL: Alert, oriented    Diagnostic Study Results     Labs -     Recent Results (from the past 12 hour(s))   CBC WITH AUTOMATED DIFF    Collection Time: 04/17/20 12:04 PM   Result Value Ref Range    WBC 12.9 (H) 4.1 - 11.1 K/uL    RBC 5.63 4.10 - 5.70 M/uL    HGB 15.5 12.1 - 17.0 g/dL    HCT 48.0 36.6 - 50.3 %    MCV 85.3 80.0 - 99.0 FL    MCH 27.5 26.0 - 34.0 PG    MCHC 32.3 30.0 - 36.5 g/dL    RDW 18.1 (H) 11.5 - 14.5 %    PLATELET 031 828 - 959 K/uL    MPV 8.8 (L) 8.9 - 12.9 FL    NRBC 0.0 0  WBC    ABSOLUTE NRBC 0.00 0.00 - 0.01 K/uL    NEUTROPHILS 74 32 - 75 %    LYMPHOCYTES 14 12 - 49 %    MONOCYTES 10 5 - 13 %    EOSINOPHILS 1 0 - 7 %    BASOPHILS 0 0 - 1 %    IMMATURE GRANULOCYTES 1 (H) 0.0 - 0.5 %    ABS. NEUTROPHILS 9.6 (H) 1.8 - 8.0 K/UL    ABS. LYMPHOCYTES 1.8 0.8 - 3.5 K/UL    ABS. MONOCYTES 1.3 (H) 0.0 - 1.0 K/UL    ABS. EOSINOPHILS 0.1 0.0 - 0.4 K/UL    ABS. BASOPHILS 0.0 0.0 - 0.1 K/UL    ABS. IMM. GRANS. 0.1 (H) 0.00 - 0.04 K/UL    DF AUTOMATED     METABOLIC PANEL, COMPREHENSIVE    Collection Time: 04/17/20 12:04 PM   Result Value Ref Range    Sodium 135 (L) 136 - 145 mmol/L    Potassium 3.9 3.5 - 5.1 mmol/L    Chloride 102 97 - 108 mmol/L    CO2 30 21 - 32 mmol/L    Anion gap 3 (L) 5 - 15 mmol/L    Glucose 100 65 - 100 mg/dL    BUN 16 6 - 20 MG/DL    Creatinine 0.82 0.70 - 1.30 MG/DL    BUN/Creatinine ratio 20 12 - 20      GFR est AA >60 >60 ml/min/1.73m2    GFR est non-AA >60 >60 ml/min/1.73m2    Calcium 8.8 8.5 - 10.1 MG/DL    Bilirubin, total 1.0 0.2 - 1.0 MG/DL    ALT (SGPT) 102 (H) 12 - 78 U/L    AST (SGOT) 30 15 - 37 U/L    Alk.  phosphatase 72 45 - 117 U/L    Protein, total 7.6 6.4 - 8.2 g/dL    Albumin 2.6 (L) 3.5 - 5.0 g/dL    Globulin 5.0 (H) 2.0 - 4.0 g/dL    A-G Ratio 0.5 (L) 1.1 - 2.2     LIPASE    Collection Time: 04/17/20 12:04 PM   Result Value Ref Range    Lipase 134 73 - 393 U/L   URINALYSIS W/ REFLEX CULTURE    Collection Time: 04/17/20 12:14 PM   Result Value Ref Range    Color YELLOW/STRAW      Appearance CLOUDY (A) CLEAR      Specific gravity 1.018 1.003 - 1.030      pH (UA) 6.5 5.0 - 8.0      Protein TRACE (A) NEG mg/dL    Glucose Negative NEG mg/dL    Ketone Negative NEG mg/dL    Bilirubin Negative NEG      Blood LARGE (A) NEG      Urobilinogen 0.2 0.2 - 1.0 EU/dL    Nitrites Negative NEG      Leukocyte Esterase TRACE (A) NEG      WBC 0-4 0 - 4 /hpf    RBC >100 (H) 0 - 5 /hpf    Epithelial cells FEW FEW /lpf    Bacteria Negative NEG /hpf    UA:UC IF INDICATED CULTURE NOT INDICATED BY UA RESULT CNI      Hyaline cast 0-2 0 - 5 /lpf       Radiologic Studies -   CT ABD PELV W CONT   Final Result   IMPRESSION:      1. Diffuse right-sided periureteric inflammatory stranding, with   hyperenhancement of the wall of the right renal collecting system, without   evidence of obstructive uropathy. This may represent the sequela of a urinary   tract infection. No evidence of renal abscess. 2. Diffuse fatty infiltration of the liver. 3. Chronic diffuse groundglass opacities throughout both lungs, similar to   2/17/2020. CT Results  (Last 48 hours)               04/17/20 1341  CT ABD PELV W CONT Final result    Impression:  IMPRESSION:       1. Diffuse right-sided periureteric inflammatory stranding, with   hyperenhancement of the wall of the right renal collecting system, without   evidence of obstructive uropathy. This may represent the sequela of a urinary   tract infection. No evidence of renal abscess. 2. Diffuse fatty infiltration of the liver. 3. Chronic diffuse groundglass opacities throughout both lungs, similar to   2/17/2020. Narrative:  EXAM: CT ABD PELV W CONT       INDICATION: RLQ pain       COMPARISON: CT chest 2/17/2020        CONTRAST: 100 mL of Isovue-370.        TECHNIQUE:    Following the uneventful intravenous administration of contrast, thin axial   images were obtained through the abdomen and pelvis. Coronal and sagittal   reconstructions were generated. Oral contrast was not administered. CT dose   reduction was achieved through use of a standardized protocol tailored for this   examination and automatic exposure control for dose modulation. FINDINGS:    LOWER THORAX: Diffuse groundglass opacity is again seen throughout both lung   bases, similar to prior study. LIVER: Diffuse fatty infiltration of the liver. BILIARY TREE: Gallbladder is within normal limits. CBD is not dilated. SPLEEN: within normal limits. PANCREAS: No mass or ductal dilatation. ADRENALS: Unremarkable. KIDNEYS: No mass, calculus, or hydronephrosis. There is diffuse right-sided   periureteric inflammatory stranding, as well as hyperenhancement of the wall of   the right renal collecting system and ureter. STOMACH: Unremarkable. SMALL BOWEL: No dilatation or wall thickening. COLON: No dilatation or wall thickening. APPENDIX: Status post appendectomy   PERITONEUM: No ascites or pneumoperitoneum. RETROPERITONEUM: No lymphadenopathy or aortic aneurysm. REPRODUCTIVE ORGANS: The prostate is noted. There is no free fluid. URINARY BLADDER: No mass or calculus. BONES: No destructive bone lesion. ABDOMINAL WALL: No mass or hernia. ADDITIONAL COMMENTS: N/A               CXR Results  (Last 48 hours)    None            Medical Decision Making   I, Rahel Varma MD am the first provider for this patient and am the attending of record for this patient encounter. I reviewed the vital signs, available nursing notes, past medical history, past surgical history, family history and social history. Vital Signs-Reviewed the patient's vital signs.   Patient Vitals for the past 12 hrs:   Temp Pulse Resp BP SpO2   04/17/20 1500    119/70 97 %   04/17/20 1300    125/75 98 %   04/17/20 1249    121/75 98 %   04/17/20 1141     99 %   04/17/20 1137 98.3 °F (36.8 °C) 60 15 109/66 100 %         Records Reviewed: Nursing Notes and Old Medical Records    Provider Notes (Medical Decision Making): On presentation, the patient is well appearing, in no acute distress with normal vital signs. Based on my history and exam the differential diagnosis for this patient includes appendicitis, diverticulitis, obstructed ureteral stone, pyelonephritis, UTI, testicular torsion among others. CT scan noted diffuse right-sided periureteral inflammatory stranding with hyperenhancement of the wall of the right renal collecting system without any evidence of obstructive uropathy. There is also no evidence of renal abscess. They did note bilateral groundglass opacities which is been present on a previous CT scan. We will plan to discuss with urology and given the patient's cough and leukocytosis will treat empirically with antibiotics for possible pneumonia. Also notify patient may have [address] so should quarantine. ED Course:   Initial assessment performed. The patients presenting problems have been discussed, and they are in agreement with the care plan formulated and outlined with them. I have encouraged them to ask questions as they arise throughout their visit. ED Course as of Apr 17 1533   Fri Apr 17, 2020   1430 Discussed case with Dr. Bk Ch, urology. He agreed findings consistent with likely recently passed ureteral stone. No signs of significant infection requiring treatment at this time from a urologic standpoint. Patient can follow-up in clinic.    [BN]      ED Course User Index  [BN] Gina Soliman MD       PROGRESS:  The patient has been re-evaluated and sx have improved. Reviewed available results with patient and have counseled them on diagnosis and care plan. They have expressed understanding, and all their questions have been answered. They agree with plan for admission.       CONSULT:  Orly Berrios MD spoke with the hospitalist.  Discussed HPI and PE, available diagnostic tests and clinical findings. He is in agreement with care plans as outlined and will evaluate for admission     Admit Note  Patient is being admitted to the hospitalist.  The results of their tests and reasons for their admission have been discussed with them and/or available family. They convey agreement and understanding for the need to be admitted and for their admission diagnosis. Consultation has been made with the inpatient physician specialist for hospitalization. Disposition:  admission    PLAN:  1. Admit     Diagnosis     Clinical Impression:   1. Hematuria, unspecified type    2. Ground glass opacity present on imaging of lung    3. Flank pain    4. Fatty liver        Please note that this dictation was completed with Dragon, computer voice recognition software. Quite often unanticipated grammatical, syntax, homophones, and other interpretive errors are inadvertently transcribed by the computer software. Please disregard these errors. Additionally, please excuse any errors that have escaped final proofreading.

## 2020-04-17 NOTE — ED TRIAGE NOTES
Patient arrives to the emergency department via EMS with a chief complaint of RLQ abd pain x3-4 days. Denies N/V/D and any urinary symptoms. Pt had a small amount of bright red blood in his stool yesterday. Pt wears 4L NC at baseline for an issue that he is currently seeing a pulmonologist to determine.

## 2020-04-17 NOTE — ED NOTES
Dr. Slime Carter and Valorie Tobias RN reviewed discharge instructions with the patient. The patient verbalized understanding. All questions and concerns were addressed. The patient was taken out of the department in a wheelchair and is discharged ambulatory in the care of family members with instructions and prescriptions in hand. Pt is alert and oriented x 4. Respirations are clear and unlabored. Pt reports that his ride is brining is at home o2.

## 2020-04-18 ENCOUNTER — HOSPITAL ENCOUNTER (EMERGENCY)
Age: 50
Discharge: HOME | End: 2020-04-18
Attending: EMERGENCY MEDICINE | Admitting: EMERGENCY MEDICINE
Payer: COMMERCIAL

## 2020-04-18 VITALS
TEMPERATURE: 98 F | DIASTOLIC BLOOD PRESSURE: 66 MMHG | OXYGEN SATURATION: 99 % | RESPIRATION RATE: 18 BRPM | BODY MASS INDEX: 28.22 KG/M2 | WEIGHT: 219.8 LBS | HEART RATE: 77 BPM | SYSTOLIC BLOOD PRESSURE: 109 MMHG

## 2020-04-18 DIAGNOSIS — R10.9 RIGHT FLANK PAIN: ICD-10-CM

## 2020-04-18 DIAGNOSIS — T14.8XXA TRAUMATIC HEMATOMA: ICD-10-CM

## 2020-04-18 DIAGNOSIS — R79.1 SUPRATHERAPEUTIC INR: Primary | ICD-10-CM

## 2020-04-18 LAB
INR PPP: 12 (ref 0.9–1.1)
PROTHROMBIN TIME: 102.8 SEC (ref 9–11.1)

## 2020-04-18 PROCEDURE — 99283 EMERGENCY DEPT VISIT LOW MDM: CPT

## 2020-04-18 PROCEDURE — 85610 PROTHROMBIN TIME: CPT

## 2020-04-18 PROCEDURE — 74011250637 HC RX REV CODE- 250/637: Performed by: PHYSICIAN ASSISTANT

## 2020-04-18 PROCEDURE — 36415 COLL VENOUS BLD VENIPUNCTURE: CPT

## 2020-04-18 RX ORDER — TRAMADOL HYDROCHLORIDE 50 MG/1
50 TABLET ORAL
Qty: 9 TAB | Refills: 0 | Status: SHIPPED | OUTPATIENT
Start: 2020-04-18 | End: 2020-04-21

## 2020-04-18 RX ADMIN — PHYTONADIONE 5 MG: 10 INJECTION, EMULSION INTRAMUSCULAR; INTRAVENOUS; SUBCUTANEOUS at 18:15

## 2020-04-18 NOTE — DISCHARGE INSTRUCTIONS
Please do not take your warfarin today or tomorrow. Call your Primary Care doctor to have your INR re-checked Monday. Return to the Emergency Room for any unusual bleeding.

## 2020-04-19 NOTE — ED PROVIDER NOTES
EMERGENCY DEPARTMENT HISTORY AND PHYSICAL EXAM      Date: 4/18/2020  Patient Name: Inga Green    History of Presenting Illness     Chief Complaint   Patient presents with    Arm Pain     onset tuesday; he was in here yesterday; he is back today for a hematoma of right arm from blood drawn yesterday       History Provided By: Patient    HPI: Inga Green, 47 y.o. male with a history of A. fib, PE, DVT and chronic anticoagulation on Coumadin presents ambulatory to the ED with cc of about a day of 6 out of 10 constant, achy tenderness to the bruised skin of the right AC fossa that is worse with palpation. He was seen in this facility yesterday for right lower quadrant pain and a CT demonstrated periureteric inflammatory stranding and chronic diffuse groundglass opacities throughout both lungs. Ultimately he was discharged with azithromycin. He presents today because the bruise of his right AC fossa where the IV was yesterday has increased and become more tender. He tells me he has been compliant with his Coumadin and typically takes 2.5 mg every day. He is uncertain of his last INR. There are no other complaints, changes, or physical findings at this time. PCP: Josie Sotomayor MD    Current Outpatient Medications   Medication Sig Dispense Refill    traMADoL (ULTRAM) 50 mg tablet Take 1 Tab by mouth every eight (8) hours as needed for Pain for up to 3 days. Max Daily Amount: 150 mg. Indications: pain 9 Tab 0    predniSONE (DELTASONE) 10 mg tablet Take 10 mg by mouth daily. Beginning 4/07/2020: Take 40mg for 4 days; 30mg for 4 days; 20mg for 4 days; then 10mg for 4 days.  azithromycin (ZITHROMAX) 250 mg tablet Take 1 Tab by mouth daily for 4 days. 4 Tab 0    amoxicillin-clavulanate (Augmentin) 875-125 mg per tablet Take 1 Tab by mouth two (2) times a day for 5 days. 10 Tab 0    tamsulosin (Flomax) 0.4 mg capsule Take 1 Cap by mouth daily for 7 doses.  7 Cap 0    ondansetron (Zofran ODT) 4 mg disintegrating tablet Take 1 Tab by mouth every eight (8) hours as needed for Nausea. 10 Tab 0    warfarin (COUMADIN) 5 mg tablet Take 1 Tab by mouth five (5) days a week. TAKE 1 TABLET (5MG) BY MOUTH EVERY DAY ON MONDAY THROUGH FRIDAY, AND 1/2 TABLET (2.5MG) EVERY SATURDAY AND Sunday. 90 Tab 0    carvediloL (COREG) 3.125 mg tablet Take 1 Tab by mouth two (2) times daily (with meals). 60 Tab 3    furosemide (LASIX) 20 mg tablet Take 1 Tab by mouth daily. (Patient taking differently: Take 20 mg by mouth nightly.) 30 Tab 3    warfarin (COUMADIN) 2.5 mg tablet Take 2.5 mg by mouth two (2) times daily on Sat & Sun. 2.5mg on Sat/Sun. TAKE 1 TAB (5MG) BY MOUTH EVERY DAY ON MONDAY THROUGH FRIDAY, AND 1/2 TAB (2.5MG) EVERY SATURDAY AND Sunday.  rosuvastatin (CRESTOR) 20 mg tablet TAKE 1 TABLET BY MOUTH EVERY NIGHT 90 Tab 3    EPINEPHrine (EPIPEN) 0.3 mg/0.3 mL injection INJECT 0.3 ML BY INTRAMUSCULAR ROUTE ONCE PRN FOR ANAPHYLAXIS OR ALLERGIC RESPONSE FOR UP TO ONE DOSE 1 Syringe 0    sildenafil citrate (VIAGRA) 50 mg tablet Take 50 mg by mouth as needed. Past History     Past Medical History:  Past Medical History:   Diagnosis Date    [facility]fib ([facility]     Allergic rhinitis     Anxiety disorder     Cellulitis     post tattoo    Deep vein thrombosis (DVT) ([facility]     [facility]  4/24/17 f/u note to manage Lovenox    Essential tremor     BILATERAL HANDS    Laceration of arm 09/23/2017    Left cut by a chain saw.     Nicotine vapor product user     STOPPED 2018-ONLY USED 2 MONTHS    Phlebitis     Pulmonary embolism (HCC) 3/2011    multiple episodes    Rectal bleeding 10/2011    on coumadin @ the time &again 4/2017 4/12/17 note from [facility]//colonoscopy report rec'd    Shortness of breath     Thromboembolus ([facility]     Umbilical hernia     Vitamin D deficiency 04/2014    again 4/2017       Past Surgical History:  Past Surgical History:   Procedure Laterality Date    HX APPENDECTOMY  2003    HX COLONOSCOPY  04/28/2017    Dr. Maxwell Arroyo    HX HEENT      BRONCHOSCOPY    HX HERNIA REPAIR  4529    umbilical       Family History:  Family History   Problem Relation Age of Onset    Heart Disease Father     Heart Disease Maternal Grandmother     Heart Disease Maternal Grandfather     Asthma Neg Hx     Bleeding Prob Neg Hx     Diabetes Neg Hx        Social History:  Social History     Tobacco Use    Smoking status: Never Smoker    Smokeless tobacco: Current User     Types: Snuff    Tobacco comment: former vaper   Substance Use Topics    Alcohol use: Not Currently     Alcohol/week: 0.0 standard drinks    Drug use: No       Allergies: Allergies   Allergen Reactions    Bee Sting [Sting, Bee] Swelling    Neomycin Rash    Neosporin [Neomycin-Bacitracin-Polymyxin] Rash     Review of Systems   Review of Systems   Constitutional: Negative for fatigue and fever. HENT: Negative for congestion, ear pain and rhinorrhea. Eyes: Negative for pain and redness. Respiratory: Negative for cough and wheezing. Cardiovascular: Negative for chest pain and palpitations. Gastrointestinal: Negative for abdominal pain, nausea and vomiting. Genitourinary: Negative for dysuria, frequency and urgency. Musculoskeletal: Negative for back pain, neck pain and neck stiffness. Skin: Positive for color change (Tender hematoma of the right AC fossa). Negative for rash and wound. Neurological: Negative for weakness, light-headedness, numbness and headaches. Physical Exam   Physical Exam  Vitals signs and nursing note reviewed. Constitutional:       General: He is not in acute distress. Appearance: He is well-developed. He is not toxic-appearing. HENT:      Head: Normocephalic and atraumatic. No right periorbital erythema or left periorbital erythema. Jaw: No trismus.       Right Ear: External ear normal.      Left Ear: External ear normal.      Nose: Nose normal.      Mouth/Throat:      Pharynx: Uvula midline. Eyes:      General: No scleral icterus. Conjunctiva/sclera: Conjunctivae normal.      Pupils: Pupils are equal, round, and reactive to light. Neck:      Musculoskeletal: Full passive range of motion without pain and normal range of motion. Cardiovascular:      Rate and Rhythm: Normal rate and regular rhythm. Heart sounds: Normal heart sounds. Pulmonary:      Effort: Pulmonary effort is normal. No tachypnea, accessory muscle usage or respiratory distress. Breath sounds: Normal breath sounds. No decreased breath sounds or wheezing. Abdominal:      Palpations: Abdomen is soft. Abdomen is not rigid. Tenderness: There is no abdominal tenderness. There is no guarding. Musculoskeletal: Normal range of motion. Skin:     Findings: Ecchymosis present. No rash. Comments: Tender, purple hematoma of the right AC fossa   Neurological:      Mental Status: He is alert and oriented to person, place, and time. He is not disoriented. GCS: GCS eye subscore is 4. GCS verbal subscore is 5. GCS motor subscore is 6. Cranial Nerves: No cranial nerve deficit. Sensory: No sensory deficit. Psychiatric:         Speech: Speech normal.       Diagnostic Study Results     Labs -     Recent Results (from the past 12 hour(s))   PROTHROMBIN TIME + INR    Collection Time: 04/18/20  4:32 PM   Result Value Ref Range    INR 12.0 (HH) 0.9 - 1.1      Prothrombin time 102.8 (H) 9.0 - 11.1 sec       Radiologic Studies -   No orders to display     CT Results  (Last 48 hours)               04/17/20 1341  CT ABD PELV W CONT Final result    Impression:  IMPRESSION:       1. Diffuse right-sided periureteric inflammatory stranding, with   hyperenhancement of the wall of the right renal collecting system, without   evidence of obstructive uropathy. This may represent the sequela of a urinary   tract infection. No evidence of renal abscess. 2. Diffuse fatty infiltration of the liver.    3. Chronic diffuse groundglass opacities throughout both lungs, similar to   2/17/2020. Narrative:  EXAM: CT ABD PELV W CONT       INDICATION: RLQ pain       COMPARISON: CT chest 2/17/2020        CONTRAST: 100 mL of Isovue-370. TECHNIQUE:    Following the uneventful intravenous administration of contrast, thin axial   images were obtained through the abdomen and pelvis. Coronal and sagittal   reconstructions were generated. Oral contrast was not administered. CT dose   reduction was achieved through use of a standardized protocol tailored for this   examination and automatic exposure control for dose modulation. FINDINGS:    LOWER THORAX: Diffuse groundglass opacity is again seen throughout both lung   bases, similar to prior study. LIVER: Diffuse fatty infiltration of the liver. BILIARY TREE: Gallbladder is within normal limits. CBD is not dilated. SPLEEN: within normal limits. PANCREAS: No mass or ductal dilatation. ADRENALS: Unremarkable. KIDNEYS: No mass, calculus, or hydronephrosis. There is diffuse right-sided   periureteric inflammatory stranding, as well as hyperenhancement of the wall of   the right renal collecting system and ureter. STOMACH: Unremarkable. SMALL BOWEL: No dilatation or wall thickening. COLON: No dilatation or wall thickening. APPENDIX: Status post appendectomy   PERITONEUM: No ascites or pneumoperitoneum. RETROPERITONEUM: No lymphadenopathy or aortic aneurysm. REPRODUCTIVE ORGANS: The prostate is noted. There is no free fluid. URINARY BLADDER: No mass or calculus. BONES: No destructive bone lesion. ABDOMINAL WALL: No mass or hernia. ADDITIONAL COMMENTS: N/A               CXR Results  (Last 48 hours)    None        Medical Decision Making   I am the first provider for this patient. I reviewed the vital signs, available nursing notes, past medical history, past surgical history, family history and social history.     Vital Signs-Reviewed the patient's vital signs. Patient Vitals for the past 12 hrs:   Temp Pulse Resp BP SpO2   04/18/20 1527 98 °F (36.7 °C) 77 18 109/66 99 %       Pulse Oximetry Analysis - 99% on RA    Records Reviewed: Nursing Notes, Old Medical Records, Previous Radiology Studies and Previous Laboratory Studies    Provider Notes (Medical Decision Making):   DDx: Traumatic hematoma, supratherapeutic INR    Patient's INR is 12.0. He does have a contusion and there was some microscopic hematuria in his urine yesterday however there has been no life-threatening bleeding. We will give him 5 mg of phytonadione and have patient hold dose of Coumadin tonight and tomorrow. He is instructed to reach out to his primary care on Monday to repeat the INR. Strict return precautions for abnormal bleeding. Separately, he does complain of some flank pain, the same flank pain that he had yesterday. Will offer small amount of tramadol. ED Course:   Initial assessment performed. The patients presenting problems have been discussed, and they are in agreement with the care plan formulated and outlined with them. I have encouraged them to ask questions as they arise throughout their visit. Disposition:  Discharge    PLAN:  1. Discharge Medication List as of 4/18/2020  6:43 PM      START taking these medications    Details   traMADoL (ULTRAM) 50 mg tablet Take 1 Tab by mouth every eight (8) hours as needed for Pain for up to 3 days. Max Daily Amount: 150 mg. Indications: pain, Normal, Disp-9 Tab, R-0         CONTINUE these medications which have NOT CHANGED    Details   predniSONE (DELTASONE) 10 mg tablet Take 10 mg by mouth daily. Beginning 4/07/2020: Take 40mg for 4 days; 30mg for 4 days; 20mg for 4 days; then 10mg for 4 days. , Historical Med      azithromycin (ZITHROMAX) 250 mg tablet Take 1 Tab by mouth daily for 4 days. , Normal, Disp-4 Tab, R-0      amoxicillin-clavulanate (Augmentin) 875-125 mg per tablet Take 1 Tab by mouth two (2) times a day for 5 days. , Normal, Disp-10 Tab, R-0      tamsulosin (Flomax) 0.4 mg capsule Take 1 Cap by mouth daily for 7 doses. , Normal, Disp-7 Cap, R-0      ondansetron (Zofran ODT) 4 mg disintegrating tablet Take 1 Tab by mouth every eight (8) hours as needed for Nausea., Normal, Disp-10 Tab, R-0      !! warfarin (COUMADIN) 5 mg tablet Take 1 Tab by mouth five (5) days a week. TAKE 1 TABLET (5MG) BY MOUTH EVERY DAY ON MONDAY THROUGH FRIDAY, AND 1/2 TABLET (2.5MG) EVERY SATURDAY AND Sunday., Normal, Disp-90 Tab, R-0      carvediloL (COREG) 3.125 mg tablet Take 1 Tab by mouth two (2) times daily (with meals). , Print, Disp-60 Tab, R-3      furosemide (LASIX) 20 mg tablet Take 1 Tab by mouth daily. , Print, Disp-30 Tab, R-3      !! warfarin (COUMADIN) 2.5 mg tablet Take 2.5 mg by mouth two (2) times daily on Sat & Sun. 2.5mg on Sat/Sun. TAKE 1 TAB (5MG) BY MOUTH EVERY DAY ON MONDAY THROUGH FRIDAY, AND 1/2 TAB (2.5MG) EVERY SATURDAY AND Sunday., Historical Med      rosuvastatin (CRESTOR) 20 mg tablet TAKE 1 TABLET BY MOUTH EVERY NIGHT, Normal, Disp-90 Tab, R-3      EPINEPHrine (EPIPEN) 0.3 mg/0.3 mL injection INJECT 0.3 ML BY INTRAMUSCULAR ROUTE ONCE PRN FOR ANAPHYLAXIS OR ALLERGIC RESPONSE FOR UP TO ONE DOSE, Normal, Disp-1 Syringe, R-0      sildenafil citrate (VIAGRA) 50 mg tablet Take 50 mg by mouth as needed., Historical Med       !! - Potential duplicate medications found. Please discuss with provider. 2.   Follow-up Information     Follow up With Specialties Details Why Svetlana Shaw MD Family Practice Schedule an appointment as soon as possible for a visit PRIMARY CARE: call Monday to have your INR repeated. [address]  [phone]          Return to ED if worse     Diagnosis     Clinical Impression:   1. Supratherapeutic INR    2. Traumatic hematoma    3.  Right flank pain

## 2020-04-20 ENCOUNTER — PATIENT OUTREACH (OUTPATIENT)
Dept: INTERNAL MEDICINE CLINIC | Age: 50
End: 2020-04-20

## 2020-04-22 NOTE — PROGRESS NOTES
Patient contacted regarding recent discharge and COVID-19 risk   Care Transition Nurse/ Ambulatory Care Manager contacted the patient by telephone to perform post discharge assessment. Verified name and  with patient as identifiers. Patient has following risk factors of: acute respiratory failure, currently on 02, waiting for lung biopsy which was canceled due to virus. CTN/ACM reviewed discharge instructions, medical action plan and red flags related to discharge diagnosis. Reviewed and educated them on any new and changed medications related to discharge diagnosis. Advised obtaining a 90-day supply of all daily and as-needed medications. Education provided regarding infection prevention, and signs and symptoms of COVID-19 and when to seek medical attention with patient who verbalized understanding. Discussed exposure protocols and quarantine from [address] you at higher risk for severe illness  and given an opportunity for questions and concerns. The patient agrees to contact the COVID-19 hotline [phone] or PCP office for questions related to their healthcare. CTN/ACM provided contact information for future reference. From CDC: Are you at higher risk for severe illness?  Wash your hands often.  Avoid close contact (6 feet, which is about two arm lengths) with people who are sick.  Put distance between yourself and other people if COVID-19 is spreading in your community.  Clean and disinfect frequently touched surfaces.  Avoid all cruise travel and non-essential air travel.  Call your healthcare professional if you have concerns about COVID-19 and your underlying condition or if you are sick. For more information on steps you can take to protect yourself, see CDC's How to Protect Yourself      Patient/family/caregiver given information for Ceasar Gutiérrez and agrees to enroll yes  Patient's preferred e-mail:  [email or web address]  Patient's preferred phone number: [phone]  Based on Loop alert triggers, patient will be contacted by nurse care manager for worsening symptoms. Pt will be further monitored by COVID Loop Team based on severity of symptoms and risk factors. 04/22/20  Patient remains on 02, patient is on 4LPM. Patient checks 02 sats about every 4 hours while awake. patient has f/u appt with his PCP today.

## 2020-05-20 ENCOUNTER — PATIENT OUTREACH (OUTPATIENT)
Dept: INTERNAL MEDICINE CLINIC | Age: 50
End: 2020-05-20

## 2020-05-20 NOTE — PROGRESS NOTES
Patient resolved from Transition of Care episode on 05/20/20    Patient/family has been provided the following resources and education related to COVID-19:                         Signs, symptoms and red flags related to COVID-19            CDC exposure and quarantine guidelines            [facility] exposure contact - [phone]            Contact for their local Department of Health                 Patient currently reports that the following symptoms have improved:  no new symptoms and no worsening symptoms. No further outreach scheduled with this CTN/ACM. Episode of Care resolved. Patient has this CTN/ACM contact information if future needs arise. Patient has f/u with thoracic surgeon on 5/26 and will determine if he can move forward with previous scheduled lung procedure that was postponed due to covid.

## 2020-05-26 ENCOUNTER — OFFICE VISIT (OUTPATIENT)
Dept: SURGERY | Age: 50
End: 2020-05-26

## 2020-05-26 DIAGNOSIS — J84.9 ILD (INTERSTITIAL LUNG DISEASE) (HCC): ICD-10-CM

## 2020-05-26 DIAGNOSIS — Z86.711 HX PULMONARY EMBOLISM: ICD-10-CM

## 2020-05-26 DIAGNOSIS — J96.01 ACUTE RESPIRATORY FAILURE WITH HYPOXIA (HCC): Primary | ICD-10-CM

## 2020-05-26 NOTE — PROGRESS NOTES
Discuss surgery. Virtual Visit. 1. Have you been to the ER, urgent care clinic since your last visit? Hospitalized since your last visit? No    2. Have you seen or consulted any other health care providers outside of the [address] [place] [address] since your last visit? Include any pap smears or colon screening.  No

## 2020-05-26 NOTE — PROGRESS NOTES
Mr. Theersa Cantu presents for a return clinic visit. He was originally seen for evaluation of a VATS lung biopsy in March. Due to the outbreak of the coronavirus pandemic his surgery was deferred until later in the year. He reports that he is feeling good. He has been sheltering in place. Mr. Theresa Cantu remains on 4L NC. He does state that he has been trying periods of up to 8 hours without oxygen. He remains active walking daily. No significant changes to his past medical or surgical history       Current Outpatient Medications:     ondansetron (Zofran ODT) 4 mg disintegrating tablet, Take 1 Tab by mouth every eight (8) hours as needed for Nausea., Disp: 10 Tab, Rfl: 0    warfarin (COUMADIN) 5 mg tablet, Take 1 Tab by mouth five (5) days a week. TAKE 1 TABLET (5MG) BY MOUTH EVERY DAY ON MONDAY THROUGH FRIDAY, AND 1/2 TABLET (2.5MG) EVERY SATURDAY AND Sunday. , Disp: 90 Tab, Rfl: 0    carvediloL (COREG) 3.125 mg tablet, Take 1 Tab by mouth two (2) times daily (with meals). , Disp: 60 Tab, Rfl: 3    furosemide (LASIX) 20 mg tablet, Take 1 Tab by mouth daily. (Patient taking differently: Take 20 mg by mouth nightly.), Disp: 30 Tab, Rfl: 3    warfarin (COUMADIN) 2.5 mg tablet, Take 2.5 mg by mouth two (2) times daily on Sat & Sun. 2.5mg on Sat/Sun. TAKE 1 TAB (5MG) BY MOUTH EVERY DAY ON MONDAY THROUGH FRIDAY, AND 1/2 TAB (2.5MG) EVERY SATURDAY AND Sunday. , Disp: , Rfl:     rosuvastatin (CRESTOR) 20 mg tablet, TAKE 1 TABLET BY MOUTH EVERY NIGHT, Disp: 90 Tab, Rfl: 3    EPINEPHrine (EPIPEN) 0.3 mg/0.3 mL injection, INJECT 0.3 ML BY INTRAMUSCULAR ROUTE ONCE PRN FOR ANAPHYLAXIS OR ALLERGIC RESPONSE FOR UP TO ONE DOSE, Disp: 1 Syringe, Rfl: 0    sildenafil citrate (VIAGRA) 50 mg tablet, Take 50 mg by mouth as needed. , Disp: , Rfl:       Physical exam is incomplete due to the inherent limitations of a video virtual telehealth session.     He is seated upright  Alert and oriented  Tachypneic, dyspneic  Wearing O2  Anicteric  Well developed  Normal speech, normal affect  No goiter  No audible wheezing or stridor  No jvd      I have personally reviewed his chest CT. Diffuse GGO throughout both lung fields, no lymphadenopathy, no PE     ==============================     PFTs- FeV1 46%     ==============================     ECHO EF 00%  RV systolic function mildly depressed     ===========================     Diagnoses  1: Hypoxic respiratory failure  2: ILD  3: h/o PE     =============================  Mr. Ricki Sargent has unexplained ILD and his O2 requirements are out of proportion to his history and CT scan. We will post for a L VATS lung biopsy in late June. I reached out to his pulmonologist, Dr. Shonna Son, to confirm that the lung biopsy was still clinically needed. We discussed the risks and benefits of a lung biopsy. We also discussed his coronavirus risks. Post operative I will have PAR assist in his management. He will need a lovenox bridge. The patient was counseled at length about the risks of adis Covid-19 during their perioperative period and any recovery window from their procedure. The patient was made aware that adis Covid-19  may worsen their prognosis for recovering from their procedure and lend to a higher morbidity and/or mortality risk. All material risks, benefits, and reasonable alternatives including postponing the procedure were discussed. The patient does  wish to proceed with the procedure at this time.        Thank you for allowing us to care for Mr Iona Crandall     I spent 15 minutes with Iona Crandall and their family, greater than 50% of which was spent in counseling and education reviewing their films, discussing surgical options and formulating a future care plan. I was in the office while conducting this encounter. Patient was at home.      Consent:  He and/or his healthcare decision maker is aware that this patient-initiated Telehealth encounter is a billable service, with coverage as determined by his insurance carrier. He is aware that he may receive a bill and has provided verbal consent to proceed: Yes    This virtual visit was conducted via [employer]. Pursuant to the emergency declaration under the [facility], [facility] waiver authority and the appening and [employer] General Act, this Virtual  Visit was conducted to reduce the patient's risk of exposure to COVID-19 and provide continuity of care for an established patient. Services were provided through a video synchronous discussion virtually to substitute for in-person clinic visit. Due to this being a TeleHealth evaluation, many elements of the physical examination are unable to be assessed. Total Time: minutes: 11-20 minutes.

## 2020-06-04 ENCOUNTER — TELEPHONE (OUTPATIENT)
Dept: SURGERY | Age: 50
End: 2020-06-04

## 2020-06-04 DIAGNOSIS — Z79.01 ON BRIDGING TREATMENT WITH LOVENOX: Primary | ICD-10-CM

## 2020-06-04 RX ORDER — ENOXAPARIN SODIUM 100 MG/ML
40 INJECTION SUBCUTANEOUS DAILY
Qty: 7 SYRINGE | Refills: 0 | Status: SHIPPED | OUTPATIENT
Start: 2020-06-04 | End: 2020-06-11

## 2020-06-04 NOTE — TELEPHONE ENCOUNTER
Patient has upcoming surgery on June 22nd with Dr. Ruddy Palma. Patient advised, as per Muna Moreland, to stop his Coumadin after his dose on June 14th. Start Lovenox injections on June 15th and take one injection daily at 9:00 am through June 21st.  The prescription has been sent to his pharmacy. The patient verbalized understanding and also repeated these instructions.

## 2020-06-08 ENCOUNTER — HOSPITAL ENCOUNTER (OUTPATIENT)
Dept: PREADMISSION TESTING | Age: 50
Discharge: HOME | End: 2020-06-08
Payer: COMMERCIAL

## 2020-06-08 VITALS
BODY MASS INDEX: 28.78 KG/M2 | DIASTOLIC BLOOD PRESSURE: 73 MMHG | TEMPERATURE: 98.1 F | SYSTOLIC BLOOD PRESSURE: 116 MMHG | HEIGHT: 74 IN | HEART RATE: 73 BPM | WEIGHT: 224.25 LBS

## 2020-06-08 LAB
ALBUMIN SERPL-MCNC: 3.6 G/DL (ref 3.5–5)
ALBUMIN/GLOB SERPL: 0.9 {RATIO} (ref 1.1–2.2)
ALP SERPL-CCNC: 88 U/L (ref 45–117)
ALT SERPL-CCNC: 34 U/L (ref 12–78)
ANION GAP SERPL CALC-SCNC: 7 MMOL/L (ref 5–15)
AST SERPL-CCNC: 29 U/L (ref 15–37)
BASOPHILS # BLD: 0 K/UL (ref 0–0.1)
BASOPHILS NFR BLD: 1 % (ref 0–1)
BILIRUB SERPL-MCNC: 1.5 MG/DL (ref 0.2–1)
BUN SERPL-MCNC: 6 MG/DL (ref 6–20)
BUN/CREAT SERPL: 7 (ref 12–20)
CALCIUM SERPL-MCNC: 9.2 MG/DL (ref 8.5–10.1)
CHLORIDE SERPL-SCNC: 108 MMOL/L (ref 97–108)
CO2 SERPL-SCNC: 25 MMOL/L (ref 21–32)
CREAT SERPL-MCNC: 0.82 MG/DL (ref 0.7–1.3)
DIFFERENTIAL METHOD BLD: ABNORMAL
EOSINOPHIL # BLD: 0.3 K/UL (ref 0–0.4)
EOSINOPHIL NFR BLD: 5 % (ref 0–7)
ERYTHROCYTE [DISTWIDTH] IN BLOOD BY AUTOMATED COUNT: 17.8 % (ref 11.5–14.5)
GLOBULIN SER CALC-MCNC: 4.2 G/DL (ref 2–4)
GLUCOSE SERPL-MCNC: 92 MG/DL (ref 65–100)
HCT VFR BLD AUTO: 45.5 % (ref 36.6–50.3)
HGB BLD-MCNC: 14.4 G/DL (ref 12.1–17)
IMM GRANULOCYTES # BLD AUTO: 0 K/UL (ref 0–0.04)
IMM GRANULOCYTES NFR BLD AUTO: 0 % (ref 0–0.5)
INR PPP: 3.7 (ref 0.9–1.1)
LYMPHOCYTES # BLD: 1.4 K/UL (ref 0.8–3.5)
LYMPHOCYTES NFR BLD: 21 % (ref 12–49)
MCH RBC QN AUTO: 29 PG (ref 26–34)
MCHC RBC AUTO-ENTMCNC: 31.6 G/DL (ref 30–36.5)
MCV RBC AUTO: 91.7 FL (ref 80–99)
MONOCYTES # BLD: 0.5 K/UL (ref 0–1)
MONOCYTES NFR BLD: 8 % (ref 5–13)
NEUTS SEG # BLD: 4.4 K/UL (ref 1.8–8)
NEUTS SEG NFR BLD: 65 % (ref 32–75)
NRBC # BLD: 0 K/UL (ref 0–0.01)
NRBC BLD-RTO: 0 PER 100 WBC
PLATELET # BLD AUTO: 219 K/UL (ref 150–400)
PMV BLD AUTO: 9 FL (ref 8.9–12.9)
POTASSIUM SERPL-SCNC: 4.3 MMOL/L (ref 3.5–5.1)
PROT SERPL-MCNC: 7.8 G/DL (ref 6.4–8.2)
PROTHROMBIN TIME: 34.8 SEC (ref 9–11.1)
RBC # BLD AUTO: 4.96 M/UL (ref 4.1–5.7)
SODIUM SERPL-SCNC: 140 MMOL/L (ref 136–145)
WBC # BLD AUTO: 6.6 K/UL (ref 4.1–11.1)

## 2020-06-08 PROCEDURE — 80053 COMPREHEN METABOLIC PANEL: CPT

## 2020-06-08 PROCEDURE — 85610 PROTHROMBIN TIME: CPT

## 2020-06-08 PROCEDURE — 85025 COMPLETE CBC W/AUTO DIFF WBC: CPT

## 2020-06-08 PROCEDURE — 36415 COLL VENOUS BLD VENIPUNCTURE: CPT

## 2020-06-08 PROCEDURE — 86900 BLOOD TYPING SEROLOGIC ABO: CPT

## 2020-06-08 RX ORDER — TRAMADOL HYDROCHLORIDE 50 MG/1
50 TABLET ORAL
COMMUNITY
End: 2020-07-07

## 2020-06-08 RX ORDER — FAMOTIDINE 20 MG/1
20 TABLET, FILM COATED ORAL
COMMUNITY
End: 2020-06-13 | Stop reason: SDUPTHER

## 2020-06-08 NOTE — PERIOP NOTES
6/8/20 @ 11:55 A. M. - CONSULTED NURSE CARMEN FOR DR. SERVIN IN REFERENCE TO ORDERS. NURSE CARMEN GAVE APPROVAL FOR ADDITIONAL ORDERS PER ANESTHESIA PROTOCOL.

## 2020-06-09 NOTE — PERIOP NOTES
SPOKE WITH CARMEN--DR SERVIN RE: ABN. PREOP LAB:  PT/INR  3.7/34.8;  CMP SAMPLE HEMOLYZED, OKAY TO REPEAT STAT PREOP DOS. ORDER RECEIVED TO REPEAT PT/INR STAT PREOP DOS.

## 2020-06-10 LAB
ABO + RH BLD: NORMAL
BLOOD GROUP ANTIBODIES SERPL: NORMAL
SPECIMEN EXP DATE BLD: NORMAL

## 2020-06-12 ENCOUNTER — TELEPHONE (OUTPATIENT)
Dept: FAMILY MEDICINE CLINIC | Age: 50
End: 2020-06-12

## 2020-06-13 DIAGNOSIS — K21.9 GASTROESOPHAGEAL REFLUX DISEASE, ESOPHAGITIS PRESENCE NOT SPECIFIED: Primary | ICD-10-CM

## 2020-06-13 RX ORDER — FAMOTIDINE 20 MG/1
20 TABLET, FILM COATED ORAL
Qty: 90 TAB | Refills: 0 | Status: SHIPPED | OUTPATIENT
Start: 2020-06-13 | End: 2020-09-24

## 2020-06-18 ENCOUNTER — HOSPITAL ENCOUNTER (OUTPATIENT)
Dept: PREADMISSION TESTING | Age: 50
Discharge: HOME | End: 2020-06-18
Attending: NURSE PRACTITIONER
Payer: COMMERCIAL

## 2020-06-18 DIAGNOSIS — Z79.01 ANTICOAGULANT LONG-TERM USE: ICD-10-CM

## 2020-06-18 DIAGNOSIS — Z20.822 ENCOUNTER FOR LABORATORY TESTING FOR COVID-19 VIRUS: ICD-10-CM

## 2020-06-18 PROCEDURE — 87635 SARS-COV-2 COVID-19 AMP PRB: CPT

## 2020-06-18 RX ORDER — CARVEDILOL 3.12 MG/1
3.12 TABLET ORAL 2 TIMES DAILY WITH MEALS
Qty: 60 TAB | Refills: 2 | Status: SHIPPED | OUTPATIENT
Start: 2020-06-18 | End: 2021-01-29

## 2020-06-18 NOTE — TELEPHONE ENCOUNTER
PCP: Mireille Bone MD    Last appt: 4/22/2020  No future appointments. Requested Prescriptions     Pending Prescriptions Disp Refills    carvediloL (COREG) 3.125 mg tablet 60 Tab 3     Sig: Take 1 Tab by mouth two (2) times daily (with meals). Pharmacy [facility]    Patient has ? days' supply of medication available.     Prior labs and Blood pressures:  BP Readings from Last 3 Encounters:   06/08/20 116/73   04/18/20 109/66   04/17/20 123/81     Lab Results   Component Value Date/Time    Sodium 140 06/08/2020 01:00 PM    Potassium 4.3 06/08/2020 01:00 PM    Chloride 108 06/08/2020 01:00 PM    CO2 25 06/08/2020 01:00 PM    Anion gap 7 06/08/2020 01:00 PM    Glucose 92 06/08/2020 01:00 PM    BUN 6 06/08/2020 01:00 PM    Creatinine 0.82 06/08/2020 01:00 PM    BUN/Creatinine ratio 7 (L) 06/08/2020 01:00 PM    GFR est AA >60 06/08/2020 01:00 PM    GFR est non-AA >60 06/08/2020 01:00 PM    Calcium 9.2 06/08/2020 01:00 PM     Lab Results   Component Value Date/Time    Hemoglobin A1c 6.4 (H) 08/17/2019 08:11 AM     Lab Results   Component Value Date/Time    Cholesterol, total 112 08/17/2019 08:11 AM    HDL Cholesterol 33 (L) 08/17/2019 08:11 AM    LDL, calculated 53 08/17/2019 08:11 AM    VLDL, calculated 26 08/17/2019 08:11 AM    Triglyceride 129 08/17/2019 08:11 AM     Lab Results   Component Value Date/Time    VITAMIN D, 25-HYDROXY 26.9 (L) 08/17/2019 08:11 AM       Lab Results   Component Value Date/Time    TSH 1.410 08/17/2019 08:11 AM

## 2020-06-18 NOTE — TELEPHONE ENCOUNTER
The patient reported that he will run out of medication Saturday and he is having an operation on Monday. Do he need to continue Coumadin?

## 2020-06-19 ENCOUNTER — HOSPITAL ENCOUNTER (EMERGENCY)
Age: 50
Discharge: HOME | End: 2020-06-20
Attending: EMERGENCY MEDICINE | Admitting: EMERGENCY MEDICINE
Payer: COMMERCIAL

## 2020-06-19 ENCOUNTER — APPOINTMENT (OUTPATIENT)
Dept: VASCULAR SURGERY | Age: 50
End: 2020-06-19
Attending: EMERGENCY MEDICINE
Payer: COMMERCIAL

## 2020-06-19 DIAGNOSIS — L03.116 CELLULITIS OF LEFT LOWER EXTREMITY: Primary | ICD-10-CM

## 2020-06-19 LAB
ALBUMIN SERPL-MCNC: 3.7 G/DL (ref 3.5–5)
ALBUMIN/GLOB SERPL: 0.7 {RATIO} (ref 1.1–2.2)
ALP SERPL-CCNC: 85 U/L (ref 45–117)
ALT SERPL-CCNC: 28 U/L (ref 12–78)
ANION GAP SERPL CALC-SCNC: 3 MMOL/L (ref 5–15)
AST SERPL-CCNC: 20 U/L (ref 15–37)
BASOPHILS # BLD: 0.1 K/UL (ref 0–0.1)
BASOPHILS NFR BLD: 1 % (ref 0–1)
BILIRUB SERPL-MCNC: 2.1 MG/DL (ref 0.2–1)
BUN SERPL-MCNC: 10 MG/DL (ref 6–20)
BUN/CREAT SERPL: 10 (ref 12–20)
CALCIUM SERPL-MCNC: 9.5 MG/DL (ref 8.5–10.1)
CHLORIDE SERPL-SCNC: 106 MMOL/L (ref 97–108)
CO2 SERPL-SCNC: 28 MMOL/L (ref 21–32)
COMMENT, HOLDF: NORMAL
CREAT SERPL-MCNC: 0.96 MG/DL (ref 0.7–1.3)
DIFFERENTIAL METHOD BLD: ABNORMAL
EOSINOPHIL # BLD: 0.4 K/UL (ref 0–0.4)
EOSINOPHIL NFR BLD: 4 % (ref 0–7)
ERYTHROCYTE [DISTWIDTH] IN BLOOD BY AUTOMATED COUNT: 16.2 % (ref 11.5–14.5)
GLOBULIN SER CALC-MCNC: 5 G/DL (ref 2–4)
GLUCOSE SERPL-MCNC: 135 MG/DL (ref 65–100)
HCT VFR BLD AUTO: 46.4 % (ref 36.6–50.3)
HGB BLD-MCNC: 15 G/DL (ref 12.1–17)
IMM GRANULOCYTES # BLD AUTO: 0 K/UL (ref 0–0.04)
IMM GRANULOCYTES NFR BLD AUTO: 0 % (ref 0–0.5)
LACTATE SERPL-SCNC: 1 MMOL/L (ref 0.4–2)
LYMPHOCYTES # BLD: 1.2 K/UL (ref 0.8–3.5)
LYMPHOCYTES NFR BLD: 14 % (ref 12–49)
MCH RBC QN AUTO: 29.6 PG (ref 26–34)
MCHC RBC AUTO-ENTMCNC: 32.3 G/DL (ref 30–36.5)
MCV RBC AUTO: 91.7 FL (ref 80–99)
MONOCYTES # BLD: 0.6 K/UL (ref 0–1)
MONOCYTES NFR BLD: 7 % (ref 5–13)
NEUTS SEG # BLD: 6.5 K/UL (ref 1.8–8)
NEUTS SEG NFR BLD: 74 % (ref 32–75)
NRBC # BLD: 0 K/UL (ref 0–0.01)
NRBC BLD-RTO: 0 PER 100 WBC
PLATELET # BLD AUTO: 197 K/UL (ref 150–400)
PMV BLD AUTO: 9.2 FL (ref 8.9–12.9)
POTASSIUM SERPL-SCNC: 3.4 MMOL/L (ref 3.5–5.1)
PROT SERPL-MCNC: 8.7 G/DL (ref 6.4–8.2)
RBC # BLD AUTO: 5.06 M/UL (ref 4.1–5.7)
SAMPLES BEING HELD,HOLD: NORMAL
SARS-COV-2, COV2NT: NOT DETECTED
SODIUM SERPL-SCNC: 137 MMOL/L (ref 136–145)
WBC # BLD AUTO: 8.8 K/UL (ref 4.1–11.1)

## 2020-06-19 PROCEDURE — 80053 COMPREHEN METABOLIC PANEL: CPT

## 2020-06-19 PROCEDURE — 36415 COLL VENOUS BLD VENIPUNCTURE: CPT

## 2020-06-19 PROCEDURE — 93971 EXTREMITY STUDY: CPT

## 2020-06-19 PROCEDURE — 83605 ASSAY OF LACTIC ACID: CPT

## 2020-06-19 PROCEDURE — 99284 EMERGENCY DEPT VISIT MOD MDM: CPT

## 2020-06-19 PROCEDURE — 85025 COMPLETE CBC W/AUTO DIFF WBC: CPT

## 2020-06-19 RX ORDER — CEPHALEXIN 500 MG/1
500 CAPSULE ORAL 3 TIMES DAILY
Qty: 20 CAP | Refills: 0 | Status: SHIPPED | OUTPATIENT
Start: 2020-06-19 | End: 2020-06-26

## 2020-06-19 RX ORDER — CEPHALEXIN 500 MG/1
500 CAPSULE ORAL
Status: COMPLETED | OUTPATIENT
Start: 2020-06-19 | End: 2020-06-20

## 2020-06-20 VITALS
TEMPERATURE: 98.6 F | RESPIRATION RATE: 16 BRPM | SYSTOLIC BLOOD PRESSURE: 106 MMHG | DIASTOLIC BLOOD PRESSURE: 77 MMHG | OXYGEN SATURATION: 95 % | HEART RATE: 85 BPM

## 2020-06-20 PROCEDURE — 74011250637 HC RX REV CODE- 250/637: Performed by: EMERGENCY MEDICINE

## 2020-06-20 RX ADMIN — CEPHALEXIN 500 MG: 500 CAPSULE ORAL at 00:02

## 2020-06-20 NOTE — DISCHARGE INSTRUCTIONS

## 2020-06-20 NOTE — ED NOTES
Bedside and Verbal shift change report given to Ryder Velasquez RN (oncoming nurse) by Katlin sterling RN (offgoing nurse). Report included the following information SBAR, ED Summary and Recent Results.

## 2020-06-20 NOTE — ED TRIAGE NOTES
He arrives ambulatory on oxygen at 4L. He is here for swelling in his left lower leg for a day. He hit it on a car hitch Saturday. He is scheduled for a lung biopsy next year. He is taking Lovenox.

## 2020-06-20 NOTE — ED PROVIDER NOTES
HPI .  Patient has a history of anxiety disorder, essential tremor, cellulitis, DVT, pulmonary emboli, rectal bleeding, umbilical hernia, and vitamin D deficiency. Patient says he had a work-up for his throat with a negative biopsy. He reports that thoracic surgery plans a lung biopsy in 3 days. Patient has been on home oxygen for the last 4 months. He hit his left leg on a hitch several days ago. There was a minor abrasion and a \"bump\" over the area. Patient subsequently has had increased swelling of his left leg. He has some bilateral edema but swelling is now asymmetric. Patient is not complaining of chest pain or increase in his shortness of breath. Additional history; patient struck his leg 1 week ago; he stopped his Coumadin 5 days ago; he started Lovenox injections 4 days ago. He has had some bruising of his abdominal wall using the Lovenox. Past Medical History:   Diagnosis Date    A-fib ([address]     Allergic rhinitis     Anxiety disorder     Cellulitis     post tattoo    Deep vein thrombosis (DVT) ([facility])     [facility]  4/24/17 f/u note to manage Lovenox    Essential tremor     BILATERAL HANDS    Laceration of arm 09/23/2017    Left cut by a chain saw.     Nicotine vapor product user     STOPPED 2018    Phlebitis     Psychiatric disorder     ANXIETY WITH PANICK ATTACK    Pulmonary embolism ([address] 3/2011    multiple episodes    Rectal bleeding 10/2011    on coumadin @ the time &again 4/2017 4/12/17 note from Carla//colonoscopy report rec'd    Shortness of breath     [facility]     Umbilical hernia     Vitamin D deficiency 04/2014    again 4/2017       Past Surgical History:   Procedure Laterality Date    HX APPENDECTOMY  2003    HX COLONOSCOPY  04/28/2017    Dr. Gamaliel Acosta    HX HEENT  02/2020    BRONCHOSCOPY    HX HERNIA REPAIR  7925    umbilical         Family History:   Problem Relation Age of Onset    Heart Disease Father     Heart Disease Maternal Grandmother  Heart Disease Maternal Grandfather     Anesth Problems Neg Hx        Social History     Socioeconomic History    Marital status: SINGLE     Spouse name: Not on file    Number of children: Not on file    Years of education: Not on file    Highest education level: Not on file   Occupational History    Not on file   Social Needs    Financial resource strain: Not on file    Food insecurity     Worry: Not on file     Inability: Not on file    Transportation needs     Medical: Not on file     Non-medical: Not on file   Tobacco Use    Smoking status: Never Smoker    Smokeless tobacco: Current User     Types: Snuff    Tobacco comment: former vaper   Substance and Sexual Activity    Alcohol use: Yes     Alcohol/week: 0.0 standard drinks     Comment: SOCIALLY EVERY 6 MONTHS    Drug use: Never    Sexual activity: Yes     Partners: Female   Lifestyle    Physical activity     Days per week: Not on file     Minutes per session: Not on file    Stress: Not on file   Relationships    Social connections     Talks on phone: Not on file     Gets together: Not on file     Attends Orthodoxy service: Not on file     Active member of club or organization: Not on file     Attends meetings of clubs or organizations: Not on file     Relationship status: Not on file    Intimate partner violence     Fear of current or ex partner: Not on file     Emotionally abused: Not on file     Physically abused: Not on file     Forced sexual activity: Not on file   Other Topics Concern    Not on file   Social History Narrative    Not on file         ALLERGIES: Bee sting [sting, bee]; Neomycin; and Neosporin [neomycin-bacitracin-polymyxin]    Review of Systems   Constitutional: Positive for diaphoresis. HENT: Negative for congestion. Eyes: Negative for redness. Cardiovascular: Negative for chest pain. Gastrointestinal: Negative for abdominal pain. Genitourinary: Negative for difficulty urinating.    Neurological: Negative for speech difficulty. Psychiatric/Behavioral: Negative for agitation and behavioral problems. Vitals:    06/19/20 2004   BP: 152/76   Pulse: (!) 106   Resp: 16   Temp: 98.9 °F (37.2 °C)   SpO2: 92%            Physical Exam  Vitals signs and nursing note reviewed. Constitutional:       Appearance: He is well-developed. HENT:      Head: Normocephalic and atraumatic. Eyes:      Pupils: Pupils are equal, round, and reactive to light. Neck:      Musculoskeletal: Normal range of motion and neck supple. Cardiovascular:      Rate and Rhythm: Normal rate and regular rhythm. Heart sounds: Normal heart sounds. No murmur. No friction rub. No gallop. Pulmonary:      Effort: Pulmonary effort is normal. No respiratory distress. Breath sounds: No wheezing or rales. Abdominal:      Palpations: Abdomen is soft. Tenderness: There is no abdominal tenderness. There is no rebound. Musculoskeletal: Normal range of motion. General: No tenderness. Right lower leg: Edema present. Left lower leg: Edema present. Comments: Left leg is significantly larger than the right leg   Skin:     Findings: No erythema. Comments: Small abrasion left anterior leg   Neurological:      Mental Status: He is alert. Cranial Nerves: No cranial nerve deficit.       Comments: Motor; symmetric   Psychiatric:         Behavior: Behavior normal.          MDM       Procedures

## 2020-06-21 ENCOUNTER — ANESTHESIA EVENT (OUTPATIENT)
Dept: SURGERY | Age: 50
DRG: 167 | End: 2020-06-21
Payer: COMMERCIAL

## 2020-06-22 ENCOUNTER — APPOINTMENT (OUTPATIENT)
Dept: GENERAL RADIOLOGY | Age: 50
DRG: 167 | End: 2020-06-22
Attending: THORACIC SURGERY (CARDIOTHORACIC VASCULAR SURGERY)
Payer: COMMERCIAL

## 2020-06-22 ENCOUNTER — PATIENT OUTREACH (OUTPATIENT)
Dept: INTERNAL MEDICINE CLINIC | Age: 50
End: 2020-06-22

## 2020-06-22 ENCOUNTER — HOSPITAL ENCOUNTER (INPATIENT)
Age: 50
LOS: 1 days | Discharge: HOME | DRG: 167 | End: 2020-06-23
Attending: THORACIC SURGERY (CARDIOTHORACIC VASCULAR SURGERY) | Admitting: THORACIC SURGERY (CARDIOTHORACIC VASCULAR SURGERY)
Payer: COMMERCIAL

## 2020-06-22 ENCOUNTER — ANESTHESIA (OUTPATIENT)
Dept: SURGERY | Age: 50
DRG: 167 | End: 2020-06-22
Payer: COMMERCIAL

## 2020-06-22 DIAGNOSIS — J84.9 ILD (INTERSTITIAL LUNG DISEASE) (HCC): Primary | ICD-10-CM

## 2020-06-22 LAB
ALBUMIN SERPL-MCNC: 3.3 G/DL (ref 3.5–5)
ALBUMIN/GLOB SERPL: 0.7 {RATIO} (ref 1.1–2.2)
ALP SERPL-CCNC: 76 U/L (ref 45–117)
ALT SERPL-CCNC: 21 U/L (ref 12–78)
ANION GAP SERPL CALC-SCNC: 6 MMOL/L (ref 5–15)
AST SERPL-CCNC: 18 U/L (ref 15–37)
BILIRUB SERPL-MCNC: 1.5 MG/DL (ref 0.2–1)
BUN SERPL-MCNC: 8 MG/DL (ref 6–20)
BUN/CREAT SERPL: 8 (ref 12–20)
CALCIUM SERPL-MCNC: 9 MG/DL (ref 8.5–10.1)
CHLORIDE SERPL-SCNC: 105 MMOL/L (ref 97–108)
CO2 SERPL-SCNC: 28 MMOL/L (ref 21–32)
CREAT SERPL-MCNC: 1 MG/DL (ref 0.7–1.3)
GLOBULIN SER CALC-MCNC: 4.5 G/DL (ref 2–4)
GLUCOSE SERPL-MCNC: 111 MG/DL (ref 65–100)
INR BLD: 1.4 (ref 0.9–1.2)
POTASSIUM SERPL-SCNC: 4 MMOL/L (ref 3.5–5.1)
PROT SERPL-MCNC: 7.8 G/DL (ref 6.4–8.2)
SODIUM SERPL-SCNC: 139 MMOL/L (ref 136–145)

## 2020-06-22 PROCEDURE — 74011250636 HC RX REV CODE- 250/636: Performed by: THORACIC SURGERY (CARDIOTHORACIC VASCULAR SURGERY)

## 2020-06-22 PROCEDURE — 77030018836 HC SOL IRR NACL ICUM -A: Performed by: THORACIC SURGERY (CARDIOTHORACIC VASCULAR SURGERY)

## 2020-06-22 PROCEDURE — 77030011640 HC PAD GRND REM COVD -A: Performed by: THORACIC SURGERY (CARDIOTHORACIC VASCULAR SURGERY)

## 2020-06-22 PROCEDURE — 77030022474 HC RELD STPLR ENDO GIA COVD -C: Performed by: THORACIC SURGERY (CARDIOTHORACIC VASCULAR SURGERY)

## 2020-06-22 PROCEDURE — 77030040361 HC SLV COMPR DVT MDII -B: Performed by: THORACIC SURGERY (CARDIOTHORACIC VASCULAR SURGERY)

## 2020-06-22 PROCEDURE — 77030003666 HC NDL SPINAL BD -A: Performed by: THORACIC SURGERY (CARDIOTHORACIC VASCULAR SURGERY)

## 2020-06-22 PROCEDURE — 65660000000 HC RM CCU STEPDOWN

## 2020-06-22 PROCEDURE — 85610 PROTHROMBIN TIME: CPT

## 2020-06-22 PROCEDURE — 77030010507 HC ADH SKN DERMBND J&J -B: Performed by: THORACIC SURGERY (CARDIOTHORACIC VASCULAR SURGERY)

## 2020-06-22 PROCEDURE — 71045 X-RAY EXAM CHEST 1 VIEW: CPT

## 2020-06-22 PROCEDURE — 77030008671 HC TU ENDO/BRNC CUF COVD -B: Performed by: ANESTHESIOLOGY

## 2020-06-22 PROCEDURE — 76060000034 HC ANESTHESIA 1.5 TO 2 HR: Performed by: THORACIC SURGERY (CARDIOTHORACIC VASCULAR SURGERY)

## 2020-06-22 PROCEDURE — 88307 TISSUE EXAM BY PATHOLOGIST: CPT

## 2020-06-22 PROCEDURE — 74011250637 HC RX REV CODE- 250/637: Performed by: THORACIC SURGERY (CARDIOTHORACIC VASCULAR SURGERY)

## 2020-06-22 PROCEDURE — 0W9B40Z DRAINAGE OF LEFT PLEURAL CAVITY WITH DRAINAGE DEVICE, PERCUTANEOUS ENDOSCOPIC APPROACH: ICD-10-PCS | Performed by: THORACIC SURGERY (CARDIOTHORACIC VASCULAR SURGERY)

## 2020-06-22 PROCEDURE — 77030002996 HC SUT SLK J&J -A: Performed by: THORACIC SURGERY (CARDIOTHORACIC VASCULAR SURGERY)

## 2020-06-22 PROCEDURE — 76010000149 HC OR TIME 1 TO 1.5 HR: Performed by: THORACIC SURGERY (CARDIOTHORACIC VASCULAR SURGERY)

## 2020-06-22 PROCEDURE — 77030012390 HC DRN CHST BTL GTNG -B: Performed by: THORACIC SURGERY (CARDIOTHORACIC VASCULAR SURGERY)

## 2020-06-22 PROCEDURE — 77030031139 HC SUT VCRL2 J&J -A: Performed by: THORACIC SURGERY (CARDIOTHORACIC VASCULAR SURGERY)

## 2020-06-22 PROCEDURE — 76210000016 HC OR PH I REC 1 TO 1.5 HR: Performed by: THORACIC SURGERY (CARDIOTHORACIC VASCULAR SURGERY)

## 2020-06-22 PROCEDURE — 77030011264 HC ELECTRD BLD EXT COVD -A: Performed by: THORACIC SURGERY (CARDIOTHORACIC VASCULAR SURGERY)

## 2020-06-22 PROCEDURE — 77030016151 HC PROTCTR LNS DFOG COVD -B: Performed by: THORACIC SURGERY (CARDIOTHORACIC VASCULAR SURGERY)

## 2020-06-22 PROCEDURE — 74011000250 HC RX REV CODE- 250: Performed by: NURSE ANESTHETIST, CERTIFIED REGISTERED

## 2020-06-22 PROCEDURE — 77030009965 HC RELD STPLR ENDOS COVD -D: Performed by: THORACIC SURGERY (CARDIOTHORACIC VASCULAR SURGERY)

## 2020-06-22 PROCEDURE — 74011250636 HC RX REV CODE- 250/636: Performed by: ANESTHESIOLOGY

## 2020-06-22 PROCEDURE — 74011250636 HC RX REV CODE- 250/636

## 2020-06-22 PROCEDURE — 74011250636 HC RX REV CODE- 250/636: Performed by: NURSE ANESTHETIST, CERTIFIED REGISTERED

## 2020-06-22 PROCEDURE — 77030018673: Performed by: THORACIC SURGERY (CARDIOTHORACIC VASCULAR SURGERY)

## 2020-06-22 PROCEDURE — 74011250637 HC RX REV CODE- 250/637: Performed by: ANESTHESIOLOGY

## 2020-06-22 PROCEDURE — 0BBG4ZX EXCISION OF LEFT UPPER LUNG LOBE, PERCUTANEOUS ENDOSCOPIC APPROACH, DIAGNOSTIC: ICD-10-PCS | Performed by: THORACIC SURGERY (CARDIOTHORACIC VASCULAR SURGERY)

## 2020-06-22 PROCEDURE — 0BBJ4ZX EXCISION OF LEFT LOWER LUNG LOBE, PERCUTANEOUS ENDOSCOPIC APPROACH, DIAGNOSTIC: ICD-10-PCS | Performed by: THORACIC SURGERY (CARDIOTHORACIC VASCULAR SURGERY)

## 2020-06-22 PROCEDURE — 36415 COLL VENOUS BLD VENIPUNCTURE: CPT

## 2020-06-22 PROCEDURE — 77030022473 HC HNDL ENDO GIA UNIV USDA -C: Performed by: THORACIC SURGERY (CARDIOTHORACIC VASCULAR SURGERY)

## 2020-06-22 PROCEDURE — 74011000250 HC RX REV CODE- 250: Performed by: THORACIC SURGERY (CARDIOTHORACIC VASCULAR SURGERY)

## 2020-06-22 PROCEDURE — 80053 COMPREHEN METABOLIC PANEL: CPT

## 2020-06-22 PROCEDURE — 77030040922 HC BLNKT HYPOTHRM STRY -A

## 2020-06-22 PROCEDURE — 77030018846 HC SOL IRR STRL H20 ICUM -A: Performed by: THORACIC SURGERY (CARDIOTHORACIC VASCULAR SURGERY)

## 2020-06-22 PROCEDURE — 0BBH4ZX EXCISION OF LUNG LINGULA, PERCUTANEOUS ENDOSCOPIC APPROACH, DIAGNOSTIC: ICD-10-PCS | Performed by: THORACIC SURGERY (CARDIOTHORACIC VASCULAR SURGERY)

## 2020-06-22 RX ORDER — SODIUM CHLORIDE 0.9 % (FLUSH) 0.9 %
5-40 SYRINGE (ML) INJECTION EVERY 8 HOURS
Status: DISCONTINUED | OUTPATIENT
Start: 2020-06-22 | End: 2020-06-22 | Stop reason: HOSPADM

## 2020-06-22 RX ORDER — SODIUM CHLORIDE 0.9 % (FLUSH) 0.9 %
5-40 SYRINGE (ML) INJECTION AS NEEDED
Status: DISCONTINUED | OUTPATIENT
Start: 2020-06-22 | End: 2020-06-22 | Stop reason: HOSPADM

## 2020-06-22 RX ORDER — WARFARIN 2.5 MG/1
2.5 TABLET ORAL
Status: DISCONTINUED | OUTPATIENT
Start: 2020-06-27 | End: 2020-06-23 | Stop reason: HOSPADM

## 2020-06-22 RX ORDER — WARFARIN SODIUM 5 MG/1
5 TABLET ORAL
Qty: 90 TAB | Refills: 0 | Status: SHIPPED | OUTPATIENT
Start: 2020-06-22 | End: 2020-08-21 | Stop reason: DRUGHIGH

## 2020-06-22 RX ORDER — CARVEDILOL 3.12 MG/1
3.12 TABLET ORAL 2 TIMES DAILY WITH MEALS
Qty: 60 TAB | Refills: 3 | OUTPATIENT
Start: 2020-06-22

## 2020-06-22 RX ORDER — PROPOFOL 10 MG/ML
INJECTION, EMULSION INTRAVENOUS AS NEEDED
Status: DISCONTINUED | OUTPATIENT
Start: 2020-06-22 | End: 2020-06-22 | Stop reason: HOSPADM

## 2020-06-22 RX ORDER — LIDOCAINE HYDROCHLORIDE 20 MG/ML
INJECTION, SOLUTION EPIDURAL; INFILTRATION; INTRACAUDAL; PERINEURAL AS NEEDED
Status: DISCONTINUED | OUTPATIENT
Start: 2020-06-22 | End: 2020-06-22 | Stop reason: HOSPADM

## 2020-06-22 RX ORDER — DIPHENHYDRAMINE HYDROCHLORIDE 50 MG/ML
12.5 INJECTION, SOLUTION INTRAMUSCULAR; INTRAVENOUS AS NEEDED
Status: DISCONTINUED | OUTPATIENT
Start: 2020-06-22 | End: 2020-06-22 | Stop reason: HOSPADM

## 2020-06-22 RX ORDER — ROCURONIUM BROMIDE 10 MG/ML
INJECTION, SOLUTION INTRAVENOUS AS NEEDED
Status: DISCONTINUED | OUTPATIENT
Start: 2020-06-22 | End: 2020-06-22 | Stop reason: HOSPADM

## 2020-06-22 RX ORDER — MIDAZOLAM HYDROCHLORIDE 1 MG/ML
INJECTION, SOLUTION INTRAMUSCULAR; INTRAVENOUS AS NEEDED
Status: DISCONTINUED | OUTPATIENT
Start: 2020-06-22 | End: 2020-06-22 | Stop reason: HOSPADM

## 2020-06-22 RX ORDER — FENTANYL CITRATE 50 UG/ML
INJECTION, SOLUTION INTRAMUSCULAR; INTRAVENOUS
Status: COMPLETED
Start: 2020-06-22 | End: 2020-06-22

## 2020-06-22 RX ORDER — SODIUM CHLORIDE 9 MG/ML
25 INJECTION, SOLUTION INTRAVENOUS CONTINUOUS
Status: DISCONTINUED | OUTPATIENT
Start: 2020-06-22 | End: 2020-06-22 | Stop reason: HOSPADM

## 2020-06-22 RX ORDER — FAMOTIDINE 20 MG/1
20 TABLET, FILM COATED ORAL
Status: DISCONTINUED | OUTPATIENT
Start: 2020-06-23 | End: 2020-06-23 | Stop reason: HOSPADM

## 2020-06-22 RX ORDER — CEFAZOLIN SODIUM/WATER 2 G/20 ML
2 SYRINGE (ML) INTRAVENOUS ONCE
Status: COMPLETED | OUTPATIENT
Start: 2020-06-22 | End: 2020-06-22

## 2020-06-22 RX ORDER — MIDAZOLAM HYDROCHLORIDE 1 MG/ML
INJECTION, SOLUTION INTRAMUSCULAR; INTRAVENOUS
Status: COMPLETED
Start: 2020-06-22 | End: 2020-06-22

## 2020-06-22 RX ORDER — SODIUM CHLORIDE, SODIUM LACTATE, POTASSIUM CHLORIDE, CALCIUM CHLORIDE 600; 310; 30; 20 MG/100ML; MG/100ML; MG/100ML; MG/100ML
125 INJECTION, SOLUTION INTRAVENOUS CONTINUOUS
Status: DISCONTINUED | OUTPATIENT
Start: 2020-06-22 | End: 2020-06-22 | Stop reason: HOSPADM

## 2020-06-22 RX ORDER — ROSUVASTATIN CALCIUM 10 MG/1
20 TABLET, COATED ORAL
Status: DISCONTINUED | OUTPATIENT
Start: 2020-06-22 | End: 2020-06-23 | Stop reason: HOSPADM

## 2020-06-22 RX ORDER — ONDANSETRON 2 MG/ML
4 INJECTION INTRAMUSCULAR; INTRAVENOUS AS NEEDED
Status: DISCONTINUED | OUTPATIENT
Start: 2020-06-22 | End: 2020-06-22 | Stop reason: HOSPADM

## 2020-06-22 RX ORDER — NALOXONE HYDROCHLORIDE 0.4 MG/ML
0.4 INJECTION, SOLUTION INTRAMUSCULAR; INTRAVENOUS; SUBCUTANEOUS AS NEEDED
Status: DISCONTINUED | OUTPATIENT
Start: 2020-06-22 | End: 2020-06-23 | Stop reason: HOSPADM

## 2020-06-22 RX ORDER — MIDAZOLAM HYDROCHLORIDE 1 MG/ML
1 INJECTION, SOLUTION INTRAMUSCULAR; INTRAVENOUS AS NEEDED
Status: DISCONTINUED | OUTPATIENT
Start: 2020-06-22 | End: 2020-06-22 | Stop reason: HOSPADM

## 2020-06-22 RX ORDER — MIDAZOLAM HYDROCHLORIDE 1 MG/ML
0.5 INJECTION, SOLUTION INTRAMUSCULAR; INTRAVENOUS
Status: COMPLETED | OUTPATIENT
Start: 2020-06-22 | End: 2020-06-22

## 2020-06-22 RX ORDER — SODIUM CHLORIDE 0.9 % (FLUSH) 0.9 %
5-40 SYRINGE (ML) INJECTION AS NEEDED
Status: DISCONTINUED | OUTPATIENT
Start: 2020-06-22 | End: 2020-06-23 | Stop reason: HOSPADM

## 2020-06-22 RX ORDER — FENTANYL CITRATE 50 UG/ML
INJECTION, SOLUTION INTRAMUSCULAR; INTRAVENOUS AS NEEDED
Status: DISCONTINUED | OUTPATIENT
Start: 2020-06-22 | End: 2020-06-22 | Stop reason: HOSPADM

## 2020-06-22 RX ORDER — FENTANYL CITRATE 50 UG/ML
50 INJECTION, SOLUTION INTRAMUSCULAR; INTRAVENOUS AS NEEDED
Status: DISCONTINUED | OUTPATIENT
Start: 2020-06-22 | End: 2020-06-22 | Stop reason: HOSPADM

## 2020-06-22 RX ORDER — LIDOCAINE HYDROCHLORIDE 10 MG/ML
0.1 INJECTION, SOLUTION EPIDURAL; INFILTRATION; INTRACAUDAL; PERINEURAL AS NEEDED
Status: DISCONTINUED | OUTPATIENT
Start: 2020-06-22 | End: 2020-06-22 | Stop reason: HOSPADM

## 2020-06-22 RX ORDER — HYDROMORPHONE HYDROCHLORIDE 1 MG/ML
1 INJECTION, SOLUTION INTRAMUSCULAR; INTRAVENOUS; SUBCUTANEOUS
Status: DISCONTINUED | OUTPATIENT
Start: 2020-06-22 | End: 2020-06-23 | Stop reason: HOSPADM

## 2020-06-22 RX ORDER — DEXAMETHASONE SODIUM PHOSPHATE 4 MG/ML
INJECTION, SOLUTION INTRA-ARTICULAR; INTRALESIONAL; INTRAMUSCULAR; INTRAVENOUS; SOFT TISSUE AS NEEDED
Status: DISCONTINUED | OUTPATIENT
Start: 2020-06-22 | End: 2020-06-22 | Stop reason: HOSPADM

## 2020-06-22 RX ORDER — HYDROMORPHONE HYDROCHLORIDE 1 MG/ML
0.2 INJECTION, SOLUTION INTRAMUSCULAR; INTRAVENOUS; SUBCUTANEOUS
Status: DISCONTINUED | OUTPATIENT
Start: 2020-06-22 | End: 2020-06-22 | Stop reason: HOSPADM

## 2020-06-22 RX ORDER — MORPHINE SULFATE 10 MG/ML
2 INJECTION, SOLUTION INTRAMUSCULAR; INTRAVENOUS
Status: DISCONTINUED | OUTPATIENT
Start: 2020-06-22 | End: 2020-06-22 | Stop reason: HOSPADM

## 2020-06-22 RX ORDER — FENTANYL CITRATE 50 UG/ML
25 INJECTION, SOLUTION INTRAMUSCULAR; INTRAVENOUS
Status: COMPLETED | OUTPATIENT
Start: 2020-06-22 | End: 2020-06-22

## 2020-06-22 RX ORDER — ONDANSETRON 2 MG/ML
4 INJECTION INTRAMUSCULAR; INTRAVENOUS
Status: DISCONTINUED | OUTPATIENT
Start: 2020-06-22 | End: 2020-06-23 | Stop reason: HOSPADM

## 2020-06-22 RX ORDER — WARFARIN SODIUM 5 MG/1
5 TABLET ORAL
Status: DISCONTINUED | OUTPATIENT
Start: 2020-06-23 | End: 2020-06-23 | Stop reason: HOSPADM

## 2020-06-22 RX ORDER — ONDANSETRON 2 MG/ML
INJECTION INTRAMUSCULAR; INTRAVENOUS AS NEEDED
Status: DISCONTINUED | OUTPATIENT
Start: 2020-06-22 | End: 2020-06-22 | Stop reason: HOSPADM

## 2020-06-22 RX ORDER — SODIUM CHLORIDE 0.9 % (FLUSH) 0.9 %
5-40 SYRINGE (ML) INJECTION EVERY 8 HOURS
Status: DISCONTINUED | OUTPATIENT
Start: 2020-06-22 | End: 2020-06-23 | Stop reason: HOSPADM

## 2020-06-22 RX ORDER — CEPHALEXIN 500 MG/1
500 CAPSULE ORAL 3 TIMES DAILY
Status: DISCONTINUED | OUTPATIENT
Start: 2020-06-22 | End: 2020-06-23 | Stop reason: HOSPADM

## 2020-06-22 RX ORDER — CARVEDILOL 3.12 MG/1
3.12 TABLET ORAL 2 TIMES DAILY WITH MEALS
Status: DISCONTINUED | OUTPATIENT
Start: 2020-06-22 | End: 2020-06-23 | Stop reason: HOSPADM

## 2020-06-22 RX ORDER — OXYCODONE AND ACETAMINOPHEN 5; 325 MG/1; MG/1
2 TABLET ORAL
Status: DISCONTINUED | OUTPATIENT
Start: 2020-06-22 | End: 2020-06-23 | Stop reason: HOSPADM

## 2020-06-22 RX ORDER — TRAMADOL HYDROCHLORIDE 50 MG/1
50 TABLET ORAL
Status: DISCONTINUED | OUTPATIENT
Start: 2020-06-22 | End: 2020-06-23 | Stop reason: HOSPADM

## 2020-06-22 RX ORDER — HYDROMORPHONE HYDROCHLORIDE 1 MG/ML
INJECTION, SOLUTION INTRAMUSCULAR; INTRAVENOUS; SUBCUTANEOUS
Status: COMPLETED
Start: 2020-06-22 | End: 2020-06-22

## 2020-06-22 RX ORDER — SODIUM CHLORIDE, SODIUM LACTATE, POTASSIUM CHLORIDE, CALCIUM CHLORIDE 600; 310; 30; 20 MG/100ML; MG/100ML; MG/100ML; MG/100ML
INJECTION, SOLUTION INTRAVENOUS
Status: DISCONTINUED | OUTPATIENT
Start: 2020-06-22 | End: 2020-06-22 | Stop reason: HOSPADM

## 2020-06-22 RX ORDER — ACETAMINOPHEN 325 MG/1
650 TABLET ORAL ONCE
Status: COMPLETED | OUTPATIENT
Start: 2020-06-22 | End: 2020-06-22

## 2020-06-22 RX ORDER — KETOROLAC TROMETHAMINE 30 MG/ML
15 INJECTION, SOLUTION INTRAMUSCULAR; INTRAVENOUS EVERY 6 HOURS
Status: DISCONTINUED | OUTPATIENT
Start: 2020-06-22 | End: 2020-06-23 | Stop reason: HOSPADM

## 2020-06-22 RX ORDER — ROPIVACAINE HYDROCHLORIDE 5 MG/ML
30 INJECTION, SOLUTION EPIDURAL; INFILTRATION; PERINEURAL ONCE
Status: DISCONTINUED | OUTPATIENT
Start: 2020-06-22 | End: 2020-06-22 | Stop reason: HOSPADM

## 2020-06-22 RX ORDER — SODIUM CHLORIDE, SODIUM LACTATE, POTASSIUM CHLORIDE, CALCIUM CHLORIDE 600; 310; 30; 20 MG/100ML; MG/100ML; MG/100ML; MG/100ML
75 INJECTION, SOLUTION INTRAVENOUS CONTINUOUS
Status: DISCONTINUED | OUTPATIENT
Start: 2020-06-22 | End: 2020-06-22 | Stop reason: HOSPADM

## 2020-06-22 RX ORDER — SUCCINYLCHOLINE CHLORIDE 20 MG/ML
INJECTION INTRAMUSCULAR; INTRAVENOUS AS NEEDED
Status: DISCONTINUED | OUTPATIENT
Start: 2020-06-22 | End: 2020-06-22 | Stop reason: HOSPADM

## 2020-06-22 RX ADMIN — FENTANYL CITRATE 25 MCG: 50 INJECTION, SOLUTION INTRAMUSCULAR; INTRAVENOUS at 07:57

## 2020-06-22 RX ADMIN — ONDANSETRON HYDROCHLORIDE 4 MG: 2 INJECTION, SOLUTION INTRAMUSCULAR; INTRAVENOUS at 07:50

## 2020-06-22 RX ADMIN — ROCURONIUM BROMIDE 20 MG: 10 SOLUTION INTRAVENOUS at 07:53

## 2020-06-22 RX ADMIN — SODIUM CHLORIDE, SODIUM LACTATE, POTASSIUM CHLORIDE, AND CALCIUM CHLORIDE 125 ML/HR: 600; 310; 30; 20 INJECTION, SOLUTION INTRAVENOUS at 07:05

## 2020-06-22 RX ADMIN — FENTANYL CITRATE 75 MCG: 50 INJECTION, SOLUTION INTRAMUSCULAR; INTRAVENOUS at 07:34

## 2020-06-22 RX ADMIN — Medication 10 ML: at 17:25

## 2020-06-22 RX ADMIN — MIDAZOLAM HYDROCHLORIDE 0.5 MG: 2 INJECTION, SOLUTION INTRAMUSCULAR; INTRAVENOUS at 09:30

## 2020-06-22 RX ADMIN — SUGAMMADEX 100 MG: 100 INJECTION, SOLUTION INTRAVENOUS at 08:30

## 2020-06-22 RX ADMIN — SUCCINYLCHOLINE CHLORIDE 200 MG: 20 INJECTION, SOLUTION INTRAMUSCULAR; INTRAVENOUS at 07:34

## 2020-06-22 RX ADMIN — MIDAZOLAM HYDROCHLORIDE 0.5 MG: 2 INJECTION, SOLUTION INTRAMUSCULAR; INTRAVENOUS at 09:15

## 2020-06-22 RX ADMIN — DEXAMETHASONE SODIUM PHOSPHATE 4 MG: 4 INJECTION, SOLUTION INTRAMUSCULAR; INTRAVENOUS at 07:50

## 2020-06-22 RX ADMIN — PROPOFOL 150 MG: 10 INJECTION, EMULSION INTRAVENOUS at 07:34

## 2020-06-22 RX ADMIN — MORPHINE SULFATE 2 MG: 10 INJECTION INTRAVENOUS at 14:02

## 2020-06-22 RX ADMIN — CEPHALEXIN 500 MG: 500 CAPSULE ORAL at 23:09

## 2020-06-22 RX ADMIN — FENTANYL CITRATE 25 MCG: 50 INJECTION INTRAMUSCULAR; INTRAVENOUS at 09:00

## 2020-06-22 RX ADMIN — HYDROMORPHONE HYDROCHLORIDE 0.2 MG: 1 INJECTION, SOLUTION INTRAMUSCULAR; INTRAVENOUS; SUBCUTANEOUS at 09:30

## 2020-06-22 RX ADMIN — OXYCODONE HYDROCHLORIDE AND ACETAMINOPHEN 2 TABLET: 5; 325 TABLET ORAL at 19:14

## 2020-06-22 RX ADMIN — HYDROMORPHONE HYDROCHLORIDE 0.2 MG: 1 INJECTION, SOLUTION INTRAMUSCULAR; INTRAVENOUS; SUBCUTANEOUS at 09:00

## 2020-06-22 RX ADMIN — MIDAZOLAM HYDROCHLORIDE 0.5 MG: 2 INJECTION, SOLUTION INTRAMUSCULAR; INTRAVENOUS at 09:00

## 2020-06-22 RX ADMIN — MORPHINE SULFATE 2 MG: 10 INJECTION INTRAVENOUS at 11:15

## 2020-06-22 RX ADMIN — Medication 2 G: at 07:39

## 2020-06-22 RX ADMIN — MIDAZOLAM 2 MG: 1 INJECTION INTRAMUSCULAR; INTRAVENOUS at 07:24

## 2020-06-22 RX ADMIN — SODIUM CHLORIDE, SODIUM LACTATE, POTASSIUM CHLORIDE, AND CALCIUM CHLORIDE: 600; 310; 30; 20 INJECTION, SOLUTION INTRAVENOUS at 07:22

## 2020-06-22 RX ADMIN — MIDAZOLAM HYDROCHLORIDE 0.5 MG: 2 INJECTION, SOLUTION INTRAMUSCULAR; INTRAVENOUS at 09:45

## 2020-06-22 RX ADMIN — LIDOCAINE HYDROCHLORIDE 60 MG: 20 INJECTION, SOLUTION EPIDURAL; INFILTRATION; INTRACAUDAL; PERINEURAL at 07:34

## 2020-06-22 RX ADMIN — FENTANYL CITRATE 25 MCG: 50 INJECTION INTRAMUSCULAR; INTRAVENOUS at 09:45

## 2020-06-22 RX ADMIN — MORPHINE SULFATE 2 MG: 10 INJECTION INTRAVENOUS at 10:45

## 2020-06-22 RX ADMIN — ACETAMINOPHEN 650 MG: 325 TABLET ORAL at 07:06

## 2020-06-22 RX ADMIN — KETOROLAC TROMETHAMINE 15 MG: 30 INJECTION, SOLUTION INTRAMUSCULAR at 17:25

## 2020-06-22 RX ADMIN — ROCURONIUM BROMIDE 25 MG: 10 SOLUTION INTRAVENOUS at 07:45

## 2020-06-22 RX ADMIN — ROCURONIUM BROMIDE 5 MG: 10 SOLUTION INTRAVENOUS at 07:34

## 2020-06-22 RX ADMIN — ROSUVASTATIN 20 MG: 10 TABLET, FILM COATED ORAL at 23:09

## 2020-06-22 RX ADMIN — CARVEDILOL 3.12 MG: 3.12 TABLET, FILM COATED ORAL at 17:25

## 2020-06-22 RX ADMIN — FENTANYL CITRATE 25 MCG: 50 INJECTION INTRAMUSCULAR; INTRAVENOUS at 09:15

## 2020-06-22 RX ADMIN — HYDROMORPHONE HYDROCHLORIDE 0.2 MG: 1 INJECTION, SOLUTION INTRAMUSCULAR; INTRAVENOUS; SUBCUTANEOUS at 10:15

## 2020-06-22 RX ADMIN — CEPHALEXIN 500 MG: 500 CAPSULE ORAL at 17:25

## 2020-06-22 RX ADMIN — FENTANYL CITRATE 25 MCG: 50 INJECTION INTRAMUSCULAR; INTRAVENOUS at 09:30

## 2020-06-22 RX ADMIN — HYDROMORPHONE HYDROCHLORIDE 0.2 MG: 1 INJECTION, SOLUTION INTRAMUSCULAR; INTRAVENOUS; SUBCUTANEOUS at 10:00

## 2020-06-22 RX ADMIN — KETOROLAC TROMETHAMINE 15 MG: 30 INJECTION, SOLUTION INTRAMUSCULAR at 23:09

## 2020-06-22 RX ADMIN — SUGAMMADEX 200 MG: 100 INJECTION, SOLUTION INTRAVENOUS at 08:17

## 2020-06-22 RX ADMIN — HYDROMORPHONE HYDROCHLORIDE 0.2 MG: 1 INJECTION, SOLUTION INTRAMUSCULAR; INTRAVENOUS; SUBCUTANEOUS at 09:15

## 2020-06-22 RX ADMIN — MEPERIDINE HYDROCHLORIDE 12.5 MG: 25 INJECTION INTRAMUSCULAR; INTRAVENOUS; SUBCUTANEOUS at 10:45

## 2020-06-22 NOTE — PROGRESS NOTES
Problem: Lung Procedures/VATS Pathway: Day of Surgery  Goal: Activity/Safety  6/22/2020 1822 by Lyle Soliman RN  Outcome: Progressing Towards Goal  6/22/2020 1822 by Lyle Soliman RN  Outcome: Progressing Towards Goal  Goal: Nutrition/Diet  6/22/2020 1822 by Lyle Soliman RN  Outcome: Progressing Towards Goal  6/22/2020 1822 by Lyle Soliman RN  Outcome: Progressing Towards Goal  Goal: Medications  6/22/2020 1822 by Lyle Soliman RN  Outcome: Progressing Towards Goal  6/22/2020 1822 by Lyle Soliman RN  Outcome: Progressing Towards Goal  Goal: Respiratory  6/22/2020 1822 by Lyle Soliman RN  Outcome: Progressing Towards Goal  6/22/2020 1822 by Lyle Soliman RN  Outcome: Progressing Towards Goal  Goal: Treatments/Interventions/Procedures  6/22/2020 1822 by Lyle Soliman RN  Outcome: Progressing Towards Goal  6/22/2020 1822 by Lyle Soliman RN  Outcome: Progressing Towards Goal  Goal: Psychosocial  6/22/2020 1822 by Lyle Soliman RN  Outcome: Progressing Towards Goal  6/22/2020 1822 by Lyle Soliman RN  Outcome: Progressing Towards Goal  Goal: *No signs and symptoms of infection or wound complications  5/26/1375 1822 by Lyle Soliman RN  Outcome: Progressing Towards Goal  6/22/2020 1822 by Lyle Soliman RN  Outcome: Progressing Towards Goal  Goal: *Optimal pain control at patient's stated goal  6/22/2020 1822 by Lyle Soliman RN  Outcome: Progressing Towards Goal  6/22/2020 1822 by Lyle Soliman RN  Outcome: Progressing Towards Goal  Goal: *Adequate urinary output (equal to or greater than 30 milliliters/hour)  6/22/2020 1822 by Lyle Soliman RN  Outcome: Progressing Towards Goal  6/22/2020 1822 by Lyle Soliman RN  Outcome: Progressing Towards Goal  Goal: *Hemodynamically stable  6/22/2020 1822 by Lyle Soliman RN  Outcome: Progressing Towards Goal  6/22/2020 1822 by Jannie Rivero RN  Outcome: Progressing Towards Goal  Goal: *Tolerating diet  6/22/2020 1822 by Jannie Rivero RN  Outcome: Progressing Towards Goal  6/22/2020 1822 by Jannie Rivero RN  Outcome: Progressing Towards Goal  Goal: *Demonstrates progressive activity  6/22/2020 1822 by Jannie Rivero RN  Outcome: Progressing Towards Goal  6/22/2020 1822 by Jannie Rivero RN  Outcome: Progressing Towards Goal

## 2020-06-22 NOTE — ANESTHESIA POSTPROCEDURE EVALUATION
Post-Anesthesia Evaluation and Assessment    Patient: Jen Leone MRN: [id number]  SSN: [id number]    YOB: 1970  Age: 47 y.o. Sex: male      I have evaluated the patient and they are stable and ready for discharge from the PACU. Cardiovascular Function/Vital Signs  Visit Vitals  /69   Pulse 89   Temp 36.2 °C (97.1 °F)   Resp 25   Ht 6' 2\" (1.88 m)   Wt 101.7 kg (224 lb 4 oz)   SpO2 95%   BMI 28.79 kg/m²       Patient is status post General anesthesia for Procedure(s):  LEFT VATS WEDGE RESECTION. Nausea/Vomiting: None    Postoperative hydration reviewed and adequate. Pain:  Pain Scale 1: Numeric (0 - 10) (06/22/20 1045)  Pain Intensity 1: 9 (06/22/20 1045)   Managed    Neurological Status:   Neuro (WDL): Within Defined Limits (06/22/20 0854)   At baseline    Mental Status, Level of Consciousness: Alert and  oriented to person, place, and time    Pulmonary Status:   O2 Device: Non-rebreather mask (06/22/20 0854)   Adequate oxygenation and airway patent    Complications related to anesthesia: None    Post-anesthesia assessment completed. No concerns    Signed By: Emily Thomas MD     June 22, 2020              Procedure(s):  LEFT VATS WEDGE RESECTION. general    <BSHSIANPOST>    INITIAL Post-op Vital signs:   Vitals Value Taken Time   /60 6/22/2020 11:00 AM   Temp 36.2 °C (97.1 °F) 6/22/2020  9:45 AM   Pulse 91 6/22/2020 11:08 AM   Resp 18 6/22/2020 11:08 AM   SpO2 94 % 6/22/2020 11:08 AM   Vitals shown include unvalidated device data.

## 2020-06-22 NOTE — PERIOP NOTES
TRANSFER - OUT REPORT:    Verbal report given to Cait(name) on Sofie Gomez  being transferred to 4(unit) for routine post - op       Report consisted of patients Situation, Background, Assessment and   Recommendations(SBAR). Time Pre op antibiotic given:0739  Anesthesia Stop time: 503  Mix Present on Transfer to floor:no  Order for Mix on Chart:no  Discharge Prescriptions with Chart:no    Information from the following report(s) SBAR, OR Summary, MAR and Cardiac Rhythm NSR was reviewed with the receiving nurse. Opportunity for questions and clarification was provided. Is the patient on 02? YES       L/Min 4    Is the patient on a monitor? YES    Is the nurse transporting with the patient? YES    Surgical Waiting Area notified of patient's transfer from PACU? YES; family given room number and unit phone number at [phone]    The following personal items collected during your admission accompanied patient upon transfer:   Dental Appliance: Dental Appliances: None  Vision: Visual Aid: None  Hearing Aid:    Jewelry: Jewelry: Body Piercing(TONGUE RING IN CLOTHING BAG TO PACU)  Clothing: Clothing: (clothes, black bag, O2 tank returned in pacu)  Other Valuables: Other Valuables:  Other (comment)(OXYGEN TANK & HITCHCOCK; BLACK BOOK BAG TO PACU)  Valuables sent to safe:

## 2020-06-22 NOTE — ROUTINE PROCESS
Patient: Remy Obrien MRN: [id number]  SSN: [id number]   YOB: 1970  Age: 47 y.o. Sex: male     Patient is status post Procedure(s):  LEFT VATS WEDGE RESECTION. Surgeon(s) and Role:     * Lamonte Manzano MD - Primary    Local/Dose/Irrigation:  SEE MAR                  Peripheral IV 06/22/20 Left Arm (Active)   Site Assessment Clean, dry, & intact 6/22/2020  7:04 AM   Phlebitis Assessment 0 6/22/2020  7:04 AM   Infiltration Assessment 0 6/22/2020  7:04 AM   Dressing Status Clean, dry, & intact; New 6/22/2020  7:04 AM   Dressing Type Tape;Transparent 6/22/2020  7:04 AM   Hub Color/Line Status Green 6/22/2020  7:04 AM       Peripheral IV Right Arm (Active)            Airway - Endotracheal Tube 06/22/20 Oral (Active)                   Dressing/Packing:  Wound Chest Left Port holes x 3-Dressing Type: Topical skin adhesive/glue (06/22/20 0815)    Splint/Cast:  ]    Other:  Chest tube present on left.

## 2020-06-22 NOTE — H&P
Mr. Ran Matias presents for a return clinic visit. He was originally seen for evaluation of a VATS lung biopsy in March. Due to the outbreak of the coronavirus pandemic his surgery was deferred until later in the year. He reports that he is feeling good. He has been sheltering in place. Mr. Ran Matias remains on 4L NC. He does state that he has been trying periods of up to 8 hours without oxygen. He remains active walking daily.     No significant changes to his past medical or surgical history         Current Outpatient Medications:     ondansetron (Zofran ODT) 4 mg disintegrating tablet, Take 1 Tab by mouth every eight (8) hours as needed for Nausea., Disp: 10 Tab, Rfl: 0    warfarin (COUMADIN) 5 mg tablet, Take 1 Tab by mouth five (5) days a week. TAKE 1 TABLET (5MG) BY MOUTH EVERY DAY ON MONDAY THROUGH FRIDAY, AND 1/2 TABLET (2.5MG) EVERY SATURDAY AND Sunday. , Disp: 90 Tab, Rfl: 0    carvediloL (COREG) 3.125 mg tablet, Take 1 Tab by mouth two (2) times daily (with meals). , Disp: 60 Tab, Rfl: 3    furosemide (LASIX) 20 mg tablet, Take 1 Tab by mouth daily. (Patient taking differently: Take 20 mg by mouth nightly.), Disp: 30 Tab, Rfl: 3    warfarin (COUMADIN) 2.5 mg tablet, Take 2.5 mg by mouth two (2) times daily on Sat & Sun. 2.5mg on Sat/Sun. TAKE 1 TAB (5MG) BY MOUTH EVERY DAY ON MONDAY THROUGH FRIDAY, AND 1/2 TAB (2.5MG) EVERY SATURDAY AND Sunday. , Disp: , Rfl:     rosuvastatin (CRESTOR) 20 mg tablet, TAKE 1 TABLET BY MOUTH EVERY NIGHT, Disp: 90 Tab, Rfl: 3    EPINEPHrine (EPIPEN) 0.3 mg/0.3 mL injection, INJECT 0.3 ML BY INTRAMUSCULAR ROUTE ONCE PRN FOR ANAPHYLAXIS OR ALLERGIC RESPONSE FOR UP TO ONE DOSE, Disp: 1 Syringe, Rfl: 0    sildenafil citrate (VIAGRA) 50 mg tablet, Take 50 mg by mouth as needed. , Disp: , Rfl:         Physical exam is incomplete due to the inherent limitations of a video virtual telehealth session.     He is seated upright  Alert and oriented  Tachypneic, dyspneic  Wearing O2  Anicteric  Well developed  Normal speech, normal affect  No goiter  No audible wheezing or stridor  No jvd        I have personally reviewed his chest CT.  Diffuse GGO throughout both lung fields, no lymphadenopathy, no PE     ==============================     PFTs- FeV1 46%     ==============================     ECHO EF 50%  RV systolic function mildly depressed     ===========================     Diagnoses  1: Hypoxic respiratory failure  2: ILD  3: h/o PE     =============================  Mr. Suzi Taylor has unexplained ILD and his O2 requirements are out of proportion to his history and CT scan. We will post for a L VATS lung biopsy in late June. I reached out to his pulmonologist, Dr. Martha Zelaya, to confirm that the lung biopsy was still clinically needed.     We discussed the risks and benefits of a lung biopsy. We also discussed his coronavirus risks.     Post operative I will have PAR assist in his management.     He will need a lovenox bridge.     The patient was counseled at length about the risks of adis Covid-19 during their perioperative period and any recovery window from their procedure.  The patient was made aware that adis Covid-19  may worsen their prognosis for recovering from their procedure and lend to a higher morbidity and/or mortality risk.  All material risks, benefits, and reasonable alternatives including postponing the procedure were discussed. The patient does  wish to proceed with the procedure at this time.        Thank you for allowing us to care for Mr Sumeet Jeffers     I spent 15 minutes with Sumeet Jeffers and their family, greater than 50% of which was spent in counseling and education reviewing their films, discussing surgical options and formulating a future care plan.

## 2020-06-22 NOTE — PROGRESS NOTES
TRANSFER - OUT REPORT:    Verbal report given to [facility] (name) on Vani Dillon  being transferred to [address] [place] [address] (unit) for routine progression of care       Report consisted of patients Situation, Background, Assessment and   Recommendations(SBAR). Information from the following report(s) SBAR, MAR, Recent Results and Cardiac Rhythm SR was reviewed with the receiving nurse. Lines:   Peripheral IV 06/22/20 Left Arm (Active)   Site Assessment Clean, dry, & intact 6/22/2020  9:00 AM   Phlebitis Assessment 0 6/22/2020  9:00 AM   Infiltration Assessment 0 6/22/2020  9:00 AM   Dressing Status Clean, dry, & intact 6/22/2020  9:00 AM   Dressing Type Tape;Transparent 6/22/2020  9:00 AM   Hub Color/Line Status Green; Infusing 6/22/2020  9:00 AM   Action Taken Open ports on tubing capped 6/22/2020  9:00 AM   Alcohol Cap Used Yes 6/22/2020  9:00 AM       Peripheral IV Right Arm (Active)   Site Assessment Clean, dry, & intact 6/22/2020  9:00 AM   Phlebitis Assessment 0 6/22/2020  9:00 AM   Infiltration Assessment 0 6/22/2020  9:00 AM   Dressing Status Clean, dry, & intact 6/22/2020  9:00 AM   Dressing Type Tape;Transparent 6/22/2020  9:00 AM   Hub Color/Line Status Green;Capped 6/22/2020  9:00 AM   Action Taken Open ports on tubing capped 6/22/2020  9:00 AM   Alcohol Cap Used Yes 6/22/2020  9:00 AM        Opportunity for questions and clarification was provide

## 2020-06-22 NOTE — PROGRESS NOTES
06/22/20  Patient currently admitted to [facility]. Patient graduated from prior [facility] encounter prior to planned admission.  AR

## 2020-06-22 NOTE — PROGRESS NOTES
TRANSFER - IN REPORT:    Verbal report received from Moose Arreola (name) on [employer]  being received from PACU (unit) for routine post - op      Report consisted of patients Situation, Background, Assessment and   Recommendations(SBAR). Information from the following report(s) SBAR, MAR, Recent Results and Cardiac Rhythm SR was reviewed with the receiving nurse. Opportunity for questions and clarification was provided. Assessment completed upon patients arrival to unit and care assumed.

## 2020-06-22 NOTE — BRIEF OP NOTE
Brief Postoperative Note    Patient: Jen Leone  YOB: 1970  MRN: [id number]    Date of Procedure: 6/22/2020     Pre-Op Diagnosis: INTERSTITIAL LUNG DISEASE    Post-Op Diagnosis: Same as preoperative diagnosis.       Procedure(s):  LEFT VATS WEDGE RESECTION    Surgeon(s):  Maggi Montejo MD    Surgical Assistant: Physician Assistant: BOZENA Sue    Anesthesia: General     Estimated Blood Loss (mL): Minimal    Complications: None    Specimens:   ID Type Source Tests Collected by Time Destination   1 : Left upper lobe wedge Fresh Lung, Upper Lobe  Maggi Montejo MD 6/22/2020 0802 Pathology   2 : Left Lingular wedge  Fresh Lung Biopsy  Maggi Montejo MD 6/22/2020 0804 Pathology   3 : Left lower lobe wedge Fresh Lung, Lower Lobe  Maggi Montejo MD 6/22/2020 7500 Pathology        Implants: * No implants in log *    Drains: * No LDAs found *    Findings: none    Condition: fair    Disposition; to pacu    Electronically Signed by Miguel Echols MD on 6/22/2020 at 8:23 AM

## 2020-06-22 NOTE — PROGRESS NOTES
Bedside shift change report given to [address] [place] [address] (oncoming nurse) by Lisseth Romo (offgoing nurse).  Report included the following information SBAR, MAR, Recent Results and Cardiac Rhythm SR.

## 2020-06-22 NOTE — CDMP QUERY
Pt admitted with ILD and had L VATS wedge resection 6/22 and has Hypoxic Respiratory Failure documented. Please further specify type and acuity of Respiratory Failure in the medical record. ? Chronic respiratory failure with hypoxia ? Acute on chronic respiratory failure with hypoxia  
? Other, please specify ? Clinically unable to determine The medical record reflects the following: 
   Risk Factors: 47 M ILD Clinical Indicators: 6/22 H&P notes patient is tachycardic and dyspneic, patient wears O2 4L continuous, 2/17 CT results \"Diffuse groundglass opacity with focal areas of air-trapping may represent mild edema or other acute abnormality. No evidence of pulmonary embolism. \", PFTs- FeV1 46%, H&P states \"hypoxic respiratory failure\", RR 18-27 this admission with patient saturations 90-95% on his baseline 4L of O2 Treatment: telemetry with continuous pulse ox, continue home O2,  L VATS wedge resection

## 2020-06-22 NOTE — ANESTHESIA PREPROCEDURE EVALUATION
Relevant Problems   No relevant active problems       Anesthetic History   No history of anesthetic complications            Review of Systems / Medical History  Patient summary reviewed, nursing notes reviewed and pertinent labs reviewed    Pulmonary  Within defined limits                 Neuro/Psych   Within defined limits           Cardiovascular                Pertinent negatives: No dysrhythmias       GI/Hepatic/Renal  Within defined limits              Endo/Other  Within defined limits           Other Findings              Physical Exam    Airway  Mallampati: II  TM Distance: > 6 cm  Neck ROM: normal range of motion   Mouth opening: Normal     Cardiovascular  Regular rate and rhythm,  S1 and S2 normal,  no murmur, click, rub, or gallop             Dental  No notable dental hx       Pulmonary  Breath sounds clear to auscultation               Abdominal  GI exam deferred       Other Findings            Anesthetic Plan    ASA: 3  Anesthesia type: general          Induction: Intravenous  Anesthetic plan and risks discussed with: Patient

## 2020-06-23 ENCOUNTER — APPOINTMENT (OUTPATIENT)
Dept: GENERAL RADIOLOGY | Age: 50
DRG: 167 | End: 2020-06-23
Attending: PHYSICIAN ASSISTANT
Payer: COMMERCIAL

## 2020-06-23 VITALS
RESPIRATION RATE: 18 BRPM | DIASTOLIC BLOOD PRESSURE: 68 MMHG | BODY MASS INDEX: 28.77 KG/M2 | HEART RATE: 75 BPM | TEMPERATURE: 98 F | OXYGEN SATURATION: 99 % | SYSTOLIC BLOOD PRESSURE: 114 MMHG | WEIGHT: 224.21 LBS | HEIGHT: 74 IN

## 2020-06-23 LAB
GLUCOSE BLD STRIP.AUTO-MCNC: 106 MG/DL (ref 65–100)
SERVICE CMNT-IMP: ABNORMAL

## 2020-06-23 PROCEDURE — 71045 X-RAY EXAM CHEST 1 VIEW: CPT

## 2020-06-23 PROCEDURE — 74011250637 HC RX REV CODE- 250/637: Performed by: THORACIC SURGERY (CARDIOTHORACIC VASCULAR SURGERY)

## 2020-06-23 PROCEDURE — 74011250636 HC RX REV CODE- 250/636: Performed by: THORACIC SURGERY (CARDIOTHORACIC VASCULAR SURGERY)

## 2020-06-23 PROCEDURE — 82962 GLUCOSE BLOOD TEST: CPT

## 2020-06-23 RX ORDER — OXYCODONE AND ACETAMINOPHEN 5; 325 MG/1; MG/1
2 TABLET ORAL
Qty: 40 TAB | Refills: 0 | Status: SHIPPED | OUTPATIENT
Start: 2020-06-23 | End: 2020-06-26

## 2020-06-23 RX ORDER — AMOXICILLIN 250 MG
1 CAPSULE ORAL DAILY
Qty: 14 TAB | Refills: 0 | Status: SHIPPED | OUTPATIENT
Start: 2020-06-23 | End: 2020-07-07 | Stop reason: ALTCHOICE

## 2020-06-23 RX ORDER — ENOXAPARIN SODIUM 100 MG/ML
40 INJECTION SUBCUTANEOUS DAILY
Qty: 2 SYRINGE | Refills: 0 | Status: SHIPPED | OUTPATIENT
Start: 2020-06-23 | End: 2020-07-07 | Stop reason: ALTCHOICE

## 2020-06-23 RX ADMIN — CARVEDILOL 3.12 MG: 3.12 TABLET, FILM COATED ORAL at 16:14

## 2020-06-23 RX ADMIN — CEPHALEXIN 500 MG: 500 CAPSULE ORAL at 16:14

## 2020-06-23 RX ADMIN — CEPHALEXIN 500 MG: 500 CAPSULE ORAL at 08:13

## 2020-06-23 RX ADMIN — CARVEDILOL 3.12 MG: 3.12 TABLET, FILM COATED ORAL at 08:13

## 2020-06-23 RX ADMIN — WARFARIN SODIUM 5 MG: 5 TABLET ORAL at 16:35

## 2020-06-23 RX ADMIN — FAMOTIDINE 20 MG: 20 TABLET ORAL at 06:16

## 2020-06-23 RX ADMIN — KETOROLAC TROMETHAMINE 15 MG: 30 INJECTION, SOLUTION INTRAMUSCULAR at 06:15

## 2020-06-23 RX ADMIN — OXYCODONE HYDROCHLORIDE AND ACETAMINOPHEN 2 TABLET: 5; 325 TABLET ORAL at 08:13

## 2020-06-23 RX ADMIN — KETOROLAC TROMETHAMINE 15 MG: 30 INJECTION, SOLUTION INTRAMUSCULAR at 11:29

## 2020-06-23 NOTE — PROGRESS NOTES
Bedside and Verbal shift change report given to Debra Lamar (oncoming nurse) by Carley Solis (offgoing nurse). Report included the following information SBAR, Kardex, Intake/Output, MAR, Recent Results and Cardiac Rhythm NSR.

## 2020-06-23 NOTE — CONSULTS
PULMONARY MEDICINE    Initial Physician Consultation Note    Name: [facility]   : 1970   MRN: [id number]   Date: 2020      Subjective:   Consult Note: 2020   Requesting Physician: Dr. Evelyn Farooq  Reason for consult: patient of Dr. Nasim García with severe ILD, please assit in post op management    Medical records and data reviewed. Patient is a 47 y.o. male who presented to the hospital for elective surgical lung biopsy for work up of ILD which he underwent on . He has had an uneventful post op course, chest tube has been removed and discharge is planned. He is followed by Dr. Barbra Lombard and is on home O2. He was noted to have asymmetric edema and LLE doppler was negative in . Review of Systems:     A comprehensive 12 system review of systems was negative except for as documented in HPI    Assessment:     Acute post op pulmonary insufficiency  S/P VATS biopsy for work up of ILD  LLE edema, negative doppler  Other medical problems per chart    Recommendations:      On home O2 at baseline  Surgical path pending  On warfarin post op  We will arrange follow up with Dr. Barbra Lombard to discuss path and subsequent management later this week or early next week  D/W patient and RN    Active Problem List:     Problem List  Date Reviewed: 2020          Codes Class    ILD (interstitial lung disease) ([facility] ICD-10-CM: J84.9  ICD-9-CM: 515         Oxygen dependent ICD-10-CM: Z99.81  ICD-9-CM: V46.2         PAF (paroxysmal atrial fibrillation) ([facility] ICD-10-CM: I48.0  ICD-9-CM: 427.31     Overview Signed 2020  8:41 PM by Azalea Keen MD     Per patient report             Acute respiratory failure with hypoxia ([facility] ICD-10-CM: J96.01  ICD-9-CM: 518.81         Edema ICD-10-CM: R60.9  ICD-9-CM: 782.3         Erectile dysfunction ICD-10-CM: N52.9  ICD-9-CM: 607.84         Vitamin D deficiency ICD-10-CM: E55.9  ICD-9-CM: 268.9         Prediabetes ICD-10-CM: R73.03  ICD-9-CM: 790.29 Disorder of lipoid metabolism ICD-10-CM: E78.9  ICD-9-CM: 272.9         Personal history of DVT (deep vein thrombosis) ICD-10-CM: U54.271  ICD-9-CM: V12.51         Hx pulmonary embolism ICD-10-CM: L38.597  ICD-9-CM: V12.55         Bee sting allergy ICD-10-CM: Z91.030  ICD-9-CM: V15.06         Panic anxiety syndrome ICD-10-CM: F41.0  ICD-9-CM: 300.01         Anticoagulant long-term use ICD-10-CM: Z79.01  ICD-9-CM: V58.61         Tremor ICD-10-CM: R25.1  ICD-9-CM: 781.0         Tobacco abuse ICD-10-CM: Z72.0  ICD-9-CM: 305.1               Past Medical History:      has a past medical history of A-fib ([facility] Allergic rhinitis, Anxiety disorder, Cellulitis, Deep vein thrombosis (DVT) ([facility] Essential tremor, Laceration of arm (09/23/2017), Nicotine vapor product user, Phlebitis, Psychiatric disorder, Pulmonary embolism ([facility] (3/2011), Rectal bleeding (10/2011), Shortness of breath, Thromboembolus ([facility] Umbilical hernia, and Vitamin D deficiency (04/2014). Past Surgical History:      has a past surgical history that includes hx colonoscopy (04/28/2017); hx appendectomy (2003); hx hernia repair (2000); and hx heent (02/2020). Home Medications:     Prior to Admission medications    Medication Sig Start Date End Date Taking? Authorizing Provider   cephALEXin (Keflex) 500 mg capsule Take 1 Cap by mouth three (3) times daily for 20 doses. 6/19/20 6/26/20 Yes Ben Estrada MD   carvediloL (COREG) 3.125 mg tablet Take 1 Tab by mouth two (2) times daily (with meals). 6/18/20  Yes Jo Ann Sotomayor MD   famotidine (PEPCID) 20 mg tablet Take 1 Tab by mouth every morning. 6/13/20  Yes Jo Ann Sotomayor MD   traMADoL [place] [facility]) 50 mg tablet Take 50 mg by mouth every six (6) hours as needed for Pain. Yes Provider, Historical   rosuvastatin (CRESTOR) 20 mg tablet TAKE 1 TABLET BY MOUTH EVERY NIGHT  Patient taking differently: Take 20 mg by mouth nightly.  9/11/19  Yes Read, Jo Ann Guardado MD   warfarin (COUMADIN) 5 mg tablet Take 1 Tab by mouth five (5) days a week. TAKE 1 TABLET (5MG) BY MOUTH EVERY DAY ON MONDAY THROUGH FRIDAY, AND 1/2 TABLET (2.5MG) EVERY SATURDAY AND . 20   Scottie Sotomayor MD   warfarin (COUMADIN) 2.5 mg tablet Take 2.5 mg by mouth two (2) times daily on Sat & Sun. 2.5mg on Sat/Sun. TAKE 1 TAB (5MG) BY MOUTH EVERY DAY ON MONDAY THROUGH FRIDAY, AND 1/2 TAB (2.5MG) EVERY SATURDAY AND . Provider, Historical   EPINEPHrine (EPIPEN) 0.3 mg/0.3 mL injection INJECT 0.3 ML BY INTRAMUSCULAR ROUTE ONCE PRN FOR ANAPHYLAXIS OR ALLERGIC RESPONSE FOR UP TO ONE DOSE  Patient taking differently: 0.3 mg by IntraMUSCular route as needed. INJECT 0.3 ML BY INTRAMUSCULAR ROUTE ONCE PRN FOR ANAPHYLAXIS OR ALLERGIC RESPONSE FOR UP TO ONE DOSE 19   Scottie Sotomayor MD   sildenafil citrate (VIAGRA) 50 mg tablet Take 50 mg by mouth as needed. Other, MD Gibson       Allergies/Social/Family History: Allergies   Allergen Reactions    Bee Sting [Sting, Bee] Swelling    Neomycin Rash    Neosporin [Neomycin-Bacitracin-Polymyxin] Rash      Social History     Tobacco Use    Smoking status: Never Smoker    Smokeless tobacco: Current User     Types: Snuff    Tobacco comment: former vaper   Substance Use Topics    Alcohol use:  Yes     Alcohol/week: 0.0 standard drinks     Comment: SOCIALLY EVERY 6 MONTHS      Family History   Problem Relation Age of Onset    Heart Disease Father     Heart Disease Maternal Grandmother     Heart Disease Maternal Grandfather     Anesth Problems Neg Hx             Objective:   Vital Signs:  Visit Vitals  /71 (BP 1 Location: Right arm, BP Patient Position: At rest)   Pulse 72   Temp 97.9 °F (36.6 °C)   Resp 18   Ht 6' 2\" (1.88 m)   Wt 101.7 kg (224 lb 3.3 oz)   SpO2 99%   BMI 28.79 kg/m²    O2 Flow Rate (L/min): 3 l/min O2 Device: Nasal cannula Temp (24hrs), Av.1 °F (36.7 °C), Min:97.9 °F (36.6 °C), Max:98.5 °F (36.9 °C)           Intake/Output:     Intake/Output Summary (Last 24 hours) at 6/23/2020 1521  Last data filed at 6/23/2020 0816  Gross per 24 hour   Intake --   Output 357 ml   Net -357 ml       Physical Exam:   General:  Alert, cooperative, no distress, appears stated age. Head:  Normocephalic, without obvious abnormality, atraumatic. Eyes:  Conjunctivae/corneas clear. PERRL   Neck: Supple, symmetrical, no adenopathy, no carotid bruit and no JVD. Lungs:   Diminished BS with basilar crepitations   Chest wall:  No tenderness or deformity. Heart:  Regular rate and rhythm, S1-S2 normal, no murmur, no click, rub or gallop. Abdomen:   Soft, non-tender. Bowel sounds present. No masses,  No organomegaly. Extremities: Atraumatic, no cyanosis. Asymmetric edema L> R   Pulses: Palpable   Skin: No rashes or lesions   Neurologic: Grossly nonfocal         LABS AND  DATA: Personally reviewed  No results for input(s): WBC, HGB, HCT, PLT, HGBEXT, HCTEXT, PLTEXT in the last 72 hours. Recent Labs     06/22/20  0654      K 4.0      CO2 28   BUN 8   CREA 1.00   *   CA 9.0     Recent Labs     06/22/20  0654   AP 76   TP 7.8   ALB 3.3*   GLOB 4.5*     Recent Labs     06/22/20  0639   INR 1.4*      No results for input(s): PHI, PCO2I, PO2I, FIO2I in the last 72 hours. No results for input(s): CPK, CKMB, TROIQ, BNPP in the last 72 hours. MEDS: Reviewed    Chest Imaging: personally reviewed and report checked    Tele- reviewed    Medical decision making:   I have reviewed the flowsheet and previous day's notes  Patient has acute or chronic illness that poses a threat to life or bodily function  Review and order of Clinical lab tests  Review and Order of Radiology tests  Independent visualization of Image  s        Thank you for allowing me to participate in this patient's care.     Preston Boeck, MD      [facility]

## 2020-06-23 NOTE — PROGRESS NOTES
Thoracic Surgery Simple Progress Note    Admit Date: 2020  POD: 1 Day Post-Op      Procedure:  Procedure(s):  LEFT VATS WEDGE RESECTION      Subjective:     Patient has complaints: No significant medical complaints    Review of Systems:  CARDIAC: negative  RESP: positive for ILD, dyspnea, home O2 use  NEURO:  negative  INCISION: Clean, dry, and intact  EXT: Denies new swelling or pain in the legs or calves. Objective:     Blood pressure 124/80, pulse 76, temperature 97.9 °F (36.6 °C), resp. rate 17, height 6' 2\" (1.88 m), weight 224 lb 3.3 oz (101.7 kg), SpO2 96 %. Temp (24hrs), Av.8 °F (36.6 °C), Min:96.8 °F (36 °C), Max:98.5 °F (36.9 °C)        Hemodynamics    PAP    CO    CI    No intake/output data recorded.  1901 -  0700  In: 250 [I.V.:250]  Out: 966 [Urine:900]    EXAM:  GENERAL: VSS, afrible, alert and cooperative  HEART:  regular rate and rhythm  LUNG: clear to auscultation bilaterally, L CT w 61ml/24 hrs output. CXR reviewed  NEURO:  normal without focal findings  mental status, speech normal, A&O x3  UE shaky this AM  INCISION: Clean, dry, and intact  EXTREMITIES:No evidence of DVT seen on physical exam.  GI/: Abd soft, nontender. Voiding    Labs:No results found for this or any previous visit (from the past 24 hour(s)). Assessment:   No evidence of DVT.   Active Problems:    ILD (interstitial lung disease) ([facility] (2020)      Chronic hypoxic respiratory failure      PATH:  pending  Plan/Recommendations:   Advance diet  OOB, ambulation  Resume PTA Coumadin regimen today  Advised to continue Lovenox injections x 3 days  Plan to remove CT later today & hopefully d/c to home  See BOZENA Lala  2020

## 2020-06-23 NOTE — DISCHARGE INSTRUCTIONS
*DISCHARGE INSTRUCTIONS AFTER LUNG [address] [facility]  [address] [facility] [address]  [phone]    Leave the chest tube dressing in place for 48 hours (until Thurs 6/25/2020), then you can remove it and shower. SURGICAL INCISION:    You will have two or three small incisions. These incisions are sealed with sutures that dissolve. The lower incision is from the chest tube. This lower incision will seal itself in 5 to 7 days. Until then you may have drainage from that incision. The drainage will be thin yellowish or red in color and may drain for 5 to 7 days. This is normal and expected. INCISION CARE:    Wash incisions daily with soap. Pat dry. Showers only, no tub baths. If you have drainage, you can place a bandage over the area, otherwise keep open to air. You may experience drainage of clear-strawberry colored fluid from the chest tube site. This is to be expected and will stop in 24-48 hours. PAIN:    What you will experience:     Tenderness and soreness around incisions   Burning and/or numbness   Soreness around front/back of chest    For relief of discomfort:     Take the pain medicine you were given at discharge   Aleve one or two tablets every 12 hrs (with food) along with pain medicine (this can be purchased over the counter at your local pharmacy/drug store). Advil may be substituted. Start this after you finish taking Toradol if prescribed.  Heating pad 10 minutes at a time to the upper incision area as often as you wish.  For the Ladies: Spandex or elastic sports bra will give you the support you need without pressure on the incision. Most will find this to be a great comfort. COUGHING:    Coughing is helpful after lung surgery. Place a pillow over your incision and apply pressure when coughing to reduce pain. ACTIVITY:    DO: 1. Walk daily outside if weather permits, at a mall if not.  Increase your distance                       each day. Exercise has many benefits. It promotes healing, expands your lungs,                helps you cough, relaxes your body, tones muscles, lowers blood pressure and               improves your appetite. After you exercise expect to feel tired and probably short                of breath. Plan to take a nap 1-2 times a day to regain your strength . 2. Climb stairs           3. Ride in a car           4. Perform breathing exercises (incentive spirometer)    DO NOT:               1. Lift heavy objects (8-10 pounds)  2. Drive a car/truck until after your post-op visit  3. Do heavy yard or housework     APPETITE:    It is usual to have a decreased appetite after surgery. Exercise is the best stimulant. You may expect to slowly improve over 4-10 days. CALL THE OFFICE IF YOU DEVELOP:     Increasing pain that is not controlled by the pain medicine.  Increasing redness or drainage around the incision.  Unusual or increasing shortness of breath   Fever greater than 101 degrees   Change in the color of your sputum to yellow or green, especially if you also have increasing shortness of breath and a fever greater than 101. YOUR MEDICATIONS:  As instructed        FOLLOW-UP APPOINTMENT:  Future Appointments   Date Time Provider Sabi Parker   7/7/2020  9:30 AM BOZENA Cruz     AFTER DISCHARGE CALL THE OFFICE TO SCHEDULE YOUR VISIT TO SEE US IN 2 WEEKS. Please arrive 45 minutes prior to you appointment time in order to have a chest X-Ray. Check in at [address] on the ground floor of the [facility]. After your X-ray come to the [address] [place] [address] for your appointment.       Physician Signature

## 2020-06-23 NOTE — PROGRESS NOTES
EL PLAN:    RUR-10%    Patient was admitted from home and plans to return home in care of his neighbor     Patient's supportive neighbor Amparo Garcia [phone] will provide transportation home    Patient to follow up with PCP/Specialists      Reason for Admission:  Interstitial Lung Disease,LEFT VATS WEDGE RESECTION on 6/22/20                     RUR Score:    10%                 Plan for utilizing home health:   No       PCP: First and Last name: Andres Backer    Name of Practice: [address] [place] [address],[facility] [facility]   Are you a current patient: Yes   Approximate date of last visit: Last year   Can you participate in a virtual visit with your PCP: Y                    Current Advanced Directive/Advance Care Plan: Yes but no copy in the file as patient did not bring a copy to [facility]. However, he said his cousin Kellie Apt [phone] and his aunt Lisa Orf [phone] are designated to speak on his behalf                         Transition of Care Plan: CM met with patient to discuss discharge planning. He is single, independent without any assistive devices and currently on leave from work due to his medical condition. Patient requires home O2 at 4 L at home and [place] is the [employer] that services patient. Patient did not voice any financial stressors or discharge barriers. CM will continue to follow. Adelia Mcneil RN, CRM. Care Management Interventions  PCP Verified by CM: Yes  Palliative Care Criteria Met (RRAT>21 & CHF Dx)?: No  Mode of Transport at Discharge:  Other (see comment)(Private car)  Transition of Care Consult (CM Consult): Discharge Planning  MyChart Signup: No  Discharge Durable Medical Equipment: No  Health Maintenance Reviewed: Yes  Physical Therapy Consult: No  Occupational Therapy Consult: No  Speech Therapy Consult: No  Current Support Network: Lives Alone  Confirm Follow Up Transport: Friends  Discharge Location  Discharge Placement: Home with family assistance

## 2020-06-23 NOTE — DISCHARGE SUMMARY
Physician Discharge Summary     Patient ID:  Mariely Morris  [id number]  53 y.o.  1970    Allergies: Bee sting [sting, bee]; Neomycin; and Neosporin [neomycin-bacitracin-polymyxin]    Admit Date: 6/22/2020    Discharge Date: 6/23/2020    * Admission Diagnoses: ILD (interstitial lung disease) ([facility] [J84.9]    * Discharge Diagnoses:    Hospital Problems as of 6/23/2020 Date Reviewed: 6/22/2020          Codes Class Noted - Resolved POA    ILD (interstitial lung disease) ([facility] ICD-10-CM: J84.9  ICD-9-CM: 303  6/22/2020 - Present Unknown               Admission Condition: Good    * Discharge Condition: good    * Procedures: Procedure(s):  LEFT VATS [address] Course:   Normal hospital course for this procedure. Transitioned to oral analgesics and pain well controlled. He ambulated in halls & used IS. Patient had chest tube removed POD #1 and DC to home POD #1. Initial Post-pull CXR showed small apical L PTX, follow-up CXR improved. He will resume home O2 & PTA Coumadin regimen. He is doing well with follow up appointments in place. Consults: Pulmonary    Significant Diagnostic Studies: labs: Surgical Pathology--pending and radiology: Daily CXR & Telemetry    * Disposition: Home    Discharge Medications:   Current Discharge Medication List      START taking these medications    Details   oxyCODONE-acetaminophen (PERCOCET) 5-325 mg per tablet Take 2 Tabs by mouth every four (4) hours as needed for Pain for up to 3 days. Max Daily Amount: 12 Tabs. Qty: 40 Tab, Refills: 0    Associated Diagnoses: ILD (interstitial lung disease) ([facility]      senna-docusate (PERICOLACE) 8.6-50 mg per tablet Take 1 Tab by mouth daily. Indications: constipation  Qty: 14 Tab, Refills: 0      enoxaparin (Lovenox) 40 mg/0.4 mL 0.4 mL by SubCUTAneous route daily for 14 days.  Indications: deep vein thrombosis prevention  Qty: 2 Syringe, Refills: 0         CONTINUE these medications which have NOT CHANGED Details   cephALEXin (Keflex) 500 mg capsule Take 1 Cap by mouth three (3) times daily for 20 doses. Qty: 20 Cap, Refills: 0      carvediloL (COREG) 3.125 mg tablet Take 1 Tab by mouth two (2) times daily (with meals). Qty: 60 Tab, Refills: 2      famotidine (PEPCID) 20 mg tablet Take 1 Tab by mouth every morning. Qty: 90 Tab, Refills: 0    Associated Diagnoses: Gastroesophageal reflux disease, esophagitis presence not specified      traMADoL (ULTRAM) 50 mg tablet Take 50 mg by mouth every six (6) hours as needed for Pain. rosuvastatin (CRESTOR) 20 mg tablet TAKE 1 TABLET BY MOUTH EVERY NIGHT  Qty: 90 Tab, Refills: 3      !! warfarin (COUMADIN) 5 mg tablet Take 1 Tab by mouth five (5) days a week. TAKE 1 TABLET (5MG) BY MOUTH EVERY DAY ON MONDAY THROUGH FRIDAY, AND 1/2 TABLET (2.5MG) EVERY SATURDAY AND Sunday. Qty: 90 Tab, Refills: 0    Associated Diagnoses: Anticoagulant long-term use      !! warfarin (COUMADIN) 2.5 mg tablet Take 2.5 mg by mouth two (2) times daily on Sat & Sun. 2.5mg on Sat/Sun. TAKE 1 TAB (5MG) BY MOUTH EVERY DAY ON MONDAY THROUGH FRIDAY, AND 1/2 TAB (2.5MG) EVERY SATURDAY AND Sunday. EPINEPHrine (EPIPEN) 0.3 mg/0.3 mL injection INJECT 0.3 ML BY INTRAMUSCULAR ROUTE ONCE PRN FOR ANAPHYLAXIS OR ALLERGIC RESPONSE FOR UP TO ONE DOSE  Qty: 1 Syringe, Refills: 0      sildenafil citrate (VIAGRA) 50 mg tablet Take 50 mg by mouth as needed. !! - Potential duplicate medications found. Please discuss with provider. * Follow-up Care/Patient Instructions: Activity: No lifting >10#, Driving, or Strenuous exercise for 2 weeks.  No driving while on analgesics and See surgical instructions  Diet: Resume previous diet  Wound Care: Keep wound clean and dry    Follow-up Information     Follow up With Specialties Details Why Contact Info    Read, Jo Ann Guardado MD [address]  [phone]          Follow-up appts:  Future Appointments Date Time Provider Sabi Parker   7/7/2020  9:30 AM Gabriella Solis [facility] SCHED         Signed:  Gabriella Adame  6/23/2020  3:59 PM

## 2020-06-24 ENCOUNTER — PATIENT OUTREACH (OUTPATIENT)
Dept: CASE MANAGEMENT | Age: 50
End: 2020-06-24

## 2020-06-24 DIAGNOSIS — Z86.711 HX PULMONARY EMBOLISM: Primary | ICD-10-CM

## 2020-06-24 RX ORDER — ENOXAPARIN SODIUM 100 MG/ML
40 INJECTION SUBCUTANEOUS DAILY
Qty: 3 SYRINGE | Refills: 0 | Status: SHIPPED | OUTPATIENT
Start: 2020-06-24 | End: 2020-06-27

## 2020-06-24 NOTE — PROGRESS NOTES
6/30/2020 Care transitions nurse -     Abelardo Son RN, [facility], [facility]  Care transitions nurse [phone]  [facility] Coordination Team    Patient was admitted to [facility] on 6/19 and discharged on 6/23 for 6/22/20   OPERATION PERFORMED:  1. Left video-assisted thoracoscopy. 2.  Left VATS wedge resection x3 (left upper lobe, left lingula, left lower lobe).       POSTOPERATIVE DIAGNOSES:  1. Interstitial lung disease. 2.  History of pulmonary embolism. Patient was contacted within 1 business days of discharge. Top Discharge Challenges to be reviewed by the provider   Additional needs identified to be addressed with provider no  Transition of Care Plan: CM met with patient to discuss discharge planning. He is single, independent without any assistive devices and currently on leave from work due to his medical condition. Patient requires home O2 at 4 L at home and [employer] is the [employer] that services patient. Discussed COVID-19 related testing which was available at this time. Test results were negative. Patient informed of results, if available? yes     Date: 6/18/2020 Department: [facility] Or Preadmit Tsting Released By: Morgan Steve Authorizing: Sharon Pavon NP   Component Value Flag Ref Range Units Status   SARS-CoV-2 Not Detected   Not Detected   Final   Comment:   (NOTE)        Method of communication with provider : staff message       Advance Care Planning:   Does patient have an Advance Directive:  not on file; education provided     Inpatient Readmission Risk score: 10  Was this a readmission?  no   Patient stated reason for the admission: Pt to ED post hitting his left leg - with minor abrasion and a 'bump ' over the area - left leg swelling -   Patients top risk factors for readmission: functional physical ability, lack of knowledge about disease and medical condition  Interventions to address risk factors: Review of discharge/ review of testing with pt - review of discharge medications and follow up    Care Transition Nurse (CTN) contacted the patient by telephone to perform post hospital discharge assessment. Verified name and  with patient as identifiers. Provided introduction to self, and explanation of the CTN role. CTN reviewed discharge instructions, medical action plan and red flags with patient who verbalized understanding. Patient given an opportunity to ask questions and does not have any further questions or concerns at this time. The patient agrees to contact the PCP office for questions related to their healthcare. Medication reconciliation was performed with patient, who verbalizes understanding of administration of home medications. Advised obtaining a 90-day supply of all daily and as-needed medications. Referral to Pharm D needed: no     Home Health/Outpatient orders at discharge:  [address]: n/a  Date of initial visit: [address] ordered at discharge: none  [address] received: n/a    Covid Risk Education    Patient has following risk factors of: post operative lung biopsy/ worsening lung disease on oxygen therapy - hx DVT. Harry Prost Education provided regarding infection prevention, and signs and symptoms of COVID-19 and when to seek medical attention with patient who verbalized understanding. Discussed exposure protocols and quarantine From CDC: Are you at higher risk for severe illness?  and given an opportunity for questions and concerns. The patient agrees to contact the COVID-19 hotline 947-383-2396 or PCP office for questions related to COVID-19. For more information on steps you can take to protect yourself, see CDC's How to Protect Yourself     Patient/family/caregiver given information for Ceasar Gutiérrez and agrees to enroll no  Patient's preferred e-mail: [email or web address]  Patient's preferred phone number: [phone]    Discussed follow-up appointments. If no appointment was previously scheduled, appointment scheduling offered: yes     [facility] follow up appointment(s):   Future Appointments   Date Time Provider Sabi Parker   7/7/2020  9:30 AM BOZENA Cardenas     [facility] follow up appointment(s): none noted    Plan for follow-up call in 7-10 days based on severity of symptoms and risk factors. CTN provided contact information for future needs. Goals      Attends follow-up appointments as directed. 6/24/20  Post op follow up   7/7 with [facility] - surgical post lung biopsy/ lung resection. ttk       Prevent complications post hospitalization. 2/25/2020    - Spoke to patient today. Patient lives alone but has family and friend support. Patient works full time as . - patient attended PCP appt on 2/24/2020 and PCP wants patient to see pulmonary before he is released back to work  - patient has pulmonary appt on 2/27  - carrdiology 4/8  - patient report that he has [employer] that he uses to track his vitals including 02. He reports today BP as 125/75, 02 98%, HR 78. Advised patient to contact MD if 02 drops below 90%. - patient reports that his SOB is mild, often only occurring with exertion but he states that his 02 has not dropped below 90%. Reviewed with patient that if 02 drops below 90% to take a break from activity, use pursed lip breathing and monitor 02. - Reviewed red flag s/s with patient, nausea, vomiting, inability to pass urine/stool, SOB, mental status change, chest pain, fever, decrease in 02 sats. -confirmed medications      Patient will contact Md/CTN if red flag s/s arise and keep log of vitals. CTN to f/u ~ 7-10 days. AR      03/12/20  - patient attended f/u with pulmonary on 2/27. Patient was started on home 02. Supplied by [employer]. Patient reports they just brought him 9 more tanks, he is currently on 4LPM and stating 94-99%.  He continues to monitor using his watch which also keeps a log. 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 Patient has f/u this afternoon with pulmonary, Dr. Richard Rivera. Patient requested that CTN contact him tomorrow to discuss his visit this afternoon. CTN to f/u 1 day. AR    03/13/20  - Spoke to patient today. Patient attended pulmonary f/u yesterday. Patient has biopsy on 3/25 and will potentially try to wean 02 after biopsy. Patient continues to be on 4LPM 02. Discussed patients family support and emotional reaction to his situation. Patient reports that his family is supportive and all he has to do is contact them and they are there to help. Discussed potential impacts due to patient not being able to work and patient stated no problems. CTN currently building rapport with patient so that patient will feel comfortable voicing concerns to CTN. Patient reports he is very thankful for CTN calls and checking in on him. Encouraged patient to sign up for my chart so he has access to all his [facility]. Encouraged patient to reach out for resources if assistance is needed. - patient reports small amount of swelling in his legs, he did report eating ramen noodles earlier in the week. Reviewed low sodium diet with patient and how increased sodium can lead to swelling which can also impact his lungs. Discussed daily weights and patient does not currently have a scale but he states he can get one. Discussed daily weights and how they can be first indicator of increased fluid on board. - patient reports that his edema is not pitting and that his shoes/socks fit without feeling tight. Advised patient to elevate extremities, avoid high salt diet (reviewed foods high in salt), and to contact PCP if swelling gets worse or if it has not improved by Monday.  Patient verbalized understanding.   - considering patients current respiratory situation reviewed Covid - 19 precautions such as hand washing, avoiding crowds, reporting cough or increased SOB. Patient verbalized understanding of the above. CTN to f/u with patient in 1 week. AR       Returns to baseline activity level. 6/24/20  Pt to monitor wound - dressing changes, as directed - call surgeon is bleeding, redness, tenderness, or fever. Monitor temperature for wound healing /post op and re: covid -     ttk                                  _______________________________________________________     INDICATIONS FOR PROCEDURE:  The patient is a 58-year-old gentleman referred from Pulmonary Medicine for an open surgical lung biopsy. The patient has progressive interstitial lung disease with worsening oxygen requirements as well as worsening parenchymal disease on chest x-ray. 6/22/20   OPERATION PERFORMED:  1. Left video-assisted thoracoscopy. 2.  Left VATS wedge resection x3 (left upper lobe, left lingula, left lower lobe).       POSTOPERATIVE DIAGNOSES:  1. Interstitial lung disease. 2.  History of pulmonary embolism. INDICATIONS FOR PROCEDURE:  The patient is a 58-year-old gentleman referred from Pulmonary Medicine for an open surgical lung biopsy. The patient has progressive interstitial lung disease with worsening oxygen requirements as well as worsening parenchymal disease on chest x-ray.

## 2020-06-26 ENCOUNTER — TELEPHONE (OUTPATIENT)
Dept: SURGERY | Age: 50
End: 2020-06-26

## 2020-06-26 DIAGNOSIS — R91.1 LUNG NODULE: Primary | ICD-10-CM

## 2020-06-26 NOTE — TELEPHONE ENCOUNTER
Quinton Landrum -pathologist with the [facility]- would like a call back regarding this patient. He had a referral question and also some questions regarding the patients lung biopsy.

## 2020-06-29 ENCOUNTER — TELEPHONE (OUTPATIENT)
Dept: FAMILY MEDICINE CLINIC | Age: 50
End: 2020-06-29

## 2020-06-29 NOTE — TELEPHONE ENCOUNTER
Patient calls asking to get an order to have his PT/INR checked at a local [facility]. States he is not overdue and can wait for the order to be sent pending Dr. Nasim West return from [address] [address].

## 2020-07-06 DIAGNOSIS — Z79.01 ANTICOAGULANT LONG-TERM USE: Primary | ICD-10-CM

## 2020-07-07 ENCOUNTER — HOSPITAL ENCOUNTER (OUTPATIENT)
Dept: GENERAL RADIOLOGY | Age: 50
Discharge: HOME | End: 2020-07-07
Payer: COMMERCIAL

## 2020-07-07 ENCOUNTER — OFFICE VISIT (OUTPATIENT)
Dept: SURGERY | Age: 50
End: 2020-07-07

## 2020-07-07 VITALS
SYSTOLIC BLOOD PRESSURE: 107 MMHG | RESPIRATION RATE: 20 BRPM | BODY MASS INDEX: 28.75 KG/M2 | HEART RATE: 95 BPM | DIASTOLIC BLOOD PRESSURE: 67 MMHG | WEIGHT: 224 LBS | OXYGEN SATURATION: 96 % | HEIGHT: 74 IN | TEMPERATURE: 98.5 F

## 2020-07-07 DIAGNOSIS — R91.1 LUNG NODULE: ICD-10-CM

## 2020-07-07 DIAGNOSIS — J84.9 ILD (INTERSTITIAL LUNG DISEASE) (HCC): Primary | ICD-10-CM

## 2020-07-07 PROCEDURE — 71046 X-RAY EXAM CHEST 2 VIEWS: CPT

## 2020-07-07 NOTE — PROGRESS NOTES
Follow up for Left VATS; Wedge Resection on 6-.      1. Have you been to the ER, urgent care clinic since your last visit? Hospitalized since your last visit? No    2. Have you seen or consulted any other health care providers outside of the [address] [place] [address] since your last visit? Include any pap smears or colon screening.  No

## 2020-07-07 NOTE — PROGRESS NOTES
Subjective: Janey Closs is a 47 y.o. male presents for postop care 2 weeks following Left video-assisted thoracoscopy with Left VATS wedge resection x3 (left upper lobe, left lingula, left lower lobe) by Dr. Angel Skiff on 2020. He remains on home O2 (using 2-3L via NC)     Appetite is normal. Eating a regular diet without difficulty. Bowel movements are regular. The patient is voiding without difficulty. The patient is not having any pain. .    Patient has an advanced directive: not on file  Mr. Micheline Paez has a reminder for a \"due or due soon\" health maintenance. I have asked that he contact his primary care provider for follow-up on this health maintenance. FINAL PATHOLOGIC DIAGNOSIS   1. Left upper lobe, wedge biopsy:   Cellular nonspecific interstitial pneumonia-pattern (see Comment). 2. Left lingula, wedge biopsy:   Cellular nonspecific interstitial pneumonia-pattern (see Comment). 3. Left lower lobe, wedge biopsy:   Cellular nonspecific interstitial pneumonia-pattern (see Comment). Objective:     CXR (2020) shows:  IMPRESSION  1. Resolution of left pneumothorax. 2. Improved diffuse interstitial and airspace opacities. Probable small left  pleural effusion. 3. No pulmonary nodule identified by plain radiographs. If high clinical concern  consider CT. Visit Vitals  /67 (BP 1 Location: Right arm, BP Patient Position: Sitting)   Pulse 95   Temp 98.5 °F (36.9 °C) (Oral)   Resp 20   Ht 6' 2\" (1.88 m)   Wt 224 lb (101.6 kg)   SpO2 96% Comment: 3 liters O2   BMI 28.76 kg/m²       General:  alert, cooperative, no distress, appears stated age   Pulm: Full, clear BS bilat. No resp distress On 3L O2 via NC   Incision:   healing well, no drainage, no erythema, no hernia, no dehiscence, incisions well approximated. CT site open 2mm--steri strips applied     Assessment:     Doing well postoperatively. Plan/Recommendations/Medical Decision Makin.  Continue home O2 as prescribed/needed  2. F/up Dr. Radha Soriano --will call to make f/up appt   3. Wound care discussed  4. RTC PRN    We discussed the importance of proper diet and 30 minutes/day of proper exercise. Mr. Minoo Lutz verbalized understanding and questions were answered to the best of my knowledge and ability. Healthy eating educational materials were provided. All questions were personally answered. Thank you for allowing us to participate in the care of your patient.     Ammon Parkinson [place]  Thoracic Surgery  7/7/2020

## 2020-07-07 NOTE — PATIENT INSTRUCTIONS
Eating Healthy Foods: Care Instructions Your Care Instructions Eating healthy foods can help lower your risk for disease. Healthy food gives you energy and keeps your heart strong, your brain active, your muscles working, and your bones strong. A healthy diet includes a variety of foods from the basic food groups: grains, vegetables, fruits, milk and milk products, and meat and beans. Some people may eat more of their favorite foods from only one food group and, as a result, miss getting the nutrients they need. So, it is important to pay attention not only to what you eat but also to what you are missing from your diet. You can eat a healthy, balanced diet by making a few small changes. Follow-up care is a key part of your treatment and safety. Be sure to make and go to all appointments, and call your doctor if you are having problems. It's also a good idea to know your test results and keep a list of the medicines you take. How can you care for yourself at home? Look at what you eat · Keep a food diary for a week or two and record everything you eat or drink. Track the number of servings you eat from each food group. · For a balanced diet every day, eat a variety of: 
? 6 or more ounce-equivalents of grains, such as cereals, breads, crackers, rice, or pasta, every day. An ounce-equivalent is 1 slice of bread, 1 cup of ready-to-eat cereal, or ½ cup of cooked rice, cooked pasta, or cooked cereal. 
? 2½ cups of vegetables, especially: § Dark-green vegetables such as broccoli and spinach. § Orange vegetables such as carrots and sweet potatoes. § Dry beans (such as viveros and kidney beans) and peas (such as lentils). ? 2 cups of fresh, frozen, or canned fruit. A small apple or 1 banana or orange equals 1 cup. ? 3 cups of nonfat or low-fat milk, yogurt, or other milk products.  
? 5½ ounces of meat and beans, such as chicken, fish, lean meat, beans, Health Maintenance Due   Topic Date Due   • Influenza Vaccine (1) 09/01/2019       Patient is due for topics as listed above but is not proceeding with Immunization(s) Influenza at this time. She does not plan on getting an Influenza vaccine this year.    Recent PHQ 2/9 Score    PHQ 2:  Date Adult PHQ 2 Score   1/13/2020 0       PHQ 9:  Date Adult PHQ 9 Score   1/13/2020 1                nuts, and seeds. One egg, 1 tablespoon of peanut butter, ½ ounce nuts or seeds, or ¼ cup of cooked beans equals 1 ounce of meat. · Learn how to read food labels for serving sizes and ingredients. Fast-food and convenience-food meals often contain few or no fruits or vegetables. Make sure you eat some fruits and vegetables to make the meal more nutritious. · Look at your food diary. For each food group, add up what you have eaten and then divide the total by the number of days. This will give you an idea of how much you are eating from each food group. See if you can find some ways to change your diet to make it more healthy. Start small · Do not try to make dramatic changes to your diet all at once. You might feel that you are missing out on your favorite foods and then be more likely to fail. · Start slowly, and gradually change your habits. Try some of the following: ? Use whole wheat bread instead of white bread. ? Use nonfat or low-fat milk instead of whole milk. ? Eat brown rice instead of white rice, and eat whole wheat pasta instead of white-flour pasta. ? Try low-fat cheeses and low-fat yogurt. ? Add more fruits and vegetables to meals and have them for snacks. ? Add lettuce, tomato, cucumber, and onion to sandwiches. ? Add fruit to yogurt and cereal. 
Enjoy food · You can still eat your favorite foods. You just may need to eat less of them. If your favorite foods are high in fat, salt, and sugar, limit how often you eat them, but do not cut them out entirely. · Eat a wide variety of foods. Make healthy choices when eating out · The type of restaurant you choose can help you make healthy choices. Even fast-food chains are now offering more low-fat or healthier choices on the menu. · Choose smaller portions, or take half of your meal home. · When eating out, try: ? A veggie pizza with a whole wheat crust or grilled chicken (instead of sausage or pepperoni). ? Pasta with roasted vegetables, grilled chicken, or marinara sauce instead of cream sauce. ? A vegetable wrap or grilled chicken wrap. ? Broiled or poached food instead of fried or breaded items. Make healthy choices easy · Buy packaged, prewashed, ready-to-eat fresh vegetables and fruits, such as baby carrots, salad mixes, and chopped or shredded broccoli and cauliflower. · Buy packaged, presliced fruits, such as melon or pineapple. · Choose 100% fruit or vegetable juice instead of soda. Limit juice intake to 4 to 6 oz (½ to ¾ cup) a day. · Blend low-fat yogurt, fruit juice, and canned or frozen fruit to make a smoothie for breakfast or a snack. Where can you learn more? Go to http://tito-ludmila.info/ Enter T756 in the search box to learn more about \"Eating Healthy Foods: Care Instructions. \" Current as of: August 22, 2019               Content Version: 12.5 © 4817-8856 [employer]. Care instructions adapted under license by [employer] (which disclaims liability or warranty for this information). If you have questions about a medical condition or this instruction, always ask your healthcare professional. [address] any warranty or liability for your use of this information.

## 2020-07-22 DIAGNOSIS — Z79.01 ANTICOAGULANT LONG-TERM USE: ICD-10-CM

## 2020-08-04 ENCOUNTER — HOSPITAL ENCOUNTER (EMERGENCY)
Age: 50
Discharge: HOME | End: 2020-08-04
Attending: EMERGENCY MEDICINE | Admitting: EMERGENCY MEDICINE
Payer: COMMERCIAL

## 2020-08-04 ENCOUNTER — APPOINTMENT (OUTPATIENT)
Dept: VASCULAR SURGERY | Age: 50
End: 2020-08-04
Attending: EMERGENCY MEDICINE
Payer: COMMERCIAL

## 2020-08-04 VITALS
BODY MASS INDEX: 29.77 KG/M2 | TEMPERATURE: 98 F | OXYGEN SATURATION: 95 % | WEIGHT: 232 LBS | DIASTOLIC BLOOD PRESSURE: 70 MMHG | SYSTOLIC BLOOD PRESSURE: 120 MMHG | HEIGHT: 74 IN | RESPIRATION RATE: 18 BRPM | HEART RATE: 70 BPM

## 2020-08-04 DIAGNOSIS — R60.0 BILATERAL LOWER EXTREMITY EDEMA: Primary | ICD-10-CM

## 2020-08-04 DIAGNOSIS — R79.1 SUPRATHERAPEUTIC INR: ICD-10-CM

## 2020-08-04 DIAGNOSIS — M79.601 RIGHT ARM PAIN: ICD-10-CM

## 2020-08-04 DIAGNOSIS — T07.XXXA MULTIPLE CONTUSIONS: ICD-10-CM

## 2020-08-04 LAB
ALBUMIN SERPL-MCNC: 3.3 G/DL (ref 3.5–5)
ALBUMIN/GLOB SERPL: 0.7 {RATIO} (ref 1.1–2.2)
ALP SERPL-CCNC: 85 U/L (ref 45–117)
ALT SERPL-CCNC: 55 U/L (ref 12–78)
ANION GAP SERPL CALC-SCNC: 3 MMOL/L (ref 5–15)
AST SERPL-CCNC: 19 U/L (ref 15–37)
BASOPHILS # BLD: 0.1 K/UL (ref 0–0.1)
BASOPHILS NFR BLD: 0 % (ref 0–1)
BILIRUB SERPL-MCNC: 1.1 MG/DL (ref 0.2–1)
BNP SERPL-MCNC: <5 PG/ML
BUN SERPL-MCNC: 14 MG/DL (ref 6–20)
BUN/CREAT SERPL: 15 (ref 12–20)
CALCIUM SERPL-MCNC: 8.8 MG/DL (ref 8.5–10.1)
CHLORIDE SERPL-SCNC: 104 MMOL/L (ref 97–108)
CO2 SERPL-SCNC: 29 MMOL/L (ref 21–32)
CREAT SERPL-MCNC: 0.95 MG/DL (ref 0.7–1.3)
DIFFERENTIAL METHOD BLD: ABNORMAL
EOSINOPHIL # BLD: 0.2 K/UL (ref 0–0.4)
EOSINOPHIL NFR BLD: 2 % (ref 0–7)
ERYTHROCYTE [DISTWIDTH] IN BLOOD BY AUTOMATED COUNT: 14.7 % (ref 11.5–14.5)
GLOBULIN SER CALC-MCNC: 4.8 G/DL (ref 2–4)
GLUCOSE SERPL-MCNC: 127 MG/DL (ref 65–100)
HCT VFR BLD AUTO: 47.7 % (ref 36.6–50.3)
HGB BLD-MCNC: 15.4 G/DL (ref 12.1–17)
IMM GRANULOCYTES # BLD AUTO: 0.1 K/UL (ref 0–0.04)
IMM GRANULOCYTES NFR BLD AUTO: 1 % (ref 0–0.5)
INR PPP: 11.2 (ref 0.9–1.1)
LYMPHOCYTES # BLD: 1.6 K/UL (ref 0.8–3.5)
LYMPHOCYTES NFR BLD: 12 % (ref 12–49)
MCH RBC QN AUTO: 29.5 PG (ref 26–34)
MCHC RBC AUTO-ENTMCNC: 32.3 G/DL (ref 30–36.5)
MCV RBC AUTO: 91.4 FL (ref 80–99)
MONOCYTES # BLD: 0.6 K/UL (ref 0–1)
MONOCYTES NFR BLD: 4 % (ref 5–13)
NEUTS SEG # BLD: 11 K/UL (ref 1.8–8)
NEUTS SEG NFR BLD: 81 % (ref 32–75)
NRBC # BLD: 0 K/UL (ref 0–0.01)
NRBC BLD-RTO: 0 PER 100 WBC
PLATELET # BLD AUTO: 158 K/UL (ref 150–400)
PMV BLD AUTO: 9.3 FL (ref 8.9–12.9)
POTASSIUM SERPL-SCNC: 4.1 MMOL/L (ref 3.5–5.1)
PROT SERPL-MCNC: 8.1 G/DL (ref 6.4–8.2)
PROTHROMBIN TIME: 95.9 SEC (ref 9–11.1)
RBC # BLD AUTO: 5.22 M/UL (ref 4.1–5.7)
SODIUM SERPL-SCNC: 136 MMOL/L (ref 136–145)
WBC # BLD AUTO: 13.5 K/UL (ref 4.1–11.1)

## 2020-08-04 PROCEDURE — 93971 EXTREMITY STUDY: CPT

## 2020-08-04 PROCEDURE — 96374 THER/PROPH/DIAG INJ IV PUSH: CPT

## 2020-08-04 PROCEDURE — 85025 COMPLETE CBC W/AUTO DIFF WBC: CPT

## 2020-08-04 PROCEDURE — 99285 EMERGENCY DEPT VISIT HI MDM: CPT

## 2020-08-04 PROCEDURE — 36415 COLL VENOUS BLD VENIPUNCTURE: CPT

## 2020-08-04 PROCEDURE — 96375 TX/PRO/DX INJ NEW DRUG ADDON: CPT

## 2020-08-04 PROCEDURE — 80053 COMPREHEN METABOLIC PANEL: CPT

## 2020-08-04 PROCEDURE — 74011250636 HC RX REV CODE- 250/636: Performed by: EMERGENCY MEDICINE

## 2020-08-04 PROCEDURE — 85610 PROTHROMBIN TIME: CPT

## 2020-08-04 PROCEDURE — 83880 ASSAY OF NATRIURETIC PEPTIDE: CPT

## 2020-08-04 RX ORDER — MORPHINE SULFATE 2 MG/ML
4 INJECTION, SOLUTION INTRAMUSCULAR; INTRAVENOUS
Status: COMPLETED | OUTPATIENT
Start: 2020-08-04 | End: 2020-08-04

## 2020-08-04 RX ORDER — PHYTONADIONE 5 MG/1
10 TABLET ORAL
Status: DISCONTINUED | OUTPATIENT
Start: 2020-08-04 | End: 2020-08-04

## 2020-08-04 RX ORDER — ONDANSETRON 2 MG/ML
4 INJECTION INTRAMUSCULAR; INTRAVENOUS
Status: COMPLETED | OUTPATIENT
Start: 2020-08-04 | End: 2020-08-04

## 2020-08-04 RX ORDER — OXYCODONE AND ACETAMINOPHEN 5; 325 MG/1; MG/1
1 TABLET ORAL
Qty: 10 TAB | Refills: 0 | Status: SHIPPED | OUTPATIENT
Start: 2020-08-04 | End: 2020-08-07

## 2020-08-04 RX ADMIN — ONDANSETRON 4 MG: 2 INJECTION INTRAMUSCULAR; INTRAVENOUS at 13:02

## 2020-08-04 RX ADMIN — PHYTONADIONE 10 MG: 10 INJECTION, EMULSION INTRAMUSCULAR; INTRAVENOUS; SUBCUTANEOUS at 14:27

## 2020-08-04 RX ADMIN — MORPHINE SULFATE 4 MG: 2 INJECTION, SOLUTION INTRAMUSCULAR; INTRAVENOUS at 13:02

## 2020-08-04 NOTE — ED NOTES
Assumed care. Patient arrives via ems with c/o b/l lower extremity pain and swelling along with right arm pain and swelling since Sunday. Denies CHF history. Recently diagnosed with lung condition. Denies sob. Wears oxygen as needed at home. Takes Warfarin for afib. NAD. Placed on cardiac, bp, and pulse ox monitoring. Call bell in reach. Will continue to monitor. 1322 - Patient transported to vascular. 1505 - Discharge instructions provided to patient. Verbalized understanding. Alert and oriented. IV lock removed. Escorted out of ED via w/c.

## 2020-08-04 NOTE — DISCHARGE INSTRUCTIONS
Patient Education     High INR Test Result: Care Instructions  Your Care Instructions  You had a blood test to check how long it takes your blood to clot. This test is called a PT or prothrombin time test. The result of the test is called the INR level. A high INR level can happen when you take warfarin (Coumadin). Warfarin helps prevent blood clots. To do this, it slows the amount of time it takes for your blood to clot. This raises your INR level. The INR goal for people who take warfarin is usually from 2 to 3.5. A value higher than 3.5 increases the risk of bleeding problems. Many things can affect the way warfarin works. Some natural health products and other medicines can make warfarin work too well. That can raise the risk of bleeding. If you drink a lot of alcohol, that may raise your INR. And severe diarrhea or vomiting can also raise your INR. The best way to lower your INR will depend on several things. In some cases, the doctor may have you stop taking warfarin for a few days. You may also be given other medicines to take. You will need to be tested often to make sure your INR level is going down. You will also need to watch for signs of bleeding. The doctor has checked you carefully, but problems can develop later. If you notice any problems or new symptoms, get medical treatment right away. Follow-up care is a key part of your treatment and safety. Be sure to make and go to all appointments, and call your doctor if you are having problems. It's also a good idea to know your test results and keep a list of the medicines you take. How can you care for yourself at home? Be careful with medicines and foods  · Don't start or stop taking any medicines, vitamins, or natural remedies unless you first talk to your doctor. · Keep the amount of vitamin K in your diet about the same from day to day. Do not suddenly eat a lot more or a lot less food that is rich in vitamin K than you usually do.  Vitamin K affects how warfarin works and how your blood clots. · Avoid cranberry juice and other cranberry products. They can increase the effects of warfarin. · Limit your use of alcohol. Avoid bleeding by preventing falls  · Wear slippers or shoes with nonskid soles. · Remove throw rugs and clutter. · Rearrange furniture and electrical cords to keep them out of walking paths. · Keep stairways, porches, and outside walkways well lit. Use night-lights in hallways and bathrooms. · Be extra careful when you work with sharp tools or knives. When should you call for help? Call 911 anytime you think you may need emergency care. For example, call if:  · You have a sudden, severe headache that is different from past headaches. Call your doctor now or seek immediate medical care if:  · You have any abnormal bleeding, such as:  ¨ Nosebleeds. ¨ Vaginal bleeding that is different (heavier, more frequent, at a different time of the month) than what you are used to. ¨ Bloody or black stools, or rectal bleeding. ¨ Bloody or pink urine. Watch closely for changes in your health, and be sure to contact your doctor if you have any problems. Where can you learn more? Go to Eneedo.be  Enter C178 in the search box to learn more about \"High INR Test Result: Care Instructions. \"   © 8740-8119 Healthwise, Incorporated. Care instructions adapted under license by [facility] (which disclaims liability or warranty for this information). This care instruction is for use with your licensed healthcare professional. If you have questions about a medical condition or this instruction, always ask your healthcare professional. [place] [address] any warranty or liability for your use of this information.   Content Version: 13.9.796459; Current as of: November 20, 2015           Patient Education        Leg and Ankle Edema: Care Instructions  Your Care Instructions  Swelling in the legs, ankles, and feet is called edema. It is common after you sit or stand for a while. Long plane flights or car rides often cause swelling in the legs and feet. You may also have swelling if you have to stand for long periods of time at your job. Problems with the veins in the legs (varicose veins) and changes in hormones can also cause swelling. Sometimes the swelling in the ankles and feet is caused by a more serious problem, such as heart failure, infection, blood clots, or liver or kidney disease. Follow-up care is a key part of your treatment and safety. Be sure to make and go to all appointments, and call your doctor if you are having problems. It's also a good idea to know your test results and keep a list of the medicines you take. How can you care for yourself at home? · If your doctor gave you medicine, take it as prescribed. Call your doctor if you think you are having a problem with your medicine. · Whenever you are resting, raise your legs up. Try to keep the swollen area higher than the level of your heart. · Take breaks from standing or sitting in one position. ? Walk around to increase the blood flow in your lower legs. ? Move your feet and ankles often while you stand, or tighten and relax your leg muscles. · Wear support stockings. Put them on in the morning, before swelling gets worse. · Eat a balanced diet. Lose weight if you need to. · Limit the amount of salt (sodium) in your diet. Salt holds fluid in the body and may increase swelling. When should you call for help? [id number] anytime you think you may need emergency care. For example, call if:  · You have symptoms of a blood clot in your lung (called a pulmonary embolism). These may include:  ? Sudden chest pain. ? Trouble breathing. ? Coughing up blood. Call your doctor now or seek immediate medical care if:  · You have signs of a blood clot, such as:  ? Pain in your calf, back of the knee, thigh, or groin. ?  Redness and swelling in your leg or groin. · You have symptoms of infection, such as:  ? Increased pain, swelling, warmth, or redness. ? Red streaks or pus. ? A fever. Watch closely for changes in your health, and be sure to contact your doctor if:  · Your swelling is getting worse. · You have new or worsening pain in your legs. · You do not get better as expected. Where can you learn more? Go to http://tito-ludmila.info/  Enter O471 in the search box to learn more about \"Leg and Ankle Edema: Care Instructions. \"  Current as of: June 26, 2019               Content Version: 12.5  © 2018-3298 Healthwise, Incorporated. Care instructions adapted under license by [employer] (which disclaims liability or warranty for this information). If you have questions about a medical condition or this instruction, always ask your healthcare professional. [address] any warranty or liability for your use of this information.

## 2020-08-04 NOTE — ED PROVIDER NOTES
EMERGENCY DEPARTMENT HISTORY AND PHYSICAL EXAM      Date: 8/4/2020  Patient Name: Estrada Rich    History of Presenting Illness     Chief Complaint   Patient presents with    Peripheral Edema     patient complains of b/l lower extremity and right arm pain and swelling that has been increasing Sunday. History Provided By: Patient and EMS    HPI: Estrada Rich, 47 y.o. male presents to the ED with cc of swelling in legs and right arm. Patient states that his symptoms started 2 days ago. 10 out of 10 pain involving the right arm. He denies trauma. He indicates that he has had several bruises appear in the last few days. He denies chest pain, cough, fever, chills or shortness of breath. He has intermittent pain in his legs which is 6 out of 10 in severity. He denies headache, abdominal pain or back pain. There are no other complaints, changes, or physical findings at this time. PCP: Jared Sotomayor MD    No current facility-administered medications on file prior to encounter. Current Outpatient Medications on File Prior to Encounter   Medication Sig Dispense Refill    warfarin (COUMADIN) 5 mg tablet Take 1 Tab by mouth five (5) days a week. TAKE 1 TABLET (5MG) BY MOUTH EVERY DAY ON MONDAY THROUGH FRIDAY, AND 1/2 TABLET (2.5MG) EVERY SATURDAY AND Sunday. 90 Tab 0    carvediloL (COREG) 3.125 mg tablet Take 1 Tab by mouth two (2) times daily (with meals). 60 Tab 2    famotidine (PEPCID) 20 mg tablet Take 1 Tab by mouth every morning. 90 Tab 0    warfarin (COUMADIN) 2.5 mg tablet Take 2.5 mg by mouth two (2) times daily on Sat & Sun. 2.5mg on Sat/Sun. TAKE 1 TAB (5MG) BY MOUTH EVERY DAY ON MONDAY THROUGH FRIDAY, AND 1/2 TAB (2.5MG) EVERY SATURDAY AND Sunday.       rosuvastatin (CRESTOR) 20 mg tablet TAKE 1 TABLET BY MOUTH EVERY NIGHT (Patient taking differently: Take 20 mg by mouth nightly.) 90 Tab 3    EPINEPHrine (EPIPEN) 0.3 mg/0.3 mL injection INJECT 0.3 ML BY INTRAMUSCULAR ROUTE ONCE PRN FOR ANAPHYLAXIS OR ALLERGIC RESPONSE FOR UP TO ONE DOSE (Patient taking differently: 0.3 mg by IntraMUSCular route as needed. INJECT 0.3 ML BY INTRAMUSCULAR ROUTE ONCE PRN FOR ANAPHYLAXIS OR ALLERGIC RESPONSE FOR UP TO ONE DOSE) 1 Syringe 0    sildenafil citrate (VIAGRA) 50 mg tablet Take 50 mg by mouth as needed. Past History     Past Medical History:  Past Medical History:   Diagnosis Date    [facility]     Allergic rhinitis     Anxiety disorder     Cellulitis     post tattoo    Deep vein thrombosis (DVT) ([facility]     [facility]  [facility]/24/17 f/u note to manage Lovenox    Essential tremor     BILATERAL HANDS    Laceration of arm 09/23/2017    Left cut by a chain saw.  Nicotine vapor product user     STOPPED 2018    Phlebitis     Psychiatric disorder     ANXIETY WITH PANICK ATTACK    Pulmonary embolism ([facility] 3/2011    multiple episodes    Rectal bleeding 10/2011    on coumadin @ the time &again 4/2017 4/12/17 note from Carla//colonoscopy report rec'd    Shortness of breath     Thromboembolus ([facility]     Umbilical hernia     Vitamin D deficiency 04/2014    again 4/2017       Past Surgical History:  Past Surgical History:   Procedure Laterality Date    HX APPENDECTOMY  2003    HX COLONOSCOPY  04/28/2017    Dr. Shanelle Vides    HX HEENT  02/2020    BRONCHOSCOPY    HX HERNIA REPAIR  8165    umbilical       Family History:  Family History   Problem Relation Age of Onset    Heart Disease Father     Heart Disease Maternal Grandmother     Heart Disease Maternal Grandfather     Anesth Problems Neg Hx        Social History:  Social History     Tobacco Use    Smoking status: Never Smoker    Smokeless tobacco: Current User     Types: Snuff    Tobacco comment: former vaper   Substance Use Topics    Alcohol use: Yes     Alcohol/week: 0.0 standard drinks     Comment: SOCIALLY EVERY 6 MONTHS    Drug use: Never       Allergies:   Allergies   Allergen Reactions    Bee Sting [Sting, Bee] Swelling    Neomycin Rash    Neosporin [Neomycin-Bacitracin-Polymyxin] Rash    Penicillins Unknown (comments)         Review of Systems   Review of Systems   Constitutional: Negative for fever. HENT: Negative for congestion. Eyes: Negative. Respiratory: Negative for shortness of breath. Cardiovascular: Negative for chest pain. Gastrointestinal: Negative for abdominal pain. Endocrine: Negative for heat intolerance. Genitourinary: Negative for dysuria. Musculoskeletal: Negative for back pain. Skin: Negative for rash. Allergic/Immunologic: Negative for immunocompromised state. Neurological: Negative for dizziness. Hematological: Bruises/bleeds easily. Psychiatric/Behavioral: Negative. All other systems reviewed and are negative. Physical Exam   Physical Exam  Vitals signs and nursing note reviewed. Constitutional:       General: He is not in acute distress. Appearance: He is well-developed. HENT:      Head: Normocephalic and atraumatic. Neck:      Musculoskeletal: Normal range of motion. Cardiovascular:      Rate and Rhythm: Normal rate and regular rhythm. Heart sounds: Normal heart sounds. Pulmonary:      Effort: Pulmonary effort is normal.      Breath sounds: Normal breath sounds. Abdominal:      General: Bowel sounds are normal.      Palpations: Abdomen is soft. Musculoskeletal:      Right lower leg: Edema present. Left lower leg: Edema present. Comments: 2+ bilateral lower extremity edema, 2+ edema right upper extremity, possible contusion distal to the right elbow and tender to palpation   Skin:     General: Skin is warm and dry. Findings: Bruising present. Comments: Contusion right lower abdomen   Neurological:      General: No focal deficit present. Mental Status: He is alert and oriented to person, place, and time.       Coordination: Coordination normal.   Psychiatric:         Mood and Affect: Mood normal.         Behavior: Behavior normal.         Diagnostic Study Results     Labs -     Recent Results (from the past 12 hour(s))   CBC WITH AUTOMATED DIFF    Collection Time: 08/04/20 12:47 PM   Result Value Ref Range    WBC 13.5 (H) 4.1 - 11.1 K/uL    RBC 5.22 4.10 - 5.70 M/uL    HGB 15.4 12.1 - 17.0 g/dL    HCT 47.7 36.6 - 50.3 %    MCV 91.4 80.0 - 99.0 FL    MCH 29.5 26.0 - 34.0 PG    MCHC 32.3 30.0 - 36.5 g/dL    RDW 14.7 (H) 11.5 - 14.5 %    PLATELET 873 590 - 890 K/uL    MPV 9.3 8.9 - 12.9 FL    NRBC 0.0 0  WBC    ABSOLUTE NRBC 0.00 0.00 - 0.01 K/uL    NEUTROPHILS 81 (H) 32 - 75 %    LYMPHOCYTES 12 12 - 49 %    MONOCYTES 4 (L) 5 - 13 %    EOSINOPHILS 2 0 - 7 %    BASOPHILS 0 0 - 1 %    IMMATURE GRANULOCYTES 1 (H) 0.0 - 0.5 %    ABS. NEUTROPHILS 11.0 (H) 1.8 - 8.0 K/UL    ABS. LYMPHOCYTES 1.6 0.8 - 3.5 K/UL    ABS. MONOCYTES 0.6 0.0 - 1.0 K/UL    ABS. EOSINOPHILS 0.2 0.0 - 0.4 K/UL    ABS. BASOPHILS 0.1 0.0 - 0.1 K/UL    ABS. IMM. GRANS. 0.1 (H) 0.00 - 0.04 K/UL    DF AUTOMATED     PROTHROMBIN TIME + INR    Collection Time: 08/04/20 12:47 PM   Result Value Ref Range    INR 11.2 (HH) 0.9 - 1.1      Prothrombin time 95.9 (H) 9.0 - 85.4 sec   METABOLIC PANEL, COMPREHENSIVE    Collection Time: 08/04/20 12:47 PM   Result Value Ref Range    Sodium 136 136 - 145 mmol/L    Potassium 4.1 3.5 - 5.1 mmol/L    Chloride 104 97 - 108 mmol/L    CO2 29 21 - 32 mmol/L    Anion gap 3 (L) 5 - 15 mmol/L    Glucose 127 (H) 65 - 100 mg/dL    BUN 14 6 - 20 MG/DL    Creatinine 0.95 0.70 - 1.30 MG/DL    BUN/Creatinine ratio 15 12 - 20      GFR est AA >60 >60 ml/min/1.73m2    GFR est non-AA >60 >60 ml/min/1.73m2    Calcium 8.8 8.5 - 10.1 MG/DL    Bilirubin, total 1.1 (H) 0.2 - 1.0 MG/DL    ALT (SGPT) 55 12 - 78 U/L    AST (SGOT) 19 15 - 37 U/L    Alk.  phosphatase 85 45 - 117 U/L    Protein, total 8.1 6.4 - 8.2 g/dL    Albumin 3.3 (L) 3.5 - 5.0 g/dL    Globulin 4.8 (H) 2.0 - 4.0 g/dL    A-G Ratio 0.7 (L) 1.1 - 2.2     NT-PRO BNP    Collection Time: 08/04/20 12:47 PM   Result Value Ref Range    NT pro-BNP <5 <125 PG/ML       Radiologic Studies -   No orders to display     CT Results  (Last 48 hours)    None        CXR Results  (Last 48 hours)    None          Medical Decision Making   I am the first provider for this patient. I reviewed the vital signs, available nursing notes, past medical history, past surgical history, family history and social history. Vital Signs-Reviewed the patient's vital signs. Patient Vitals for the past 12 hrs:   Temp Pulse Resp BP SpO2   08/04/20 1431 98 °F (36.7 °C) 70 18 120/70 95 %   08/04/20 1400  69 17 127/77 93 %   08/04/20 1200  79 21 117/85 93 %   08/04/20 1134 98 °F (36.7 °C) 75 16 125/82 94 %         Records Reviewed: Nursing Notes, Old Medical Records, Ambulance Run Sheet, Previous Radiology Studies and Previous Laboratory Studies    Provider Notes (Medical Decision Making):   Supratherapeutic INR, contusions, DVT, anasarca,    ED Course:   Initial assessment performed. The patients presenting problems have been discussed, and they are in agreement with the care plan formulated and outlined with them. I have encouraged them to ask questions as they arise throughout their visit. Progress note:    Patient is feeling better. His results have been reviewed. He is advised to follow-up and return to ER if worse             Critical Care Time:   0    Disposition:  home    DISCHARGE PLAN:  1. Current Discharge Medication List      START taking these medications    Details   oxyCODONE-acetaminophen (Percocet) 5-325 mg per tablet Take 1 Tab by mouth every eight (8) hours as needed for Pain for up to 3 days. Max Daily Amount: 3 Tabs. Indications: pain  Qty: 10 Tab, Refills: 0    Associated Diagnoses: Right arm pain           2.    Follow-up Information     Follow up With Specialties Details Why Contact Info    Kaya Sotomayor MD Family Medicine Call today Get a repeat INR in the next 2 days [id number] [facility]  [phone]      Do not take Coumadin tonight or tomorrow night unless you are able to get a repeat INR tomorrow        [facility] EMERGENCY DEPT Emergency Medicine  If symptoms worsen [address] [place] [address]  [phone]        3. Return to ED if worse     Diagnosis     Clinical Impression:   1. Bilateral lower extremity edema    2. Right arm pain    3. Multiple contusions    4. Supratherapeutic INR        Attestations:    Bean Mayen MD    Please note that this dictation was completed with [employer] the [employer] voice recognition software. Quite often unanticipated grammatical, syntax, homophones, and other interpretive errors are inadvertently transcribed by the computer software. Please disregard these errors. Please excuse any errors that have escaped final proofreading. Thank you.

## 2020-08-07 ENCOUNTER — TELEPHONE (OUTPATIENT)
Dept: FAMILY MEDICINE CLINIC | Age: 50
End: 2020-08-07

## 2020-08-07 NOTE — TELEPHONE ENCOUNTER
Patient called in stating that he was recently hospitalized. He was told to stop taking coumadin , in the hospital it was a level 11. Should he start retaking it ?   P: [phone]

## 2020-08-10 NOTE — TELEPHONE ENCOUNTER
Patient called back checking to see what he should do. Also stated that he went to the hospital because his feet was swollen. He would like his labs also sent to [facility]. Patient would like to be notified when labs are sent.    F: [phone]

## 2020-08-11 NOTE — TELEPHONE ENCOUNTER
Called pt to follow up with hospital visit and swelling of feet. Pt states that he was told to stop taking the Coumadin while he was in the hospital and wanted to know if he should have started back taking the Rx? Pt also requesting labs and there are no active labs in his chart. Please advise.

## 2020-08-12 DIAGNOSIS — I48.0 PAF (PAROXYSMAL ATRIAL FIBRILLATION) (HCC): ICD-10-CM

## 2020-08-12 DIAGNOSIS — Z79.01 ANTICOAGULANT LONG-TERM USE: Primary | ICD-10-CM

## 2020-08-12 DIAGNOSIS — Z79.01 ANTICOAGULANT LONG-TERM USE: ICD-10-CM

## 2020-08-13 NOTE — TELEPHONE ENCOUNTER
Called pt to inform that Dr. Milli Pfeiffer has ordered PT+INR. Pt states that he went to a [facility] on [address] and had some blood drawn and thinks one of them were the the PT+INR.

## 2020-08-14 ENCOUNTER — OFFICE VISIT (OUTPATIENT)
Dept: FAMILY MEDICINE CLINIC | Age: 50
End: 2020-08-14
Payer: COMMERCIAL

## 2020-08-14 VITALS
DIASTOLIC BLOOD PRESSURE: 80 MMHG | BODY MASS INDEX: 31.37 KG/M2 | WEIGHT: 244.4 LBS | SYSTOLIC BLOOD PRESSURE: 115 MMHG | HEIGHT: 74 IN | HEART RATE: 81 BPM | RESPIRATION RATE: 17 BRPM | TEMPERATURE: 98.5 F | OXYGEN SATURATION: 95 %

## 2020-08-14 DIAGNOSIS — Z79.01 ANTICOAGULANT LONG-TERM USE: ICD-10-CM

## 2020-08-14 DIAGNOSIS — R60.0 LOWER LEG EDEMA: Primary | ICD-10-CM

## 2020-08-14 LAB
INR PPP: 2.6 (ref 0.8–1.2)
PROTHROMBIN TIME: 26.5 SEC (ref 9.1–12)

## 2020-08-14 PROCEDURE — 99213 OFFICE O/P EST LOW 20 MIN: CPT | Performed by: NURSE PRACTITIONER

## 2020-08-14 RX ORDER — PREDNISONE 20 MG/1
TABLET ORAL
COMMUNITY
Start: 2020-08-07 | End: 2021-04-14 | Stop reason: ALTCHOICE

## 2020-08-14 RX ORDER — FUROSEMIDE 20 MG/1
20 TABLET ORAL DAILY
Qty: 3 TAB | Refills: 0 | Status: SHIPPED | OUTPATIENT
Start: 2020-08-14 | End: 2021-12-10

## 2020-08-14 RX ORDER — CEPHALEXIN 500 MG/1
500 CAPSULE ORAL 3 TIMES DAILY
Qty: 30 CAP | Refills: 0 | Status: SHIPPED | OUTPATIENT
Start: 2020-08-14 | End: 2020-08-24

## 2020-08-14 NOTE — PATIENT INSTRUCTIONS
1) Left leg swelling Please take the keflex 500mg 3x day for 10 days for cellulitis . This is an antibiotic and you should take all of it as directed until it is complete. If you have any kind of reaction - itching, shortness of breath, etc, please stop the antibiotic immediately and call the office. Take lasix 20mg daily for 3 days. This will help reduce the swelling. It is a diuretic and will make you urinate. Lower leg swelling (edema) happens when fluid collects in small spaces around tissues and organs inside the body. Swelling in the legs, hands, and belly can be uncomfortable and can be a symptom of a more serious condition. Swelling in the lungs can be life-threatening, because it is usually a symptom of a serious heart problem. Lower leg edema is particularly frequent in older adults, and is usually chronic. It is often due to chronic venous disease, heart failure, medications, or pulmonary hypertension. Treatment of edema includes several components: 
 
Reduce salt (sodium) in your diet  Sodium, which is found in table salt and processed foods, can worsen edema. Reducing the amount of salt you consume can help to reduce edema, especially if you also take a diuretic. Compression stockings - apply pressure at the ankle and gradually decrease the pressure up the leg. These stockings are available with varying degrees of compression. ? Stockings with small amounts of compression can be purchased at pharmacies and surgical supply stores without a prescription. ? People with moderate to severe edema, those on their feet a lot, and those with ulcers usually require prescription stockings. ? The white \"antiembolism\" stockings commonly given in the hospital do not apply enough pressure at the ankle and are not adequate treatment for edema. Body positioning  Leg, ankle, and foot edema can be improved by elevating the legs above heart level for 30 minutes three or four times per day. Elevating the legs may be sufficient to reduce or eliminate edema for people with mild venous disease. Medications:  Diuretics are a type of medication that causes the kidneys to excrete more water and sodium, which can reduce edema. Diuretics must be used with care because removing too much fluid too quickly can lower the blood pressure, cause lightheadedness or fainting, and impair kidney function. 2) INR -  The international normalized ratio (INR) is used to determine blood clotting time. It is calculated from a Prothrombin time (PT), which evaluates a liver enzyme used in blood clotting. Normal range is 0.8 -1.2. Therapeutic range is 2-3. Please get this drawn on Tuesday, 8/18/20. Continue to take your warfarin as directed. (5mg x5 days/week, 2.5mg x 2 days/week) Depnding on the INR value, dosage may have to be changed. 3) Referral to Dr. Sonny Rubio - vascular specialist.  Please call and make an appt with him for further evaluation. 4) Heat on right arm to help reduce swelling Monitor for signs of blood clots - shortness of breath, leg pain in calf, etc.  
  
Leg and Ankle Edema: Care Instructions Your Care Instructions Swelling in the legs, ankles, and feet is called edema. It is common after you sit or stand for a while. Long plane flights or car rides often cause swelling in the legs and feet. You may also have swelling if you have to stand for long periods of time at your job. Problems with the veins in the legs (varicose veins) and changes in hormones can also cause swelling. Sometimes the swelling in the ankles and feet is caused by a more serious problem, such as heart failure, infection, blood clots, or liver or kidney disease. Follow-up care is a key part of your treatment and safety. Be sure to make and go to all appointments, and call your doctor if you are having problems. It's also a good idea to know your test results and keep a list of the medicines you take. How can you care for yourself at home? · If your doctor gave you medicine, take it as prescribed. Call your doctor if you think you are having a problem with your medicine. · Whenever you are resting, raise your legs up. Try to keep the swollen area higher than the level of your heart. · Take breaks from standing or sitting in one position. ? Walk around to increase the blood flow in your lower legs. ? Move your feet and ankles often while you stand, or tighten and relax your leg muscles. · Wear support stockings. Put them on in the morning, before swelling gets worse. · Eat a balanced diet. Lose weight if you need to. · Limit the amount of salt (sodium) in your diet. Salt holds fluid in the body and may increase swelling. When should you call for help? [id number] anytime you think you may need emergency care. For example, call if: 
· You have symptoms of a blood clot in your lung (called a pulmonary embolism). These may include: 
? Sudden chest pain. ? Trouble breathing. ? Coughing up blood. Call your doctor now or seek immediate medical care if: 
· You have signs of a blood clot, such as: 
? Pain in your calf, back of the knee, thigh, or groin. ? Redness and swelling in your leg or groin. · You have symptoms of infection, such as: 
? Increased pain, swelling, warmth, or redness. ? Red streaks or pus. ? A fever. Watch closely for changes in your health, and be sure to contact your doctor if: 
· Your swelling is getting worse. · You have new or worsening pain in your legs. · You do not get better as expected. Where can you learn more? Go to http://tito-ludmila.info/ Enter X578 in the search box to learn more about \"Leg and Ankle Edema: Care Instructions. \" Current as of: June 26, 2019               Content Version: 12.5 © 6482-8083 Healthwise, Incorporated.   
Care instructions adapted under license by [employer] (which disclaims liability or warranty for this information). If you have questions about a medical condition or this instruction, always ask your healthcare professional. [address] any warranty or liability for your use of this information. Cellulitis: Care Instructions Your Care Instructions Cellulitis is a skin infection caused by bacteria, most often strep or staph. It often occurs after a break in the skin from a scrape, cut, bite, or puncture, or after a rash. Cellulitis may be treated without doing tests to find out what caused it. But your doctor may do tests, if needed, to look for a specific bacteria, like methicillin-resistant Staphylococcus aureus (MRSA). The doctor has checked you carefully, but problems can develop later. If you notice any problems or new symptoms, get medical treatment right away. Follow-up care is a key part of your treatment and safety. Be sure to make and go to all appointments, and call your doctor if you are having problems. It's also a good idea to know your test results and keep a list of the medicines you take. How can you care for yourself at home? · Take your antibiotics as directed. Do not stop taking them just because you feel better. You need to take the full course of antibiotics. · Prop up the infected area on pillows to reduce pain and swelling. Try to keep the area above the level of your heart as often as you can. · If your doctor told you how to care for your wound, follow your doctor's instructions. If you did not get instructions, follow this general advice: 
? Wash the wound with clean water 2 times a day. Don't use hydrogen peroxide or alcohol, which can slow healing. ? You may cover the wound with a thin layer of petroleum jelly, such as Vaseline, and a nonstick bandage. ? Apply more petroleum jelly and replace the bandage as needed. · Be safe with medicines. Take pain medicines exactly as directed. ? If the doctor gave you a prescription medicine for pain, take it as prescribed. ? If you are not taking a prescription pain medicine, ask your doctor if you can take an over-the-counter medicine. To prevent cellulitis in the future · Try to prevent cuts, scrapes, or other injuries to your skin. Cellulitis most often occurs where there is a break in the skin. · If you get a scrape, cut, mild burn, or bite, wash the wound with clean water as soon as you can to help avoid infection. Don't use hydrogen peroxide or alcohol, which can slow healing. · If you have swelling in your legs (edema), support stockings and good skin care may help prevent leg sores and cellulitis. · Take care of your feet, especially if you have diabetes or other conditions that increase the risk of infection. Wear shoes and socks. Do not go barefoot. If you have athlete's foot or other skin problems on your feet, talk to your doctor about how to treat them. When should you call for help? Call your doctor now or seek immediate medical care if: 
· You have signs that your infection is getting worse, such as: 
? Increased pain, swelling, warmth, or redness. ? Red streaks leading from the area. ? Pus draining from the area. ? A fever. · You get a rash. Watch closely for changes in your health, and be sure to contact your doctor if: 
· You do not get better as expected. Where can you learn more? Go to http://tito-ludmila.info/ [facility] in the search box to learn more about \"Cellulitis: Care Instructions. \" Current as of: October 31, 2019               Content Version: 12.5 © 7070-3352 Healthwise, Incorporated. Care instructions adapted under license by [employer] (which disclaims liability or warranty for this information).  If you have questions about a medical condition or this instruction, always ask your healthcare professional. Nolvia Valentine disclaims any warranty or liability for your use of this information.

## 2020-08-14 NOTE — PROGRESS NOTES
S: Shayna Dan is a 47 y.o. male who presents for follow up     Assessment/Plan:     1. Lower leg edema  -RLE +1 pitting edema; LLE: +4 pitting edema w/ hyperpigmentation over shin, warm to touch  -8/13/20 doppler at [facility] ED= wnl  -rx: keflex 500mg tid x10 days  -rx: lasix 20mg x3 days  -supportive tx instructions given  - REFERRAL TO VASCULAR SURGERY    2. Right arm pain  -inferior bicep: large hematoma, ttp; superior to elbow - firm tissue w/ttp  -supportive tx instructions given    3. Anticoagulant long-term use  -current therapy: warfarin 2.5mg 2 days/week + warfarin 5mg 5 days/week -  -INR = 2.6 (8/13/20); INR = 11.2 (8/4/20);  INR = 1.0 (8/9/20),   -advised to continue to take warfarin as directed and recheck INR on 8/17/20, may need dose adjustment  - PROTHROMBIN TIME + INR    RTC 2 weeks for LLE       HPI:  ED 8/4/20 - edema in right arm and leg  Doppler right arm = wnl  INR = 11.2  Warfarin 2.5mg 2 days/week + 5mg 5 days/week - stopped taking it for 2 days after 8/4/2/0,    Went to [facility] ED 8/9/20 - no notes to review   Per pt  - Doppler of left leg = wnl and INR = 1.0    8/13/20  = INR = 2.6  Did not take warfarin today (takes at night, so hasn't taken it today)   Took 2.5mg for 3 days - Friday, 8/7 - 8/9  Took 5 mg on Monday, 8/10  Last night, took warfarin 5mg     Taking prednisone for acute pulmonary failure w/hypoxia (2/2020)   Atilio Archibadl NP - pulmonary - d/c'd carvedilol 3.125mg bid  Pred taper over next 3 weeks - taking 5mg (1/2 tab of 10mg) this week   BP today = 115/80; Pulse 81    Pain in left leg and pain in arm   Hx of DVT in left leg       Social History:  Nutrition: doesn't eat Vitamin K foods - does eat chicken, hamburger, sometimes steak, no leafy greens    Social History     Tobacco Use   Smoking Status Never Smoker   Smokeless Tobacco Current User    Types: Snuff   Tobacco Comment    former vaper     Social History     Substance and Sexual Activity   Alcohol Use Yes    Alcohol/week: 0.0 standard drinks    Comment: SOCIALLY EVERY 6 MONTHS     Social History     Substance and Sexual Activity   Drug Use Never        Review of Systems:  - Constitutional Symptoms: no fevers, chills,  - Cardiovascular: no chest pain or palpitations  - Respiratory: no cough or shortness of breath    ROS is negative otherwise. 3 most recent PHQ Screens 2/17/2020   Little interest or pleasure in doing things Several days   Feeling down, depressed, irritable, or hopeless Several days   Total Score PHQ 2 2       I reviewed the following:  Past Medical History:   Diagnosis Date    [facility]fib [facility] [address]     Allergic rhinitis     Anxiety disorder     Cellulitis     post tattoo    Deep vein thrombosis (DVT) ([facility] [address]     [facility]  4/24/17 f/u note to manage Lovenox    Essential tremor     BILATERAL HANDS    Laceration of arm 09/23/2017    Left cut by a chain saw.  Nicotine vapor product user     STOPPED 2018    Phlebitis     Psychiatric disorder     ANXIETY WITH PANICK ATTACK    Pulmonary embolism ([facility] [address] 3/2011    multiple episodes    Rectal bleeding 10/2011    on coumadin @ the time &again 4/2017 4/12/17 note from [facility]//colonoscopy report rec'd    Shortness of breath     Thromboembolus ([facility] [address]     Umbilical hernia     Vitamin D deficiency 04/2014    again 4/2017       Current Outpatient Medications   Medication Sig Dispense Refill    predniSONE (DELTASONE) 20 mg tablet TK 1 T O QD      warfarin (COUMADIN) 5 mg tablet Take 1 Tab by mouth five (5) days a week. TAKE 1 TABLET (5MG) BY MOUTH EVERY DAY ON MONDAY THROUGH FRIDAY, AND 1/2 TABLET (2.5MG) EVERY SATURDAY AND Sunday. 90 Tab 0    famotidine (PEPCID) 20 mg tablet Take 1 Tab by mouth every morning. 90 Tab 0    warfarin (COUMADIN) 2.5 mg tablet Take 2.5 mg by mouth two (2) times daily on Sat & Sun. 2.5mg on Sat/Sun. TAKE 1 TAB (5MG) BY MOUTH EVERY DAY ON MONDAY THROUGH FRIDAY, AND 1/2 TAB (2.5MG) EVERY SATURDAY AND Sunday.       rosuvastatin (CRESTOR) 20 mg tablet TAKE 1 TABLET BY MOUTH EVERY NIGHT (Patient taking differently: Take 20 mg by mouth nightly.) 90 Tab 3    sildenafil citrate (VIAGRA) 50 mg tablet Take 50 mg by mouth as needed.  carvediloL (COREG) 3.125 mg tablet Take 1 Tab by mouth two (2) times daily (with meals). 60 Tab 2    EPINEPHrine (EPIPEN) 0.3 mg/0.3 mL injection INJECT 0.3 ML BY INTRAMUSCULAR ROUTE ONCE PRN FOR ANAPHYLAXIS OR ALLERGIC RESPONSE FOR UP TO ONE DOSE (Patient taking differently: 0.3 mg by IntraMUSCular route as needed. INJECT 0.3 ML BY INTRAMUSCULAR ROUTE ONCE PRN FOR ANAPHYLAXIS OR ALLERGIC RESPONSE FOR UP TO ONE DOSE) 1 Syringe 0       Allergies   Allergen Reactions    Bee Sting [Sting, Bee] Swelling    Neomycin Rash    Neosporin [Neomycin-Bacitracin-Polymyxin] Rash    Penicillins Unknown (comments)        O: VS:   Visit Vitals  /80 (BP 1 Location: Left arm, BP Patient Position: Sitting)   Pulse 81   Temp 98.5 °F (36.9 °C) (Oral)   Resp 17   Ht 6' 2\" (1.88 m)   Wt 244 lb 6.4 oz (110.9 kg)   SpO2 95%   BMI 31.38 kg/m²       Data Reviewed:  8/4/20 - doppler right arm - No evidence of deep vein thrombosis in the right upper extremity. 8/4/20 labs: WBC 13.5, albumin 3.3; INR = 11.2    GENERAL: Austin Ford is in no acute distress. Non-toxic. Well nourished. Well developed. Appropriately groomed. RESP: Breath sounds are symmetrical bilaterally. Unlabored without SOB. Speaking in full sentences. Clear to auscultation bilaterally anteriorly and posteriorly. No wheezes. No rales or rhonchi. CV: normal rate. Regular rhythm. S1, S2 audible. No murmur noted. No rubs, clicks or gallops noted. Right arm:   Inferior Bicep: large hematoma, ttp pain on palpation and with movement. Superior to elbow - firm tissue w/ttp  HEME/LYMPH: peripheral pulses palpable 2+ x 4 extremities. RLE +1 pitting edema is noted.   Not ttp  LLE: 4+ pitting edema with erythema, warm to touch and ttp  PSYCH: appropriate behavior, dress and thought processes. Good eye contact. Clear and coherent speech. Full affect. Good insight.     ___________________________________________________________________  Patient education was done. Advised on nutrition, physical activity, weight management, tobacco, alcohol and safety. Counseling included discussion of diagnosis, differentials, treatment options, prescribed treatment, warning signs and follow up. Medication risks/benefits, interactions and alternatives discussed with patient.      Patient verbalized understanding and agreed to plan of care. Patient was given an after visit summary which included current diagnoses, medications and vital signs. Follow up as directed.

## 2020-08-14 NOTE — PROGRESS NOTES
Latrell Cooper is a 47 y.o. male    HIPAA verified by two patient identifiers. Chief Complaint   Patient presents with    Leg Swelling     Left, noticed 2 weeks ago      Pt states he has been taking Tylenol for pain and it has not helped. Pt has bruising on his arms and his left leg is swollen, as well as some bruising. 1. Have you been to the ER, urgent care clinic since your last visit? Hospitalized since your last visit? [facility], [facility]. Swelling of leg. 2. Have you seen or consulted any other health care providers outside of the [address] [place] [address] since your last visit? Include any pap smears or colon screening.  No    Visit Vitals  /80 (BP 1 Location: Left arm, BP Patient Position: Sitting)   Pulse 81   Temp 98.5 °F (36.9 °C) (Oral)   Resp 17   Ht 6' 2\" (1.88 m)   Wt 244 lb 6.4 oz (110.9 kg)   SpO2 95%   BMI 31.38 kg/m²       Pain Scale: 10 - Worst pain ever/10  Pain Location: Leg (Left leg and right arm)

## 2020-08-14 NOTE — TELEPHONE ENCOUNTER
Pt coming into the office today for visit with NP Janita Sicard. Will print off labs for pt to have completed.

## 2020-08-19 LAB
INR PPP: 3.1 (ref 0.8–1.2)
PROTHROMBIN TIME: 31.7 SEC (ref 9.1–12)

## 2020-08-21 RX ORDER — WARFARIN SODIUM 5 MG/1
5 TABLET ORAL
Qty: 90 TAB | Refills: 0
Start: 2020-08-21 | End: 2020-09-11 | Stop reason: DRUGHIGH

## 2020-08-24 NOTE — PROGRESS NOTES
Called spoke to pt. Two pt id confirmed   Informed pt per Dr. Sergio Rae Coumadin is now therapeutic. Can restart his usual dose and recheck PT/INR in a week. Transferred pt to  to schedule lab visit. Pt verbalized understanding of information discussed w/ no further questions at this time.

## 2020-08-28 ENCOUNTER — OFFICE VISIT (OUTPATIENT)
Dept: FAMILY MEDICINE CLINIC | Age: 50
End: 2020-08-28
Payer: COMMERCIAL

## 2020-08-28 VITALS
TEMPERATURE: 97 F | RESPIRATION RATE: 17 BRPM | HEIGHT: 74 IN | SYSTOLIC BLOOD PRESSURE: 118 MMHG | HEART RATE: 74 BPM | OXYGEN SATURATION: 94 % | BODY MASS INDEX: 31.32 KG/M2 | DIASTOLIC BLOOD PRESSURE: 84 MMHG | WEIGHT: 244 LBS

## 2020-08-28 DIAGNOSIS — R60.0 LOWER LEG EDEMA: Primary | ICD-10-CM

## 2020-08-28 DIAGNOSIS — R79.1 ELEVATED INR: ICD-10-CM

## 2020-08-28 PROCEDURE — 99213 OFFICE O/P EST LOW 20 MIN: CPT | Performed by: NURSE PRACTITIONER

## 2020-08-28 RX ORDER — SILDENAFIL 50 MG/1
TABLET, FILM COATED ORAL
Qty: 30 TAB | Refills: 1 | Status: SHIPPED | OUTPATIENT
Start: 2020-08-28 | End: 2022-01-21

## 2020-08-28 NOTE — PATIENT INSTRUCTIONS
1) Left leg has improved - no signs of cellulitis today. Still have +3 pititing edema. Please wear compression hose daily on both legs Lower leg swelling (edema) happens when fluid collects in small spaces around tissues and organs inside the body. Swelling in the legs, hands, and belly can be uncomfortable and can be a symptom of a more serious condition. Swelling in the lungs can be life-threatening, because it is usually a symptom of a serious heart problem. Lower leg edema is particularly frequent in older adults, and is usually chronic. It is often due to chronic venous disease, heart failure, medications, or pulmonary hypertension. Treatment of edema includes several components: 
 
Reduce salt (sodium) in your diet  Sodium, which is found in table salt and processed foods, can worsen edema. Reducing the amount of salt you consume can help to reduce edema, especially if you also take a diuretic. Compression stockings - apply pressure at the ankle and gradually decrease the pressure up the leg. These stockings are available with varying degrees of compression. ? Stockings with small amounts of compression can be purchased at pharmacies and surgical supply stores without a prescription. ? People with moderate to severe edema, those on their feet a lot, and those with ulcers usually require prescription stockings. ? The white \"antiembolism\" stockings commonly given in the hospital do not apply enough pressure at the ankle and are not adequate treatment for edema. Body positioning  Leg, ankle, and foot edema can be improved by elevating the legs above heart level for 30 minutes three or four times per day. Elevating the legs may be sufficient to reduce or eliminate edema for people with mild venous disease. Medications:  Diuretics are a type of medication that causes the kidneys to excrete more water and sodium, which can reduce edema.  Diuretics must be used with care because removing too much fluid too quickly can lower the blood pressure, cause lightheadedness or fainting, and impair kidney function. 2) You ar etaking 5mb warfarin 5 days a week. Will check INR to make sure it is in therapeutic range today. INR -  The international normalized ratio (INR) is used to determine blood clotting time. It is calculated from a Prothrombin time (PT), which evaluates a liver enzyme used in blood clotting. Normal range is 0.8 -1.2.

## 2020-08-28 NOTE — PROGRESS NOTES
Austin Ford is a 47 y.o. male    HIPAA verified by two patient identifiers. Chief Complaint   Patient presents with    Follow-up     Swollen Legs     Pt states that his legs are doing better and that the swelling has subsided. Pt also states that he seen the Vascular doctor Monday and that he recommended compression socks. 1. Have you been to the ER, urgent care clinic since your last visit? Hospitalized since your last visit? No    2. Have you seen or consulted any other health care providers outside of the [address] [place] [address] since your last visit? Include any pap smears or colon screening.  No    Visit Vitals  /84 (BP 1 Location: Left arm, BP Patient Position: Sitting)   Pulse 74   Temp 97 °F (36.1 °C) (Oral)   Resp 17   Ht 6' 2\" (1.88 m)   Wt 244 lb (110.7 kg)   SpO2 94%   BMI 31.33 kg/m²       Pain Scale: 0 - No pain/10  Pain Location:

## 2020-08-28 NOTE — PROGRESS NOTES
S: Ezra Rodriges is a 47 y.o. male who presents for follow up     Assessment/Plan:     1. Lower leg edema  -improved with lasix and keflex tx  -+3 pitting edema left leg; nonpitting edema right leg  -supportive instructions, including no salt, compression hose    2. Elevated INR  -current therapy: warfarin 5mg x5days/week  -INR today         HPI:    Warfarin 5mg for past 5 days   Did take only 1/2 pill for a few days when his arms were hurting from blood draws = swelling and pain resolved, then started taking 5mg tab again  INR - didn't get this done today, will get it after appt    LLE   Feels much better, no issues  Saw Dr. Betsy Asencio - vascular - not circulation issues present, advised 15mmHg compression hose    Social History:    Social History     Tobacco Use   Smoking Status Never Smoker   Smokeless Tobacco Current User    Types: Snuff   Tobacco Comment    former vaper     Social History     Substance and Sexual Activity   Alcohol Use Yes    Alcohol/week: 0.0 standard drinks    Comment: SOCIALLY EVERY 6 MONTHS     Social History     Substance and Sexual Activity   Drug Use Never        Review of Systems:  - Constitutional Symptoms: no fevers, chills, weight loss  - Cardiovascular: no chest pain or palpitations  - Respiratory: no cough or shortness of breath  ROS is negative otherwise. 3 most recent PHQ Screens 2/17/2020   Little interest or pleasure in doing things Several days   Feeling down, depressed, irritable, or hopeless Several days   Total Score PHQ 2 2       I reviewed the following:  Past Medical History:   Diagnosis Date    [facility]     Allergic rhinitis     Anxiety disorder     Cellulitis     post tattoo    Deep vein thrombosis (DVT) ([facility]     [facility]  [facility] f/u note to manage Lovenox    Essential tremor     BILATERAL HANDS    Laceration of arm 09/23/2017    Left cut by a chain saw.     Nicotine vapor product user     STOPPED 2018    Phlebitis     Psychiatric disorder ANXIETY WITH PANICK ATTACK    Pulmonary embolism ([facility] [address] 3/2011    multiple episodes    Rectal bleeding 10/2011    on coumadin @ the time &again 4/2017 4/12/17 note from [facility]//colonoscopy report rec'd    Shortness of breath     Thromboembolus ([facility] [address] [facility]     Umbilical hernia     Vitamin D deficiency 04/2014    again 4/2017       Current Outpatient Medications   Medication Sig Dispense Refill    warfarin (COUMADIN) 5 mg tablet Take 1 Tab by mouth five (5) days a week. TAKE 1 TABLET (5MG) BY MOUTH EVERY DAY ON [facility] THROUGH Friday. 90 Tab 0    predniSONE (DELTASONE) 20 mg tablet TK 1 T O QD      furosemide (LASIX) 20 mg tablet Take 1 Tab by mouth daily. 3 Tab 0    carvediloL (COREG) 3.125 mg tablet Take 1 Tab by mouth two (2) times daily (with meals). 60 Tab 2    famotidine (PEPCID) 20 mg tablet Take 1 Tab by mouth every morning. 90 Tab 0    warfarin (COUMADIN) 2.5 mg tablet Take 2.5 mg by mouth two (2) times daily on Sat & Sun. 2.5mg on Sat/Sun. TAKE 1 TAB (5MG) BY MOUTH EVERY DAY ON MONDAY THROUGH FRIDAY, AND 1/2 TAB (2.5MG) EVERY SATURDAY AND Sunday.  rosuvastatin (CRESTOR) 20 mg tablet TAKE 1 TABLET BY MOUTH EVERY NIGHT (Patient taking differently: Take 20 mg by mouth nightly.) 90 Tab 3    sildenafil citrate (VIAGRA) 50 mg tablet Take 50 mg by mouth as needed.  EPINEPHrine (EPIPEN) 0.3 mg/0.3 mL injection INJECT 0.3 ML BY INTRAMUSCULAR ROUTE ONCE PRN FOR ANAPHYLAXIS OR ALLERGIC RESPONSE FOR UP TO ONE DOSE (Patient taking differently: 0.3 mg by IntraMUSCular route as needed.  INJECT 0.3 ML BY INTRAMUSCULAR ROUTE ONCE PRN FOR ANAPHYLAXIS OR ALLERGIC RESPONSE FOR UP TO ONE DOSE) 1 Syringe 0       Allergies   Allergen Reactions    Bee Sting [Sting, Bee] Swelling    Neomycin Rash    Neosporin [Neomycin-Bacitracin-Polymyxin] Rash    Penicillins Unknown (comments)        O: VS:   Visit Vitals  /84 (BP 1 Location: Left arm, BP Patient Position: Sitting)   Pulse 74   Temp 97 °F (36.1 °C) (Oral)   Resp 17   Ht 6' 2\" (1.88 m)   Wt 244 lb (110.7 kg)   SpO2 94%   BMI 31.33 kg/m²       GENERAL: Wing Sheridan is in no acute distress. Non-toxic. Well nourished. Well developed. Appropriately groomed. HEAD:  Normocephalic. Atraumatic. Non tender sinuses x 4. EYE: PERRLA. No drainage or discharge. EARS: Hearing intact bilaterally. External ear canals normal without evidence of blood or swelling. Bilateral TM's intact, pearly grey with landmarks visible. No erythema or effusion. NOSE: Patent. No erythema. No discharge. MOUTH: mucous membranes pink and moist. Posterior pharynx normal with cobblestone appearance. No erythema, white exudate or obstruction. NECK: supple. Midline trachea. No cervical adenopathy noted. RESP: Breath sounds are symmetrical bilaterally. Unlabored without SOB. Speaking in full sentences. Clear to auscultation bilaterally anteriorly and posteriorly. No wheezes. No rales or rhonchi. CV: normal rate. Regular rhythm. S1, S2 audible. No murmur noted. No rubs, clicks or gallops noted. HEME/LYMPH: peripheral pulses palpable 2+ x 4 extremities. Right LE: Non pitting peripheral edema is noted. Left leg - +3 pitting edema with slight hyperpigmentation on shin noted  SKIN: Skin is warm and dry. Turgor is normal.   PSYCH: appropriate behavior, dress and thought processes. Good eye contact. Clear and coherent speech. Full affect. Good insight.               ___________________________________________________________________  Patient education was done. Advised on nutrition, physical activity, weight management, tobacco, alcohol and safety. Counseling included discussion of diagnosis, differentials, treatment options, prescribed treatment, warning signs and follow up. Medication risks/benefits, interactions and alternatives discussed with patient.      Patient verbalized understanding and agreed to plan of care.   Patient was given an after visit summary which included current diagnoses, medications and vital signs. Follow up as directed.

## 2020-08-29 LAB
INR PPP: 1.3 (ref 0.8–1.2)
PROTHROMBIN TIME: 13.8 SEC (ref 9.1–12)

## 2020-08-30 NOTE — PROGRESS NOTES
P/c and let him know INR is low - he needs to continue the 5mg warfarin daily and recheck INR in 7 days. Result ltr mailed.

## 2020-09-09 ENCOUNTER — TELEPHONE (OUTPATIENT)
Dept: FAMILY MEDICINE CLINIC | Age: 50
End: 2020-09-09

## 2020-09-11 LAB
INR PPP: 1.7 (ref 0.8–1.2)
PROTHROMBIN TIME: 18.2 SEC (ref 9.1–12)

## 2020-09-11 RX ORDER — WARFARIN SODIUM 5 MG/1
5 TABLET ORAL DAILY
Qty: 90 TAB | Refills: 0 | Status: SHIPPED | OUTPATIENT
Start: 2020-09-11 | End: 2020-12-14

## 2020-09-15 NOTE — MR AVS SNAPSHOT
[address] [place] [address] 
[address] [place] [address] 
[phone] Patient: Edwin Funez MRN: [id number] :1970 Visit Information Date & Time Provider Department Dept. Phone Encounter #  
 8/15/2018 12:00 PM Jessica East MD [facility] [phone] [id number] Upcoming Health Maintenance Date Due Influenza Age 5 to Adult 9/15/2018* DTaP/Tdap/Td series (2 - Td) 1/10/2027 COLONOSCOPY 2027 *Topic was postponed. The date shown is not the original due date. Allergies as of 8/15/2018  Review Complete On: 8/15/2018 By: Rahul Winslow Severity Noted Reaction Type Reactions Bee Sting [Sting, Bee] High 2010    Swelling Neomycin High 2010    Rash Neosporin [Neomycin-bacitracin-polymyxin] High 2010    Rash Current Immunizations  Reviewed on 1/10/2017 Name Date Influenza Vaccine 2013 Tdap 1/10/2017 Not reviewed this visit You Were Diagnosed With   
  
 Codes Comments Edema, unspecified type    -  Primary ICD-10-CM: R60.9 ICD-9-CM: 340. 3 Vitamin D deficiency     ICD-10-CM: E55.9 ICD-9-CM: 268.9 Prediabetes     ICD-10-CM: R73.03 
ICD-9-CM: 790.29 Disorder of lipoid metabolism     ICD-10-CM: E78.9 ICD-9-CM: 272.9 Anticoagulant long-term use     ICD-10-CM: Z79.01 
ICD-9-CM: V58.61 Prostate cancer screening     ICD-10-CM: Z12.5 ICD-9-CM: V76.44 Laboratory exam ordered as part of routine general medical examination     ICD-10-CM: Z00.00 ICD-9-CM: V72.62 Vitals BP Pulse Temp Resp Height(growth percentile) Weight(growth percentile) 113/72 (BP 1 Location: Left arm, BP Patient Position: Sitting) 70 96.2 °F (35.7 °C) (Oral) 16 6' 1\" (1.854 m) 254 lb 14.4 oz (115.6 kg) SpO2 BMI Smoking Status 96% 33.63 kg/m2 Never Smoker Vitals History BMI and BSA Data Body Mass Index Body Surface Area 33.63 kg/m 2 2.44 m 2 Preferred Pharmacy Pharmacy Name Phone Mirella Velasquez [facility] [address] [facility] [phone] Your Updated Medication List  
  
   
This list is accurate as of 8/15/18 12:05 PM.  Always use your most recent med list.  
  
  
  
  
 hydroCHLOROthiazide 25 mg tablet Commonly known as:  HYDRODIURIL Take 1 Tab by mouth daily. Indications: Edema KRILL -82-05-50 mg Cap Generic drug:  krill-omega-3-dha-epa-lipids Take 1 Cap by mouth daily. rosuvastatin 20 mg tablet Commonly known as:  CRESTOR  
TAKE 1 TABLET BY MOUTH EVERY NIGHT  
  
 sildenafil citrate 50 mg tablet Commonly known as:  VIAGRA Take 50 mg by mouth as needed. warfarin 5 mg tablet Commonly known as:  COUMADIN  
TAKE 1 TABLET BY MOUTH EVERY DAY ON MONDAY- FRIDAY AND 1/2 TABLET BY MOUTH EVERY DAY ON SATURDAY AND SUNDAY Prescriptions Sent to Pharmacy Refills  
 hydroCHLOROthiazide (HYDRODIURIL) 25 mg tablet 0 Sig: Take 1 Tab by mouth daily. Indications: Edema Class: Normal  
 [facility] [address] [facility] Ph #: [phone] Route: Oral  
  
We Performed the Following CBC WITH AUTOMATED DIFF [59035 CPT(R)] HEMOGLOBIN A1C WITH EAG [29493 CPT(R)] LIPID PANEL [83445 CPT(R)] METABOLIC PANEL, COMPREHENSIVE [10694 CPT(R)] PROTHROMBIN TIME + INR [33471 CPT(R)] PSA, DIAGNOSTIC (PROSTATE SPECIFIC AG) [id number] CPT(R)] TSH 3RD GENERATION [80213 CPT(R)] URINALYSIS W/ RFLX MICROSCOPIC [41409 CPT(R)] VITAMIN D, 25 HYDROXY [id number] CPT(R)] Introducing [facility]! Thomas Mckinnon introduces [employer] patient portal. Now you can access parts of your medical record, email your doctor's office, and request medication refills online.    
 
1. In your internet browser, go to [email or web address] 2. Click on the First Time User? Click Here link in the Sign In box. You will see the New Member Sign Up page. 3. Enter your vitalclip Access Code exactly as it appears below. You will not need to use this code after youve completed the sign-up process. If you do not sign up before the expiration date, you must request a new code. · vitalclip Access Code: [email or web address] Expires: 11/13/2018 12:05 PM 
 
4. Enter the last four digits of your Social Security Number (xxxx) and Date of Birth (mm/dd/yyyy) as indicated and click Submit. You will be taken to the next sign-up page. 5. Create a [employer] ID. This will be your vitalclip login ID and cannot be changed, so think of one that is secure and easy to remember. 6. Create a vitalclip password. You can change your password at any time. 7. Enter your Password Reset Question and Answer. This can be used at a later time if you forget your password. 8. Enter your e-mail address. You will receive e-mail notification when new information is available in [address] 9. Click Sign Up. You can now view and download portions of your medical record. 10. Click the Download Summary menu link to download a portable copy of your medical information. If you have questions, please visit the Frequently Asked Questions section of the vitalclip website. Remember, vitalclip is NOT to be used for urgent needs. For medical emergencies, dial 911. Now available from your iPhone and Android! Please provide this summary of care documentation to your next provider. Your primary care clinician is listed as [id number] If you have any questions after today's visit, please call [phone]. 36.8

## 2020-09-17 DIAGNOSIS — K21.9 GASTROESOPHAGEAL REFLUX DISEASE, ESOPHAGITIS PRESENCE NOT SPECIFIED: ICD-10-CM

## 2020-09-24 RX ORDER — FAMOTIDINE 20 MG/1
TABLET, FILM COATED ORAL
Qty: 90 TAB | Refills: 0 | Status: SHIPPED | OUTPATIENT
Start: 2020-09-24 | End: 2020-12-30

## 2020-11-05 ENCOUNTER — HOSPITAL ENCOUNTER (OUTPATIENT)
Dept: CT IMAGING | Age: 50
Discharge: HOME | End: 2020-11-05
Attending: NURSE PRACTITIONER
Payer: COMMERCIAL

## 2020-11-05 DIAGNOSIS — J84.9 INTERSTITIAL LUNG DISEASE (HCC): ICD-10-CM

## 2020-11-05 PROCEDURE — 71250 CT THORAX DX C-: CPT

## 2020-12-29 DIAGNOSIS — K21.9 GASTROESOPHAGEAL REFLUX DISEASE: ICD-10-CM

## 2020-12-30 RX ORDER — FAMOTIDINE 20 MG/1
TABLET, FILM COATED ORAL
Qty: 90 TAB | Refills: 0 | Status: SHIPPED | OUTPATIENT
Start: 2020-12-30 | End: 2021-04-14 | Stop reason: SDUPTHER

## 2021-01-29 RX ORDER — CARVEDILOL 3.12 MG/1
3.12 TABLET ORAL 2 TIMES DAILY WITH MEALS
Qty: 60 TAB | Refills: 0 | Status: SHIPPED | OUTPATIENT
Start: 2021-01-29 | End: 2021-04-14 | Stop reason: SDUPTHER

## 2021-01-29 NOTE — TELEPHONE ENCOUNTER
PCP: Hema Castellon MD    Last appt: 4/22/2020  No future appointments. Requested Prescriptions     Pending Prescriptions Disp Refills    carvediloL (COREG) 3.125 mg tablet [Pharmacy Med Name: CARVEDILOL 3.125MG TABLETS] 60 Tab 2     Sig: TAKE 1 TABLET BY MOUTH TWICE DAILY WITH MEALS     No visits with results within 3 Month(s) from this visit. Latest known visit with results is:   Office Visit on 08/14/2020   Component Date Value Ref Range Status    INR 08/18/2020 3.1* 0.8 - 1.2 Final    Comment: Reference interval is for non-anticoagulated patients. Suggested INR therapeutic range for Vitamin K  antagonist therapy:     Standard Dose (moderate intensity                    therapeutic range):       2.0 - 3.0     Higher intensity therapeutic range       2.5 - 3.5      Prothrombin time 08/18/2020 31.7* 9.1 - 12.0 sec Final    INR 08/28/2020 1.3* 0.8 - 1.2 Final    Comment: Reference interval is for non-anticoagulated patients. Suggested INR therapeutic range for Vitamin K  antagonist therapy:     Standard Dose (moderate intensity                    therapeutic range):       2.0 - 3.0     Higher intensity therapeutic range       2.5 - 3.5      Prothrombin time 08/28/2020 13.8* 9.1 - 12.0 sec Final    INR 09/10/2020 1.7* 0.8 - 1.2 Final    Comment: Reference interval is for non-anticoagulated patients.   Suggested INR therapeutic range for Vitamin K  antagonist therapy:     Standard Dose (moderate intensity                    therapeutic range):       2.0 - 3.0     Higher intensity therapeutic range       2.5 - 3.5      Prothrombin time 09/10/2020 18.2* 9.1 - 12.0 sec Final

## 2021-02-12 ENCOUNTER — TRANSCRIBE ORDER (OUTPATIENT)
Dept: SCHEDULING | Age: 51
End: 2021-02-12

## 2021-02-12 DIAGNOSIS — J84.9 INTERSTITIAL LUNG DISEASE (HCC): Primary | ICD-10-CM

## 2021-04-01 DIAGNOSIS — I82.5Y3 CHRONIC DEEP VEIN THROMBOSIS (DVT) OF PROXIMAL VEIN OF BOTH LOWER EXTREMITIES (HCC): Primary | ICD-10-CM

## 2021-04-02 RX ORDER — CARVEDILOL 3.12 MG/1
3.12 TABLET ORAL 2 TIMES DAILY WITH MEALS
Qty: 60 TAB | Refills: 0 | OUTPATIENT
Start: 2021-04-02

## 2021-04-09 ENCOUNTER — TELEPHONE (OUTPATIENT)
Dept: FAMILY MEDICINE CLINIC | Age: 51
End: 2021-04-09

## 2021-04-09 NOTE — TELEPHONE ENCOUNTER
I made a new patient appt for this patient. It is 5/14 @ 2:pm.  He is concerned, because he is on blood thinners and will be running out. Is there anything that can be done, before appt. Please call @[phone].

## 2021-04-14 ENCOUNTER — OFFICE VISIT (OUTPATIENT)
Dept: FAMILY MEDICINE CLINIC | Age: 51
End: 2021-04-14
Payer: COMMERCIAL

## 2021-04-14 VITALS
HEART RATE: 72 BPM | RESPIRATION RATE: 16 BRPM | HEIGHT: 74 IN | OXYGEN SATURATION: 94 % | DIASTOLIC BLOOD PRESSURE: 84 MMHG | WEIGHT: 271.8 LBS | BODY MASS INDEX: 34.88 KG/M2 | SYSTOLIC BLOOD PRESSURE: 120 MMHG | TEMPERATURE: 97.2 F

## 2021-04-14 DIAGNOSIS — Z13.220 SCREENING CHOLESTEROL LEVEL: ICD-10-CM

## 2021-04-14 DIAGNOSIS — J84.9 INTERSTITIAL LUNG DISEASE (HCC): ICD-10-CM

## 2021-04-14 DIAGNOSIS — K21.9 GASTROESOPHAGEAL REFLUX DISEASE, UNSPECIFIED WHETHER ESOPHAGITIS PRESENT: ICD-10-CM

## 2021-04-14 DIAGNOSIS — R73.03 PREDIABETES: ICD-10-CM

## 2021-04-14 DIAGNOSIS — I48.0 PAF (PAROXYSMAL ATRIAL FIBRILLATION) (HCC): Primary | ICD-10-CM

## 2021-04-14 DIAGNOSIS — I82.403 RECURRENT ACUTE DEEP VEIN THROMBOSIS (DVT) OF BOTH LOWER EXTREMITIES (HCC): ICD-10-CM

## 2021-04-14 PROCEDURE — 99214 OFFICE O/P EST MOD 30 MIN: CPT | Performed by: STUDENT IN AN ORGANIZED HEALTH CARE EDUCATION/TRAINING PROGRAM

## 2021-04-14 PROCEDURE — 85610 PROTHROMBIN TIME: CPT | Performed by: STUDENT IN AN ORGANIZED HEALTH CARE EDUCATION/TRAINING PROGRAM

## 2021-04-14 RX ORDER — FAMOTIDINE 20 MG/1
TABLET, FILM COATED ORAL
Qty: 90 TAB | Refills: 0 | Status: SHIPPED | OUTPATIENT
Start: 2021-04-14 | End: 2021-07-22

## 2021-04-14 RX ORDER — CARVEDILOL 3.12 MG/1
3.12 TABLET ORAL 2 TIMES DAILY WITH MEALS
Qty: 180 TAB | Refills: 3 | Status: SHIPPED | OUTPATIENT
Start: 2021-04-14 | End: 2022-02-21 | Stop reason: SDUPTHER

## 2021-04-14 NOTE — PROGRESS NOTES
[address] [facility]  [address] [place], [address] [place] [address]  [phone]    C/C: PAF, Recurrent DVT, ILD and GERD    HPI:    Rawland Claude is a 45 y.o. male who presents to clinic today for evaluation of the issues listed above. Pt is new to the practice. Previous pcp: Dr. Roderick Murphy, [facility] now retired. PMHx: Reviewed and updated under problems. 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 Sees pulm (Dr. Edmonia Mohs, next appt on 05/2021). Also sees Dr. Dick Tillman (Thoracic surgeon) and underwent lung procedure in the past. ILD however well controlled. Recurrent DVTs:  On coumadin for more than 20 yrs. Had 3 blood clots in the past including a PE. PAF: On Coreg and coumadin    GERD: On famotidine     Pt denies any  fever, chill, night sweats, chest pain, pressure, SOB, ECHAVARRIA, PND, orthopnea, abdominal pain, n/v/d. Specialists:  Pulm  Thoracic surgery    Current Medications: Reviewed and updated in the chart. Allergies- reviewed: Allergies   Allergen Reactions    Bee Sting [Sting, Bee] Swelling    Neomycin Rash    Neosporin [Neomycin-Bacitracin-Polymyxin] Rash    Penicillins Unknown (comments)       Medications- reviewed:   Current Outpatient Medications   Medication Sig    famotidine (PEPCID) 20 mg tablet TAKE 1 TABLET BY MOUTH EVERY MORNING    carvediloL (COREG) 3.125 mg tablet Take 1 Tab by mouth two (2) times daily (with meals).  rosuvastatin (CRESTOR) 20 mg tablet TAKE 1 TABLET BY MOUTH EVERY NIGHT    warfarin (COUMADIN) 5 mg tablet TAKE 1 TABLET BY MOUTH DAILY    sildenafil citrate (VIAGRA) 50 mg tablet Take 1 tab po 0.5-4hr before sexual activity; max 100mg dose in 24hours    furosemide (LASIX) 20 mg tablet Take 1 Tab by mouth daily.     EPINEPHrine (EPIPEN) 0.3 mg/0.3 mL injection INJECT 0.3 ML BY INTRAMUSCULAR ROUTE ONCE PRN FOR ANAPHYLAXIS OR ALLERGIC RESPONSE FOR UP TO ONE DOSE (Patient taking differently: 0.3 mg by IntraMUSCular route as needed. INJECT 0.3 ML BY INTRAMUSCULAR ROUTE ONCE PRN FOR ANAPHYLAXIS OR ALLERGIC RESPONSE FOR UP TO ONE DOSE)     No current facility-administered medications for this visit. Past Medical History- reviewed:  Past Medical History:   Diagnosis Date    A-fib ([facility] [address]     Allergic rhinitis     Anxiety disorder     Cellulitis     post tattoo    Deep vein thrombosis (DVT) ([facility] [address]     [facility]  [facility] f/u note to manage Lovenox    Essential tremor     BILATERAL HANDS    Laceration of arm 09/23/2017    Left cut by a chain saw.     Nicotine vapor product user     STOPPED 2018    Phlebitis     Psychiatric disorder     ANXIETY WITH PANICK ATTACK    Pulmonary embolism ([facility] [address] 3/2011    multiple episodes    Rectal bleeding 10/2011    on coumadin @ the time &again 4/2017 4/12/17 note from [facility]//colonoscopy report rec'd    Shortness of breath     Thromboembolus ([facility] [address] [address]     Umbilical hernia     Vitamin D deficiency 04/2014    again 4/2017       Past Surgical History- reviewed:   Past Surgical History:   Procedure Laterality Date    HX APPENDECTOMY  2003    HX COLONOSCOPY  04/28/2017    Dr. Guidry Latin    HX HEENT  02/2020    BRONCHOSCOPY    HX HERNIA REPAIR  1370    umbilical       Family History - reviewed:  Family History   Problem Relation Age of Onset    Heart Disease Father     Heart Disease Maternal Grandmother     Heart Disease Maternal Grandfather     Anesth Problems Neg Hx        Social History - reviewed:  Social History     Socioeconomic History    Marital status: SINGLE     Spouse name: Not on file    Number of children: Not on file    Years of education: Not on file    Highest education level: Not on file   Occupational History    Not on file   Social Needs    Financial resource strain: Not on file    Food insecurity     Worry: Not on file     Inability: Not on file    Transportation needs     Medical: Not on file Non-medical: Not on file   Tobacco Use    Smoking status: Never Smoker    Smokeless tobacco: Current User     Types: Snuff    Tobacco comment: former vaper   Substance and Sexual Activity    Alcohol use: Yes     Alcohol/week: 0.0 standard drinks     Comment: SOCIALLY EVERY 6 MONTHS    Drug use: Never    Sexual activity: Yes     Partners: Female   Lifestyle    Physical activity     Days per week: Not on file     Minutes per session: Not on file    Stress: Not on file   Relationships    Social connections     Talks on phone: Not on file     Gets together: Not on file     Attends Gnosticism service: Not on file     Active member of club or organization: Not on file     Attends meetings of clubs or organizations: Not on file     Relationship status: Not on file    Intimate partner violence     Fear of current or ex partner: Not on file     Emotionally abused: Not on file     Physically abused: Not on file     Forced sexual activity: Not on file   Other Topics Concern    Not on file   Social History Narrative    Not on file       Review of systems:     A comprehensive review of systems was negative except for that written in the History of Present Illness. Visit Vitals  /84 (BP 1 Location: Left upper arm, BP Patient Position: Sitting, BP Cuff Size: Adult)   Pulse 72   Temp 97.2 °F (36.2 °C) (Temporal)   Resp 16   Ht 6' 2\" (1.88 m)   Wt 271 lb 12.8 oz (123.3 kg)   SpO2 94%   BMI 34.90 kg/m²       General: Alert and oriented, in no acute distress. Well nourished. EYE: PERRL. Sclera and conjuctival clear. Extraocular movements intact. EARS: External normal, canals clear, tympanic membranes normal.   NOSE: Mucosa healthy without drainage or ulceration. OROPHARYNX: No suspicious lesions, normal dentition, pharynx, tongue and tonsils normal.  NECK: Supple; no masses; thyroid normal.  LUNGS: Respirations unlabored; clear to auscultation bilaterally.   CARDIOVASCULAR: Regular, rate, and rhythm without murmurs, gallops or rubs. ABDOMEN: Soft; nontender; nondistended; normoactive bowel sounds; no masses or organomegaly. MUSCULOSKELETAL: FROM in all extremities     EXT: No edema. Neurovascularlly intact. Normal gait. SKIN: No rash. No suspicious lesions or moles. Neuro: Mental Status: Pt is alert and oriented to person, place, and time. Assessment/Plan       ICD-10-CM ICD-9-CM    1. PAF (paroxysmal atrial fibrillation) ([facility])  I48.0 427.31 carvediloL (COREG) 3.125 mg tablet   2. Recurrent acute deep vein thrombosis (DVT) of both lower extremities ([facility])  I82.403 453.40 AMB POC PT/INR      CBC W/O DIFF      METABOLIC PANEL, COMPREHENSIVE      PROTHROMBIN TIME + INR      CBC W/O DIFF      METABOLIC PANEL, COMPREHENSIVE      PROTHROMBIN TIME + INR   3. Interstitial lung disease ([facility]  J84.9 515    4. Gastroesophageal reflux disease, unspecified whether esophagitis present  K21.9 530.81 famotidine (PEPCID) 20 mg tablet   5. Screening cholesterol level  Z13.220 V77.91 LIPID PANEL      LIPID PANEL   6. Prediabetes  R73.03 790.29 HEMOGLOBIN A1C WITH EAG      HEMOGLOBIN A1C WITH EAG     1. PAF (paroxysmal atrial fibrillation) ([facility])  - carvediloL (COREG) 3.125 mg tablet; Take 1 Tab by mouth two (2) times daily (with meals). 2. Recurrent acute deep vein thrombosis (DVT) of both lower extremities ([facility])  Has not checked INR in about 6 months. INR goal 2-3  On coumadin 5mg daily  - CBC W/O DIFF; Future  - METABOLIC PANEL, COMPREHENSIVE; Future  - PROTHROMBIN TIME + INR; Future    3. Interstitial lung disease ([facility]  Follows with pulm and thoracic surgery    4. Gastroesophageal reflux disease, unspecified whether esophagitis present  - famotidine (PEPCID) 20 mg tablet; TAKE 1 TABLET BY MOUTH EVERY MORNING      5. Screening cholesterol level  - LIPID PANEL; Future  6. Prediabetes  - HEMOGLOBIN A1C WITH EAG;  Futur    Follow up: 4 weeks    I have discussed the diagnosis with the patient and the intended plan as seen in the above orders. The patient has received an after-visit summary and questions were answered concerning future plans. I have discussed medication side effects and warnings with the patient as well. Informed patient to return to the office if new symptoms arise.     Signed By: Rohith Munroe MD     April 14, 2021

## 2021-04-17 LAB
ALBUMIN SERPL-MCNC: 4.1 G/DL (ref 3.8–4.9)
ALBUMIN/GLOB SERPL: 1.4 {RATIO} (ref 1.2–2.2)
ALP SERPL-CCNC: 87 IU/L (ref 39–117)
ALT SERPL-CCNC: 31 IU/L (ref 0–44)
AST SERPL-CCNC: 22 IU/L (ref 0–40)
BILIRUB SERPL-MCNC: 0.7 MG/DL (ref 0–1.2)
BUN SERPL-MCNC: 11 MG/DL (ref 6–24)
BUN/CREAT SERPL: 13 (ref 9–20)
CALCIUM SERPL-MCNC: 9.2 MG/DL (ref 8.7–10.2)
CHLORIDE SERPL-SCNC: 106 MMOL/L (ref 96–106)
CHOLEST SERPL-MCNC: 130 MG/DL (ref 100–199)
CO2 SERPL-SCNC: 25 MMOL/L (ref 20–29)
CREAT SERPL-MCNC: 0.84 MG/DL (ref 0.76–1.27)
ERYTHROCYTE [DISTWIDTH] IN BLOOD BY AUTOMATED COUNT: 14.2 % (ref 11.6–15.4)
EST. AVERAGE GLUCOSE BLD GHB EST-MCNC: 134 MG/DL
GLOBULIN SER CALC-MCNC: 2.9 G/DL (ref 1.5–4.5)
GLUCOSE SERPL-MCNC: 89 MG/DL (ref 65–99)
HBA1C MFR BLD: 6.3 % (ref 4.8–5.6)
HCT VFR BLD AUTO: 46.3 % (ref 37.5–51)
HDLC SERPL-MCNC: 34 MG/DL
HGB BLD-MCNC: 16 G/DL (ref 13–17.7)
INR PPP: 1.4 (ref 0.9–1.2)
LDLC SERPL CALC-MCNC: 57 MG/DL (ref 0–99)
MCH RBC QN AUTO: 31 PG (ref 26.6–33)
MCHC RBC AUTO-ENTMCNC: 34.6 G/DL (ref 31.5–35.7)
MCV RBC AUTO: 90 FL (ref 79–97)
PLATELET # BLD AUTO: 210 X10E3/UL (ref 150–450)
POTASSIUM SERPL-SCNC: 4.5 MMOL/L (ref 3.5–5.2)
PROT SERPL-MCNC: 7 G/DL (ref 6–8.5)
PROTHROMBIN TIME: 14.6 SEC (ref 9.1–12)
RBC # BLD AUTO: 5.16 X10E6/UL (ref 4.14–5.8)
SODIUM SERPL-SCNC: 142 MMOL/L (ref 134–144)
TRIGL SERPL-MCNC: 246 MG/DL (ref 0–149)
VLDLC SERPL CALC-MCNC: 39 MG/DL (ref 5–40)
WBC # BLD AUTO: 6.5 X10E3/UL (ref 3.4–10.8)

## 2021-04-19 NOTE — TELEPHONE ENCOUNTER
I called and was able to talk directly with patient. The patient was identified by 2 identifiers. I have discussed the Low INR. Increased by about 20 %, pt will double dose on M/W, then continue normal dose rest of the week. Recheck INR in a week. Orders placed. Patient verbalized understanding of the plan and agrees with the plan. I have discussed medication side effects and warnings with the patient as well. Advised to call back directly if there are further questions.     Signed By: Radhika Tamez MD     April 19, 2021

## 2021-04-24 DIAGNOSIS — Z79.01 ANTICOAGULANT LONG-TERM USE: ICD-10-CM

## 2021-04-26 LAB
INR BLD: 2.4
PT POC: NORMAL
VALID INTERNAL CONTROL?: YES

## 2021-04-26 RX ORDER — WARFARIN SODIUM 5 MG/1
TABLET ORAL
Qty: 30 TAB | Refills: 0 | Status: SHIPPED | OUTPATIENT
Start: 2021-04-26 | End: 2021-06-04 | Stop reason: SDUPTHER

## 2021-05-17 ENCOUNTER — TRANSCRIBE ORDER (OUTPATIENT)
Dept: SCHEDULING | Age: 51
End: 2021-05-17

## 2021-05-17 DIAGNOSIS — J84.9 INTERSTITIAL LUNG DISEASE (HCC): Primary | ICD-10-CM

## 2021-06-04 DIAGNOSIS — Z79.01 ANTICOAGULANT LONG-TERM USE: ICD-10-CM

## 2021-06-04 RX ORDER — WARFARIN SODIUM 5 MG/1
TABLET ORAL
Qty: 30 TABLET | Refills: 0 | Status: SHIPPED | OUTPATIENT
Start: 2021-06-04 | End: 2021-07-09

## 2021-06-04 NOTE — TELEPHONE ENCOUNTER
Pt p#[phone], pt requesting refill on Coumadin, he is completely out , has been trying to obtain for a few days now, in looking in system the request has been going to [facility] and not Dr. Fadumo Story at [facility] , please refill ,pt needs prior to weekend.

## 2021-06-17 ENCOUNTER — HOSPITAL ENCOUNTER (OUTPATIENT)
Dept: CT IMAGING | Age: 51
Discharge: HOME | End: 2021-06-17
Attending: INTERNAL MEDICINE
Payer: COMMERCIAL

## 2021-06-17 DIAGNOSIS — J84.9 INTERSTITIAL LUNG DISEASE (HCC): ICD-10-CM

## 2021-06-17 PROCEDURE — 71250 CT THORAX DX C-: CPT

## 2021-06-29 DIAGNOSIS — E78.5 HYPERLIPIDEMIA, UNSPECIFIED HYPERLIPIDEMIA TYPE: Primary | ICD-10-CM

## 2021-06-29 RX ORDER — ROSUVASTATIN CALCIUM 20 MG/1
TABLET, COATED ORAL
Qty: 90 TABLET | Refills: 3 | Status: SHIPPED | OUTPATIENT
Start: 2021-06-29 | End: 2021-09-22 | Stop reason: SDUPTHER

## 2021-07-08 DIAGNOSIS — Z79.01 ANTICOAGULANT LONG-TERM USE: ICD-10-CM

## 2021-07-09 RX ORDER — WARFARIN SODIUM 5 MG/1
TABLET ORAL
Qty: 30 TABLET | Refills: 0 | Status: SHIPPED | OUTPATIENT
Start: 2021-07-09 | End: 2021-08-17 | Stop reason: SDUPTHER

## 2021-07-22 DIAGNOSIS — K21.9 GASTROESOPHAGEAL REFLUX DISEASE, UNSPECIFIED WHETHER ESOPHAGITIS PRESENT: ICD-10-CM

## 2021-07-22 RX ORDER — FAMOTIDINE 20 MG/1
TABLET, FILM COATED ORAL
Qty: 90 TABLET | Refills: 0 | Status: SHIPPED | OUTPATIENT
Start: 2021-07-22 | End: 2021-11-12

## 2021-08-17 DIAGNOSIS — Z79.01 ANTICOAGULANT LONG-TERM USE: ICD-10-CM

## 2021-08-17 RX ORDER — WARFARIN SODIUM 5 MG/1
5 TABLET ORAL DAILY
Qty: 30 TABLET | Refills: 0 | Status: SHIPPED | OUTPATIENT
Start: 2021-08-17 | End: 2021-09-21

## 2021-08-17 NOTE — TELEPHONE ENCOUNTER
Last visit:4/14/21  Next visit: not scheduled  Previous refill 7/09/21(30+0R)    Requested Prescriptions     Pending Prescriptions Disp Refills    warfarin (COUMADIN) 5 mg tablet 30 Tablet 0     Sig: Take 1 Tablet by mouth daily.

## 2021-09-21 DIAGNOSIS — Z79.01 ANTICOAGULANT LONG-TERM USE: ICD-10-CM

## 2021-09-21 RX ORDER — WARFARIN SODIUM 5 MG/1
5 TABLET ORAL DAILY
Qty: 30 TABLET | Refills: 0 | Status: SHIPPED | OUTPATIENT
Start: 2021-09-21 | End: 2021-10-26

## 2021-09-22 DIAGNOSIS — E78.5 HYPERLIPIDEMIA, UNSPECIFIED HYPERLIPIDEMIA TYPE: ICD-10-CM

## 2021-09-22 RX ORDER — ROSUVASTATIN CALCIUM 20 MG/1
TABLET, COATED ORAL
Qty: 90 TABLET | Refills: 3 | Status: SHIPPED | OUTPATIENT
Start: 2021-09-22 | End: 2022-10-03

## 2021-09-22 NOTE — TELEPHONE ENCOUNTER
Patient is needing a refill on his;warfarin (COUMADIN) 5 mg tablet; rosuvastatin (CRESTOR) 20 mg tablet. Please call @[phone].

## 2021-09-22 NOTE — TELEPHONE ENCOUNTER
warfarin (COUMADIN) 5 mg tablet   Filled 9/21/2021    Patient Also requesting:  rosuvastatin (CRESTOR) 20 mg tablet  Sig: TAKE 1 TABLET BY MOUTH EVERY NIGHT    Last filled: 6/29/2021    Last seen:  4/14/21

## 2021-09-22 NOTE — TELEPHONE ENCOUNTER
Writer called patient about results and recommendations from Dr. Lorna Moreno. Writer spoke with patient. Patient verified . Writer notified patient. Patient verbalized understanding and appreciation. Stated that he needs to go to see a dentist about a cavitity, and wanted to know if he would need to come off of the coumadin prior to going? Writer verbalized understanding, and stated that message would be sent to Dr. Lorna Moreno. <-- Click to add NO pertinent Family History

## 2021-09-23 ENCOUNTER — TELEPHONE (OUTPATIENT)
Dept: FAMILY MEDICINE CLINIC | Age: 51
End: 2021-09-23

## 2021-09-23 NOTE — TELEPHONE ENCOUNTER
----- Message from Brit Braga sent at 9/23/2021 11:20 AM EDT -----  Regarding: Enow/Rx  Pt is requesting a Rx for Warfarin and Rosuvastatin with 90 days  he has no medication. Pts number is [phone] and [facility] number is [phone].

## 2021-09-23 NOTE — TELEPHONE ENCOUNTER
Notified patient that both scripts are at he pharmacy on file and pharmacy confirmed receipt. Sched.  appt for 10/1/2021 for Pt/INR

## 2021-10-01 ENCOUNTER — OFFICE VISIT (OUTPATIENT)
Dept: FAMILY MEDICINE CLINIC | Age: 51
End: 2021-10-01
Payer: COMMERCIAL

## 2021-10-01 VITALS
SYSTOLIC BLOOD PRESSURE: 126 MMHG | BODY MASS INDEX: 34.78 KG/M2 | WEIGHT: 271 LBS | TEMPERATURE: 98 F | HEIGHT: 74 IN | DIASTOLIC BLOOD PRESSURE: 75 MMHG | RESPIRATION RATE: 16 BRPM | OXYGEN SATURATION: 96 % | HEART RATE: 60 BPM

## 2021-10-01 DIAGNOSIS — Z79.01 ANTICOAGULANT LONG-TERM USE: ICD-10-CM

## 2021-10-01 DIAGNOSIS — I48.0 PAF (PAROXYSMAL ATRIAL FIBRILLATION) (HCC): Primary | ICD-10-CM

## 2021-10-01 LAB
INR BLD: 1.9
PT POC: NORMAL
VALID INTERNAL CONTROL?: YES

## 2021-10-01 PROCEDURE — 99213 OFFICE O/P EST LOW 20 MIN: CPT | Performed by: STUDENT IN AN ORGANIZED HEALTH CARE EDUCATION/TRAINING PROGRAM

## 2021-10-01 PROCEDURE — 85610 PROTHROMBIN TIME: CPT | Performed by: STUDENT IN AN ORGANIZED HEALTH CARE EDUCATION/TRAINING PROGRAM

## 2021-10-01 NOTE — PROGRESS NOTES
Name and  Verified. Pharmacy verified    Chief Complaint   Patient presents with    Follow-up     PT/INR       Patient is fasting if other labs are needed. 1. Have you been to the ER, urgent care clinic since your last visit? Hospitalized since your last visit? No    2. Have you seen or consulted any other health care providers outside of the [address] [place] [address] since your last visit? Include any pap smears or colon screening.  No      Health Maintenance Due   Topic Date Due    Hepatitis C Screening  Never done    COVID-19 Vaccine (1) Never done    Shingrix Vaccine Age 50> (1 of 2) Never done    Flu Vaccine (1) 2021

## 2021-10-01 NOTE — PROGRESS NOTES
[address] [address] [place], [address] [place] [address]  [phone]    C/C: PAF and Recurrent DVT  HPI:    Joe Layne is a 45 y.o. male who presents to clinic today for evaluation of the issues listed above. Current concerns;    PAF  On coreg. On coumadin for more than 20 yrs. Currently takes 5mg daily (M-F) and 2.5mg on Saturday and Sunday. Does not have a cardiologist although has been referred a few times in the past per chart review. Last Echo (2/18/20) - LVEF 50-55%. Also has a history of 3 blood clots in the past including a PE. Pt denies any  fever, chill, night sweats, chest pain, pressure, SOB, ECHAVARRIA, PND, orthopnea, abdominal pain, n/v/d. Specialists:  Pulm  Thoracic surgery    Allergies- reviewed:   Medications- reviewed:   Current Outpatient Medications   Medication Sig    rosuvastatin (CRESTOR) 20 mg tablet TAKE 1 TABLET BY MOUTH EVERY NIGHT    warfarin (COUMADIN) 5 mg tablet TAKE 1 TABLET BY MOUTH DAILY    carvediloL (COREG) 3.125 mg tablet Take 1 Tab by mouth two (2) times daily (with meals).  furosemide (LASIX) 20 mg tablet Take 1 Tab by mouth daily.  famotidine (PEPCID) 20 mg tablet TAKE 1 TABLET BY MOUTH EVERY MORNING (Patient not taking: Reported on 10/1/2021)    sildenafil citrate (VIAGRA) 50 mg tablet Take 1 tab po 0.5-4hr before sexual activity; max 100mg dose in 24hours (Patient not taking: Reported on 10/1/2021)    EPINEPHrine (EPIPEN) 0.3 mg/0.3 mL injection INJECT 0.3 ML BY INTRAMUSCULAR ROUTE ONCE PRN FOR ANAPHYLAXIS OR ALLERGIC RESPONSE FOR UP TO ONE DOSE (Patient not taking: Reported on 10/1/2021)     No current facility-administered medications for this visit.        Past Medical History- reviewed:  Past Medical History:   Diagnosis Date    [facility]     Allergic rhinitis     Anxiety disorder     Cellulitis     post tattoo    Deep vein thrombosis (DVT) ([facility]     [facility] f/u note to manage Lovenox    Essential tremor     BILATERAL HANDS    Laceration of arm 09/23/2017    Left cut by a chain saw.  Nicotine vapor product user     STOPPED 2018    Phlebitis     Psychiatric disorder     ANXIETY WITH PANICK ATTACK    Pulmonary embolism ([address] 3/2011    multiple episodes    Rectal bleeding 10/2011    on coumadin @ the time &again 4/2017 4/12/17 note from Carla//colonoscopy report rec'd    Shortness of breath     Thromboembolus ([address]     Umbilical hernia     Vitamin D deficiency 04/2014    again 4/2017       Past Surgical History- reviewed:   Past Surgical History:   Procedure Laterality Date    HX APPENDECTOMY  2003    HX COLONOSCOPY  04/28/2017    Dr. Jamel Olivo    HX HEENT  02/2020    BRONCHOSCOPY    HX HERNIA REPAIR  3391    umbilical       Family History - reviewed:  Family History   Problem Relation Age of Onset    Heart Disease Father     Heart Disease Maternal Grandmother     Heart Disease Maternal Grandfather     Anesth Problems Neg Hx        Social History - reviewed:    Review of systems:     A comprehensive review of systems was negative except for that written in the History of Present Illness. Visit Vitals  /75 (BP 1 Location: Right arm, BP Patient Position: Sitting, BP Cuff Size: Adult)   Pulse 60   Temp 98 °F (36.7 °C) (Oral)   Resp 16   Ht 6' 2\" (1.88 m)   Wt 271 lb (122.9 kg)   SpO2 96%   BMI 34.79 kg/m²       General: Alert and oriented, in no acute distress. Well nourished. EYE: PERRL. Sclera and conjuctival clear. Extraocular movements intact. EARS: External normal, canals clear, tympanic membranes normal.   NOSE: Mucosa healthy without drainage or ulceration. OROPHARYNX: No suspicious lesions, normal dentition, pharynx, tongue and tonsils normal.  NECK: Supple; no masses; thyroid normal.  LUNGS: Respirations unlabored; clear to auscultation bilaterally. CARDIOVASCULAR: Regular, rate, and rhythm without murmurs, gallops or rubs. EXT: No edema. Neurovascularlly intact. Normal gait. Assessment/Plan       ICD-10-CM ICD-9-CM    1. PAF (paroxysmal atrial fibrillation) ([facility])  I48.0 427.31 REFERRAL TO CARDIOLOGY   2. Anticoagulant long-term use  Z79.01 V58.61 AMB POC PT/INR       1.  PAF (paroxysmal atrial fibrillation) ([facility])  - AMB POC PT/INR: 1.9 (Goal 2-3)  - Take coumadin 5mg daily  - continue Coreg 3.125 mg BID  - REFERRAL TO CARDIOLOGY    Follow up: 1 month     I have discussed the diagnosis with the patient and the intended plan as seen in the above orders. The patient has received an after-visit summary and questions were answered concerning future plans. I have discussed medication side effects and warnings with the patient as well. Informed patient to return to the office if new symptoms arise.     Signed By: Jeannie Woods MD     October 1, 2021

## 2021-10-26 DIAGNOSIS — Z79.01 ANTICOAGULANT LONG-TERM USE: ICD-10-CM

## 2021-10-26 RX ORDER — WARFARIN SODIUM 5 MG/1
5 TABLET ORAL DAILY
Qty: 30 TABLET | Refills: 0 | Status: SHIPPED | OUTPATIENT
Start: 2021-10-26 | End: 2021-11-29

## 2021-11-03 ENCOUNTER — TELEPHONE (OUTPATIENT)
Dept: FAMILY MEDICINE CLINIC | Age: 51
End: 2021-11-03

## 2021-11-05 ENCOUNTER — OFFICE VISIT (OUTPATIENT)
Dept: FAMILY MEDICINE CLINIC | Age: 51
End: 2021-11-05
Payer: COMMERCIAL

## 2021-11-05 VITALS
BODY MASS INDEX: 35.68 KG/M2 | RESPIRATION RATE: 18 BRPM | HEIGHT: 74 IN | DIASTOLIC BLOOD PRESSURE: 64 MMHG | SYSTOLIC BLOOD PRESSURE: 110 MMHG | TEMPERATURE: 96.9 F | HEART RATE: 85 BPM | WEIGHT: 278 LBS | OXYGEN SATURATION: 95 %

## 2021-11-05 DIAGNOSIS — Z79.01 ANTICOAGULANT LONG-TERM USE: Primary | ICD-10-CM

## 2021-11-05 LAB
INR BLD: 3.2
PT POC: NORMAL
VALID INTERNAL CONTROL?: YES

## 2021-11-05 PROCEDURE — 85610 PROTHROMBIN TIME: CPT | Performed by: STUDENT IN AN ORGANIZED HEALTH CARE EDUCATION/TRAINING PROGRAM

## 2021-11-05 PROCEDURE — 99213 OFFICE O/P EST LOW 20 MIN: CPT | Performed by: STUDENT IN AN ORGANIZED HEALTH CARE EDUCATION/TRAINING PROGRAM

## 2021-11-05 NOTE — PROGRESS NOTES
[address]  [address] [place] [address]  [phone]    C/C: PAF and Recurrent DVT  HPI:    Roslyn Levine is a 45 y.o. male who presents to clinic today for evaluation of the issues listed above. Current concerns;    PAF  On coreg. On coumadin for more than 20 yrs. Currently takes 5mg daily. Has appointment with Cards (11/19/21)  Last Echo (2/18/20) - LVEF 50-55%. Also has a history of 3 blood clots in the past including a PE. Pt denies any  fever, chill, night sweats, chest pain, pressure, SOB, ECHAVARRIA and n/v/d. Specialists:  Pulm  Thoracic surgery    Allergies- reviewed:   Medications- reviewed:   Current Outpatient Medications   Medication Sig    warfarin (COUMADIN) 5 mg tablet TAKE 1 TABLET BY MOUTH DAILY    rosuvastatin (CRESTOR) 20 mg tablet TAKE 1 TABLET BY MOUTH EVERY NIGHT    carvediloL (COREG) 3.125 mg tablet Take 1 Tab by mouth two (2) times daily (with meals).  furosemide (LASIX) 20 mg tablet Take 1 Tab by mouth daily.  famotidine (PEPCID) 20 mg tablet TAKE 1 TABLET BY MOUTH EVERY MORNING (Patient not taking: Reported on 10/1/2021)    sildenafil citrate (VIAGRA) 50 mg tablet Take 1 tab po 0.5-4hr before sexual activity; max 100mg dose in 24hours (Patient not taking: Reported on 10/1/2021)    EPINEPHrine (EPIPEN) 0.3 mg/0.3 mL injection INJECT 0.3 ML BY INTRAMUSCULAR ROUTE ONCE PRN FOR ANAPHYLAXIS OR ALLERGIC RESPONSE FOR UP TO ONE DOSE (Patient not taking: Reported on 10/1/2021)     No current facility-administered medications for this visit. Past Medical History- reviewed:  Past Medical History:   Diagnosis Date    [facility]fib ([facility]     Allergic rhinitis     Anxiety disorder     Cellulitis     post tattoo    Deep vein thrombosis (DVT) ([facility]     [facility] f/u note to manage Lovenox    Essential tremor     BILATERAL HANDS    Laceration of arm 09/23/2017    Left cut by a chain saw.     Nicotine vapor product user     STOPPED 2018    Phlebitis     Psychiatric disorder     ANXIETY WITH PANICK ATTACK    Pulmonary embolism ([address] 3/2011    multiple episodes    Rectal bleeding 10/2011    on coumadin @ the time &again 4/2017 4/12/17 note from Carla//colonoscopy report rec'd    Shortness of breath     [facility]embolus [facility]     Umbilical hernia     Vitamin D deficiency 04/2014    again 4/2017       Past Surgical History- reviewed:   Past Surgical History:   Procedure Laterality Date    HX APPENDECTOMY  2003    HX COLONOSCOPY  04/28/2017    Dr. Gt Rodriguez    HX HEENT  02/2020    BRONCHOSCOPY    HX HERNIA REPAIR  5959    umbilical       Family History - reviewed:  Family History   Problem Relation Age of Onset    Heart Disease Father     Heart Disease Maternal Grandmother     Heart Disease Maternal Grandfather     Anesth Problems Neg Hx        Social History - reviewed:    Review of systems:     A comprehensive review of systems was negative except for that written in the History of Present Illness. Visit Vitals  /64 (BP 1 Location: Right arm, BP Patient Position: Sitting, BP Cuff Size: Adult)   Pulse 85   Temp 96.9 °F (36.1 °C) (Oral)   Resp 18   Ht 6' 2\" (1.88 m)   Wt 278 lb (126.1 kg)   SpO2 95%   BMI 35.69 kg/m²       General: Alert and oriented, in no acute distress. Well nourished. EYE: PERRL. Sclera and conjuctival clear. Extraocular movements intact. EARS: External normal, canals clear, tympanic membranes normal.   NOSE: Mucosa healthy without drainage or ulceration. OROPHARYNX: No suspicious lesions, normal dentition, pharynx, tongue and tonsils normal.  NECK: Supple; no masses; thyroid normal.  LUNGS: Respirations unlabored; clear to auscultation bilaterally. CARDIOVASCULAR: Regular, rate, and rhythm without murmurs, gallops or rubs. EXT: No edema. Neurovascularlly intact. Normal gait. Assessment/Plan       ICD-10-CM ICD-9-CM    1.  Anticoagulant long-term use  Z79.01 V58.61 AMB POC PT/INR       1.  PAF (paroxysmal atrial fibrillation) ([facility])  - AMB POC PT/INR: 3.2 (Goal 2-3)  - Take coumadin 5mg daily  - continue Coreg 3.125 mg BID  - REFERRAL TO CARDIOLOGY    Follow up: 1 month     I have discussed the diagnosis with the patient and the intended plan as seen in the above orders. The patient has received an after-visit summary and questions were answered concerning future plans. I have discussed medication side effects and warnings with the patient as well. Informed patient to return to the office if new symptoms arise.     Signed By: Malissa Barbosa MD     November 5, 2021

## 2021-11-05 NOTE — PROGRESS NOTES
Name and  Verified. Pharmacy verified    Chief Complaint   Patient presents with    Follow-up     PT/INR       1. Have you been to the ER, urgent care clinic since your last visit? Hospitalized since your last visit? No    2. Have you seen or consulted any other health care providers outside of the [address] [place] [address] since your last visit? Include any pap smears or colon screening.  No      Health Maintenance Due   Topic Date Due    Hepatitis C Screening  Never done    COVID-19 Vaccine (1) Never done    Shingrix Vaccine Age 50> (1 of 2) Never done    Flu Vaccine (1) 2021

## 2021-11-12 DIAGNOSIS — K21.9 GASTROESOPHAGEAL REFLUX DISEASE, UNSPECIFIED WHETHER ESOPHAGITIS PRESENT: ICD-10-CM

## 2021-11-12 RX ORDER — FAMOTIDINE 20 MG/1
TABLET, FILM COATED ORAL
Qty: 90 TABLET | Refills: 0 | Status: SHIPPED | OUTPATIENT
Start: 2021-11-12 | End: 2022-02-21 | Stop reason: SDUPTHER

## 2021-11-12 NOTE — TELEPHONE ENCOUNTER
Medication refill request: famotidine (PEPCID) 20 mg tablet  Sig: TAKE 1 TABLET BY MOUTH EVERY MORNING    Last filled:   7/22/2021    Last Seen:   11/5/2021

## 2021-11-19 ENCOUNTER — CLINICAL SUPPORT (OUTPATIENT)
Dept: CARDIOLOGY CLINIC | Age: 51
End: 2021-11-19

## 2021-11-19 ENCOUNTER — OFFICE VISIT (OUTPATIENT)
Dept: CARDIOLOGY CLINIC | Age: 51
End: 2021-11-19
Payer: COMMERCIAL

## 2021-11-19 VITALS
WEIGHT: 278.4 LBS | RESPIRATION RATE: 18 BRPM | DIASTOLIC BLOOD PRESSURE: 76 MMHG | HEIGHT: 74 IN | OXYGEN SATURATION: 93 % | BODY MASS INDEX: 35.73 KG/M2 | SYSTOLIC BLOOD PRESSURE: 112 MMHG | HEART RATE: 78 BPM

## 2021-11-19 DIAGNOSIS — I48.0 PAF (PAROXYSMAL ATRIAL FIBRILLATION) (HCC): Primary | ICD-10-CM

## 2021-11-19 DIAGNOSIS — Z86.711 HX PULMONARY EMBOLISM: ICD-10-CM

## 2021-11-19 DIAGNOSIS — Z86.718 PERSONAL HISTORY OF DVT (DEEP VEIN THROMBOSIS): ICD-10-CM

## 2021-11-19 DIAGNOSIS — J84.9 ILD (INTERSTITIAL LUNG DISEASE) (HCC): ICD-10-CM

## 2021-11-19 DIAGNOSIS — R06.02 SOB (SHORTNESS OF BREATH) ON EXERTION: ICD-10-CM

## 2021-11-19 DIAGNOSIS — R00.2 PALPITATION: ICD-10-CM

## 2021-11-19 PROCEDURE — 99214 OFFICE O/P EST MOD 30 MIN: CPT | Performed by: INTERNAL MEDICINE

## 2021-11-19 PROCEDURE — 93270 REMOTE 30 DAY ECG REV/REPORT: CPT | Performed by: INTERNAL MEDICINE

## 2021-11-19 PROCEDURE — 93000 ELECTROCARDIOGRAM COMPLETE: CPT | Performed by: INTERNAL MEDICINE

## 2021-11-19 PROCEDURE — 93272 ECG/REVIEW INTERPRET ONLY: CPT | Performed by: INTERNAL MEDICINE

## 2021-11-19 NOTE — LETTER
11/19/2021    Patient: Mariely Morris   YOB: 1970   Date of Visit: 11/19/2021     Manjit Haney MD  [address] [address]  Via In Basket    Dear Manjit Haney MD,      Thank you for referring Mr. Matt Brito to [facility] for evaluation. My notes for this consultation are attached. If you have questions, please do not hesitate to call me. I look forward to following your patient along with you.       Sincerely,    Miroslava Lopez MD

## 2021-11-19 NOTE — PROGRESS NOTES
1. Have you been to the ER, urgent care clinic since your last visit? Hospitalized since your last visit? No    2. Have you seen or consulted any other health care providers outside of the [address] [place] [address] since your last visit? Include any pap smears or colon screening.  No         Chief Complaint   Patient presents with    New Patient     C/O  CP, SOB with excertion, Left leg discoloration

## 2021-11-19 NOTE — PROGRESS NOTES
Darryle Ganser, NP         Subjective/HPI:     Karli Renteria is a 45 y.o. male is here for new patient consultation. The patient has medical hx significant for PAF, PE x1, DVTs x1, and ILD. He was referred by PCP for reporting palpitations. He reports that he feels palpitations periodically. Not daily. Feels like an irregular heart beat. He has sob, will wake up at night and feel like he is sob. Also having ECHAVARRIA at times. Not consistently. He denies CP, edema, lightheadedness or syncope. Previous cardiac evaluation includes echo 2/18/2020 EF 50-55%, no valvular disease of significance. No stress test. Family med hx significant for father with CHF, PGM/PGF with CHF. Current Outpatient Medications   Medication Sig    famotidine (PEPCID) 20 mg tablet TAKE 1 TABLET BY MOUTH EVERY MORNING    warfarin (COUMADIN) 5 mg tablet TAKE 1 TABLET BY MOUTH DAILY    rosuvastatin (CRESTOR) 20 mg tablet TAKE 1 TABLET BY MOUTH EVERY NIGHT    carvediloL (COREG) 3.125 mg tablet Take 1 Tab by mouth two (2) times daily (with meals).  sildenafil citrate (VIAGRA) 50 mg tablet Take 1 tab po 0.5-4hr before sexual activity; max 100mg dose in 24hours (Patient not taking: Reported on 10/1/2021)    furosemide (LASIX) 20 mg tablet Take 1 Tab by mouth daily. (Patient not taking: Reported on 11/19/2021)    EPINEPHrine (EPIPEN) 0.3 mg/0.3 mL injection INJECT 0.3 ML BY INTRAMUSCULAR ROUTE ONCE PRN FOR ANAPHYLAXIS OR ALLERGIC RESPONSE FOR UP TO ONE DOSE (Patient not taking: Reported on 10/1/2021)     No current facility-administered medications for this visit. Vitals:    11/19/21 1435   BP: 112/76   Pulse: 78   Resp: 18   SpO2: 93%   Weight: 278 lb 6.4 oz (126.3 kg)   Height: 6' 2\" (1.88 m)       I have reviewed the nurses notes, vitals, problem list, allergy list, medical history, family, social history and medications. Review of Symptoms:  General: Pt denies excessive weight gain or loss.  Pt is able to conduct ADL's  HEENT: Denies blurred vision, headaches, epistaxis and difficulty swallowing. Respiratory: +shortness of breath, ECHAVARRIA, denies wheezing or stridor. Cardiovascular: Denies precordial pain, +palpitations, denies edema or PND  Gastrointestinal: Denies poor appetite, indigestion, abdominal pain or blood in stool  Urinary: Denies dysuria, pyuria  Musculoskeletal: Denies pain or swelling from muscles or joints  Neurologic: Denies tremor, paresthesias, or sensory motor disturbance  Skin: Denies rash, itching or texture change. Psych: Denies depression        Physical Exam:      General: Well developed, cooperative, alert in no acute distress, appears states age. HEENT: Supple, No carotid bruits, no JVD, trach is midline. PERRL, EOM intact  Heart:  Normal S1/S2 negative S3 or S4. Regular, no murmur, gallop or rub. Respiratory: Clear bilaterally x 4, no wheezing or rales  Abdomen:   Soft, non-tender, no masses, bowel sounds are active. Extremities:  No edema, normal cap refill, no cyanosis, atraumatic. Neuro: A&Ox3, speech clear, gait stable. Skin: Skin color is normal. No rashes or lesions. Non diaphoretic  Vascular: 2+ pulses symmetric in all extremities    Cardiographics    ECG: Sinus  Rhythm HR 78  onspecific T-abnormality       Assessment:        Specialty Problems        Cardiology Problems    Disorder of lipoid metabolism        Edema        PAF (paroxysmal atrial fibrillation) ([facility]              ICD-10-CM ICD-9-CM    1. PAF (paroxysmal atrial fibrillation) ([facility])  I48.0 427.31 AMB POC EKG ROUTINE W/ 12 LEADS, INTER & REP   2. SOB (shortness of breath) on exertion  R06.02 786.05    3. Hx pulmonary embolism  Z86.711 V12.55    4. Personal history of DVT (deep vein thrombosis)  Z86.718 V12.51    5. ILD (interstitial lung disease) ([email or web address]  J84.9 515          PLAN:  1. PAF (paroxysmal atrial fibrillation) ([facility])  Hx of PAF, reports over 10+ years ago. has been managed by Coreg and Coumadin. He is reporting episodes of an irregular heart beat. Not daily, no triggers. ECG SR. Per echo 2/18/2020 EF 50-55%. Obtain event monitor     2. SOB/ECHAVARRIA  Reporting episodes of SOB and ECHAVARRIA. EF 50-55% 2/202. ECG SR without acute changes of significance. No family hx of CAD/MI. Will obtain an echo    3. Hx pulmonary embolism  Hx of PE, taking Coumadin. 4. Personal history of DVT (deep vein thrombosis)  Hx of multiple DVT. Taking Coumadin for 20 years. Seen by Dr Kacey Willis in past.     4. ILD (interstitial lung disease) ([address]  Hx of left VATS with wedge resection at [facility] 6/2020. [facility] and was c/w HSP. Followed by Dr Lazarus Cedar, last seen 2/10/21. F/U dependent on results    Master Washington NP    Patient seen and examined by me with the above nurse practitioner. I personally performed all components of the history, physical, and medical decision making and agree with the assessment and plan with minor modifications as noted. Today the patient presents with intermittent palpitations that occur at rest, some shortness of breath and dyspnea on exertion, with known interstitial lung disease. General PE  Gen:  NAD  Mental Status - Alert. General Appearance - Not in acute distress. HEENT:  PERRL, no carotid bruits or JVD  Chest and Lung Exam   Inspection: Accessory muscles - No use of accessory muscles in breathing. Auscultation:   Breath sounds: - Normal.   Cardiovascular   Inspection: Jugular vein - Bilateral - Inspection Normal.   Palpation/Percussion:   Apical Impulse: - Normal.   Auscultation: Rhythm - Regular. Heart Sounds - S1 WNL and S2 WNL. No S3 or S4. Murmurs & Other Heart Sounds: Auscultation of the heart reveals - No Murmurs. Peripheral Vascular   Upper Extremity: Inspection - Bilateral - No Cyanotic nailbeds or Digital clubbing. Lower Extremity:   Palpation: Edema - Bilateral - No edema. Abdomen:   Soft, non-tender, bowel sounds are active.   Neuro: A&O times 3, CN and motor grossly WNL    Check echo and event monitor. Follow up to be determined based on test results. Patient was made aware during visit today that all testing completed would be instantaneously available on their MyChart for review. Discussed that these results will be made available to the provider at the same time. They were advised to wait at least 3 business days to allow for provider's interpretation of results with follow-up before calling our office with concerns about their results.

## 2021-11-19 NOTE — PROGRESS NOTES
Patient received a 2 week event monitor. Instructions given verbally as well as an instruction sheet. Pt verbalized understanding.     [facility] Event Monitoring

## 2021-11-28 DIAGNOSIS — Z79.01 ANTICOAGULANT LONG-TERM USE: ICD-10-CM

## 2021-11-29 RX ORDER — WARFARIN SODIUM 5 MG/1
5 TABLET ORAL DAILY
Qty: 30 TABLET | Refills: 0 | Status: SHIPPED | OUTPATIENT
Start: 2021-11-29 | End: 2021-12-10 | Stop reason: SDUPTHER

## 2021-12-09 ENCOUNTER — TELEPHONE (OUTPATIENT)
Dept: CARDIOLOGY CLINIC | Age: 51
End: 2021-12-09

## 2021-12-09 NOTE — PROGRESS NOTES
Please advise the patient that his event monitor was normal, so no arrhythmias to explain his symptoms. Awaiting echo result.

## 2021-12-09 NOTE — TELEPHONE ENCOUNTER
----- Message from Julianna Scruggs MD sent at 12/9/2021  7:41 AM EST -----  Please advise the patient that his event monitor was normal, so no arrhythmias to explain his symptoms. Awaiting echo result.

## 2021-12-10 ENCOUNTER — OFFICE VISIT (OUTPATIENT)
Dept: FAMILY MEDICINE CLINIC | Age: 51
End: 2021-12-10
Payer: COMMERCIAL

## 2021-12-10 VITALS
RESPIRATION RATE: 18 BRPM | OXYGEN SATURATION: 95 % | HEIGHT: 74 IN | SYSTOLIC BLOOD PRESSURE: 124 MMHG | TEMPERATURE: 98.3 F | BODY MASS INDEX: 34.63 KG/M2 | HEART RATE: 80 BPM | DIASTOLIC BLOOD PRESSURE: 89 MMHG | WEIGHT: 269.8 LBS

## 2021-12-10 DIAGNOSIS — Z79.01 ANTICOAGULANT LONG-TERM USE: ICD-10-CM

## 2021-12-10 DIAGNOSIS — I48.0 PAF (PAROXYSMAL ATRIAL FIBRILLATION) (HCC): Primary | ICD-10-CM

## 2021-12-10 LAB
INR BLD: 1.9
PT POC: NORMAL
VALID INTERNAL CONTROL?: YES

## 2021-12-10 PROCEDURE — 85610 PROTHROMBIN TIME: CPT | Performed by: STUDENT IN AN ORGANIZED HEALTH CARE EDUCATION/TRAINING PROGRAM

## 2021-12-10 PROCEDURE — 99213 OFFICE O/P EST LOW 20 MIN: CPT | Performed by: STUDENT IN AN ORGANIZED HEALTH CARE EDUCATION/TRAINING PROGRAM

## 2021-12-10 RX ORDER — WARFARIN SODIUM 5 MG/1
5 TABLET ORAL DAILY
Qty: 90 TABLET | Refills: 0 | Status: SHIPPED | OUTPATIENT
Start: 2021-12-10 | End: 2022-01-11

## 2021-12-10 NOTE — PROGRESS NOTES
Chief Complaint   Patient presents with    Follow-up     1m fu        1. Have you been to the ER, urgent care clinic since your last visit? Hospitalized since your last visit? No    2. Have you seen or consulted any other health care providers outside of the [address] [place] [address] since your last visit? Include any pap smears or colon screening.  No

## 2021-12-10 NOTE — PROGRESS NOTES
[address]  [address]. [facility], [address]  [phone]    C/C: PAF and Recurrent DVT    HPI:    Sofie Gomez is a 45 y.o. male who presents to clinic today for evaluation of the issues listed above. Current concerns;    PAF  On coreg. On coumadin for more than 20 yrs. Currently takes 5mg daily. Also has a history of 3 DVT's in the past including a PE. Saw Dr. Dell Thacker (11/19/21) and had normal event monitor. Scheduled to have an Echo. Pt denies any  fever, chill, night sweats, chest pain, pressure, SOB, ECHAVARRIA and n/v/d. Needs INR check today    Specialists:  Pulm  Thoracic surgery    Allergies- reviewed:   Medications- reviewed:   Current Outpatient Medications   Medication Sig    warfarin (COUMADIN) 5 mg tablet Take 1 Tablet by mouth daily.  famotidine (PEPCID) 20 mg tablet TAKE 1 TABLET BY MOUTH EVERY MORNING    rosuvastatin (CRESTOR) 20 mg tablet TAKE 1 TABLET BY MOUTH EVERY NIGHT    carvediloL (COREG) 3.125 mg tablet Take 1 Tab by mouth two (2) times daily (with meals).  sildenafil citrate (VIAGRA) 50 mg tablet Take 1 tab po 0.5-4hr before sexual activity; max 100mg dose in 24hours (Patient not taking: Reported on 10/1/2021)     No current facility-administered medications for this visit. Past Medical History- reviewed:  Past Medical History:   Diagnosis Date    [facility]fib ([facility]     Allergic rhinitis     Anxiety disorder     Cellulitis     post tattoo    Deep vein thrombosis (DVT) ([address]     [facility]  [facility] f/u note to manage Lovenox    Essential hypertension     Essential tremor     BILATERAL HANDS    Hyperlipidemia     Laceration of arm 09/23/2017    Left cut by a chain saw.     Long term current use of anticoagulant therapy     Nicotine vapor product user     STOPPED 2018    Phlebitis     Psychiatric disorder     ANXIETY WITH PANICK ATTACK    Pulmonary embolism ([address] 3/2011    multiple episodes    Rectal bleeding 10/2011    on coumadin @ the time &again 4/2017 4/12/17 note from Carla//colonoscopy report rec'd    Shortness of breath     Thromboembolus ([address]     Umbilical hernia     Vitamin D deficiency 04/2014    again 4/2017       Past Surgical History- reviewed:   Past Surgical History:   Procedure Laterality Date    HX APPENDECTOMY  2003    HX COLONOSCOPY  04/28/2017    Dr. Dunham Handler    HX HEENT  02/2020    BRONCHOSCOPY    HX HERNIA REPAIR  5625    umbilical       Family History - reviewed:  Family History   Problem Relation Age of Onset    Heart Disease Father     Heart Disease Maternal Grandmother     Heart Disease Maternal Grandfather     Anesth Problems Neg Hx        Social History - reviewed:    Review of systems:     A comprehensive review of systems was negative except for that written in the History of Present Illness. Visit Vitals  /89   Pulse 80   Temp 98.3 °F (36.8 °C) (Oral)   Resp 18   Ht 6' 2\" (1.88 m)   Wt 269 lb 12.8 oz (122.4 kg)   SpO2 95%   BMI 34.64 kg/m²       General: Alert and oriented, in no acute distress. Well nourished. EYE: PERRL. Sclera and conjuctival clear. Extraocular movements intact. EARS: External normal, canals clear, tympanic membranes normal.   NOSE: Mucosa healthy without drainage or ulceration. OROPHARYNX: No suspicious lesions, normal dentition, pharynx, tongue and tonsils normal.  NECK: Supple; no masses; thyroid normal.  LUNGS: Respirations unlabored; clear to auscultation bilaterally. CARDIOVASCULAR: Regular, rate, and rhythm without murmurs, gallops or rubs. EXT: No edema. Neurovascularlly intact. Normal gait. Assessment/Plan       ICD-10-CM ICD-9-CM    1. PAF (paroxysmal atrial fibrillation) ([facility])  I48.0 427.31    2.  Anticoagulant long-term use  Z79.01 V58.61 warfarin (COUMADIN) 5 mg tablet      AMB POC PT/INR       1.  PAF (paroxysmal atrial fibrillation) ([facility])  - AMB POC PT/INR: 1.9 (Goal 2-3)  - Continue coumadin 5mg daily  - continue Coreg 3.125 mg BID  - Follow up with Cards for Echo. Follow up: 1 month for INR check    I have discussed the diagnosis with the patient and the intended plan as seen in the above orders. The patient has received an after-visit summary and questions were answered concerning future plans. I have discussed medication side effects and warnings with the patient as well. Informed patient to return to the office if new symptoms arise.     Signed By: Pamela Oliveros MD     December 10, 2021

## 2021-12-14 ENCOUNTER — ANCILLARY PROCEDURE (OUTPATIENT)
Dept: CARDIOLOGY CLINIC | Age: 51
End: 2021-12-14
Payer: COMMERCIAL

## 2021-12-14 VITALS
BODY MASS INDEX: 34.52 KG/M2 | HEIGHT: 74 IN | SYSTOLIC BLOOD PRESSURE: 124 MMHG | DIASTOLIC BLOOD PRESSURE: 89 MMHG | WEIGHT: 269 LBS

## 2021-12-14 DIAGNOSIS — J84.9 ILD (INTERSTITIAL LUNG DISEASE) (HCC): ICD-10-CM

## 2021-12-14 DIAGNOSIS — Z86.711 HX PULMONARY EMBOLISM: ICD-10-CM

## 2021-12-14 DIAGNOSIS — Z86.718 PERSONAL HISTORY OF DVT (DEEP VEIN THROMBOSIS): ICD-10-CM

## 2021-12-14 DIAGNOSIS — I48.0 PAF (PAROXYSMAL ATRIAL FIBRILLATION) (HCC): ICD-10-CM

## 2021-12-14 DIAGNOSIS — R06.02 SOB (SHORTNESS OF BREATH) ON EXERTION: ICD-10-CM

## 2021-12-14 PROCEDURE — 93306 TTE W/DOPPLER COMPLETE: CPT | Performed by: INTERNAL MEDICINE

## 2021-12-15 ENCOUNTER — TELEPHONE (OUTPATIENT)
Dept: CARDIOLOGY CLINIC | Age: 51
End: 2021-12-15

## 2021-12-15 LAB
ECHO AO ASC DIAM: 3.3 CM
ECHO AO ASCENDING AORTA INDEX: 1.34 CM/M2
ECHO AO ROOT DIAM: 3.3 CM
ECHO AO ROOT INDEX: 1.34 CM/M2
ECHO AV PEAK GRADIENT: 9 MMHG
ECHO AV PEAK VELOCITY: 1.5 M/S
ECHO AV VELOCITY RATIO: 0.8
ECHO LA DIAMETER INDEX: 1.54 CM/M2
ECHO LA DIAMETER: 3.8 CM
ECHO LA TO AORTIC ROOT RATIO: 1.15
ECHO LA VOL 2C: 87 ML (ref 18–58)
ECHO LA VOL 4C: 98 ML (ref 18–58)
ECHO LA VOL BP: 93 ML (ref 18–58)
ECHO LA VOL/BSA BIPLANE: 38 ML/M2 (ref 16–28)
ECHO LA VOLUME AREA LENGTH: 96 ML
ECHO LA VOLUME INDEX A2C: 35 ML/M2 (ref 16–28)
ECHO LA VOLUME INDEX A4C: 40 ML/M2 (ref 16–28)
ECHO LA VOLUME INDEX AREA LENGTH: 39 ML/M2
ECHO LV E' LATERAL VELOCITY: 10 CM/S
ECHO LV FRACTIONAL SHORTENING: 44 % (ref 28–44)
ECHO LV INTERNAL DIMENSION DIASTOLE INDEX: 2.11 CM/M2
ECHO LV INTERNAL DIMENSION DIASTOLIC: 5.2 CM (ref 4.2–5.9)
ECHO LV INTERNAL DIMENSION SYSTOLIC INDEX: 1.18 CM/M2
ECHO LV INTERNAL DIMENSION SYSTOLIC: 2.9 CM
ECHO LV ISOVOLUMETRIC RELAXATION TIME (IVRT): 72.3 MS
ECHO LV IVSD: 0.9 CM (ref 0.6–1)
ECHO LV MASS 2D: 169 G (ref 88–224)
ECHO LV MASS INDEX 2D: 68.7 G/M2 (ref 49–115)
ECHO LV POSTERIOR WALL DIASTOLIC: 0.9 CM (ref 0.6–1)
ECHO LV RELATIVE WALL THICKNESS RATIO: 0.35
ECHO LVOT PEAK GRADIENT: 6 MMHG
ECHO LVOT PEAK VELOCITY: 1.2 M/S
ECHO MV "A" WAVE DURATION: 123.7 MSEC
ECHO MV "A" WAVE DURATION: 123.7 MSEC
ECHO MV A VELOCITY: 0.95 M/S
ECHO MV E DECELERATION TIME (DT): 188.7 MS
ECHO MV E VELOCITY: 0.81 M/S
ECHO MV E/A RATIO: 0.85
ECHO MV E/E' LATERAL: 8.1
ECHO RV FREE WALL PEAK S': 16 CM/S
ECHO RV TAPSE: 2.4 CM (ref 1.5–2)

## 2021-12-15 NOTE — TELEPHONE ENCOUNTER
----- Message from Marci Brown NP sent at 12/15/2021  2:51 PM EST -----  Heart pumping strength is normal. No valvular disease.  Can follow up in 6 months

## 2022-01-21 ENCOUNTER — VIRTUAL VISIT (OUTPATIENT)
Dept: FAMILY MEDICINE CLINIC | Age: 52
End: 2022-01-21
Payer: COMMERCIAL

## 2022-01-21 DIAGNOSIS — I48.0 PAF (PAROXYSMAL ATRIAL FIBRILLATION) (HCC): Primary | ICD-10-CM

## 2022-01-21 DIAGNOSIS — Z79.01 ANTICOAGULANT LONG-TERM USE: ICD-10-CM

## 2022-01-21 DIAGNOSIS — I82.403 RECURRENT ACUTE DEEP VEIN THROMBOSIS (DVT) OF BOTH LOWER EXTREMITIES (HCC): ICD-10-CM

## 2022-01-21 DIAGNOSIS — K42.9 UMBILICAL HERNIA WITHOUT OBSTRUCTION AND WITHOUT GANGRENE: ICD-10-CM

## 2022-01-21 PROBLEM — Z87.19 S/P HERNIA REPAIR: Status: ACTIVE | Noted: 2022-01-21

## 2022-01-21 PROBLEM — Z98.890 S/P HERNIA REPAIR: Status: ACTIVE | Noted: 2022-01-21

## 2022-01-21 LAB
INR BLD: 3.1
PT POC: NORMAL
VALID INTERNAL CONTROL?: YES

## 2022-01-21 PROCEDURE — 99213 OFFICE O/P EST LOW 20 MIN: CPT | Performed by: STUDENT IN AN ORGANIZED HEALTH CARE EDUCATION/TRAINING PROGRAM

## 2022-01-21 PROCEDURE — 85610 PROTHROMBIN TIME: CPT | Performed by: STUDENT IN AN ORGANIZED HEALTH CARE EDUCATION/TRAINING PROGRAM

## 2022-01-21 NOTE — PROGRESS NOTES
[address] [facility]  [address] [place], [address] [place] [address]  [phone]    Kiki Coffman (: 1970) is a 45 y.o. male, established patient, here for evaluation of the following chief complaint(s): Afib and recurrent DVT and Umbilical hernia    SUBJECTIVE:    Pt would like to be evaluated for the problem(s) listed above:    PAF  On coumadin for more than 20 yrs. On coreg 3.125mg BID  Currently takes 5mg daily. Also has a history of 3 DVT's in the past including a PE. Saw Dr. Pam Sam (21) and had normal event monitor. Echo (2021) - normal (LVEF 60-65%). Needs INR check today    Hernia:  Pt states that he underwent ventral hernia repair in the late 80s. Over the past few months, he has been experiencing umbilical pain at the same location where he had surgery. Symptoms are gradually worsening especially when he does situps.      Denies any nausea, vomiting, abdominal distention, bowel obstruction, fever or chills. Review of systems:    A comprehensive review of systems was negative except for that written in the History of Present Illness. PHYSICAL EXAM:    General: alert, cooperative, no distress   Mental  status: mental status: alert, oriented to person, place, and time, normal mood, behavior, speech, dress, motor activity, and thought processes   Resp: resp: normal effort and no respiratory distress   Neuro: neuro: no gross deficits   Skin: skin: no discoloration or lesions of concern on visible areas   Due to this being a TeleHealth evaluation, many elements of the physical examination are unable to be assessed. ASSESSMENT/PLAN:      ICD-10-CM ICD-9-CM    1. PAF (paroxysmal atrial fibrillation) ([facility])  I48.0 427.31 AMB POC PT/INR   2. Anticoagulant long-term use  Z79.01 V58.61 AMB POC PT/INR   3. Recurrent acute deep vein thrombosis (DVT) of both lower extremities ([facility])  I82.403 453.40 AMB POC PT/INR   4.  Umbilical hernia without obstruction and without gangrene  K42.9 553.1 REFERRAL TO GENERAL SURGERY     1.  PAF (paroxysmal atrial fibrillation) ([facility]  Echo (12/2021) - normal (LVEF 60-65%). - AMB POC PT/INR: 3.1 (Goal 2-3)  - Continue coumadin 5mg daily  - continue Coreg 3.125 mg BID  - Follow up with Cards. 2.Umbilical hernia without obstruction and without gangrene  Unable to evaluate pt since he cannot make it to the clinic in person today. Has history of umbilical hernia s/p repair (late 1990s). - REFERRAL TO GENERAL SURGERY  - Warning signs for incarceration and strangulation discussed with patient. Advised to go to ER immediately should he experience any    Follow up: 1 month for INR check     We discussed the expected course, resolution and complications of the diagnosis(es) in detail. Patient was in [place] at the time of consultation. Medication risks, benefits, costs, interactions, and alternatives were discussed as indicated. I advised him to contact the office if his condition worsens, changes or fails to improve as anticipated. He expressed understanding with the diagnosis(es) and plan. Patient understands that this encounter was a temporary measure, and the importance of further follow up and examination was emphasized. Patient verbalized understanding. On this date 01/21/22 I have spent 25 minutes reviewing previous notes, test results and virtual with the patient discussing the diagnosis and importance of compliance with the treatment plan as well as documenting on the day of the visit. [place] is being evaluated by a Virtual Visit (video visit) encounter to address concerns as mentioned above. A caregiver was present when appropriate. Due to this being a TeleHealth encounter (During JQYJQ-73 public health emergency), evaluation of the following organ systems was limited: Vitals/Constitutional/EENT/Resp/CV/GI//MS/Neuro/Skin/Heme-Lymph-Imm.   Pursuant to the emergency declaration under the [facility] waiver authority and the [employer] Act, this Virtual Visit was conducted with patient's (and/or legal guardian's) consent, to reduce the patient's risk of exposure to COVID-19 and provide necessary medical care. The patient (and/or legal guardian) has also been advised to contact this office for worsening conditions or problems, and seek emergency medical treatment and/or call 911 if deemed necessary. Patient identification was verified at the start of the visit: YES    Services were provided through a video synchronous discussion virtually to substitute for in-person clinic visit. Patient was located at home and provider was located in office or at home. An electronic signature was used to authenticate this note. -- Treasure Lopez MD     CPT Codes 07385-55029 for Established Patients may apply to this Telehealth Visit. POS code: 18.   Modifier GT

## 2022-01-21 NOTE — PATIENT INSTRUCTIONS
-GENERAL SURGERY:   Dr. Drew Mckinney (Also does EGD)  Dane Colindres MD     Medical Office [facility],   [place] [address] [address], [place], [address]    Phone: [phone]    Dr. Griselda Whittaker ,   [place], [address]  [phone]  (his nurses include Cheyenne Stevens or Shubham)    - COLORECTAL SURGERY:   Dr. Vernon Zaldivar and Dr. Tony Augustine. [facility], [address], [place], [address]  Phone: [phone]    -HAND SURGERY:  Dr. Claudetta Manifold  [phone]    DR. Armando Baires  [phone]    -BREAST SURGERY:    1) [facility]  Dr. Jazmine Llanes  [phone]    2) [address] [place] [address] MD Donna Llamas MD  [address] [facility], [address] [facility] [address]  [address]  Tel - [phone]

## 2022-01-21 NOTE — PROGRESS NOTES
Name and  Verified. Pharmacy verified    Chief Complaint   Patient presents with    Follow-up     1 Month/ PAF/DVT 12/10/2021       1. Have you been to the ER, urgent care clinic since your last visit? Hospitalized since your last visit? No    2. Have you seen or consulted any other health care providers outside of the [address] [place] [address] since your last visit? Include any pap smears or colon screening.  No      Health Maintenance Due   Topic Date Due    Hepatitis C Screening  Never done    Shingrix Vaccine Age 50> (1 of 2) Never done    Flu Vaccine (1) 2021    COVID-19 Vaccine (3 - Booster for [employer] series) 2021

## 2022-02-01 NOTE — OP NOTES
[address]  OPERATIVE REPORT    Name:  Jeronimo Bustillo  MR#:  [id number]  :  1970  ACCOUNT #:  [de-identified]  DATE OF SERVICE:  2020      CLINICAL SERVICE:  Thoracic Surgery. ATTENDING SURGEON:  Trudi Gongora MD    OPERATION PERFORMED:  1. Left video-assisted thoracoscopy. 2.  Left VATS wedge resection x3 (left upper lobe, left lingula, left lower lobe). PREOPERATIVE DIAGNOSES:  1. Interstitial lung disease. 2.  History of pulmonary embolism. POSTOPERATIVE DIAGNOSES:  1. Interstitial lung disease. 2.  History of pulmonary embolism. FIRST ASSISTANT:  BOZENA Rdz    SPECIMENS SENT:  Three separate wedge resections from the left lung were sent to anatomic pathology, left upper lobe, left lingula, and left lower lobe. DRAINS AND TUBES:  One 28-Arabic chest tube was left within the left hemithorax. ANESTHESIA:  General with double-lumen endotracheal intubation. ESTIMATED BLOOD LOSS:  For this case was minimal.    COMPLICATIONS:  None. IMPLANTS:  None. INDICATIONS FOR PROCEDURE:  The patient is a 79-year-old gentleman referred from Pulmonary Medicine for an open surgical lung biopsy. The patient has progressive interstitial lung disease with worsening oxygen requirements as well as worsening parenchymal disease on chest x-ray. PROCEDURE IN DETAIL:  After informed consent was obtained and placed on the chart, the patient was taken to the operating room and placed supine on the operating table. General anesthesia with double-lumen endotracheal intubation was induced without complication. Preoperative antibiotics were administered. The patient was then placed in the right lateral decubitus position with the left side up. The patient's left chest was prepped and draped in sterile fashion. Time-out was performed. Through the eighth intercostal space along the posterior axillary line, a 10 mm Thoracoport was placed.   In addition, a working port was placed posteriorly in the sixth intercostal space and anteriorly in the fourth intercostal space. The lung parenchyma was grossly normal in appearance, however, upon manipulating it, it was felt to be extremely thickened and stiff. Using Endo ANTHONY stapler, a wedge resection of the left upper lobe apex was performed. A wedge resection of the left lingula was performed as well as a wedge resection of the left lower lobe. The specimens were extirpated and sent to anatomic pathology. Approximately 60 mL of 1% lidocaine mixed with 0.25% Marcaine was used as local anesthetic to perform intercostal nerve blocks. A 28-Bengali chest tube was placed posteriorly. Lung was re-inflated under direct visualization. The incisions were then closed in a multilayered fashion and covered with Dermabond. The patient was then reversed from general anesthesia, extubated, and taken to PACU in stable condition. All surgical counts were correct x2 at the end of this case. There were no immediate complications identified during this case. Dr. She Diaz was present and scrubbed throughout the entire procedure. PA, Rosario Root, was instrumental in completion of this operation as she assisted with opening and closing of all incisions, the use of the camera, and the use of the Endo-ANTHONY stapler.         Rohini Marc MD      [id number]  D:  06/22/2020 9:13  T:  06/22/2020 9:48  JOB #:  [id number]  CC:  Angélica Molina MD 150

## 2022-02-07 ENCOUNTER — OFFICE VISIT (OUTPATIENT)
Dept: SURGERY | Age: 52
End: 2022-02-07
Payer: COMMERCIAL

## 2022-02-07 VITALS
OXYGEN SATURATION: 95 % | SYSTOLIC BLOOD PRESSURE: 122 MMHG | WEIGHT: 269.5 LBS | HEART RATE: 75 BPM | DIASTOLIC BLOOD PRESSURE: 73 MMHG | TEMPERATURE: 97.6 F | BODY MASS INDEX: 34.59 KG/M2 | HEIGHT: 74 IN | RESPIRATION RATE: 16 BRPM

## 2022-02-07 DIAGNOSIS — K42.9 UMBILICAL HERNIA WITHOUT OBSTRUCTION AND WITHOUT GANGRENE: Primary | ICD-10-CM

## 2022-02-07 PROCEDURE — 99202 OFFICE O/P NEW SF 15 MIN: CPT | Performed by: SURGERY

## 2022-02-07 NOTE — PROGRESS NOTES
Identified pt with two pt identifiers(name and ). Reviewed record in preparation for visit and have obtained necessary documentation. All patient medications has been reviewed. Chief Complaint   Patient presents with    Umbilical Hernia     seen at the request of  Eliseo avitia umbilical hernia        Health Maintenance Due   Topic    Hepatitis C Screening     Pneumococcal 0-64 years (1 of 2 - PPSV23)    Shingrix Vaccine Age 50> (1 of 2)    COVID-19 Vaccine (3 - Booster for [employer] series)    Flu Vaccine (1)       Vitals:    22 1420   BP: 122/73   Pulse: 75   Resp: 16   Temp: 97.6 °F (36.4 °C)   TempSrc: Temporal   SpO2: 95%   Weight: 122.2 kg (269 lb 8 oz)   Height: 6' 2\" (1.88 m)   PainSc:   0 - No pain       4. Have you been to the ER, urgent care clinic since your last visit? Hospitalized since your last visit? No    5. Have you seen or consulted any other health care providers outside of the [address] [place] [address] since your last visit? Include any pap smears or colon screening. No      Patient is accompanied by self I have received verbal consent from Peyton Guajardo to discuss any/all medical information while they are present in the room.

## 2022-02-07 NOTE — Clinical Note
2/27/2022    Patient: Mariaelena Pierson   YOB: 1970   Date of Visit: 2/7/2022     Jhon Garcia MD  [address] [place] [address]  Via In Basket    Dear Jhon Garcia MD,      Thank you for referring Mr. Fidelina Garner to [address] [place], [place] [address]marie Fernandez for evaluation. My notes for this consultation are attached. If you have questions, please do not hesitate to call me. I look forward to following your patient along with you.       Sincerely,    Willem Chavira MD

## 2022-02-21 ENCOUNTER — OFFICE VISIT (OUTPATIENT)
Dept: FAMILY MEDICINE CLINIC | Age: 52
End: 2022-02-21
Payer: COMMERCIAL

## 2022-02-21 VITALS
WEIGHT: 272 LBS | HEART RATE: 68 BPM | DIASTOLIC BLOOD PRESSURE: 77 MMHG | HEIGHT: 74 IN | BODY MASS INDEX: 34.91 KG/M2 | OXYGEN SATURATION: 97 % | TEMPERATURE: 97.4 F | RESPIRATION RATE: 16 BRPM | SYSTOLIC BLOOD PRESSURE: 127 MMHG

## 2022-02-21 DIAGNOSIS — Z79.01 ANTICOAGULANT LONG-TERM USE: ICD-10-CM

## 2022-02-21 DIAGNOSIS — K21.9 GASTROESOPHAGEAL REFLUX DISEASE, UNSPECIFIED WHETHER ESOPHAGITIS PRESENT: ICD-10-CM

## 2022-02-21 DIAGNOSIS — I82.5Y3 CHRONIC DEEP VEIN THROMBOSIS (DVT) OF PROXIMAL VEIN OF BOTH LOWER EXTREMITIES (HCC): ICD-10-CM

## 2022-02-21 DIAGNOSIS — I48.0 PAF (PAROXYSMAL ATRIAL FIBRILLATION) (HCC): Primary | ICD-10-CM

## 2022-02-21 DIAGNOSIS — J84.9 ILD (INTERSTITIAL LUNG DISEASE) (HCC): ICD-10-CM

## 2022-02-21 LAB
INR BLD: 3.2
PT POC: NORMAL
VALID INTERNAL CONTROL?: YES

## 2022-02-21 PROCEDURE — 99214 OFFICE O/P EST MOD 30 MIN: CPT | Performed by: STUDENT IN AN ORGANIZED HEALTH CARE EDUCATION/TRAINING PROGRAM

## 2022-02-21 PROCEDURE — 85610 PROTHROMBIN TIME: CPT | Performed by: STUDENT IN AN ORGANIZED HEALTH CARE EDUCATION/TRAINING PROGRAM

## 2022-02-21 RX ORDER — FAMOTIDINE 20 MG/1
TABLET, FILM COATED ORAL
Qty: 90 TABLET | Refills: 0 | Status: SHIPPED | OUTPATIENT
Start: 2022-02-21 | End: 2022-05-26 | Stop reason: SDUPTHER

## 2022-02-21 RX ORDER — CARVEDILOL 3.12 MG/1
3.12 TABLET ORAL 2 TIMES DAILY WITH MEALS
Qty: 180 TABLET | Refills: 3 | Status: SHIPPED | OUTPATIENT
Start: 2022-02-21 | End: 2022-09-26

## 2022-02-21 RX ORDER — FAMOTIDINE 20 MG/1
20 TABLET, FILM COATED ORAL DAILY
Qty: 90 TABLET | Refills: 3 | Status: SHIPPED | OUTPATIENT
Start: 2022-02-21

## 2022-02-21 NOTE — PROGRESS NOTES
[address] [facility]  [address]. [place], [address] [place] [address]  [phone]    Chief Complaint: INR monitoring and medication management    Subjective  Peyton Guajardo is a 45 y.o. male , established patient, here for evaluation of the concern(s) above;    PAF  On coumadin for more than 20 yrs, currently at 5mg daily  On coreg 3.125mg BID  Also has a history of 3 DVT's in the past including a PE. Saw Dr. Cory Gleason (11/19/21) and had normal event monitor. Echo (12/2021) - normal (LVEF 60-65%). Needs INR check today    Denies any chest pain, nausea, vomiting, abdominal distention, bowel obstruction, fever or chills. Allergies - reviewed: Allergies   Allergen Reactions    Bee Sting [Sting, Bee] Swelling    Neomycin Rash    Neosporin [Neomycin-Bacitracin-Polymyxin] Rash    Penicillins Unknown (comments)       Past Medical History - reviewed:  Past Medical History:   Diagnosis Date    A-fib ([facility]     Allergic rhinitis     Anxiety disorder     Cellulitis     post tattoo    [facility]DV[facility]) [facility]     [facility]  [facility] f/u note to manage Lovenox    Essential hypertension     Essential tremor     BILATERAL HANDS    Hyperlipidemia     Laceration of arm 09/23/2017    Left cut by a chain saw.     Long term current use of anticoagulant therapy     Nicotine vapor product user     STOPPED 2018    Phlebitis     Psychiatric disorder     ANXIETY WITH PANICK ATTACK    Pulmonary embolism ([address] 3/2011    multiple episodes    Rectal bleeding 10/2011    on coumadin @ the time &again 4/2017 4/12/17 note from Carla//colonoscopy report rec'd    Shortness of breath     Thromboembolus ([facility]     Umbilical hernia     Vitamin D deficiency 04/2014    again 4/2017       Depression screening:  3 most recent PHQ Screens 2/7/2022   Little interest or pleasure in doing things Not at all   Feeling down, depressed, irritable, or hopeless Not at all   Total Score PHQ 2 0         Review of systems:      A comprehensive review of systems was negative except for that written in the History of Present Illness. Physical Exam  Visit Vitals  /77 (BP 1 Location: Right arm, BP Patient Position: Sitting, BP Cuff Size: Adult)   Pulse 68   Temp 97.4 °F (36.3 °C) (Oral)   Resp 16   Ht 6' 2\" (1.88 m)   Wt 272 lb (123.4 kg)   SpO2 97%   BMI 34.92 kg/m²       General: Alert and oriented, in no acute distress. Well nourished. EYE: PERRL. Sclera and conjuctival clear. Extraocular movements intact. EARS: External normal, canals clear, tympanic membranes normal.   NOSE: Mucosa healthy without drainage or ulceration. OROPHARYNX: No suspicious lesions, normal dentition, pharynx, tongue and tonsils normal.  NECK: Supple; no masses; thyroid normal.  LUNGS: Respirations unlabored; clear to auscultation bilaterally. CARDIOVASCULAR: Regular, rate, and rhythm without murmurs, gallops or rubs. ABDOMEN: Soft; nontender; nondistended; normoactive bowel sounds; no masses or organomegaly. MUSCULOSKELETAL: FROM in all extremities     EXT: No edema. Neurovascularlly intact. Normal gait. Neurological: Alert and oriented X 3, normal strength and tone. Normal symmetric reflexes. Normal coordination and gait       Assessment/Plan    ICD-10-CM ICD-9-CM    1. PAF (paroxysmal atrial fibrillation) ([facility])  I48.0 427.31 carvediloL (COREG) 3.125 mg tablet   2. Chronic deep vein thrombosis (DVT) of proximal vein of both lower extremities ([facility])  I82.5Y3 453.51 AMB POC PT/INR   3. Anticoagulant long-term use  Z79.01 V58.61 AMB POC PT/INR   4. ILD (interstitial lung disease) [facility]  J84.9 515    5. Gastroesophageal reflux disease, unspecified whether esophagitis present  K21.9 530.81 famotidine (PEPCID) 20 mg tablet         1. PAF ([facility]oxysmal atrial fibrillation) [facility]  Echo (12/2021) - normal (LVEF 60-65%).   - AMB POC PT/INR: 3.2 (Goal 2-3)  - Continue coumadin 5mg daily  - Discussed starting NOAC but pt will first inquire price at the pharmacy and let me know. - continue carvediloL (COREG) 3.125 mg tablet; Take 1 Tablet by mouth two (2) times daily (with meals). - Follow up with Cards, next appointment on 06/22/2022 with Dr. Gilda Richard    2. Chronic deep vein thrombosis (DVT) of proximal vein of both lower extremities (HCC)  - AMB POC PT/INR    3. Anticoagulant long-term use  Same as above  - AMB POC PT/INR    4. ILD (interstitial lung disease) ([address]  Stable. 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 Sees pulm (Dr. Nilson Jaime). Also saw Dr. Zack Odonnell (Thoracic surgeon) and underwent lung procedure in the past. ILD however well controlled. 5. Gastroesophageal reflux disease, unspecified whether esophagitis present  stable  - famotidine (PEPCID) 20 mg tablet; Take 1 Tablet by mouth daily. Follow up: monthly INR check    We discussed the expected course, resolution and complications of the diagnosis(es) in detail. Medication risks, benefits, costs, interactions, and alternatives were discussed as indicated. I advised him to contact the office if his condition worsens, changes or fails to improve as anticipated. He expressed understanding with the diagnosis(es) and plan. Patient understands that this encounter was a temporary measure, and the importance of further follow up and examination was emphasized. Patient verbalized understanding.       Signed By: Tony Young MD     February 21, 2022

## 2022-02-21 NOTE — PROGRESS NOTES
Name and  Verified. Pharmacy verified    Chief Complaint   Patient presents with    Follow-up     PT/INR    Medication Management       1. Have you been to the ER, urgent care clinic since your last visit? Hospitalized since your last visit? No    2. Have you seen or consulted any other health care providers outside of the [address] [place] [address] since your last visit? Include any pap smears or colon screening.  No    Health Maintenance Due   Topic Date Due    Hepatitis C Screening  Never done    Pneumococcal 0-64 years (1 of 2 - PPSV23) Never done    Shingrix Vaccine Age 50> (1 of 2) Never done    COVID-19 Vaccine (3 - Booster for [employer] series) 2021

## 2022-02-27 NOTE — PROGRESS NOTES
To: Kenyetta Stahl MD    From: Vanessa Hector MD    Thank you for sending Dwight Matias to see us. Please note that this dictation was completed with [employer], the computer voice recognition software. Quite often unanticipated grammatical, syntax, homophones, and other interpretive errors are inadvertently transcribed by the software. Please disregard these errors. Please excuse any errors that have escaped final proofreading. Encounter Date: 2/7/2022  History and Physical    Assessment:   Incisional hernia containing adipose only. He underwent ventral hernia repair in the 1990s. Has been doing sit ups trying to lose weight and hurts when he is doing that. Body mass index is 34.6 kg/m². Comorbid history of recurrent DVT on Coumadin, hypercholesterolemia, paroxysmal atrial fibrillation. S/p ventral hernia repair. Plan:   He is making a concerted effort at weight loss. Given the fact that his symptoms are minimal other than when he is doing sit ups, we discussed not doing sit ups. I explained that sit ups are not going to do much for weight loss anyway. I explained that weight loss can be followed up in terms of burning more calories than one takes in. He should focus on aerobic exercises and should attempt to minimize carbohydrates in his diet. He will try this for now. We can always fix the hernia if it continues to bother him. We would need to hold his Coumadin and use preoperative Lovenox which will increase his risk of bleeding slightly. Mesh discussed -- patient consents to its use and acknowledges its risks. Discussed possibility of incarceration, strangulation, increase in size of the hernia over time, and the risk of emergency surgery in the face of strangulation. Discussed techniques for reducing the hernia should it not reduce spontaneously. Knows to call immediately for incarceration.       Also discussed alternatives to and risks and benefits of surgery. Risks include recurrence, bleeding, hematoma, infection, difficulty voiding, chronic nerve pain, and the risks of general anesthetic. The patient expressed understanding of the risks and all questions were answered to the patient's satisfaction. HPI:   Argelia Serna is a 45 y.o. male who is seen in consultation at the request of Claudia Geronimo MD for current ventral hernia. He says that he had it repaired back in the 90s and it has since reappeared. He has begun noticing it more over the last several months because he has been trying to exercise more for weight loss. He says it hurts mostly when he is doing sit ups. He has not had any gastrointestinal symptoms associated with it. He denies any other abdominal operations. He does have a history of recurrent DVTs and is maintained on Coumadin. He also has paroxysmal atrial fibrillation but had a normal Holter monitor back in November. Please see additional history provided in the \"assessment\" section above. Past Medical History:   Diagnosis Date    A[facility]fib ([facility]     Allergic rhinitis     Anxiety disorder     Cellulitis     post tattoo    [facility]DVT) ([facility]     [facility] f/u note to manage Lovenox    Essential hypertension     Essential tremor     BILATERAL HANDS    Hyperlipidemia     Laceration of arm 09/23/2017    Left cut by a chain saw.     Long term current use of anticoagulant therapy     Nicotine vapor product user     STOPPED 2018    Phlebitis     Psychiatric disorder     ANXIETY WITH PANICK ATTACK    Pulmonary embolism ([facility] 3/2011    multiple episodes    Rectal bleeding 10/2011    on coumadin @ the time &again 4/2017 4/12/17 note from [facility]//colonoscopy report rec'd    Shortness of breath     Thromboembolus ([facility]     Umbilical hernia     Vitamin D deficiency 04/2014    again 4/2017     Past Surgical History:   Procedure Laterality Date    HX APPENDECTOMY  2003    HX COLONOSCOPY  04/28/2017    Dr. Katrin Flanagan    HX HEENT  02/2020    BRONCHOSCOPY    HX HERNIA REPAIR  5832    umbilical      Family History   Problem Relation Age of Onset    Heart Disease Father     Heart Disease Maternal Grandmother     Heart Disease Maternal Grandfather     Anesth Problems Neg Hx       Social History     Tobacco Use    Smoking status: Never Smoker    Smokeless tobacco: Current User     Types: Snuff   Substance Use Topics    Alcohol use: Yes     Alcohol/week: 0.0 standard drinks     Comment: SOCIALLY EVERY 6 MONTHS      Current Outpatient Medications   Medication Sig    warfarin (COUMADIN) 5 mg tablet TAKE 1 TABLET BY MOUTH DAILY    rosuvastatin (CRESTOR) 20 mg tablet TAKE 1 TABLET BY MOUTH EVERY NIGHT    famotidine (PEPCID) 20 mg tablet TAKE 1 TABLET BY MOUTH EVERY MORNING    carvediloL (COREG) 3.125 mg tablet Take 1 Tablet by mouth two (2) times daily (with meals).  famotidine (PEPCID) 20 mg tablet Take 1 Tablet by mouth daily. No current facility-administered medications for this visit. Allergies: Allergies   Allergen Reactions    Bee Sting [Sting, Bee] Swelling    Neomycin Rash    Neosporin [Neomycin-Bacitracin-Polymyxin] Rash    Penicillins Unknown (comments)        Review of Systems:  10 systems reviewed. See scanned sheet in \"Media\" section. See HPI for pertinent positives and negatives. Objective:     Visit Vitals  /73 (BP 1 Location: Right upper arm, BP Patient Position: Sitting, BP Cuff Size: Large adult)   Pulse 75   Temp 97.6 °F (36.4 °C) (Temporal)   Resp 16   Ht 6' 2\" (1.88 m)   Wt 122.2 kg (269 lb 8 oz)   SpO2 95%   BMI 34.60 kg/m²       Physical Exam:  General appearance  Alert, cooperative, no distress, appears stated age   HEENT Anicteric   Neck Supple   Back   No CVA tenderness   Lungs   Clear to auscultation bilaterally   Heart  Regular rate and rhythm. Abdomen   Soft. Bowel sounds normal.  Fat-containing supraumbilical hernia.   Only mildly tender to palpation.    Extremities no cyanosis or edema   Pulses 2+ right radial   Skin Skin color, texture, turgor normal.   Lymph nodes Inguinal nodes normal.   Neurologic Without overt sensory or motor deficit     Signed By: Brenda Taylor MD     February 27, 2022

## 2022-03-18 PROBLEM — Z99.81 OXYGEN DEPENDENT: Status: ACTIVE | Noted: 2020-03-18

## 2022-03-18 PROBLEM — N52.9 ERECTILE DYSFUNCTION: Status: ACTIVE | Noted: 2017-10-25

## 2022-03-18 PROBLEM — R60.9 EDEMA: Status: ACTIVE | Noted: 2018-08-15

## 2022-03-19 PROBLEM — J84.9 ILD (INTERSTITIAL LUNG DISEASE) (HCC): Status: ACTIVE | Noted: 2020-06-22

## 2022-03-19 PROBLEM — Z98.890 S/P HERNIA REPAIR: Status: ACTIVE | Noted: 2022-01-21

## 2022-03-19 PROBLEM — Z87.19 S/P HERNIA REPAIR: Status: ACTIVE | Noted: 2022-01-21

## 2022-03-19 PROBLEM — J96.01 ACUTE RESPIRATORY FAILURE WITH HYPOXIA (HCC): Status: ACTIVE | Noted: 2020-02-17

## 2022-03-19 PROBLEM — I48.0 PAF (PAROXYSMAL ATRIAL FIBRILLATION) (HCC): Status: ACTIVE | Noted: 2020-02-18

## 2022-03-21 ENCOUNTER — OFFICE VISIT (OUTPATIENT)
Dept: FAMILY MEDICINE CLINIC | Age: 52
End: 2022-03-21
Payer: COMMERCIAL

## 2022-03-21 VITALS
SYSTOLIC BLOOD PRESSURE: 133 MMHG | HEART RATE: 94 BPM | RESPIRATION RATE: 16 BRPM | BODY MASS INDEX: 34.65 KG/M2 | DIASTOLIC BLOOD PRESSURE: 83 MMHG | OXYGEN SATURATION: 96 % | HEIGHT: 74 IN | WEIGHT: 270 LBS | TEMPERATURE: 97.2 F

## 2022-03-21 DIAGNOSIS — I82.5Y3 CHRONIC DEEP VEIN THROMBOSIS (DVT) OF PROXIMAL VEIN OF BOTH LOWER EXTREMITIES (HCC): ICD-10-CM

## 2022-03-21 DIAGNOSIS — I48.0 PAF (PAROXYSMAL ATRIAL FIBRILLATION) (HCC): Primary | ICD-10-CM

## 2022-03-21 LAB
INR BLD: 2.6
PT POC: NORMAL
VALID INTERNAL CONTROL?: YES

## 2022-03-21 PROCEDURE — 85610 PROTHROMBIN TIME: CPT | Performed by: STUDENT IN AN ORGANIZED HEALTH CARE EDUCATION/TRAINING PROGRAM

## 2022-03-21 PROCEDURE — 99213 OFFICE O/P EST LOW 20 MIN: CPT | Performed by: STUDENT IN AN ORGANIZED HEALTH CARE EDUCATION/TRAINING PROGRAM

## 2022-03-21 NOTE — PROGRESS NOTES
[address] [place], [address] [place] [address]  [phone]    Chief Complaint: INR monitoring   Juan Alberto Leonard is a 45 y.o. male , established patient, here for evaluation of the concern(s) above;    PAF  On coumadin for more than 20 yrs, currently at 5mg daily  On coreg 3.125mg BID  Also has a history of 3 DVT's in the past including a PE. Saw Dr. Jennifer Fernandez (11/19/21) and had normal event monitor. Echo (12/2021) - normal (LVEF 60-65%). Needs INR check today    Denies any chest pain, nausea, vomiting, abdominal distention, bowel obstruction, fever or chills. Allergies - reviewed: Allergies   Allergen Reactions    Bee Sting [Sting, Bee] Swelling    Neomycin Rash    Neosporin [Neomycin-Bacitracin-Polymyxin] Rash    Penicillins Unknown (comments)       Past Medical History - reviewed:  Past Medical History:   Diagnosis Date    A[facility]fib ([facility]     Allergic rhinitis     Anxiety disorder     Cellulitis     post tattoo    Deep vein thrombosis (DVT) ([facility])     [facility] f/u note to manage Lovenox    Essential hypertension     Essential tremor     BILATERAL HANDS    Hyperlipidemia     Laceration of arm 09/23/2017    Left cut by a chain saw.     Long term current use of anticoagulant therapy     Nicotine vapor product user     STOPPED 2018    Phlebitis     Psychiatric disorder     ANXIETY WITH PANICK ATTACK    Pulmonary embolism ([address] 3/2011    multiple episodes    Rectal bleeding 10/2011    on coumadin @ the time &again 4/2017 4/12/17 note from [facility]//colonoscopy report rec'd    Shortness of breath     Thromboembolus ([facility]     Umbilical hernia     Vitamin D deficiency 04/2014    again 4/2017       Depression screening:  3 most recent PHQ Screens 2/21/2022   Little interest or pleasure in doing things Not at all   Feeling down, depressed, irritable, or hopeless Not at all   Total Score PHQ 2 0         Review of systems:      A comprehensive review of systems was negative except for that written in the History of Present Illness. Physical Exam  Visit Vitals  /83 (BP 1 Location: Right arm, BP Patient Position: Sitting, BP Cuff Size: Adult)   Pulse 94   Temp 97.2 °F (36.2 °C) (Oral)   Resp 16   Ht 6' 2\" (1.88 m)   Wt 270 lb (122.5 kg)   SpO2 96%   BMI 34.67 kg/m²       General: Alert and oriented, in no acute distress. Well nourished. EYE: PERRL. Sclera and conjuctival clear. Extraocular movements intact. EARS: External normal, canals clear, tympanic membranes normal.   NOSE: Mucosa healthy without drainage or ulceration. OROPHARYNX: No suspicious lesions, normal dentition, pharynx, tongue and tonsils normal.  NECK: Supple; no masses; thyroid normal.  LUNGS: Respirations unlabored; clear to auscultation bilaterally. CARDIOVASCULAR: Regular, rate, and rhythm without murmurs, gallops or rubs. ABDOMEN: Soft; nontender; nondistended; normoactive bowel sounds; no masses or organomegaly. MUSCULOSKELETAL: FROM in all extremities     EXT: No edema. Neurovascularlly intact. Normal gait. Neurological: Alert and oriented X 3, normal strength and tone. Normal symmetric reflexes. Normal coordination and gait       Assessment/Plan    ICD-10-CM ICD-9-CM    1. PAF (paroxysmal atrial fibrillation) ([facility])  I48.0 427.31 AMB POC PT/INR   2. Chronic deep vein thrombosis (DVT) of proximal vein of both lower extremities ([facility])  I82.5Y3 453.51 AMB POC PT/INR         1. PAF (paroxysmal atrial fibrillation) ([facility])  Echo (12/2021) - normal (LVEF 60-65%). - AMB POC PT/INR: 2.6 (Goal 2-3)  - Continue coumadin 5mg daily  - continue carvediloL (COREG) 3.125 mg tablet; Take 1 Tablet by mouth two (2) times daily (with meals). - Follow up with Cards, Dr. Shruthi Perez    2.  Chronic deep vein thrombosis (DVT) of proximal vein of both lower extremities ([facility])  Same as above  - AMB POC PT/INR      Follow up: monthly INR check    We discussed the expected course, resolution and complications of the diagnosis(es) in detail. Medication risks, benefits, costs, interactions, and alternatives were discussed as indicated. I advised him to contact the office if his condition worsens, changes or fails to improve as anticipated. He expressed understanding with the diagnosis(es) and plan. Patient understands that this encounter was a temporary measure, and the importance of further follow up and examination was emphasized. Patient verbalized understanding.       Signed By: Warren Barrera MD     March 21, 2022

## 2022-03-21 NOTE — PROGRESS NOTES
Name and  Verified. Pharmacy verified    Chief Complaint   Patient presents with    Follow-up     1 month PT/INR       1. Have you been to the ER, urgent care clinic since your last visit? Hospitalized since your last visit? No    2. Have you seen or consulted any other health care providers outside of the [address] [place] [address] since your last visit? Include any pap smears or colon screening.  No      Health Maintenance Due   Topic Date Due    Hepatitis C Screening  Never done    Pneumococcal 0-64 years (1 of 2 - PPSV23) Never done    Shingrix Vaccine Age 50> (1 of 2) Never done    COVID-19 Vaccine (3 - Booster for [employer] series) 2021

## 2022-04-21 ENCOUNTER — OFFICE VISIT (OUTPATIENT)
Dept: FAMILY MEDICINE CLINIC | Age: 52
End: 2022-04-21
Payer: COMMERCIAL

## 2022-04-21 VITALS
HEART RATE: 85 BPM | DIASTOLIC BLOOD PRESSURE: 85 MMHG | SYSTOLIC BLOOD PRESSURE: 128 MMHG | HEIGHT: 74 IN | WEIGHT: 276 LBS | TEMPERATURE: 97.2 F | BODY MASS INDEX: 35.42 KG/M2 | RESPIRATION RATE: 16 BRPM | OXYGEN SATURATION: 94 %

## 2022-04-21 DIAGNOSIS — I48.0 PAF (PAROXYSMAL ATRIAL FIBRILLATION) (HCC): Primary | ICD-10-CM

## 2022-04-21 DIAGNOSIS — E78.2 MIXED HYPERLIPIDEMIA: ICD-10-CM

## 2022-04-21 DIAGNOSIS — E66.01 SEVERE OBESITY (BMI 35.0-39.9) WITH COMORBIDITY (HCC): ICD-10-CM

## 2022-04-21 DIAGNOSIS — Z11.59 NEED FOR HEPATITIS C SCREENING TEST: ICD-10-CM

## 2022-04-21 DIAGNOSIS — R73.03 PREDIABETES: ICD-10-CM

## 2022-04-21 DIAGNOSIS — M79.671 PAIN IN BOTH FEET: ICD-10-CM

## 2022-04-21 DIAGNOSIS — M79.672 PAIN IN BOTH FEET: ICD-10-CM

## 2022-04-21 LAB
INR BLD: 2.6
PT POC: NORMAL
VALID INTERNAL CONTROL?: YES

## 2022-04-21 PROCEDURE — 85610 PROTHROMBIN TIME: CPT | Performed by: STUDENT IN AN ORGANIZED HEALTH CARE EDUCATION/TRAINING PROGRAM

## 2022-04-21 PROCEDURE — 99214 OFFICE O/P EST MOD 30 MIN: CPT | Performed by: STUDENT IN AN ORGANIZED HEALTH CARE EDUCATION/TRAINING PROGRAM

## 2022-04-21 NOTE — PROGRESS NOTES
Name and  Verified. Pharmacy verified    Chief Complaint   Patient presents with    Follow-up     1 Month PT/INR    Foot Pain     Both x 2 months     Patient is not fasting but willing to do labs. Standing on feet most the day. Denies any injury    1. Have you been to the ER, urgent care clinic since your last visit? Hospitalized since your last visit? No    2. Have you seen or consulted any other health care providers outside of the [address] [place] [address] since your last visit? Include any pap smears or colon screening.  No      Health Maintenance Due   Topic Date Due    Hepatitis C Screening  Never done    Pneumococcal 0-64 years (1 - PCV) Never done    Shingrix Vaccine Age 50> (1 of 2) Never done    COVID-19 Vaccine (3 - Booster for [employer] series) 2021    A1C test (Diabetic or Prediabetic)  2022    Lipid Screen  2022

## 2022-04-21 NOTE — PROGRESS NOTES
[address]  [address] [place], [address] [place] [address]  [phone]    Chief Complaint: INR monitoring and foot pain  Subjective  Connor Disla is a 45 y.o. male , established patient, here for evaluation of the concern(s) above;    PAF  On coumadin for more than 20 yrs, currently at 5mg daily  On coreg 3.125mg BID  Also has a history of 3 DVT's in the past including a PE. Saw Dr. Stiven Whelan (11/19/21) and had normal event monitor. Echo (12/2021) - normal (LVEF 60-65%). Needs INR check today    Foot pain:  Bilateral. Ongoing for about 2 months. Mainly on soles especially after long days on his feet. Denies any chest pain, nausea, vomiting, abdominal distention, bowel obstruction, fever or chills. Allergies - reviewed: Allergies   Allergen Reactions    Bee Sting [Sting, Bee] Swelling    Neomycin Rash    Neosporin [Neomycin-Bacitracin-Polymyxin] Rash    Penicillins Unknown (comments)       Past Medical History - reviewed:  Past Medical History:   Diagnosis Date    A-fib ([address]     Allergic rhinitis     Anxiety disorder     Cellulitis     post tattoo    Deep vein thrombosis (DVT) ([facility])     [facility] f/u note to manage Lovenox    Essential hypertension     Essential tremor     BILATERAL HANDS    Hyperlipidemia     Laceration of arm 09/23/2017    Left cut by a chain saw.     Long term current use of anticoagulant therapy     Nicotine vapor product user     STOPPED 2018    Phlebitis     Psychiatric disorder     ANXIETY WITH PANICK ATTACK    Pulmonary embolism ([address] 3/2011    multiple episodes    Rectal bleeding 10/2011    on coumadin @ the time &again 4/2017 4/12/17 note from [facility]//colonoscopy report rec'd    Shortness of breath     Thromboembolus ([address]     Umbilical hernia     Vitamin D deficiency 04/2014    again 4/2017       Depression screening:  3 most recent PHQ Screens 2/21/2022   Little interest or pleasure in doing things Not at all   Feeling down, depressed, irritable, or hopeless Not at all   Total Score PHQ 2 0         Review of systems:      A comprehensive review of systems was negative except for that written in the History of Present Illness. Physical Exam  Visit Vitals  /85 (BP 1 Location: Right arm, BP Patient Position: Sitting, BP Cuff Size: Adult)   Pulse 85   Temp 97.2 °F (36.2 °C) (Oral)   Resp 16   Ht 6' 2\" (1.88 m)   Wt 276 lb (125.2 kg)   SpO2 94%   BMI 35.44 kg/m²       General: Alert and oriented, in no acute distress. Well nourished. EYE: PERRL. Sclera and conjuctival clear. Extraocular movements intact. EARS: External normal, canals clear, tympanic membranes normal.   NOSE: Mucosa healthy without drainage or ulceration. OROPHARYNX: No suspicious lesions, normal dentition, pharynx, tongue and tonsils normal.  NECK: Supple; no masses; thyroid normal.  LUNGS: Respirations unlabored; clear to auscultation bilaterally. CARDIOVASCULAR: Regular, rate, and rhythm without murmurs, gallops or rubs. ABDOMEN: Soft; nontender; nondistended; normoactive bowel sounds; no masses or organomegaly. MUSCULOSKELETAL: FROM in all extremities     EXT: No edema. Neurovascularlly intact. Normal gait. Neurological: Alert and oriented X 3, normal strength and tone. Normal symmetric reflexes. Normal coordination and gait       Assessment/Plan    ICD-10-CM ICD-9-CM    1. PAF (paroxysmal atrial fibrillation) (HCC)  I48.0 427.31 AMB POC PT/INR   2. Prediabetes  R73.03 790.29 HEMOGLOBIN A1C WITH EAG      METABOLIC PANEL, COMPREHENSIVE      METABOLIC PANEL, COMPREHENSIVE      HEMOGLOBIN A1C WITH EAG   3. Mixed hyperlipidemia  E78.2 272.2 LIPID PANEL      LIPID PANEL   4. Pain in both feet  M79.671 729.5     M79.672     5. Need for hepatitis C screening test  Z11.59 V73.89 HEPATITIS C AB      HEPATITIS C AB   6. Severe obesity (BMI 35.0-39. 9) with comorbidity ([facility]  E66.01 278.01        1.  PAF (paroxysmal atrial fibrillation) ([facility]  Echo (12/2021) - normal (LVEF 60-65%). - AMB POC PT/INR: 2.6 (Goal 2-3)  - Continue coumadin 5mg daily  - continue carvediloL (COREG) 3.125 mg tablet; Take 1 Tablet by mouth two (2) times daily (with meals). - Follow up with Cards, Dr. Hortencia Lee    2. Prediabetes  - HEMOGLOBIN A1C WITH EAG; Future  - METABOLIC PANEL, COMPREHENSIVE; Future    3. Mixed hyperlipidemia  - LIPID PANEL; Future    4. Pain in both feet  Advised to consider new shoes and get foot ach support   - rest and stretch. - weight loss    5. Need for hepatitis C screening test  - HEPATITIS C AB; Future    6. Severe obesity (BMI 35.0-39. 9) with comorbidity ([facility]    - I counseled on lifestyle changes; discussed of the importance of eating habits/patterns and physical activity to overall health. Also discussed the importance to avoid smoking and excessive alcohol consumption.  - Encouraged to eat foods that are baked, broiled, boiled, steamed or grilled. Avoid greasy or fried foods. Use olive oil or canola oil instead of vegetable oil. Eat fish twice weekly. Decreasing weight by 5-10% of baseline weight over the next 6 months if overweight/obese helps to improve obesity-related conditions. Eat a low carbohydrate diet. Decrease sugary beverages, white foods and sweets. Increase exercise to 5 days weekly for 30 minutes daily minimally and as tolerated. Have at an average of 7 hours of uninterrupted sleep at night. Follow up: monthly INR check    We discussed the expected course, resolution and complications of the diagnosis(es) in detail. Medication risks, benefits, costs, interactions, and alternatives were discussed as indicated. I advised him to contact the office if his condition worsens, changes or fails to improve as anticipated. He expressed understanding with the diagnosis(es) and plan.  Patient understands that this encounter was a temporary measure, and the importance of further follow up and examination was emphasized. Patient verbalized understanding.       Signed By: Jonni Dakin, MD     April 21, 2022

## 2022-04-23 LAB
ALBUMIN SERPL-MCNC: 4.5 G/DL (ref 3.8–4.9)
ALBUMIN/GLOB SERPL: 1.7 {RATIO} (ref 1.2–2.2)
ALP SERPL-CCNC: 87 IU/L (ref 44–121)
ALT SERPL-CCNC: 37 IU/L (ref 0–44)
AST SERPL-CCNC: 28 IU/L (ref 0–40)
BILIRUB SERPL-MCNC: 0.7 MG/DL (ref 0–1.2)
BUN SERPL-MCNC: 10 MG/DL (ref 6–24)
BUN/CREAT SERPL: 10 (ref 9–20)
CALCIUM SERPL-MCNC: 9.3 MG/DL (ref 8.7–10.2)
CHLORIDE SERPL-SCNC: 106 MMOL/L (ref 96–106)
CHOLEST SERPL-MCNC: 140 MG/DL (ref 100–199)
CO2 SERPL-SCNC: 18 MMOL/L (ref 20–29)
CREAT SERPL-MCNC: 1.03 MG/DL (ref 0.76–1.27)
EGFR: 87 ML/MIN/1.73
EST. AVERAGE GLUCOSE BLD GHB EST-MCNC: 151 MG/DL
GLOBULIN SER CALC-MCNC: 2.7 G/DL (ref 1.5–4.5)
GLUCOSE SERPL-MCNC: 125 MG/DL (ref 65–99)
HBA1C MFR BLD: 6.9 % (ref 4.8–5.6)
HCV AB S/CO SERPL IA: <0.1 S/CO RATIO (ref 0–0.9)
HDLC SERPL-MCNC: 36 MG/DL
LDLC SERPL CALC-MCNC: 60 MG/DL (ref 0–99)
POTASSIUM SERPL-SCNC: 4.5 MMOL/L (ref 3.5–5.2)
PROT SERPL-MCNC: 7.2 G/DL (ref 6–8.5)
SODIUM SERPL-SCNC: 144 MMOL/L (ref 134–144)
TRIGL SERPL-MCNC: 280 MG/DL (ref 0–149)
VLDLC SERPL CALC-MCNC: 44 MG/DL (ref 5–40)

## 2022-04-25 ENCOUNTER — TELEPHONE (OUTPATIENT)
Dept: FAMILY MEDICINE CLINIC | Age: 52
End: 2022-04-25

## 2022-04-25 NOTE — TELEPHONE ENCOUNTER
----- Message from Payton Salter MD sent at 4/25/2022 12:17 AM EDT -----  Reviewed. A1C now in the diabetes range (6.9%). High TG. Please inform patient that his A1C is now in diabetes range. We can repeat this at next visit and if persistently elevated, we start him on Metformin OR we can go ahead and start him on Metformin now if he is interested.

## 2022-04-25 NOTE — PROGRESS NOTES
Reviewed. A1C now in the diabetes range (6.9%). High TG. Please inform patient that his A1C is now in diabetes range. We can repeat this at next visit and if persistently elevated, we start him on Metformin OR we can go ahead and start him on Metformin now if he is interested.

## 2022-04-25 NOTE — TELEPHONE ENCOUNTER
Per Dr. Marry Crawford:  Please inform patient that his A1C is now in diabetes range. We can repeat this at next visit and if persistently elevated, we start him on Metformin OR we can go ahead and start him on Metformin now if he is interested. Spoke with patient who acknowledged receipt of information. He is going to adopt a life style change to bring down HA1C  Before starting any meds.

## 2022-05-26 ENCOUNTER — OFFICE VISIT (OUTPATIENT)
Dept: FAMILY MEDICINE CLINIC | Age: 52
End: 2022-05-26
Payer: COMMERCIAL

## 2022-05-26 VITALS
TEMPERATURE: 97.4 F | RESPIRATION RATE: 14 BRPM | HEIGHT: 74 IN | SYSTOLIC BLOOD PRESSURE: 128 MMHG | BODY MASS INDEX: 35.63 KG/M2 | WEIGHT: 277.6 LBS | OXYGEN SATURATION: 93 % | DIASTOLIC BLOOD PRESSURE: 76 MMHG | HEART RATE: 72 BPM

## 2022-05-26 DIAGNOSIS — I48.0 PAF (PAROXYSMAL ATRIAL FIBRILLATION) (HCC): Primary | ICD-10-CM

## 2022-05-26 LAB
INR BLD: 2.8
PT POC: 33.5 SECONDS
VALID INTERNAL CONTROL?: YES

## 2022-05-26 PROCEDURE — 99213 OFFICE O/P EST LOW 20 MIN: CPT | Performed by: STUDENT IN AN ORGANIZED HEALTH CARE EDUCATION/TRAINING PROGRAM

## 2022-05-26 PROCEDURE — 85610 PROTHROMBIN TIME: CPT | Performed by: STUDENT IN AN ORGANIZED HEALTH CARE EDUCATION/TRAINING PROGRAM

## 2022-05-26 NOTE — PROGRESS NOTES
Chief Complaint   Patient presents with    Anticoagulation     1. Have you been to the ER, urgent care clinic since your last visit? Hospitalized since your last visit? No    2. Have you seen or consulted any other health care providers outside of the [address] [place] [address] since your last visit? Include any pap smears or colon screening. No    Health Maintenance   Topic Date Due    Pneumococcal 0-64 years (1 - PCV) Never done    Shingrix Vaccine Age 50> (1 of 2) Never done    COVID-19 Vaccine (3 - Booster for [employer] series) 08/05/2021    Flu Vaccine (Season Ended) 09/01/2022    Depression Screen  02/21/2023    A1C test (Diabetic or Prediabetic)  04/22/2023    Lipid Screen  04/22/2023    DTaP/Tdap/Td series (2 - Td or Tdap) 01/10/2027    Colorectal Cancer Screening Combo  04/28/2027    Hepatitis C Screening  Completed       Verified patient with two type of identifiers.  Denies c/o at present

## 2022-05-26 NOTE — PROGRESS NOTES
[address] [address] SELENE Jimenez. [address] [facility], [address] [place] [address]  [phone]    Chief Complaint: INR monitoring   Subjective  Mis Cid is a 45 y.o. male , established patient, here for evaluation of the concern(s) above;    PAF  On coumadin for more than 20 yrs, currently at 5mg daily  On coreg 3.125mg BID  Also has a history of 3 DVT's in the past including a PE. Saw Dr. Kam Cowan (11/19/21) and had normal event monitor. Echo (12/2021) - normal (LVEF 60-65%). Needs INR check today    Denies any chest pain, nausea, vomiting, abdominal distention, bowel obstruction, fever or chills. Social Hx:    Of note, patient lost his mom recently. She passed away the day before mother's day at the age of 79     Allergies - reviewed: Allergies   Allergen Reactions    Bee Sting [Sting, Bee] Swelling    Neomycin Rash    Neosporin [Neomycin-Bacitracin-Polymyxin] Rash    Penicillins Unknown (comments)       Past Medical History - reviewed:  Past Medical History:   Diagnosis Date    A-fib ([address]     Allergic rhinitis     Anxiety disorder     Cellulitis     post tattoo    Deep vein thrombosis (DVT) (HCC)     [facility]  [facility] f/u note to manage Lovenox    Essential hypertension     Essential tremor     BILATERAL HANDS    Hyperlipidemia     Laceration of arm 09/23/2017    Left cut by a chain saw.     Long term current use of anticoagulant therapy     Nicotine vapor product user     STOPPED 2018    Phlebitis     Psychiatric disorder     ANXIETY WITH PANICK ATTACK    Pulmonary embolism ([address] 3/2011    multiple episodes    Rectal bleeding 10/2011    on coumadin @ the time &again 4/2017 4/12/17 note from [facility]//colonoscopy report rec'd    Shortness of breath     Thromboembolus ([address]     Umbilical hernia     Vitamin D deficiency 04/2014    again 4/2017       Depression screening:  3 most recent PHQ Screens 5/26/2022   Little interest or pleasure in doing things Not at all   Feeling down, depressed, irritable, or hopeless Not at all   Total Score PHQ 2 0         Review of systems:      A comprehensive review of systems was negative except for that written in the History of Present Illness. Physical Exam  Visit Vitals  /76   Pulse 72   Temp 97.4 °F (36.3 °C) (Oral)   Resp 14   Ht 6' 2\" (1.88 m)   Wt 277 lb 9.6 oz (125.9 kg)   SpO2 93%   BMI 35.64 kg/m²       General: Alert and oriented, in no acute distress. Well nourished. LUNGS: Respirations unlabored; clear to auscultation bilaterally. CARDIOVASCULAR: Regular, rate, and rhythm without murmurs, gallops or rubs. ABDOMEN: Soft; nontender; nondistended; normoactive bowel sounds; no masses or organomegaly. MUSCULOSKELETAL: FROM in all extremities     EXT: No edema. Neurovascularlly intact. Normal gait. Neurological: Alert and oriented X 3, normal strength and tone. Normal symmetric reflexes. Normal coordination and gait       Assessment/Plan    ICD-10-CM ICD-9-CM    1. PAF (paroxysmal atrial fibrillation) ([facility])  I48.0 427.31 AMB POC PT/INR       1. PAF (paroxysmal atrial fibrillation) ([facility]  Echo (12/2021) - normal (LVEF 60-65%). - AMB POC PT/INR: 2.8 (Goal 2-3)  - Continue coumadin 5mg daily  - continue carvediloL (COREG) 3.125 mg tablet; Take 1 Tablet by mouth two (2) times daily (with meals). - Follow up with Cards, Dr. John Espinoza      Follow up: monthly INR check    We discussed the expected course, resolution and complications of the diagnosis(es) in detail. Medication risks, benefits, costs, interactions, and alternatives were discussed as indicated. I advised him to contact the office if his condition worsens, changes or fails to improve as anticipated. He expressed understanding with the diagnosis(es) and plan. Patient understands that this encounter was a temporary measure, and the importance of further follow up and examination was emphasized. Patient verbalized understanding. Signed By: Scotty Denney MD     May 26, 2022

## 2022-06-28 ENCOUNTER — OFFICE VISIT (OUTPATIENT)
Dept: FAMILY MEDICINE CLINIC | Age: 52
End: 2022-06-28
Payer: COMMERCIAL

## 2022-06-28 VITALS
OXYGEN SATURATION: 99 % | RESPIRATION RATE: 18 BRPM | TEMPERATURE: 98.1 F | HEIGHT: 74 IN | DIASTOLIC BLOOD PRESSURE: 70 MMHG | SYSTOLIC BLOOD PRESSURE: 117 MMHG | WEIGHT: 278.1 LBS | BODY MASS INDEX: 35.69 KG/M2 | HEART RATE: 74 BPM

## 2022-06-28 DIAGNOSIS — I48.0 PAF (PAROXYSMAL ATRIAL FIBRILLATION) (HCC): Primary | ICD-10-CM

## 2022-06-28 LAB
INR BLD: 3.3
PT POC: 40.1 SECONDS
VALID INTERNAL CONTROL?: YES

## 2022-06-28 PROCEDURE — 99213 OFFICE O/P EST LOW 20 MIN: CPT | Performed by: STUDENT IN AN ORGANIZED HEALTH CARE EDUCATION/TRAINING PROGRAM

## 2022-06-28 PROCEDURE — 85610 PROTHROMBIN TIME: CPT | Performed by: STUDENT IN AN ORGANIZED HEALTH CARE EDUCATION/TRAINING PROGRAM

## 2022-06-28 NOTE — PROGRESS NOTES
[address] [address] MERE [address]. [place]ila, [facility] [place] [address]  [phone]    Chief Complaint: INR monitoring   Subjective  Alejandro Lawler is a 45 y.o. male , established patient, here for evaluation of the concern(s) above;    PAF  On coumadin for more than 20 yrs, currently at 5mg daily  On coreg 3.125mg BID  Also has a history of 3 DVT's in the past including a PE. Saw Dr. Kevin De Luna (11/19/21) and had normal event monitor. Echo (12/2021) - normal (LVEF 60-65%). Needs INR check today    Denies any chest pain, nausea, vomiting, abdominal distention, bowel obstruction, fever or chills. Social Hx:    Of note, patient lost his mom recently. She passed away the day before mother's day at the age of 79     Allergies - reviewed: Allergies   Allergen Reactions    Bee Sting [Sting, Bee] Swelling    Neomycin Rash    Neosporin [Neomycin-Bacitracin-Polymyxin] Rash    Penicillins Unknown (comments)       Past Medical History - reviewed:  Past Medical History:   Diagnosis Date    A-fib ([address]     Allergic rhinitis     Anxiety disorder     Cellulitis     post tattoo    Deep vein thrombosis (DVT) (HCC)     [facility]  [facility] f/u note to manage Lovenox    Essential hypertension     Essential tremor     BILATERAL HANDS    Hyperlipidemia     Laceration of arm 09/23/2017    Left cut by a chain saw.     Long term current use of anticoagulant therapy     Nicotine vapor product user     STOPPED 2018    Phlebitis     Psychiatric disorder     ANXIETY WITH PANICK ATTACK    Pulmonary embolism ([address] 3/2011    multiple episodes    Rectal bleeding 10/2011    on coumadin @ the time &again 4/2017 4/12/17 note from [facility]//colonoscopy report rec'd    Shortness of breath     Thromboembolus ([address]     Umbilical hernia     Vitamin D deficiency 04/2014    again 4/2017       Depression screening:  3 most recent PHQ Screens 6/28/2022   Little interest or pleasure in doing things Not at all   Feeling down, depressed, irritable, or hopeless Not at all   Total Score PHQ 2 0   Trouble falling or staying asleep, or sleeping too much Not at all   Feeling tired or having little energy Not at all   Poor appetite, weight loss, or overeating Not at all   Feeling bad about yourself - or that you are a failure or have let yourself or your family down Not at all   Trouble concentrating on things such as school, work, reading, or watching TV Not at all   Moving or speaking so slowly that other people could have noticed; or the opposite being so fidgety that others notice Not at all   Thoughts of being better off dead, or hurting yourself in some way Not at all   PHQ 9 Score 0   How difficult have these problems made it for you to do your work, take care of your home and get along with others Not difficult at all         Review of systems:      A comprehensive review of systems was negative except for that written in the History of Present Illness. Physical Exam  Visit Vitals  /70   Pulse 74   Temp 98.1 °F (36.7 °C) (Oral)   Resp 18   Ht 6' 2\" (1.88 m)   Wt 278 lb 1.6 oz (126.1 kg)   SpO2 99%   BMI 35.71 kg/m²       General: Alert and oriented, in no acute distress. Well nourished. LUNGS: Respirations unlabored; clear to auscultation bilaterally. CARDIOVASCULAR: Regular, rate, and rhythm without murmurs, gallops or rubs. ABDOMEN: Soft; nontender; nondistended; normoactive bowel sounds; no masses or organomegaly. MUSCULOSKELETAL: FROM in all extremities     EXT: No edema. Neurovascularlly intact. Normal gait. Neurological: Alert and oriented X 3, normal strength and tone. Normal symmetric reflexes. Normal coordination and gait       Assessment/Plan    ICD-10-CM ICD-9-CM    1. PAF (paroxysmal atrial fibrillation) ([facility])  I48.0 427.31 AMB POC PT/INR       1. PAF (paroxysmal atrial fibrillation) ([facility]  Echo (12/2021) - normal (LVEF 60-65%).   - AMB POC PT/INR: 3.3 (Goal 2-3)  - skip coumadin dose today and resume 5mg daily tomorrow  - recheck in a week  - continue carvediloL (COREG) 3.125 mg tablet; Take 1 Tablet by mouth two (2) times daily (with meals). - Follow up with Cards, Dr. Loyda Keene      We discussed the expected course, resolution and complications of the diagnosis(es) in detail. Medication risks, benefits, costs, interactions, and alternatives were discussed as indicated. I advised him to contact the office if his condition worsens, changes or fails to improve as anticipated. He expressed understanding with the diagnosis(es) and plan. Patient understands that this encounter was a temporary measure, and the importance of further follow up and examination was emphasized. Patient verbalized understanding.       Signed By: Toan Faustin MD     June 28, 2022

## 2022-06-28 NOTE — PROGRESS NOTES
Chief Complaint   Patient presents with    Anticoagulation     1. Have you been to the ER, urgent care clinic since your last visit? Hospitalized since your last visit? No    2. Have you seen or consulted any other health care providers outside of the [address] [place] [address] since your last visit? Include any pap smears or colon screening. No    Health Maintenance   Topic Date Due    Pneumococcal 0-64 years (1 - PCV) Never done    Shingrix Vaccine Age 50> (1 of 2) Never done    COVID-19 Vaccine (3 - Booster for Sweeney Peter series) 08/05/2021    Flu Vaccine (Season Ended) 09/01/2022    A1C test (Diabetic or Prediabetic)  04/22/2023    Lipid Screen  04/22/2023    Depression Screen  05/26/2023    DTaP/Tdap/Td series (2 - Td or Tdap) 01/10/2027    Colorectal Cancer Screening Combo  04/28/2027    Hepatitis C Screening  Completed     verified patient with two type of identifiers.   denies  C/o at present

## 2022-07-28 ENCOUNTER — OFFICE VISIT (OUTPATIENT)
Dept: FAMILY MEDICINE CLINIC | Age: 52
End: 2022-07-28
Payer: COMMERCIAL

## 2022-07-28 VITALS
RESPIRATION RATE: 16 BRPM | DIASTOLIC BLOOD PRESSURE: 79 MMHG | BODY MASS INDEX: 34.52 KG/M2 | HEART RATE: 69 BPM | WEIGHT: 269 LBS | OXYGEN SATURATION: 95 % | SYSTOLIC BLOOD PRESSURE: 124 MMHG | HEIGHT: 74 IN | TEMPERATURE: 98 F

## 2022-07-28 DIAGNOSIS — I48.0 PAF (PAROXYSMAL ATRIAL FIBRILLATION) (HCC): Primary | ICD-10-CM

## 2022-07-28 LAB
INR BLD: 2.3
PT POC: NORMAL
VALID INTERNAL CONTROL?: YES

## 2022-07-28 PROCEDURE — 85610 PROTHROMBIN TIME: CPT | Performed by: STUDENT IN AN ORGANIZED HEALTH CARE EDUCATION/TRAINING PROGRAM

## 2022-07-28 NOTE — PROGRESS NOTES
Name and  Verified. Pharmacy verified     Chief Complaint   Patient presents with    Follow-up     PT/INR       1. Have you been to the ER, urgent care clinic since your last visit? Hospitalized since your last visit? Yes  [facility]  COVID Exposure-NEGATIVE   2022    2. Have you seen or consulted any other health care providers outside of the [address] [place] [address] since your last visit? Include any pap smears or colon screening.  No      Health Maintenance Due   Topic Date Due    Pneumococcal 0-64 years (1 - PCV) Never done    Shingrix Vaccine Age 50> (1 of 2) Never done    COVID-19 Vaccine (3 - Booster for Pfizer series) 2021

## 2022-08-30 ENCOUNTER — OFFICE VISIT (OUTPATIENT)
Dept: FAMILY MEDICINE CLINIC | Age: 52
End: 2022-08-30
Payer: COMMERCIAL

## 2022-08-30 VITALS
BODY MASS INDEX: 35.16 KG/M2 | WEIGHT: 274 LBS | HEIGHT: 74 IN | TEMPERATURE: 98 F | RESPIRATION RATE: 16 BRPM | HEART RATE: 82 BPM | DIASTOLIC BLOOD PRESSURE: 70 MMHG | OXYGEN SATURATION: 96 % | SYSTOLIC BLOOD PRESSURE: 113 MMHG

## 2022-08-30 DIAGNOSIS — Z79.01 ANTICOAGULANT LONG-TERM USE: ICD-10-CM

## 2022-08-30 DIAGNOSIS — I48.0 PAF (PAROXYSMAL ATRIAL FIBRILLATION) (HCC): Primary | ICD-10-CM

## 2022-08-30 LAB
INR BLD: 3.6
PT POC: NORMAL
VALID INTERNAL CONTROL?: YES

## 2022-08-30 PROCEDURE — 85610 PROTHROMBIN TIME: CPT | Performed by: STUDENT IN AN ORGANIZED HEALTH CARE EDUCATION/TRAINING PROGRAM

## 2022-08-30 PROCEDURE — 99213 OFFICE O/P EST LOW 20 MIN: CPT | Performed by: STUDENT IN AN ORGANIZED HEALTH CARE EDUCATION/TRAINING PROGRAM

## 2022-08-30 NOTE — PROGRESS NOTES
Name and  Verified. Pharmacy verified     Chief Complaint   Patient presents with    Follow-up     2022 PT/INR       1. Have you been to the ER, urgent care clinic since your last visit? Hospitalized since your last visit? No    2. Have you seen or consulted any other health care providers outside of the [address] [place] [address] since your last visit? Include any pap smears or colon screening.  No      Health Maintenance Due   Topic Date Due    Pneumococcal 0-64 years (1 - PCV) Never done    Shingrix Vaccine Age 50> (1 of 2) Never done    COVID-19 Vaccine (3 - Booster for Pfizer series) 2021

## 2022-08-30 NOTE — PROGRESS NOTES
[address] [facility] [address]  [address]. [place], [address] [place] [address]  [phone]    Chief Complaint: INR monitoring   Subjective  Vani Carranza is a 45 y.o. male , established patient, here for evaluation of the concern(s) above;    PAF  On coumadin for more than 20 yrs, currently at 5mg daily  On coreg 3.125mg BID  Also has a history of 3 DVT's in the past including a PE. Echo (12/2021) - normal (LVEF 60-65%). Needs INR check today  Follows with cards (Dr. Gladis Valladares). Compliant with his meds    Denies any chest pain, nausea, vomiting, abdominal distention, bowel obstruction, fever or chills. Allergies - reviewed: Allergies   Allergen Reactions    Bee Sting [Sting, Bee] Swelling    Neomycin Rash    Neosporin [Neomycin-Bacitracin-Polymyxin] Rash    Penicillins Unknown (comments)       Past Medical History - reviewed:  Past Medical History:   Diagnosis Date    A-fib ([facility]     Allergic rhinitis     Anxiety disorder     Cellulitis     post tattoo    Deep vein thrombosis (DVT) ([facility]     [facility]  [facility]17 f/u note to manage Lovenox    Essential hypertension     Essential tremor     BILATERAL HANDS    Hyperlipidemia     Laceration of arm 09/23/2017    Left cut by a chain saw.     Long term current use of anticoagulant therapy     Nicotine vapor product user     STOPPED 2018    Phlebitis     Psychiatric disorder     ANXIETY WITH PANICK ATTACK    Pulmonary embolism ([address] 3/2011    multiple episodes    Rectal bleeding 10/2011    on coumadin @ the time &again 4/2017 4/12/17 note from [facility]//colonoscopy report rec'd    Shortness of breath     Thromboembolus ([facility]     Umbilical hernia     Vitamin D deficiency 04/2014    again 4/2017       Depression screening:  3 most recent PHQ Screens 6/28/2022   Little interest or pleasure in doing things Not at all   Feeling down, depressed, irritable, or hopeless Not at all   Total Score PHQ 2 0   Trouble falling or staying asleep, or sleeping too much Not at all   Feeling tired or having little energy Not at all   Poor appetite, weight loss, or overeating Not at all   Feeling bad about yourself - or that you are a failure or have let yourself or your family down Not at all   Trouble concentrating on things such as school, work, reading, or watching TV Not at all   Moving or speaking so slowly that other people could have noticed; or the opposite being so fidgety that others notice Not at all   Thoughts of being better off dead, or hurting yourself in some way Not at all   PHQ 9 Score 0   How difficult have these problems made it for you to do your work, take care of your home and get along with others Not difficult at all         Review of systems:      A comprehensive review of systems was negative except for that written in the History of Present Illness. Physical Exam  Visit Vitals  /70 (BP 1 Location: Right arm, BP Patient Position: Sitting, BP Cuff Size: Adult)   Pulse 82   Temp 98 °F (36.7 °C) (Oral)   Resp 16   Ht 6' 2\" (1.88 m)   Wt 274 lb (124.3 kg)   SpO2 96%   BMI 35.18 kg/m²       General: Alert and oriented, in no acute distress. Well nourished. LUNGS: Respirations unlabored; clear to auscultation bilaterally. CARDIOVASCULAR: Regular, rate, and rhythm without murmurs, gallops or rubs. ABDOMEN: Soft; nontender; nondistended; normoactive bowel sounds; no masses or organomegaly. MUSCULOSKELETAL: FROM in all extremities     EXT: No edema. Neurovascularlly intact. Normal gait. Neurological: Alert and oriented X 3, normal strength and tone. Normal symmetric reflexes. Normal coordination and gait       Assessment/Plan    ICD-10-CM ICD-9-CM    1. PAF (paroxysmal atrial fibrillation) ([facility])  I48.0 427.31 AMB POC PT/INR      2. Anticoagulant long-term use  Z79.01 V58.61 AMB POC PT/INR          1. PAF (paroxysmal atrial fibrillation) ([facility])  Echo (12/2021) - normal (LVEF 60-65%).   - AMB POC PT/INR: 3.6 (Goal 2-3)  - Will skip dose today and continue daily. Subsequently will skip one day every week until recheck. Pt states that his work schedule does not permit pt to come in a week and he would like to return in 4 weeks. - recheck in a week  - continue carvediloL (COREG) 3.125 mg tablet; Take 1 Tablet by mouth two (2) times daily (with meals). We discussed the expected course, resolution and complications of the diagnosis(es) in detail. Medication risks, benefits, costs, interactions, and alternatives were discussed as indicated. I advised him to contact the office if his condition worsens, changes or fails to improve as anticipated. He expressed understanding with the diagnosis(es) and plan. Patient understands that this encounter was a temporary measure, and the importance of further follow up and examination was emphasized. Patient verbalized understanding.       Signed By: Rosann Meckel, MD     August 30, 2022

## 2022-09-26 DIAGNOSIS — I48.0 PAF (PAROXYSMAL ATRIAL FIBRILLATION) (HCC): ICD-10-CM

## 2022-09-26 RX ORDER — CARVEDILOL 3.12 MG/1
TABLET ORAL
Qty: 180 TABLET | Refills: 3 | Status: SHIPPED | OUTPATIENT
Start: 2022-09-26

## 2022-09-30 ENCOUNTER — OFFICE VISIT (OUTPATIENT)
Dept: FAMILY MEDICINE CLINIC | Age: 52
End: 2022-09-30
Payer: COMMERCIAL

## 2022-09-30 VITALS
HEART RATE: 74 BPM | BODY MASS INDEX: 35.7 KG/M2 | WEIGHT: 278.2 LBS | DIASTOLIC BLOOD PRESSURE: 84 MMHG | OXYGEN SATURATION: 95 % | SYSTOLIC BLOOD PRESSURE: 133 MMHG | TEMPERATURE: 98.6 F | RESPIRATION RATE: 14 BRPM | HEIGHT: 74 IN

## 2022-09-30 DIAGNOSIS — Z79.01 ANTICOAGULANT LONG-TERM USE: Primary | ICD-10-CM

## 2022-09-30 LAB
INR BLD: 4
PT POC: NORMAL
VALID INTERNAL CONTROL?: YES

## 2022-09-30 PROCEDURE — 99213 OFFICE O/P EST LOW 20 MIN: CPT | Performed by: STUDENT IN AN ORGANIZED HEALTH CARE EDUCATION/TRAINING PROGRAM

## 2022-09-30 PROCEDURE — 85610 PROTHROMBIN TIME: CPT | Performed by: STUDENT IN AN ORGANIZED HEALTH CARE EDUCATION/TRAINING PROGRAM

## 2022-09-30 RX ORDER — WARFARIN SODIUM 5 MG/1
TABLET ORAL
Qty: 90 TABLET | Refills: 1
Start: 2022-09-30

## 2022-09-30 NOTE — PROGRESS NOTES
[address] [facility] [address]  [facility] ANNA Winslow. [place], [address] [facility]  [phone]    Chief Complaint: INR monitoring   Subjective  Kathy Matthews is a 45 y.o. male , established patient, here for evaluation of the concern(s) above;    PAF  On coumadin for more than 20 yrs, currently at 5mg (6/7 days per week)  On coreg 3.125mg BID  Also has a history of 3 DVT's in the past including a PE. Echo (12/2021) - normal (LVEF 60-65%). Needs INR check today  Follows with cards (Dr. Sanjay Barry). Compliant with his meds    Denies any chest pain, nausea, vomiting, abdominal distention, bowel obstruction, fever or chills. Allergies - reviewed: Allergies   Allergen Reactions    Bee Sting [Sting, Bee] Swelling    Neomycin Rash    Neosporin [Neomycin-Bacitracin-Polymyxin] Rash    Penicillins Unknown (comments)       Past Medical History - reviewed:  Past Medical History:   Diagnosis Date    A-fib ([facility]     Allergic rhinitis     Anxiety disorder     Cellulitis     post tattoo    Deep vein thrombosis (DVT) ([facility]     [facility]  [facility]17 f/u note to manage Lovenox    Essential hypertension     Essential tremor     BILATERAL HANDS    Hyperlipidemia     Laceration of arm 09/23/2017    Left cut by a chain saw.     Long term current use of anticoagulant therapy     Nicotine vapor product user     STOPPED 2018    Phlebitis     Psychiatric disorder     ANXIETY WITH PANICK ATTACK    Pulmonary embolism ([facility] 3/2011    multiple episodes    Rectal bleeding 10/2011    on coumadin @ the time &again 4/2017 4/12/17 note from [facility]//colonoscopy report rec'd    Shortness of breath     Thromboembolus ([facility]     Umbilical hernia     Vitamin D deficiency 04/2014    again 4/2017       Depression screening:  3 most recent PHQ Screens 9/30/2022   Little interest or pleasure in doing things Not at all   Feeling down, depressed, irritable, or hopeless Not at all   Total Score PHQ 2 0   Trouble falling or staying asleep, or sleeping too much -   Feeling tired or having little energy -   Poor appetite, weight loss, or overeating -   Feeling bad about yourself - or that you are a failure or have let yourself or your family down -   Trouble concentrating on things such as school, work, reading, or watching TV -   Moving or speaking so slowly that other people could have noticed; or the opposite being so fidgety that others notice -   Thoughts of being better off dead, or hurting yourself in some way -   PHQ 9 Score -   How difficult have these problems made it for you to do your work, take care of your home and get along with others -         Review of systems:      A comprehensive review of systems was negative except for that written in the History of Present Illness. Physical Exam  Visit Vitals  /84 (BP 1 Location: Left upper arm, BP Patient Position: Sitting)   Pulse 74   Temp 98.6 °F (37 °C) (Oral)   Resp 14   Ht 6' 2\" (1.88 m)   Wt 278 lb 3.2 oz (126.2 kg)   SpO2 95%   BMI 35.72 kg/m²       General: Alert and oriented, in no acute distress. Well nourished. LUNGS: Respirations unlabored; clear to auscultation bilaterally. CARDIOVASCULAR: Regular, rate, and rhythm without murmurs, gallops or rubs. ABDOMEN: Soft; nontender; nondistended; normoactive bowel sounds; no masses or organomegaly. MUSCULOSKELETAL: FROM in all extremities     EXT: No edema. Neurovascularlly intact. Normal gait. Neurological: Alert and oriented X 3, normal strength and tone. Normal symmetric reflexes. Normal coordination and gait       Assessment/Plan    ICD-10-CM ICD-9-CM    1. Anticoagulant long-term use  Z79.01 V58.61 AMB POC PT/INR          1. PAF (paroxysmal atrial fibrillation) ([facility])  Echo (12/2021) - normal (LVEF 60-65%). - AMB POC PT/INR: 4 (Goal 2-3)  - Will skip dose today and Sunday and they resume normal daily. Subsequently will skip one day every week until recheck.    - recheck in 2 weeks based on his work schedule  - discussed foods that will cause INR elevation . - continue carvediloL (COREG) 3.125 mg tablet; Take 1 Tablet by mouth two (2) times daily (with meals). We discussed the expected course, resolution and complications of the diagnosis(es) in detail. Medication risks, benefits, costs, interactions, and alternatives were discussed as indicated. I advised him to contact the office if his condition worsens, changes or fails to improve as anticipated. He expressed understanding with the diagnosis(es) and plan. Patient understands that this encounter was a temporary measure, and the importance of further follow up and examination was emphasized. Patient verbalized understanding.       Signed By: Juan M Adan MD     September 30, 2022

## 2022-09-30 NOTE — PROGRESS NOTES
Patient identified by 2 identifiers. Chief Complaint   Patient presents with    Follow-up     1. Have you been to the ER, urgent care clinic since your last visit? Hospitalized since your last visit? No    2. Have you seen or consulted any other health care providers outside of the [address] [place] [address] since your last visit? Include any pap smears or colon screening.  No

## 2022-10-02 DIAGNOSIS — E78.5 HYPERLIPIDEMIA, UNSPECIFIED HYPERLIPIDEMIA TYPE: ICD-10-CM

## 2022-10-03 RX ORDER — ROSUVASTATIN CALCIUM 20 MG/1
TABLET, COATED ORAL
Qty: 90 TABLET | Refills: 3 | Status: SHIPPED | OUTPATIENT
Start: 2022-10-03

## 2022-10-14 ENCOUNTER — OFFICE VISIT (OUTPATIENT)
Dept: FAMILY MEDICINE CLINIC | Age: 52
End: 2022-10-14
Payer: COMMERCIAL

## 2022-10-14 VITALS
WEIGHT: 278 LBS | HEIGHT: 74 IN | SYSTOLIC BLOOD PRESSURE: 133 MMHG | TEMPERATURE: 98.6 F | DIASTOLIC BLOOD PRESSURE: 82 MMHG | RESPIRATION RATE: 17 BRPM | BODY MASS INDEX: 35.68 KG/M2

## 2022-10-14 DIAGNOSIS — Z79.01 ANTICOAGULANT LONG-TERM USE: Primary | ICD-10-CM

## 2022-10-14 LAB
INR BLD: 2.3
PT POC: NORMAL
VALID INTERNAL CONTROL?: YES

## 2022-10-14 PROCEDURE — 85610 PROTHROMBIN TIME: CPT | Performed by: STUDENT IN AN ORGANIZED HEALTH CARE EDUCATION/TRAINING PROGRAM

## 2022-10-14 NOTE — PROGRESS NOTES
INR 2.3 (goal 2-3)  Continue taking coumadin 5mg (6 out of 7 days).     Follow up in 4 weeks for INR check

## 2022-10-14 NOTE — PROGRESS NOTES
Name and  Verified. Pharmacy verified     Chief Complaint   Patient presents with    Follow-up     2022 PT/INR       1. Have you been to the ER, urgent care clinic since your last visit? Hospitalized since your last visit? No    2. Have you seen or consulted any other health care providers outside of the [address] [place] [address] since your last visit? Include any pap smears or colon screening.  No      Health Maintenance Due   Topic Date Due    Pneumococcal 0-64 years (1 - PCV) Never done    Shingrix Vaccine Age 50> (1 of 2) Never done    COVID-19 Vaccine (3 - Booster for Pfizer series) 2021    Flu Vaccine (1) 2022

## 2022-11-16 ENCOUNTER — OFFICE VISIT (OUTPATIENT)
Dept: FAMILY MEDICINE CLINIC | Age: 52
End: 2022-11-16
Payer: COMMERCIAL

## 2022-11-16 VITALS
DIASTOLIC BLOOD PRESSURE: 87 MMHG | BODY MASS INDEX: 36.19 KG/M2 | TEMPERATURE: 98.5 F | WEIGHT: 282 LBS | HEART RATE: 71 BPM | OXYGEN SATURATION: 95 % | RESPIRATION RATE: 17 BRPM | SYSTOLIC BLOOD PRESSURE: 124 MMHG | HEIGHT: 74 IN

## 2022-11-16 DIAGNOSIS — Z79.01 ANTICOAGULANT LONG-TERM USE: ICD-10-CM

## 2022-11-16 DIAGNOSIS — I48.0 PAF (PAROXYSMAL ATRIAL FIBRILLATION) (HCC): Primary | ICD-10-CM

## 2022-11-16 LAB
INR BLD: 2.6
PT POC: NORMAL
VALID INTERNAL CONTROL?: YES

## 2022-11-16 PROCEDURE — 99213 OFFICE O/P EST LOW 20 MIN: CPT | Performed by: STUDENT IN AN ORGANIZED HEALTH CARE EDUCATION/TRAINING PROGRAM

## 2022-11-16 PROCEDURE — 85610 PROTHROMBIN TIME: CPT | Performed by: STUDENT IN AN ORGANIZED HEALTH CARE EDUCATION/TRAINING PROGRAM

## 2022-11-16 NOTE — PROGRESS NOTES
Name and  Verified. Pharmacy verified     Chief Complaint   Patient presents with    Follow-up     10/14/2022  PT/INR       1. Have you been to the ER, urgent care clinic since your last visit? Hospitalized since your last visit? No    2. Have you seen or consulted any other health care providers outside of the [address] [place] [address] since your last visit? Include any pap smears or colon screening.  No        Health Maintenance Due   Topic Date Due    Pneumococcal 0-64 years (1 - PCV) Never done    Shingrix Vaccine Age 50> (1 of 2) Never done    COVID-19 Vaccine (3 - Booster for Pfizer series) 2021    Flu Vaccine (1) 2022

## 2022-11-16 NOTE — PROGRESS NOTES
[address] Kristen Figueredo. [facility], [address] [place] [address]  [phone]    Chief Complaint: INR monitoring   Subjective  Peyton Guajardo is a 47 y.o. male , established patient, here for evaluation of the concern(s) above;    PAF  On coumadin for more than 20 yrs, currently at 5mg (6/7 days per week)  On coreg 3.125mg BID  Also has a history of 3 DVT's in the past including a PE. Echo (12/2021) - normal (LVEF 60-65%). Needs INR check today  Follows with cards (Dr. Cory Gleason). Compliant with his meds    Denies any chest pain, nausea, vomiting, abdominal distention, bowel obstruction, fever or chills. Allergies - reviewed: Allergies   Allergen Reactions    Bee Sting [Sting, Bee] Swelling    Neomycin Rash    Neosporin [Neomycin-Bacitracin-Polymyxin] Rash    Penicillins Unknown (comments)       Past Medical History - reviewed:  Past Medical History:   Diagnosis Date    A-fib ([facility]     Allergic rhinitis     Anxiety disorder     Cellulitis     post tattoo    Deep vein thrombosis (DVT) ([facility]     [facility]  [facility]17 f/u note to manage Lovenox    Essential hypertension     Essential tremor     BILATERAL HANDS    Hyperlipidemia     Laceration of arm 09/23/2017    Left cut by a chain saw.     Long term current use of anticoagulant therapy     Nicotine vapor product user     STOPPED 2018    Phlebitis     Psychiatric disorder     ANXIETY WITH PANICK ATTACK    Pulmonary embolism ([facility] 3/2011    multiple episodes    Rectal bleeding 10/2011    on coumadin @ the time &again 4/2017 4/12/17 note from [facility]//colonoscopy report rec'd    Shortness of breath     Thromboembolus ([facility]     Umbilical hernia     Vitamin D deficiency 04/2014    again 4/2017       Depression screening:  3 most recent PHQ Screens 9/30/2022   Little interest or pleasure in doing things Not at all   Feeling down, depressed, irritable, or hopeless Not at all   Total Score PHQ 2 0   Trouble falling or staying asleep, or sleeping too much -   Feeling tired or having little energy -   Poor appetite, weight loss, or overeating -   Feeling bad about yourself - or that you are a failure or have let yourself or your family down -   Trouble concentrating on things such as school, work, reading, or watching TV -   Moving or speaking so slowly that other people could have noticed; or the opposite being so fidgety that others notice -   Thoughts of being better off dead, or hurting yourself in some way -   PHQ 9 Score -   How difficult have these problems made it for you to do your work, take care of your home and get along with others -         Review of systems:      A comprehensive review of systems was negative except for that written in the History of Present Illness. Physical Exam  Visit Vitals  /87 (BP 1 Location: Right arm, BP Patient Position: Sitting, BP Cuff Size: Adult)   Pulse 71   Temp 98.5 °F (36.9 °C) (Oral)   Resp 17   Ht 6' 2\" (1.88 m)   Wt 282 lb (127.9 kg)   SpO2 95%   BMI 36.21 kg/m²       General: Alert and oriented, in no acute distress. Well nourished. LUNGS: Respirations unlabored; clear to auscultation bilaterally. CARDIOVASCULAR: Regular, rate, and rhythm without murmurs, gallops or rubs. ABDOMEN: Soft; nontender; nondistended; normoactive bowel sounds; no masses or organomegaly. MUSCULOSKELETAL: FROM in all extremities     EXT: No edema. Neurovascularlly intact. Normal gait. Neurological: Alert and oriented X 3, normal strength and tone. Normal symmetric reflexes. Normal coordination and gait       Assessment/Plan    ICD-10-CM ICD-9-CM    1. PAF (paroxysmal atrial fibrillation) ([facility])  I48.0 427.31 AMB POC PT/INR      2. Anticoagulant long-term use  Z79.01 V58.61 AMB POC PT/INR          1. PAF (paroxysmal atrial fibrillation) ([facility])  Echo (12/2021) - normal (LVEF 60-65%). INR 2.6 (goal 2-3)  Continue taking coumadin 5mg (daily except for sunday).   - continue carvediloL (COREG) 3.125 mg tablet; Take 1 Tablet by mouth two (2) times daily (with meals). Follow up: 1 month    We discussed the expected course, resolution and complications of the diagnosis(es) in detail. Medication risks, benefits, costs, interactions, and alternatives were discussed as indicated. I advised him to contact the office if his condition worsens, changes or fails to improve as anticipated. He expressed understanding with the diagnosis(es) and plan. Patient understands that this encounter was a temporary measure, and the importance of further follow up and examination was emphasized. Patient verbalized understanding.       Signed By: Irene Renae MD     November 16, 2022

## 2022-12-16 ENCOUNTER — OFFICE VISIT (OUTPATIENT)
Dept: FAMILY MEDICINE CLINIC | Age: 52
End: 2022-12-16
Payer: COMMERCIAL

## 2022-12-16 VITALS
HEART RATE: 71 BPM | HEIGHT: 74 IN | SYSTOLIC BLOOD PRESSURE: 119 MMHG | BODY MASS INDEX: 37.09 KG/M2 | RESPIRATION RATE: 17 BRPM | TEMPERATURE: 98 F | WEIGHT: 289 LBS | DIASTOLIC BLOOD PRESSURE: 75 MMHG | OXYGEN SATURATION: 96 %

## 2022-12-16 DIAGNOSIS — Z79.01 ANTICOAGULANT LONG-TERM USE: ICD-10-CM

## 2022-12-16 DIAGNOSIS — I48.0 PAF (PAROXYSMAL ATRIAL FIBRILLATION) (HCC): Primary | ICD-10-CM

## 2022-12-16 NOTE — PROGRESS NOTES
[address]  [address]. Lyudmila, [address] [place] [address]  [phone]    Chief Complaint: INR monitoring   Juan Alberto Leonard is a 47 y.o. male , established patient, here for evaluation of the concern(s) above;    PAF  On coumadin for more than 20 yrs, currently at 5mg (6/7 days per week)  On coreg 3.125mg BID  Also has a history of 3 DVT's in the past including a PE. Echo (12/2021) - normal (LVEF 60-65%). Needs INR check today  Follows with cards (Dr. Jennifer Fernandez). Compliant with his meds    Denies any chest pain, nausea, vomiting, abdominal distention, bowel obstruction, fever or chills. Allergies - reviewed: Allergies   Allergen Reactions    Bee Sting [Sting, Bee] Swelling    Neomycin Rash    Neosporin [Neomycin-Bacitracin-Polymyxin] Rash    Penicillins Unknown (comments)       Past Medical History - reviewed:  Past Medical History:   Diagnosis Date    A-fib ([facility]     Allergic rhinitis     Anxiety disorder     Cellulitis     post tattoo    Deep vein thrombosis (DVT) ([facility]     [facility]  [facility]17 f/u note to manage Lovenox    Essential hypertension     Essential tremor     BILATERAL HANDS    Hyperlipidemia     Laceration of arm 09/23/2017    Left cut by a chain saw.     Long term current use of anticoagulant therapy     Nicotine vapor product user     STOPPED 2018    Phlebitis     Psychiatric disorder     ANXIETY WITH PANICK ATTACK    Pulmonary embolism ([facility] 3/2011    multiple episodes    Rectal bleeding 10/2011    on coumadin @ the time &again 4/2017 4/12/17 note from [facility]//colonoscopy report rec'd    Shortness of breath     Thromboembolus ([facility]     Umbilical hernia     Vitamin D deficiency 04/2014    again 4/2017       Depression screening:  3 most recent PHQ Screens 9/30/2022   Little interest or pleasure in doing things Not at all   Feeling down, depressed, irritable, or hopeless Not at all   Total Score PHQ 2 0   Trouble falling or staying asleep, or sleeping too much -   Feeling tired or having little energy -   Poor appetite, weight loss, or overeating -   Feeling bad about yourself - or that you are a failure or have let yourself or your family down -   Trouble concentrating on things such as school, work, reading, or watching TV -   Moving or speaking so slowly that other people could have noticed; or the opposite being so fidgety that others notice -   Thoughts of being better off dead, or hurting yourself in some way -   PHQ 9 Score -   How difficult have these problems made it for you to do your work, take care of your home and get along with others -         Review of systems:      A comprehensive review of systems was negative except for that written in the History of Present Illness. Physical Exam  Visit Vitals  /75 (BP 1 Location: Right arm, BP Patient Position: Sitting, BP Cuff Size: Adult)   Pulse 71   Temp 98 °F (36.7 °C) (Oral)   Resp 17   Ht 6' 2\" (1.88 m)   Wt 289 lb (131.1 kg)   SpO2 96%   BMI 37.11 kg/m²       General: Alert and oriented, in no acute distress. Well nourished. LUNGS: Respirations unlabored; clear to auscultation bilaterally. CARDIOVASCULAR: Regular, rate, and rhythm without murmurs, gallops or rubs. ABDOMEN: Soft; nontender; nondistended; normoactive bowel sounds; no masses or organomegaly. MUSCULOSKELETAL: FROM in all extremities     EXT: No edema. Neurovascularlly intact. Normal gait. Neurological: Alert and oriented X 3, normal strength and tone. Normal symmetric reflexes. Normal coordination and gait       Assessment/Plan    ICD-10-CM ICD-9-CM    1. PAF (paroxysmal atrial fibrillation) ([facility])  I48.0 427.31 AMB POC PT/INR      2. Anticoagulant long-term use  Z79.01 V58.61 AMB POC PT/INR          1. PAF (paroxysmal atrial fibrillation) ([facility])  Echo (12/2021) - normal (LVEF 60-65%). INR 2.1 (goal 2-3)  Continue taking coumadin 5mg (daily except for sunday).   - continue carvediloL (COREG) 3.125 mg tablet; Take 1 Tablet by mouth two (2) times daily (with meals). Follow up: 1 month    We discussed the expected course, resolution and complications of the diagnosis(es) in detail. Medication risks, benefits, costs, interactions, and alternatives were discussed as indicated. I advised him to contact the office if his condition worsens, changes or fails to improve as anticipated. He expressed understanding with the diagnosis(es) and plan. Patient understands that this encounter was a temporary measure, and the importance of further follow up and examination was emphasized. Patient verbalized understanding.       Signed By: Shruthi Cornejo MD     December 16, 2022

## 2022-12-16 NOTE — PROGRESS NOTES
Name and  Verified. Pharmacy verified     Chief Complaint   Patient presents with    Follow-up     1 month 2022 PT/INR         1. Have you been to the ER, urgent care clinic since your last visit? Hospitalized since your last visit? No    2. Have you seen or consulted any other health care providers outside of the [address] [place] [address] since your last visit? Include any pap smears or colon screening.  No      Health Maintenance Due   Topic Date Due    Shingrix Vaccine Age 49> (1 of 2) Never done    COVID-19 Vaccine (3 - Booster for Pfizer series) 2021    Flu Vaccine (1) 2022

## 2023-02-08 DIAGNOSIS — Z79.01 ANTICOAGULANT LONG-TERM USE: ICD-10-CM

## 2023-02-08 RX ORDER — WARFARIN SODIUM 5 MG/1
TABLET ORAL
Qty: 30 TABLET | Refills: 5 | Status: SHIPPED | OUTPATIENT
Start: 2023-02-08

## 2023-02-14 ENCOUNTER — OFFICE VISIT (OUTPATIENT)
Dept: CARDIOLOGY CLINIC | Age: 53
End: 2023-02-14
Payer: COMMERCIAL

## 2023-02-14 VITALS
DIASTOLIC BLOOD PRESSURE: 78 MMHG | HEIGHT: 74 IN | BODY MASS INDEX: 36.45 KG/M2 | OXYGEN SATURATION: 96 % | SYSTOLIC BLOOD PRESSURE: 120 MMHG | HEART RATE: 68 BPM | WEIGHT: 284 LBS | RESPIRATION RATE: 16 BRPM

## 2023-02-14 DIAGNOSIS — I82.5Y3 CHRONIC DEEP VEIN THROMBOSIS (DVT) OF PROXIMAL VEIN OF BOTH LOWER EXTREMITIES (HCC): ICD-10-CM

## 2023-02-14 DIAGNOSIS — I48.0 PAF (PAROXYSMAL ATRIAL FIBRILLATION) (HCC): Primary | ICD-10-CM

## 2023-02-14 DIAGNOSIS — E78.2 MIXED HYPERLIPIDEMIA: ICD-10-CM

## 2023-02-14 DIAGNOSIS — Z86.711 HX PULMONARY EMBOLISM: ICD-10-CM

## 2023-02-14 DIAGNOSIS — J84.9 ILD (INTERSTITIAL LUNG DISEASE) (HCC): ICD-10-CM

## 2023-02-14 PROCEDURE — 93000 ELECTROCARDIOGRAM COMPLETE: CPT | Performed by: INTERNAL MEDICINE

## 2023-02-14 PROCEDURE — 99214 OFFICE O/P EST MOD 30 MIN: CPT | Performed by: INTERNAL MEDICINE

## 2023-02-14 NOTE — LETTER
2/14/2023    Patient: Maik [facility]   YOB: 1970   Date of Visit: 2/14/2023     Spencer Segal MD  [address] [place] [address]  Via In Basket    Dear Spencer Segal MD,      Thank you for referring Mr. Selina Song to [address] [place] [address] for evaluation. My notes for this consultation are attached. If you have questions, please do not hesitate to call me. I look forward to following your patient along with you.       Sincerely,    Elena De León MD

## 2023-02-14 NOTE — PROGRESS NOTES
[address], [address] [facility], [address] [place] [address]  [phone]    [address] [place] [address] Way      Subjective: Benjamin Asencio is a 47 y.o. male is here for routine f/u. The patient has medical hx significant for PAF, PE x1, DVTs x1, and ILD. Initially seen by us in 11/2021 for palpitation    12/14/21    ECHO ADULT COMPLETE 12/15/2021 12/15/2021    Interpretation Summary    Left Ventricle: Left ventricle size is normal. Normal wall thickness. Normal wall motion. Normal left ventricular systolic function with a visually estimated EF of 60 - 65%. Grade I diastolic dysfunction with normal LAP. Left Atrium: Left atrium is mildly dilated. Signed by: Radhika Mack MD on 12/15/2021  2:43 PM      Normal 2 week event 11/2021       Today,     Doing well,  still working in construction. No recurrent palpitation. Stable sob when walking fast. No cp      the patient denies chest pain, orthopnea, PND, LE edema, palpitations, syncope, or presyncope.        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     [facility]  [facility]17 f/u note to manage Lovenox    Essential hypertension     Essential tremor     BILATERAL HANDS    Hyperlipidemia     Laceration of arm 09/23/2017    Left cut by a chain saw. Long term current use of anticoagulant therapy     Nicotine vapor product user     STOPPED 2018    Phlebitis     Psychiatric disorder     ANXIETY WITH PANICK ATTACK    Pulmonary embolism ([facility] 3/2011    multiple episodes    Rectal bleeding 10/2011    on coumadin @ the time &again 4/2017 4/12/17 note from [facility]la//colonoscopy report rec'd    Shortness of breath     Thromboembolus ([facility]     Umbilical hernia     Vitamin D deficiency 04/2014    again 4/2017      Past Surgical History:   Procedure Laterality Date    HX APPENDECTOMY  2003    HX COLONOSCOPY  04/28/2017    Dr. Skye DYER  02/2020    BRONCHOSCOPY    HX HERNIA REPAIR  7705    umbilical     Allergies   Allergen Reactions    Bee Sting [Sting, Bee] Swelling    Neomycin Rash    Neosporin [Neomycin-Bacitracin-Polymyxin] Rash    Penicillins Unknown (comments)      Family History   Problem Relation Age of Onset    Heart Disease Father     Heart Disease Maternal Grandmother     Heart Disease Maternal Grandfather     Anesth Problems Neg Hx       Social History     Socioeconomic History    Marital status: SINGLE     Spouse name: Not on file    Number of children: Not on file    Years of education: Not on file    Highest education level: Not on file   Occupational History    Not on file   Tobacco Use    Smoking status: Never    Smokeless tobacco: Current     Types: Snuff   Vaping Use    Vaping Use: Never used   Substance and Sexual Activity    Alcohol use:  Yes     Alcohol/week: 0.0 standard drinks     Comment: SOCIALLY EVERY 6 MONTHS    Drug use: Never    Sexual activity: Yes     Partners: Female   Other Topics Concern    Not on file   Social History Narrative    Not on file     Social Determinants of Health     Financial Resource Strain: Not on file   Food Insecurity: Not on file   Transportation Needs: Not on file   Physical Activity: Not on file   Stress: Not on file   Social Connections: Not on file   Intimate Partner Violence: Not on file   Housing Stability: Not on file      Current Outpatient Medications   Medication Sig    warfarin (COUMADIN) 5 mg tablet TAKE 1 TABLET BY MOUTH DAILY    rosuvastatin (CRESTOR) 20 mg tablet TAKE 1 TABLET BY MOUTH EVERY NIGHT    carvediloL (COREG) 3.125 mg tablet TAKE 1 TABLET BY MOUTH TWICE DAILY WITH MEALS    famotidine (PEPCID) 20 mg tablet Take 1 Tablet by mouth daily. No current facility-administered medications for this visit. Review of Symptoms:  11 systems reviewed, negative other than as stated in the HPI    Physical ExamPhysical Exam:    Vitals:    02/14/23 0934 02/14/23 0943   BP: (!) 150/98 120/78   Pulse: 68    Resp: 16    SpO2: 96%    Weight: 284 lb (128.8 kg)    Height: 6' 2\" (1.88 m)      Body mass index is 36.46 kg/m². General PE  Gen:  NAD  Mental Status - Alert. General Appearance - Not in acute distress. HEENT:  PERRL, no carotid bruits or JVD  Chest and Lung Exam   Inspection: Accessory muscles - No use of accessory muscles in breathing. Auscultation:   Breath sounds: - Normal.   Cardiovascular   Inspection: Jugular vein - Bilateral - Inspection Normal.   Palpation/Percussion:   Apical Impulse: - Normal.   Auscultation: Rhythm - Regular. Heart Sounds - S1 WNL and S2 WNL. No S3 or S4. Murmurs & Other Heart Sounds: Auscultation of the heart reveals - No Murmurs. Peripheral Vascular   Upper Extremity: Inspection - Bilateral - No Cyanotic nailbeds or Digital clubbing. Lower Extremity:   Palpation: Edema - Bilateral - No edema. Abdomen:   Soft, non-tender, bowel sounds are active.   Neuro: A&O times 3, CN and motor grossly WNL    Labs:   Lab Results   Component Value Date/Time    Cholesterol, total 140 04/22/2022 12:00 AM    Cholesterol, total 130 04/16/2021 12:00 AM    Cholesterol, total 112 08/17/2019 08:11 AM    Cholesterol, total 113 08/15/2018 02:46 PM    Cholesterol, total 132 04/04/2017 09:05 AM    HDL Cholesterol 36 (L) 04/22/2022 12:00 AM    HDL Cholesterol 34 (L) 04/16/2021 12:00 AM    HDL Cholesterol 33 (L) 08/17/2019 08:11 AM    HDL Cholesterol 38 (L) 08/15/2018 02:46 PM    HDL Cholesterol 37 (L) 04/04/2017 09:05 AM    LDL, calculated 60 04/22/2022 12:00 AM    LDL, calculated 57 04/16/2021 12:00 AM    LDL, calculated 53 08/17/2019 08:11 AM    LDL, calculated 48 08/15/2018 02:46 PM    LDL, calculated 61 04/04/2017 09:05 AM    LDL, calculated 61 12/30/2016 09:16 AM    LDL, calculated 153 (H) 04/04/2016 11:06 AM    Triglyceride 280 (H) 04/22/2022 12:00 AM    Triglyceride 246 (H) 04/16/2021 12:00 AM    Triglyceride 129 08/17/2019 08:11 AM    Triglyceride 133 08/15/2018 02:46 PM    Triglyceride 172 (H) 04/04/2017 09:05 AM     No results found for: CPK, CPKX, CPX  Lab Results   Component Value Date/Time    Sodium 144 04/22/2022 12:00 AM    Potassium 4.5 04/22/2022 12:00 AM    Chloride 106 04/22/2022 12:00 AM    CO2 18 (L) 04/22/2022 12:00 AM    Anion gap 3 (L) 08/04/2020 12:47 PM    Glucose 125 (H) 04/22/2022 12:00 AM    BUN 10 04/22/2022 12:00 AM    Creatinine 1.03 04/22/2022 12:00 AM    BUN/Creatinine ratio 10 04/22/2022 12:00 AM    GFR est  04/16/2021 12:00 AM    GFR est non- 04/16/2021 12:00 AM    Calcium 9.3 04/22/2022 12:00 AM    Bilirubin, total 0.7 04/22/2022 12:00 AM    Alk. phosphatase 87 04/22/2022 12:00 AM    Protein, total 7.2 04/22/2022 12:00 AM    Albumin 4.5 04/22/2022 12:00 AM    Globulin 4.8 (H) 08/04/2020 12:47 PM    A-G Ratio 1.7 04/22/2022 12:00 AM    ALT (SGPT) 37 04/22/2022 12:00 AM       EKG:  NSR        Assessment:          ICD-10-CM ICD-9-CM    1. PAF (paroxysmal atrial fibrillation) ([facility])  I48.0 427.31 AMB POC EKG ROUTINE W/ 12 LEADS, INTER & REP      2.  Chronic deep vein thrombosis (DVT) of proximal vein of both lower extremities ([facility]  I82.5Y3 453.51       3. Hx pulmonary embolism  Z86.711 V12.55       4. ILD (interstitial lung disease) ([facility]  J84.9 515       5. Mixed hyperlipidemia  E78.2 272.2           Orders Placed This Encounter    AMB POC EKG ROUTINE W/ 12 LEADS, INTER & REP     Order Specific Question:   Reason for Exam:     Answer:   ROUTINE     12/14/21    ECHO ADULT COMPLETE 12/15/2021 12/15/2021    Interpretation Summary    Left Ventricle: Left ventricle size is normal. Normal wall thickness. Normal wall motion. Normal left ventricular systolic function with a visually estimated EF of 60 - 65%. Grade I diastolic dysfunction with normal LAP. Left Atrium: Left atrium is mildly dilated. Signed by: Ammon Philip MD on 12/15/2021  2:43 PM         Plan:     PAF (paroxysmal atrial fibrillation) ([facility]  Hx of PAF, reports over 10+ years ago  2 week event 11/2021 no afib  LVEF 60-65% valves ok per echo 12/2021 2/18/2020 EF 50-55%  has been managed by Coreg and Coumadin. He will discuss with PCP at upcoming appointment whether he may benefit from change to Eliquis or Xarelto. He misses green vegetables. HLD  4/2022 LDL 60 On [facility] and lipid per PCP     Hx pulmonary embolism  Hx of PE, taking Coumadin. Personal history of DVT (deep vein thrombosis)  Hx of multiple DVT. Taking Coumadin for 20 years. Seen by Dr Libby Alejo in past.      ILD (interstitial lung disease) ([facility]  Hx of left VATS with wedge resection at [facility] 6/2020. [facility] clinic and was c/w HSP. Prev followed by Dr Agustin Rodriguez, last seen 2/10/21. Patient was made aware during visit today that all testing completed would be instantaneously available on their MyChart for review. Discussed that these results will be made available to the provider at the same time.   They were advised to wait at least 3 business days to allow for provider's interpretation of results with follow-up before calling our office with concerns about their results. Continue current care and f/u in 1 yr       Patient seen and examined by me with the above nurse practitioner. I personally performed all components of the history, physical, and medical decision making and agree with the assessment and plan with minor modifications as noted. Today the patient presents in sinus rhythm, with stable hyperlipidemia, tolerating Coumadin    General PE  Gen:  NAD  Mental Status - Alert. General Appearance - Not in acute distress. HEENT:  PERRL, no carotid bruits or JVD  Chest and Lung Exam   Inspection: Accessory muscles - No use of accessory muscles in breathing. Auscultation:   Breath sounds: - Normal.   Cardiovascular   Inspection: Jugular vein - Bilateral - Inspection Normal.   Palpation/Percussion:   Apical Impulse: - Normal.   Auscultation: Rhythm - Regular. Heart Sounds - S1 WNL and S2 WNL. No S3 or S4. Murmurs & Other Heart Sounds: Auscultation of the heart reveals - No Murmurs. Peripheral Vascular   Upper Extremity: Inspection - Bilateral - No Cyanotic nailbeds or Digital clubbing. Lower Extremity:   Palpation: Edema - Bilateral - No edema. Abdomen:   Soft, non-tender, bowel sounds are active. Neuro: A&O times 3, CN and motor grossly WNL    Continue current cardiac care. Encouraged healthy diet and exercise. Follow up in 1 year in the [place] clinic, sooner as needed.

## 2023-02-16 ENCOUNTER — OFFICE VISIT (OUTPATIENT)
Dept: FAMILY MEDICINE CLINIC | Age: 53
End: 2023-02-16
Payer: COMMERCIAL

## 2023-02-16 VITALS
WEIGHT: 292 LBS | DIASTOLIC BLOOD PRESSURE: 71 MMHG | TEMPERATURE: 98.2 F | OXYGEN SATURATION: 94 % | HEIGHT: 74 IN | RESPIRATION RATE: 17 BRPM | BODY MASS INDEX: 37.47 KG/M2 | SYSTOLIC BLOOD PRESSURE: 130 MMHG | HEART RATE: 68 BPM

## 2023-02-16 DIAGNOSIS — I48.0 PAF (PAROXYSMAL ATRIAL FIBRILLATION) (HCC): Primary | ICD-10-CM

## 2023-02-16 DIAGNOSIS — Z79.01 ANTICOAGULANT LONG-TERM USE: ICD-10-CM

## 2023-02-16 LAB
INR BLD: 2.5
PT POC: NORMAL
VALID INTERNAL CONTROL?: YES

## 2023-02-16 PROCEDURE — 85610 PROTHROMBIN TIME: CPT | Performed by: STUDENT IN AN ORGANIZED HEALTH CARE EDUCATION/TRAINING PROGRAM

## 2023-02-16 PROCEDURE — 99213 OFFICE O/P EST LOW 20 MIN: CPT | Performed by: STUDENT IN AN ORGANIZED HEALTH CARE EDUCATION/TRAINING PROGRAM

## 2023-02-16 NOTE — PROGRESS NOTES
[facility]  [address]. [place], [address] [place] [address]  [phone]    Chief Complaint: INR monitoring   Juan Alberto Melendez is a 47 y.o. male , established patient, here for evaluation of the concern(s) above;    PAF  On coumadin for more than 20 yrs, currently at 5mg (6/7 days per week)  On coreg 3.125mg BID  Also has a history of 3 DVT's in the past including a PE. Echo (12/2021) - normal (LVEF 60-65%). Needs INR check today  Follows with cards (Dr. Kirk Loo), had appointment recently. Pt would like to discuss getting on Eliquis as he misses his vegetables. Compliant with his meds    Denies any chest pain, nausea, vomiting, abdominal distention, bowel obstruction, fever or chills. Allergies - reviewed: Allergies   Allergen Reactions    Bee Sting [Sting, Bee] Swelling    Neomycin Rash    Neosporin [Neomycin-Bacitracin-Polymyxin] Rash    Penicillins Unknown (comments)       Past Medical History - reviewed:  Past Medical History:   Diagnosis Date    A-fib ([address]     Allergic rhinitis     Anxiety disorder     Cellulitis     post tattoo    Deep vein thrombosis (DVT) ([address]     [facility]  [facility]17 f/u note to manage Lovenox    Essential hypertension     Essential tremor     BILATERAL HANDS    Hyperlipidemia     Laceration of arm 09/23/2017    Left cut by a chain saw.     Long term current use of anticoagulant therapy     Nicotine vapor product user     STOPPED 2018    Phlebitis     Psychiatric disorder     ANXIETY WITH PANICK ATTACK    Pulmonary embolism ([address] 3/2011    multiple episodes    Rectal bleeding 10/2011    on coumadin @ the time &again 4/2017 4/12/17 note from Carla//colonoscopy report rec'd    Shortness of breath     Thromboembolus ([address]     Umbilical hernia     Vitamin D deficiency 04/2014    again 4/2017       Depression screening:  3 most recent PHQ Screens 2/16/2023   Little interest or pleasure in doing things Not at all Feeling down, depressed, irritable, or hopeless Not at all   Total Score PHQ 2 0   Trouble falling or staying asleep, or sleeping too much -   Feeling tired or having little energy -   Poor appetite, weight loss, or overeating -   Feeling bad about yourself - or that you are a failure or have let yourself or your family down -   Trouble concentrating on things such as school, work, reading, or watching TV -   Moving or speaking so slowly that other people could have noticed; or the opposite being so fidgety that others notice -   Thoughts of being better off dead, or hurting yourself in some way -   PHQ 9 Score -   How difficult have these problems made it for you to do your work, take care of your home and get along with others -         Review of systems:      A comprehensive review of systems was negative except for that written in the History of Present Illness. Physical Exam  Visit Vitals  /71 (BP 1 Location: Left upper arm, BP Patient Position: Sitting, BP Cuff Size: Adult)   Pulse 68   Temp 98.2 °F (36.8 °C) (Oral)   Resp 17   Ht 6' 2\" (1.88 m)   Wt 292 lb (132.5 kg)   SpO2 94%   BMI 37.49 kg/m²       General: Alert and oriented, in no acute distress. Well nourished. LUNGS: Respirations unlabored; clear to auscultation bilaterally. CARDIOVASCULAR: Regular, rate, and rhythm without murmurs, gallops or rubs. ABDOMEN: Soft; nontender; nondistended; normoactive bowel sounds; no masses or organomegaly. MUSCULOSKELETAL: FROM in all extremities     EXT: No edema. Neurovascularlly intact. Normal gait. Neurological: Alert and oriented X 3, normal strength and tone. Normal symmetric reflexes. Normal coordination and gait       Assessment/Plan    ICD-10-CM ICD-9-CM    1. PAF (paroxysmal atrial fibrillation) ([facility])  I48.0 427.31 AMB POC PT/INR      apixaban (ELIQUIS) 5 mg tablet      2. Anticoagulant long-term use  Z79.01 V58.61 AMB POC PT/INR      apixaban (ELIQUIS) 5 mg tablet          1.  PAF (paroxysmal atrial fibrillation) ([facility])  Echo (12/2021) - normal (LVEF 60-65%). INR 2.5 (goal 2-3)  Plan to transition to Eliquis if covered by patient's insurance. He will continue coumadin for now while inquiring about cost of meds. Continue taking coumadin 5mg (daily except for sunday). - continue carvediloL (COREG) 3.125 mg tablet; Take 1 Tablet by mouth two (2) times daily (with meals). - apixaban (ELIQUIS) 5 mg tablet; Take 1 Tablet by mouth two (2) times a day. 2. Anticoagulant long-term use  - AMB POC PT/INR  - apixaban (ELIQUIS) 5 mg tablet; Take 1 Tablet by mouth two (2) times a day. Follow up: 1 month    We discussed the expected course, resolution and complications of the diagnosis(es) in detail. Medication risks, benefits, costs, interactions, and alternatives were discussed as indicated. I advised him to contact the office if his condition worsens, changes or fails to improve as anticipated. He expressed understanding with the diagnosis(es) and plan. Patient understands that this encounter was a temporary measure, and the importance of further follow up and examination was emphasized. Patient verbalized understanding.       Signed By: Bijan Arita MD     February 16, 2023

## 2023-02-16 NOTE — PROGRESS NOTES
Name and  Verified. Pharmacy verified     Chief Complaint   Patient presents with    Anticoagulation     Follow up PT/INR    Medication Evaluation     Consult on Plavix or Eliquis       1. Have you been to the ER, urgent care clinic since your last visit? Hospitalized since your last visit? No    2. Have you seen or consulted any other health care providers outside of the [address] [place] [address] since your last visit? Include any pap smears or colon screening.  No    Health Maintenance Due   Topic Date Due    Shingles Vaccine (1 of 2) Never done    COVID-19 Vaccine (3 - Booster for Pfizer series) 2021    Flu Vaccine (1) 2022

## 2023-03-04 DIAGNOSIS — K21.9 GASTROESOPHAGEAL REFLUX DISEASE, UNSPECIFIED WHETHER ESOPHAGITIS PRESENT: ICD-10-CM

## 2023-03-05 RX ORDER — FAMOTIDINE 20 MG/1
20 TABLET, FILM COATED ORAL DAILY
Qty: 90 TABLET | Refills: 3 | Status: SHIPPED | OUTPATIENT
Start: 2023-03-05

## 2023-03-17 ENCOUNTER — OFFICE VISIT (OUTPATIENT)
Dept: FAMILY MEDICINE CLINIC | Age: 53
End: 2023-03-17

## 2023-03-17 VITALS
HEIGHT: 74 IN | OXYGEN SATURATION: 95 % | TEMPERATURE: 97.6 F | DIASTOLIC BLOOD PRESSURE: 76 MMHG | SYSTOLIC BLOOD PRESSURE: 116 MMHG | RESPIRATION RATE: 17 BRPM | HEART RATE: 96 BPM | BODY MASS INDEX: 37.09 KG/M2 | WEIGHT: 289 LBS

## 2023-03-17 DIAGNOSIS — I48.0 PAF (PAROXYSMAL ATRIAL FIBRILLATION) (HCC): Primary | ICD-10-CM

## 2023-03-17 DIAGNOSIS — Z79.01 ANTICOAGULANT LONG-TERM USE: ICD-10-CM

## 2023-03-17 NOTE — PROGRESS NOTES
[address] [facility]  [address]. [facility], [address]  [phone]    Chief Complaint: INR monitoring   Subjective  Lizeth Stoner is a 47 y.o. male , established patient, here for evaluation of the concern(s) above;    PAF  On coumadin for more than 20 yrs, currently at 5mg (6/7 days per week)  On coreg 3.125mg BID  Also has a history of 3 DVT's in the past including a PE. Echo (12/2021) - normal (LVEF 60-65%). Needs INR check today  Follows with cards (Dr. Maureen Stevenson). Compliant with his meds    Denies any chest pain, nausea, vomiting, abdominal distention, bowel obstruction, fever or chills. Allergies - reviewed: Allergies   Allergen Reactions    Bee Sting [Sting, Bee] Swelling    Neomycin Rash    Neosporin [Neomycin-Bacitracin-Polymyxin] Rash    Penicillins Unknown (comments)       Past Medical History - reviewed:  Past Medical History:   Diagnosis Date    A-fib ([facility]     Allergic rhinitis     Anxiety disorder     Cellulitis     post tattoo    Deep vein thrombosis (DVT) ([facility]     [facility]  [facility]17 f/u note to manage Lovenox    Essential hypertension     Essential tremor     BILATERAL HANDS    Hyperlipidemia     Laceration of arm 09/23/2017    Left cut by a chain saw.     Long term current use of anticoagulant therapy     Nicotine vapor product user     STOPPED 2018    Phlebitis     Psychiatric disorder     ANXIETY WITH PANICK ATTACK    Pulmonary embolism ([address] 3/2011    multiple episodes    Rectal bleeding 10/2011    on coumadin @ the time &again 4/2017 4/12/17 note from [facility]//colonoscopy report rec'd    Shortness of breath     Thromboembolus ([facility]     Umbilical hernia     Vitamin D deficiency 04/2014    again 4/2017       Depression screening:  3 most recent PHQ Screens 2/16/2023   Little interest or pleasure in doing things Not at all   Feeling down, depressed, irritable, or hopeless Not at all   Total Score PHQ 2 0   Trouble falling or staying asleep, or sleeping too much -   Feeling tired or having little energy -   Poor appetite, weight loss, or overeating -   Feeling bad about yourself - or that you are a failure or have let yourself or your family down -   Trouble concentrating on things such as school, work, reading, or watching TV -   Moving or speaking so slowly that other people could have noticed; or the opposite being so fidgety that others notice -   Thoughts of being better off dead, or hurting yourself in some way -   PHQ 9 Score -   How difficult have these problems made it for you to do your work, take care of your home and get along with others -         Review of systems:      A comprehensive review of systems was negative except for that written in the History of Present Illness. Physical Exam  Visit Vitals  /76 (BP 1 Location: Right arm, BP Patient Position: Sitting, BP Cuff Size: Adult)   Pulse 96   Temp 97.6 °F (36.4 °C) (Oral)   Resp 17   Ht 6' 2\" (1.88 m)   Wt 289 lb (131.1 kg)   SpO2 95%   BMI 37.11 kg/m²       General: Alert and oriented, in no acute distress. Well nourished. LUNGS: Respirations unlabored; clear to auscultation bilaterally. CARDIOVASCULAR: Regular, rate, and rhythm without murmurs, gallops or rubs. ABDOMEN: Soft; nontender; nondistended; normoactive bowel sounds; no masses or organomegaly. MUSCULOSKELETAL: FROM in all extremities     EXT: No edema. Neurovascularlly intact. Normal gait. Neurological: Alert and oriented X 3, normal strength and tone. Normal symmetric reflexes. Normal coordination and gait       Assessment/Plan    ICD-10-CM ICD-9-CM    1. PAF (paroxysmal atrial fibrillation) ([facility])  I48.0 427.31 AMB POC PT/INR      2. Anticoagulant long-term use  Z79.01 V58.61 AMB POC PT/INR          1. PAF (paroxysmal atrial fibrillation) ([facility])  Echo (12/2021) - normal (LVEF 60-65%). INR 2.7 (goal 2-3)  Continue taking coumadin 5mg (daily except for sunday).   - continue carvediloL (COREG) 3.125 mg tablet; Take 1 Tablet by mouth two (2) times daily (with meals). Follow up: 1 month    We discussed the expected course, resolution and complications of the diagnosis(es) in detail. Medication risks, benefits, costs, interactions, and alternatives were discussed as indicated. I advised him to contact the office if his condition worsens, changes or fails to improve as anticipated. He expressed understanding with the diagnosis(es) and plan. Patient understands that this encounter was a temporary measure, and the importance of further follow up and examination was emphasized. Patient verbalized understanding.       Signed By: Dayami Rader MD     March 17, 2023

## 2023-03-17 NOTE — PROGRESS NOTES
Name and  Verified. Pharmacy verified     Chief Complaint   Patient presents with    Follow-up     2023 PT/INR       1. Have you been to the ER, urgent care clinic since your last visit? Hospitalized since your last visit? No    2. Have you seen or consulted any other health care providers outside of the [address] [place] [address] since your last visit? Include any pap smears or colon screening.  No      Health Maintenance Due   Topic Date Due    Shingles Vaccine (1 of 2) Never done    COVID-19 Vaccine (3 - Booster for Pfizer series) 2021    Flu Vaccine (1) 2022

## 2023-04-18 ENCOUNTER — OFFICE VISIT (OUTPATIENT)
Dept: FAMILY MEDICINE CLINIC | Age: 53
End: 2023-04-18
Payer: COMMERCIAL

## 2023-04-18 VITALS
TEMPERATURE: 97 F | HEIGHT: 74 IN | HEART RATE: 79 BPM | RESPIRATION RATE: 17 BRPM | WEIGHT: 285 LBS | DIASTOLIC BLOOD PRESSURE: 74 MMHG | SYSTOLIC BLOOD PRESSURE: 115 MMHG | OXYGEN SATURATION: 97 % | BODY MASS INDEX: 36.57 KG/M2

## 2023-04-18 DIAGNOSIS — I48.0 PAF (PAROXYSMAL ATRIAL FIBRILLATION) (HCC): Primary | ICD-10-CM

## 2023-04-18 DIAGNOSIS — Z79.01 ANTICOAGULANT LONG-TERM USE: ICD-10-CM

## 2023-04-18 LAB
INR BLD: 2.3
PT POC: NORMAL
VALID INTERNAL CONTROL?: YES

## 2023-04-18 PROCEDURE — 85610 PROTHROMBIN TIME: CPT | Performed by: STUDENT IN AN ORGANIZED HEALTH CARE EDUCATION/TRAINING PROGRAM

## 2023-04-18 PROCEDURE — 93793 ANTICOAG MGMT PT WARFARIN: CPT | Performed by: STUDENT IN AN ORGANIZED HEALTH CARE EDUCATION/TRAINING PROGRAM

## 2023-04-18 NOTE — PROGRESS NOTES
Name and  Verified. Pharmacy verified     Chief Complaint   Patient presents with    Follow-up     1 Month 3/17/2023 PT/INR     Patient declined labs for today will get on next follow up visit. 1. Have you been to the ER, urgent care clinic since your last visit? Hospitalized since your last visit? No    2. Have you seen or consulted any other health care providers outside of the [address] [place] [address] since your last visit? Include any pap smears or colon screening.  No        Health Maintenance Due   Topic Date Due    Shingles Vaccine (1 of 2) Never done    COVID-19 Vaccine (3 - Booster for Pfizer series) 2021    A1C test (Diabetic or Prediabetic)  2023    Lipid Screen  2023

## 2023-04-18 NOTE — PROGRESS NOTES
[address]  [address]. [facility], [address]  [phone]    Chief Complaint: INR monitoring   Subjective  Florencio Cartwright is a 47 y.o. male , established patient, here for evaluation of the concern(s) above;    PAF  On coumadin for more than 20 yrs, currently at 5mg (6/7 days per week)  On coreg 3.125mg BID  Also has a history of 3 DVT's in the past including a PE. Echo (12/2021) - normal (LVEF 60-65%). Needs INR check today  Follows with cards (Dr. Sol Zapien). Compliant with his meds    Denies any chest pain, nausea, vomiting, abdominal distention, bowel obstruction, fever or chills. Allergies - reviewed: Allergies   Allergen Reactions    Bee Sting [Sting, Bee] Swelling    Neomycin Rash    Neosporin [Neomycin-Bacitracin-Polymyxin] Rash    Penicillins Unknown (comments)       Past Medical History - reviewed:  Past Medical History:   Diagnosis Date    A-fib ([facility]     Allergic rhinitis     Anxiety disorder     Cellulitis     post tattoo    Deep vein thrombosis (DVT) ([facility]     [facility]  [facility]/17 f/u note to manage Lovenox    Essential hypertension     Essential tremor     BILATERAL HANDS    Hyperlipidemia     Laceration of arm 09/23/2017    Left cut by a chain saw.     Long term current use of anticoagulant therapy     Nicotine vapor product user     STOPPED 2018    Phlebitis     Psychiatric disorder     ANXIETY WITH PANICK ATTACK    Pulmonary embolism ([facility] 3/2011    multiple episodes    Rectal bleeding 10/2011    on coumadin @ the time &again 4/2017 4/12/17 note from Carla//colonoscopy report rec'd    Shortness of breath     Thromboembolus ([facility]     Umbilical hernia     Vitamin D deficiency 04/2014    again 4/2017       Depression screening:  3 most recent PHQ Screens 2/16/2023   Little interest or pleasure in doing things Not at all   Feeling down, depressed, irritable, or hopeless Not at all   Total Score PHQ 2 0   Trouble falling or staying asleep, or sleeping too much -   Feeling tired or having little energy -   Poor appetite, weight loss, or overeating -   Feeling bad about yourself - or that you are a failure or have let yourself or your family down -   Trouble concentrating on things such as school, work, reading, or watching TV -   Moving or speaking so slowly that other people could have noticed; or the opposite being so fidgety that others notice -   Thoughts of being better off dead, or hurting yourself in some way -   PHQ 9 Score -   How difficult have these problems made it for you to do your work, take care of your home and get along with others -         Review of systems:      A comprehensive review of systems was negative except for that written in the History of Present Illness. Physical Exam  Visit Vitals  /74 (BP 1 Location: Left upper arm, BP Patient Position: Sitting, BP Cuff Size: Adult)   Pulse 79   Temp 97 °F (36.1 °C) (Oral)   Resp 17   Ht 6' 2\" (1.88 m)   Wt 285 lb (129.3 kg)   SpO2 97%   BMI 36.59 kg/m²       General: Alert and oriented, in no acute distress. Well nourished. LUNGS: Respirations unlabored; clear to auscultation bilaterally. CARDIOVASCULAR: Regular, rate, and rhythm without murmurs, gallops or rubs. ABDOMEN: Soft; nontender; nondistended; normoactive bowel sounds; no masses or organomegaly. MUSCULOSKELETAL: FROM in all extremities     EXT: No edema. Neurovascularlly intact. Normal gait. Neurological: Alert and oriented X 3, normal strength and tone. Normal symmetric reflexes. Normal coordination and gait       Assessment/Plan    ICD-10-CM ICD-9-CM    1. PAF (paroxysmal atrial fibrillation) ([facility])  I48.0 427.31 AMB POC PT/INR      2. Anticoagulant long-term use  Z79.01 V58.61 AMB POC PT/INR          1. PAF (paroxysmal atrial fibrillation) ([facility])  Echo (12/2021) - normal (LVEF 60-65%). INR 2.3 (goal 2-3)  Continue taking coumadin 5mg (daily except for sunday).   - continue carvediloL (COREG) 3.125 mg tablet; Take 1 Tablet by mouth two (2) times daily (with meals). Follow up: 1 month    We discussed the expected course, resolution and complications of the diagnosis(es) in detail. Medication risks, benefits, costs, interactions, and alternatives were discussed as indicated. I advised him to contact the office if his condition worsens, changes or fails to improve as anticipated. He expressed understanding with the diagnosis(es) and plan. Patient understands that this encounter was a temporary measure, and the importance of further follow up and examination was emphasized. Patient verbalized understanding.       Signed By: Ana Maria Silveira MD     April 18, 2023

## 2023-06-07 ENCOUNTER — OFFICE VISIT (OUTPATIENT)
Age: 53
End: 2023-06-07
Payer: COMMERCIAL

## 2023-06-07 VITALS
DIASTOLIC BLOOD PRESSURE: 77 MMHG | HEIGHT: 74 IN | RESPIRATION RATE: 17 BRPM | TEMPERATURE: 98.2 F | SYSTOLIC BLOOD PRESSURE: 127 MMHG | BODY MASS INDEX: 35.81 KG/M2 | OXYGEN SATURATION: 97 % | HEART RATE: 67 BPM | WEIGHT: 279 LBS

## 2023-06-07 DIAGNOSIS — E66.01 SEVERE OBESITY (BMI 35.0-39.9) WITH COMORBIDITY (HCC): ICD-10-CM

## 2023-06-07 DIAGNOSIS — Z79.01 LONG TERM (CURRENT) USE OF ANTICOAGULANTS: ICD-10-CM

## 2023-06-07 DIAGNOSIS — E78.2 MIXED HYPERLIPIDEMIA: ICD-10-CM

## 2023-06-07 DIAGNOSIS — I48.0 PAROXYSMAL ATRIAL FIBRILLATION (HCC): Primary | ICD-10-CM

## 2023-06-07 DIAGNOSIS — E08.9 DIABETES MELLITUS DUE TO UNDERLYING CONDITION, CONTROLLED, WITHOUT COMPLICATION, WITHOUT LONG-TERM CURRENT USE OF INSULIN (HCC): ICD-10-CM

## 2023-06-07 LAB
HBA1C MFR BLD HPLC: 8.9 %
POC INR: 2.1
PROTHROMBIN TIME, POC: NORMAL

## 2023-06-07 PROCEDURE — 85610 PROTHROMBIN TIME: CPT | Performed by: STUDENT IN AN ORGANIZED HEALTH CARE EDUCATION/TRAINING PROGRAM

## 2023-06-07 PROCEDURE — 99214 OFFICE O/P EST MOD 30 MIN: CPT | Performed by: STUDENT IN AN ORGANIZED HEALTH CARE EDUCATION/TRAINING PROGRAM

## 2023-06-07 SDOH — ECONOMIC STABILITY: FOOD INSECURITY: WITHIN THE PAST 12 MONTHS, THE FOOD YOU BOUGHT JUST DIDN'T LAST AND YOU DIDN'T HAVE MONEY TO GET MORE.: NEVER TRUE

## 2023-06-07 SDOH — ECONOMIC STABILITY: TRANSPORTATION INSECURITY
IN THE PAST 12 MONTHS, HAS THE LACK OF TRANSPORTATION KEPT YOU FROM MEDICAL APPOINTMENTS OR FROM GETTING MEDICATIONS?: NO

## 2023-06-07 SDOH — HEALTH STABILITY: PHYSICAL HEALTH: ON AVERAGE, HOW MANY DAYS PER WEEK DO YOU ENGAGE IN MODERATE TO STRENUOUS EXERCISE (LIKE A BRISK WALK)?: 6 DAYS

## 2023-06-07 SDOH — ECONOMIC STABILITY: INCOME INSECURITY: HOW HARD IS IT FOR YOU TO PAY FOR THE VERY BASICS LIKE FOOD, HOUSING, MEDICAL CARE, AND HEATING?: NOT HARD AT ALL

## 2023-06-07 SDOH — ECONOMIC STABILITY: TRANSPORTATION INSECURITY
IN THE PAST 12 MONTHS, HAS LACK OF TRANSPORTATION KEPT YOU FROM MEETINGS, WORK, OR FROM GETTING THINGS NEEDED FOR DAILY LIVING?: NO

## 2023-06-07 SDOH — ECONOMIC STABILITY: HOUSING INSECURITY
IN THE LAST 12 MONTHS, WAS THERE A TIME WHEN YOU DID NOT HAVE A STEADY PLACE TO SLEEP OR SLEPT IN A SHELTER (INCLUDING NOW)?: NO

## 2023-06-07 SDOH — HEALTH STABILITY: PHYSICAL HEALTH: ON AVERAGE, HOW MANY MINUTES DO YOU ENGAGE IN EXERCISE AT THIS LEVEL?: 30 MIN

## 2023-06-07 SDOH — ECONOMIC STABILITY: FOOD INSECURITY: WITHIN THE PAST 12 MONTHS, YOU WORRIED THAT YOUR FOOD WOULD RUN OUT BEFORE YOU GOT MONEY TO BUY MORE.: NEVER TRUE

## 2023-06-07 ASSESSMENT — SOCIAL DETERMINANTS OF HEALTH (SDOH)
WITHIN THE LAST YEAR, HAVE YOU BEEN HUMILIATED OR EMOTIONALLY ABUSED IN OTHER WAYS BY YOUR PARTNER OR EX-PARTNER?: NO
HOW OFTEN DO YOU GET TOGETHER WITH FRIENDS OR RELATIVES?: MORE THAN THREE TIMES A WEEK
WITHIN THE LAST YEAR, HAVE YOU BEEN KICKED, HIT, SLAPPED, OR OTHERWISE PHYSICALLY HURT BY YOUR PARTNER OR EX-PARTNER?: NO
DO YOU BELONG TO ANY CLUBS OR ORGANIZATIONS SUCH AS CHURCH GROUPS UNIONS, FRATERNAL OR ATHLETIC GROUPS, OR SCHOOL GROUPS?: NO
HOW OFTEN DO YOU ATTEND CHURCH OR RELIGIOUS SERVICES?: NEVER
IN A TYPICAL WEEK, HOW MANY TIMES DO YOU TALK ON THE PHONE WITH FAMILY, FRIENDS, OR NEIGHBORS?: MORE THAN THREE TIMES A WEEK
HOW OFTEN DO YOU ATTENT MEETINGS OF THE CLUB OR ORGANIZATION YOU BELONG TO?: NEVER
WITHIN THE LAST YEAR, HAVE YOU BEEN AFRAID OF YOUR PARTNER OR EX-PARTNER?: NO
WITHIN THE LAST YEAR, HAVE TO BEEN RAPED OR FORCED TO HAVE ANY KIND OF SEXUAL ACTIVITY BY YOUR PARTNER OR EX-PARTNER?: NO

## 2023-06-07 ASSESSMENT — LIFESTYLE VARIABLES
HOW MANY STANDARD DRINKS CONTAINING ALCOHOL DO YOU HAVE ON A TYPICAL DAY: 1 OR 2
HOW OFTEN DO YOU HAVE A DRINK CONTAINING ALCOHOL: MONTHLY OR LESS

## 2023-06-07 NOTE — PROGRESS NOTES
Name and Date of Birth Verified    Pharmacy Verified    Chief Complaint   Patient presents with    Follow-up     4/18/2023 1 month PT/INR     Patient fasting for lab. [employer].      1. \"Have you been to the ER, urgent care clinic since your last visit? Hospitalized since your last visit? \" No    2. \"Have you seen or consulted any other health care providers outside of the [address] [place] [address] since your last visit? \" No     3. For patients aged 39-70: Has the patient had a colonoscopy / FIT/ Cologuard? Yes  4/28/2017  Next Due 4/28/2027    If the patient is female:    4. For patients aged 41-77: Has the patient had a mammogram within the past 2 years? N/A      5. For patients aged 21-65: Has the patient had a pap smear?  N/A     Health Maintenance Due   Topic Date Due    Shingles vaccine (1 of 2) Never done    COVID-19 Vaccine (3 - Booster for Pfizer series) 04/30/2021    A1C test (Diabetic or Prediabetic)  07/22/2022    Lipids  04/22/2023
measure, and the importance of further follow up and examination was emphasized. Patient verbalized understanding.       Signed By: Agustina Huitron MD     June 7, 2023

## 2023-07-07 RX ORDER — CARVEDILOL 3.12 MG/1
TABLET ORAL
Qty: 180 TABLET | Refills: 3 | Status: SHIPPED | OUTPATIENT
Start: 2023-07-07

## 2023-07-31 ENCOUNTER — OFFICE VISIT (OUTPATIENT)
Age: 53
End: 2023-07-31
Payer: COMMERCIAL

## 2023-07-31 VITALS
BODY MASS INDEX: 36.32 KG/M2 | OXYGEN SATURATION: 96 % | HEART RATE: 66 BPM | TEMPERATURE: 98.4 F | WEIGHT: 283 LBS | HEIGHT: 74 IN | RESPIRATION RATE: 17 BRPM | DIASTOLIC BLOOD PRESSURE: 78 MMHG | SYSTOLIC BLOOD PRESSURE: 128 MMHG

## 2023-07-31 DIAGNOSIS — Z79.01 LONG TERM (CURRENT) USE OF ANTICOAGULANTS: ICD-10-CM

## 2023-07-31 DIAGNOSIS — I48.0 PAF (PAROXYSMAL ATRIAL FIBRILLATION) (HCC): ICD-10-CM

## 2023-07-31 DIAGNOSIS — E11.65 TYPE 2 DIABETES MELLITUS WITH HYPERGLYCEMIA, WITHOUT LONG-TERM CURRENT USE OF INSULIN (HCC): Primary | ICD-10-CM

## 2023-07-31 LAB
HBA1C MFR BLD: 8.2 %
POC INR: 2.3
PROTHROMBIN TIME, POC: NORMAL

## 2023-07-31 PROCEDURE — 99214 OFFICE O/P EST MOD 30 MIN: CPT | Performed by: STUDENT IN AN ORGANIZED HEALTH CARE EDUCATION/TRAINING PROGRAM

## 2023-07-31 PROCEDURE — 85610 PROTHROMBIN TIME: CPT | Performed by: STUDENT IN AN ORGANIZED HEALTH CARE EDUCATION/TRAINING PROGRAM

## 2023-07-31 PROCEDURE — 83036 HEMOGLOBIN GLYCOSYLATED A1C: CPT | Performed by: STUDENT IN AN ORGANIZED HEALTH CARE EDUCATION/TRAINING PROGRAM

## 2023-07-31 RX ORDER — METFORMIN HYDROCHLORIDE 500 MG/1
500 TABLET, EXTENDED RELEASE ORAL 2 TIMES DAILY WITH MEALS
Qty: 180 TABLET | Refills: 1 | Status: SHIPPED | OUTPATIENT
Start: 2023-07-31

## 2023-07-31 NOTE — PROGRESS NOTES
Name and Date of Birth Verified    Pharmacy Verified    Chief Complaint   Patient presents with    Follow-up     6/7/2023 Paroxysmal atrial fibrillation        1. \"Have you been to the ER, urgent care clinic since your last visit? Hospitalized since your last visit? \" No    2. \"Have you seen or consulted any other health care providers outside of the [address] [place] [address] since your last visit? \" No     3. For patients aged 43-73: Has the patient had a colonoscopy / FIT/ Cologuard? Yes    4/28/2017    If the patient is female:    4. For patients aged 43-66: Has the patient had a mammogram within the past 2 years? N/A      5. For patients aged 21-65: Has the patient had a pap smear?  N/A     Health Maintenance Due   Topic Date Due    Pneumococcal 0-64 years Vaccine (1 - PCV) Never done    Shingles vaccine (1 of 2) Never done    COVID-19 Vaccine (3 - Booster for Pfizer series) 04/30/2021    Lipids  04/22/2023

## 2023-07-31 NOTE — PROGRESS NOTES
[address]  [address]. [place], [address] [facility]  [phone]    Chief Complaint: Diabetes and Afib    Subjective  Nupur Love is a 47 y.o. Black / [place] American male , established patient, here for evaluation of the concern(s) above;    Diabetes:  A1C 6.9% (04/2022). Pt hesitant to be on meds at that time. Due for recheck. PAF  On coumadin for more than 20 yrs, currently at 5mg (6/7 days per week)  On coreg 3.125mg BID  Also has a history of 3 DVT's in the past including a PE. Echo (12/2021) - normal (LVEF 60-65%). Needs INR check today  Follows with cards (Dr. Ugo Diaz). Compliant with his meds. Denies any chest pain, nausea, vomiting, abdominal distention, bowel obstruction, fever or chills. Allergies - reviewed: Allergies   Allergen Reactions    Bee Venom Swelling    Neomycin Rash    Penicillins      Other reaction(s): Unknown (comments)       Past Medical History - reviewed:  Past Medical History:   Diagnosis Date    A-fib ([address])     Allergic rhinitis     Anxiety disorder     Cellulitis     post tattoo    Deep vein thrombosis (DVT) ([address])     [facility]  4/24/17 f/u note to manage Lovenox    Essential hypertension     Essential tremor     BILATERAL HANDS    Hyperlipidemia     Laceration of arm 09/23/2017    Left cut by a chain saw.     Long term current use of anticoagulant therapy     Nicotine vapor product user     STOPPED 2018    Phlebitis     Psychiatric disorder     ANXIETY WITH PANICK ATTACK    Pulmonary embolism ([address]) 3/2011    multiple episodes    Rectal bleeding 10/2011    on coumadin @ the time &again 4/2017 4/12/17 note from Michel//colonoscopy report rec'd    Shortness of breath     Thromboembolus ([address])     Umbilical hernia     Vitamin D deficiency 04/2014    again 4/2017       Depression screening:  No data recorded    Review of systems:   A comprehensive review of systems was negative except for that written

## 2023-07-31 NOTE — PATIENT INSTRUCTIONS
Diabetes Mellitus Type 2: In order to keep your blood sugar under control (Goal A1C < 7%), you will have to work on your diet, exercise and overall lifestyle changes to prevent diabetes complications (Eye problems, heart problems, kidney problems etc). Here are things you will need to keep track of;    1) Check blood sugars 1-2 times daily either fasting (goal is ), before eating (goal is <140), or 2 hours after a meal (goal is less than 180). Alternate one meal a day to check (example: one day check after breakfast, one day after lunch, one day after dinner). Write down what you ate if your blood sugar is more than 180 so you can know to cut back or cut out this food from your diet. 2) If needed, I will order a machine to check your sugar at home. You can either pick it up at the pharmacy or buy it over the counter. 3) I strongly encourage you to do some lifestyle changes; diet and exercise routinely. Also avoid smoking, excessive alcohol consumption, sugar rich beverages or foods (like white rice, pasta, potatoes, white bread etc). Eat foods that are baked, broiled, boiled, steamed or grilled. Avoid greasy or fried foods.       4) Immunizations: Flu shot annually , Pneumovax (once before 72years old and then again after your are 72years old)    5) Eye exam yearly and routine foot exam

## 2023-08-31 ENCOUNTER — OFFICE VISIT (OUTPATIENT)
Age: 53
End: 2023-08-31
Payer: COMMERCIAL

## 2023-08-31 VITALS
HEART RATE: 61 BPM | BODY MASS INDEX: 35.04 KG/M2 | WEIGHT: 273 LBS | DIASTOLIC BLOOD PRESSURE: 72 MMHG | SYSTOLIC BLOOD PRESSURE: 111 MMHG | TEMPERATURE: 97.7 F | RESPIRATION RATE: 17 BRPM | HEIGHT: 74 IN | OXYGEN SATURATION: 96 %

## 2023-08-31 DIAGNOSIS — Z79.01 LONG TERM (CURRENT) USE OF ANTICOAGULANTS: Primary | ICD-10-CM

## 2023-08-31 LAB
POC INR: 1.8
PROTHROMBIN TIME, POC: ABNORMAL

## 2023-08-31 PROCEDURE — 85610 PROTHROMBIN TIME: CPT | Performed by: STUDENT IN AN ORGANIZED HEALTH CARE EDUCATION/TRAINING PROGRAM

## 2023-08-31 PROCEDURE — 93793 ANTICOAG MGMT PT WARFARIN: CPT | Performed by: STUDENT IN AN ORGANIZED HEALTH CARE EDUCATION/TRAINING PROGRAM

## 2023-08-31 NOTE — PROGRESS NOTES
[address] [facility] [address]  49 EMILY Pacheco Paige. Gurjit, [address] [facility]  [phone]    Chief Complaint: INR check    Subjective  Patricia Linder is a 47 y.o. Black / [place] American male , established patient, here for evaluation of the concern(s) above    PAF  On coumadin for more than 20 yrs, currently at 5mg (6/7 days per week)  On coreg 3.125mg BID  Also has a history of 3 DVT's in the past including a PE. Echo (12/2021) - normal (LVEF 60-65%). Needs INR check today  Follows with cards (Dr. Bjorn Melgoza). Compliant with his meds. Denies any chest pain, nausea, vomiting, abdominal distention, bowel obstruction, fever or chills. Allergies - reviewed: Allergies   Allergen Reactions    Bee Venom Swelling    Neomycin Rash    Penicillins      Other reaction(s): Unknown (comments)       Past Medical History - reviewed:  Past Medical History:   Diagnosis Date    A-fib ([address])     Allergic rhinitis     Anxiety disorder     Cellulitis     post tattoo    Deep vein thrombosis (DVT) ([address])     [facility]  [facility]/17 f/u note to manage Lovenox    Essential hypertension     Essential tremor     BILATERAL HANDS    Hyperlipidemia     Laceration of arm 09/23/2017    Left cut by a chain saw. Long term current use of anticoagulant therapy     Nicotine vapor product user     STOPPED 2018    Phlebitis     Psychiatric disorder     ANXIETY WITH PANICK ATTACK    Pulmonary embolism (HCC) 3/2011    multiple episodes    Rectal bleeding 10/2011    on coumadin @ the time &again 4/2017 4/12/17 note from [facility]//colonoscopy report rec'd    Shortness of breath     Thromboembolus ([address])     Umbilical hernia     Vitamin D deficiency 04/2014    again 4/2017       Depression screening:  No data recorded    Review of systems:   A comprehensive review of systems was negative except for that written in the History of Present Illness.      Physical Exam  /72 (Site: Left Upper Arm, Position:

## 2023-08-31 NOTE — PROGRESS NOTES
Name and Date of Birth Verified    Pharmacy Verified    Chief Complaint   Patient presents with    Follow-up     7/31/2023 PT/INR     Not fasting, [facility]. Labs. 1. \"Have you been to the ER, urgent care clinic since your last visit? Hospitalized since your last visit? \" No    2. \"Have you seen or consulted any other health care providers outside of the [address] [place] [address] since your last visit? \" No     3. For patients aged 43-73: Has the patient had a colonoscopy / FIT/ Cologuard? Yes 4/28/2017 Next Due: 4/28/2027      If the patient is female:    4. For patients aged 43-66: Has the patient had a mammogram within the past 2 years? N/A      5. For patients aged 21-65: Has the patient had a pap smear?  N/A     Health Maintenance Due   Topic Date Due    Pneumococcal 0-64 years Vaccine (1 - PCV) Never done    Diabetic foot exam  Never done    Diabetic Alb to Cr ratio (uACR) test  Never done    Diabetic retinal exam  Never done    Hepatitis B vaccine (1 of 3 - Risk 3-dose series) Never done    Shingles vaccine (1 of 2) Never done    COVID-19 Vaccine (3 - Booster for Pfizer series) 04/30/2021    Lipids  04/22/2023    GFR test (Diabetes, CKD 3-4, OR last GFR 15-59)  04/22/2023    Flu vaccine (1) 08/01/2023

## 2023-09-11 ENCOUNTER — TELEPHONE (OUTPATIENT)
Age: 53
End: 2023-09-11

## 2023-09-16 DIAGNOSIS — I48.11 LONGSTANDING PERSISTENT ATRIAL FIBRILLATION (HCC): Primary | ICD-10-CM

## 2023-09-16 RX ORDER — WARFARIN SODIUM 5 MG/1
TABLET ORAL
Qty: 90 TABLET | Refills: 1 | Status: SHIPPED | OUTPATIENT
Start: 2023-09-16

## 2023-10-02 ENCOUNTER — OFFICE VISIT (OUTPATIENT)
Age: 53
End: 2023-10-02
Payer: COMMERCIAL

## 2023-10-02 VITALS
DIASTOLIC BLOOD PRESSURE: 72 MMHG | OXYGEN SATURATION: 99 % | SYSTOLIC BLOOD PRESSURE: 109 MMHG | TEMPERATURE: 98.1 F | HEIGHT: 74 IN | BODY MASS INDEX: 35.68 KG/M2 | RESPIRATION RATE: 17 BRPM | HEART RATE: 79 BPM | WEIGHT: 278 LBS

## 2023-10-02 DIAGNOSIS — Z79.01 LONG TERM (CURRENT) USE OF ANTICOAGULANTS: ICD-10-CM

## 2023-10-02 DIAGNOSIS — E78.2 MIXED HYPERLIPIDEMIA: ICD-10-CM

## 2023-10-02 DIAGNOSIS — E11.65 TYPE 2 DIABETES MELLITUS WITH HYPERGLYCEMIA, WITHOUT LONG-TERM CURRENT USE OF INSULIN (HCC): Primary | ICD-10-CM

## 2023-10-02 LAB
HBA1C MFR BLD: 6.8 %
POC INR: 3.1
PROTHROMBIN TIME, POC: NORMAL

## 2023-10-02 PROCEDURE — 85610 PROTHROMBIN TIME: CPT | Performed by: STUDENT IN AN ORGANIZED HEALTH CARE EDUCATION/TRAINING PROGRAM

## 2023-10-02 PROCEDURE — 83036 HEMOGLOBIN GLYCOSYLATED A1C: CPT | Performed by: STUDENT IN AN ORGANIZED HEALTH CARE EDUCATION/TRAINING PROGRAM

## 2023-10-02 PROCEDURE — 99214 OFFICE O/P EST MOD 30 MIN: CPT | Performed by: STUDENT IN AN ORGANIZED HEALTH CARE EDUCATION/TRAINING PROGRAM

## 2023-10-02 RX ORDER — METFORMIN HYDROCHLORIDE 500 MG/1
500 TABLET, EXTENDED RELEASE ORAL 2 TIMES DAILY WITH MEALS
Qty: 180 TABLET | Refills: 1
Start: 2023-10-02

## 2023-10-02 NOTE — PROGRESS NOTES
Name and Date of Birth Verified    Pharmacy Verified    Chief Complaint   Patient presents with    Follow-up     8/31/20203 PT/INR  7/31/2023 Diabetes     Not fasting, [facility].    1. \"Have you been to the ER, urgent care clinic since your last visit? Hospitalized since your last visit? \" No    2. \"Have you seen or consulted any other health care providers outside of the [address] [place] [address] since your last visit? \" No     3. For patients aged 43-73: Has the patient had a colonoscopy / FIT/ Cologuard?      Yes 4/28/2017  Due: 2027          Health Maintenance Due   Topic Date Due    Hepatitis B vaccine (1 of 3 - 3-dose series) Never done    Pneumococcal 0-64 years Vaccine (1 - PCV) Never done    Diabetic foot exam  Never done    Diabetic Alb to Cr ratio (uACR) test  Never done    Diabetic retinal exam  Never done    Shingles vaccine (1 of 2) Never done    COVID-19 Vaccine (3 - Pfizer series) 04/30/2021    Lipids  04/22/2023    GFR test (Diabetes, CKD 3-4, OR last GFR 15-59)  04/22/2023    Flu vaccine (1) 08/01/2023

## 2023-10-02 NOTE — PROGRESS NOTES
[address]  [address] KARLI Vera. [facility], [address] [place] [address] Avenue  [phone]    Chief Complaint: Diabetes and Afib    Subjective  Nohelia La is a 46 y.o. Black / [place] American male , established patient, here for evaluation of the concern(s) above;    Diabetes:  A1C 6.9% (04/2022). ON METFORMIN 1g daily    PAF  On coumadin for more than 20 yrs, currently at 5mg (6/7 days per week)  On coreg 3.125mg BID  Also has a history of 3 DVT's in the past including a PE. Echo (12/2021) - normal (LVEF 60-65%). Needs INR check today  Follows with cards (Dr. Molly Spivey). Compliant with his meds. Denies any chest pain, nausea, vomiting, abdominal distention, bowel obstruction, fever or chills. Allergies - reviewed: Allergies   Allergen Reactions    Bee Venom Swelling    Neomycin Rash    Penicillins      Other reaction(s): Unknown (comments)       Past Medical History - reviewed:  Past Medical History:   Diagnosis Date    A-fib ([address])     Allergic rhinitis     Anxiety disorder     Cellulitis     post tattoo    Deep vein thrombosis (DVT) ([address])     [facility]  [facility]17 f/u note to manage Lovenox    Essential hypertension     Essential tremor     BILATERAL HANDS    Hyperlipidemia     Laceration of arm 09/23/2017    Left cut by a chain saw. Long term current use of anticoagulant therapy     Nicotine vapor product user     STOPPED 2018    Phlebitis     Psychiatric disorder     ANXIETY WITH PANICK ATTACK    Pulmonary embolism (HCC) 3/2011    multiple episodes    Rectal bleeding 10/2011    on coumadin @ the time &again 4/2017 4/12/17 note from [facility]//colonoscopy report rec'd    Shortness of breath     Thromboembolus ([address])     Umbilical hernia     Vitamin D deficiency 04/2014    again 4/2017       Depression screening:  No data recorded    Review of systems:   A comprehensive review of systems was negative except for that written in the History of Present Illness.

## 2023-10-13 RX ORDER — CARVEDILOL 3.12 MG/1
TABLET ORAL
Qty: 180 TABLET | Refills: 0 | Status: SHIPPED | OUTPATIENT
Start: 2023-10-13

## 2023-10-24 NOTE — TELEPHONE ENCOUNTER
Last appointment: 10/2/23  Next appointment: 11/9/23  Previous refill encounter(s): 10/3/22 #90 with 3 refills    Requested Prescriptions     Pending Prescriptions Disp Refills    rosuvastatin (CRESTOR) 20 MG tablet [Pharmacy Med Name: ROSUVASTATIN 20MG TABLETS] 90 tablet 3     Sig: TAKE 1 TABLET BY MOUTH [facility] Tracking Only    Program: Medication Refill  CPA in place:    Recommendation Provided To:    Intervention Detail: New Rx: 1, reason: Patient Preference  Intervention Accepted By:   Marce Alberto Closed?:    Time Spent (min): 5

## 2023-10-25 RX ORDER — ROSUVASTATIN CALCIUM 20 MG/1
TABLET, COATED ORAL
Qty: 90 TABLET | Refills: 3 | Status: SHIPPED | OUTPATIENT
Start: 2023-10-25

## 2023-11-05 ENCOUNTER — OFFICE VISIT (OUTPATIENT)
Age: 53
End: 2023-11-05

## 2023-11-05 VITALS
HEART RATE: 67 BPM | RESPIRATION RATE: 16 BRPM | TEMPERATURE: 98.3 F | SYSTOLIC BLOOD PRESSURE: 144 MMHG | WEIGHT: 275 LBS | OXYGEN SATURATION: 98 % | HEIGHT: 74 IN | BODY MASS INDEX: 35.29 KG/M2 | DIASTOLIC BLOOD PRESSURE: 88 MMHG

## 2023-11-05 DIAGNOSIS — S60.459A FOREIGN BODY IN SKIN OF FINGER, INITIAL ENCOUNTER: Primary | ICD-10-CM

## 2023-11-09 ENCOUNTER — OFFICE VISIT (OUTPATIENT)
Age: 53
End: 2023-11-09

## 2023-11-09 VITALS
SYSTOLIC BLOOD PRESSURE: 131 MMHG | WEIGHT: 274.4 LBS | BODY MASS INDEX: 35.22 KG/M2 | RESPIRATION RATE: 20 BRPM | HEART RATE: 72 BPM | DIASTOLIC BLOOD PRESSURE: 76 MMHG | OXYGEN SATURATION: 95 % | HEIGHT: 74 IN | TEMPERATURE: 97.6 F

## 2023-11-09 DIAGNOSIS — Z79.01 LONG TERM (CURRENT) USE OF ANTICOAGULANTS: Primary | ICD-10-CM

## 2023-11-09 LAB
POC INR: 2.4
PROTHROMBIN TIME, POC: NORMAL

## 2023-11-09 ASSESSMENT — PATIENT HEALTH QUESTIONNAIRE - PHQ9
SUM OF ALL RESPONSES TO PHQ QUESTIONS 1-9: 0
1. LITTLE INTEREST OR PLEASURE IN DOING THINGS: 0
SUM OF ALL RESPONSES TO PHQ9 QUESTIONS 1 & 2: 0
SUM OF ALL RESPONSES TO PHQ QUESTIONS 1-9: 0
2. FEELING DOWN, DEPRESSED OR HOPELESS: 0

## 2023-12-11 ENCOUNTER — OFFICE VISIT (OUTPATIENT)
Age: 53
End: 2023-12-11
Payer: COMMERCIAL

## 2023-12-11 VITALS
WEIGHT: 264.55 LBS | RESPIRATION RATE: 17 BRPM | BODY MASS INDEX: 33.95 KG/M2 | TEMPERATURE: 96.9 F | HEIGHT: 74 IN | SYSTOLIC BLOOD PRESSURE: 135 MMHG | DIASTOLIC BLOOD PRESSURE: 87 MMHG | HEART RATE: 75 BPM | OXYGEN SATURATION: 99 %

## 2023-12-11 DIAGNOSIS — K42.9 UMBILICAL HERNIA WITHOUT OBSTRUCTION AND WITHOUT GANGRENE: Primary | ICD-10-CM

## 2023-12-11 DIAGNOSIS — Z79.01 LONG TERM (CURRENT) USE OF ANTICOAGULANTS: ICD-10-CM

## 2023-12-11 LAB
POC INR: 2.8
PROTHROMBIN TIME, POC: NORMAL

## 2023-12-11 PROCEDURE — 99213 OFFICE O/P EST LOW 20 MIN: CPT | Performed by: STUDENT IN AN ORGANIZED HEALTH CARE EDUCATION/TRAINING PROGRAM

## 2023-12-11 PROCEDURE — 85610 PROTHROMBIN TIME: CPT | Performed by: STUDENT IN AN ORGANIZED HEALTH CARE EDUCATION/TRAINING PROGRAM

## 2023-12-15 ENCOUNTER — OFFICE VISIT (OUTPATIENT)
Age: 53
End: 2023-12-15
Payer: COMMERCIAL

## 2023-12-15 VITALS
HEART RATE: 76 BPM | DIASTOLIC BLOOD PRESSURE: 78 MMHG | BODY MASS INDEX: 35.11 KG/M2 | TEMPERATURE: 97.5 F | OXYGEN SATURATION: 94 % | RESPIRATION RATE: 18 BRPM | WEIGHT: 273.6 LBS | HEIGHT: 74 IN | SYSTOLIC BLOOD PRESSURE: 128 MMHG

## 2023-12-15 DIAGNOSIS — K43.2 INCISIONAL HERNIA, WITHOUT OBSTRUCTION OR GANGRENE: Primary | ICD-10-CM

## 2023-12-15 PROCEDURE — 99213 OFFICE O/P EST LOW 20 MIN: CPT | Performed by: SURGERY

## 2023-12-15 ASSESSMENT — PATIENT HEALTH QUESTIONNAIRE - PHQ9
SUM OF ALL RESPONSES TO PHQ9 QUESTIONS 1 & 2: 0
SUM OF ALL RESPONSES TO PHQ QUESTIONS 1-9: 0
SUM OF ALL RESPONSES TO PHQ QUESTIONS 1-9: 0
2. FEELING DOWN, DEPRESSED OR HOPELESS: 0
SUM OF ALL RESPONSES TO PHQ QUESTIONS 1-9: 0
SUM OF ALL RESPONSES TO PHQ QUESTIONS 1-9: 0
1. LITTLE INTEREST OR PLEASURE IN DOING THINGS: 0

## 2024-01-16 ENCOUNTER — OFFICE VISIT (OUTPATIENT)
Age: 54
End: 2024-01-16
Payer: COMMERCIAL

## 2024-01-16 VITALS
RESPIRATION RATE: 17 BRPM | BODY MASS INDEX: 34.8 KG/M2 | DIASTOLIC BLOOD PRESSURE: 83 MMHG | TEMPERATURE: 96.9 F | HEIGHT: 74 IN | WEIGHT: 271.17 LBS | OXYGEN SATURATION: 96 % | HEART RATE: 67 BPM | SYSTOLIC BLOOD PRESSURE: 123 MMHG

## 2024-01-16 DIAGNOSIS — Z79.01 LONG TERM (CURRENT) USE OF ANTICOAGULANTS: ICD-10-CM

## 2024-01-16 DIAGNOSIS — E11.65 TYPE 2 DIABETES MELLITUS WITH HYPERGLYCEMIA, WITHOUT LONG-TERM CURRENT USE OF INSULIN (HCC): Primary | ICD-10-CM

## 2024-01-16 LAB
HBA1C MFR BLD: 6.5 %
POC INR: 1.8
PROTHROMBIN TIME, POC: ABNORMAL

## 2024-01-16 PROCEDURE — 85610 PROTHROMBIN TIME: CPT | Performed by: STUDENT IN AN ORGANIZED HEALTH CARE EDUCATION/TRAINING PROGRAM

## 2024-01-16 PROCEDURE — 83036 HEMOGLOBIN GLYCOSYLATED A1C: CPT | Performed by: STUDENT IN AN ORGANIZED HEALTH CARE EDUCATION/TRAINING PROGRAM

## 2024-01-16 PROCEDURE — 99213 OFFICE O/P EST LOW 20 MIN: CPT | Performed by: STUDENT IN AN ORGANIZED HEALTH CARE EDUCATION/TRAINING PROGRAM

## 2024-01-16 RX ORDER — METFORMIN HYDROCHLORIDE 500 MG/1
500 TABLET, EXTENDED RELEASE ORAL 2 TIMES DAILY WITH MEALS
Qty: 180 TABLET | Refills: 1 | Status: SHIPPED | OUTPATIENT
Start: 2024-01-16

## 2024-01-16 ASSESSMENT — PATIENT HEALTH QUESTIONNAIRE - PHQ9
1. LITTLE INTEREST OR PLEASURE IN DOING THINGS: 0
2. FEELING DOWN, DEPRESSED OR HOPELESS: 0
SUM OF ALL RESPONSES TO PHQ9 QUESTIONS 1 & 2: 0
SUM OF ALL RESPONSES TO PHQ QUESTIONS 1-9: 0

## 2024-01-16 NOTE — PROGRESS NOTES
[facility]  [address] [place] [address] [phone]    Chief Complaint:  chronic medical problems    Subjective  Garfield Chavez is a 53 y.o. Black /  male , established patient, here for evaluation of the concern(s) above;    PMHx: DM II, HLD, umbilical hernia and Afib    Diabetes:  A1C 6.9% (04/2022).  ON METFORMIN 1g daily and compliant with meds     PAF  On coumadin for more than 20 yrs, currently at 5mg (6/7 days per week)  On coreg 3.125mg BID  Also has a history of 3 DVT's in the past including a PE.  Echo (12/2021) - normal (LVEF 60-65%).  Needs INR check today  Follows with cards (Dr. Fleming).  Compliant with his meds.    Denies any chest pain, nausea, vomiting, abdominal distention, bowel obstruction, fever or chills.        Allergies - reviewed:   Allergies   Allergen Reactions    Bee Venom Swelling    Neomycin Rash    Penicillins      Other reaction(s): Unknown (comments)       Past Medical History - reviewed:  Past Medical History:   Diagnosis Date    A-fib (HCC)     Allergic rhinitis     Anxiety disorder     Cellulitis     post tattoo    Deep vein thrombosis (DVT) (HCC)     [facility]  4/[facility]/17 f/u note to manage Lovenox    Essential hypertension     Essential tremor     BILATERAL HANDS    Hyperlipidemia     Laceration of arm 09/23/2017    Left cut by a chain saw.    Long term current use of anticoagulant therapy     Nicotine vapor product user     STOPPED 2018    Phlebitis     Psychiatric disorder     ANXIETY WITH PANICK ATTACK    Pulmonary embolism (HCC) 3/2011    multiple episodes    Rectal bleeding 10/2011    on coumadin @ the time &again 4/2017 4/12/17 note from [facility]//colonoscopy report rec'd    Shortness of breath     Thromboembolus (HCC)     Umbilical hernia     Vitamin D deficiency 04/2014    again 4/2017       Depression screening:  PHQ-9 Total Score: 0 (1/16/2024  9:11 AM)      Review of systems:   A

## 2024-01-16 NOTE — PROGRESS NOTES
Chief Complaint   Patient presents with    Follow-up     10/2/2023 PT/INR  Diabetes Last A1C 6.8%        The patient, Garfield Chavez, identity was verified by name and , pharmacy verified  Labs:Yes  Fasting:Yes-[facility] Labs    \"Have you been to the ER, urgent care clinic since your last visit?  Hospitalized since your last visit?\"    NO    “Have you seen or consulted any other health care providers outside of [facility] since your last visit?”    NO             Vitals:    24 0911   BP: 123/83   Pulse: 67   Resp: 17   Temp: 96.9 °F (36.1 °C)   SpO2: 96%     Health Maintenance Due   Topic Date Due    Hepatitis B vaccine (1 of 3 - 3-dose series) Never done    Pneumococcal 0-64 years Vaccine (1 - PCV) Never done    Diabetic foot exam  Never done    Diabetic Alb to Cr ratio (uACR) test  Never done    Diabetic retinal exam  Never done    Shingles vaccine (1 of 2) Never done    Lipids  2023    GFR test (Diabetes, CKD 3-4, OR last GFR 15-59)  2023

## 2024-01-25 DIAGNOSIS — E11.65 TYPE 2 DIABETES MELLITUS WITH HYPERGLYCEMIA, WITHOUT LONG-TERM CURRENT USE OF INSULIN (HCC): ICD-10-CM

## 2024-01-30 RX ORDER — METFORMIN HYDROCHLORIDE 500 MG/1
500 TABLET, EXTENDED RELEASE ORAL 2 TIMES DAILY WITH MEALS
Qty: 180 TABLET | Refills: 1 | OUTPATIENT
Start: 2024-01-30

## 2024-02-20 ENCOUNTER — OFFICE VISIT (OUTPATIENT)
Age: 54
End: 2024-02-20
Payer: COMMERCIAL

## 2024-02-20 VITALS
SYSTOLIC BLOOD PRESSURE: 130 MMHG | HEIGHT: 74 IN | WEIGHT: 264 LBS | OXYGEN SATURATION: 96 % | DIASTOLIC BLOOD PRESSURE: 84 MMHG | BODY MASS INDEX: 33.88 KG/M2 | HEART RATE: 74 BPM

## 2024-02-20 DIAGNOSIS — Z86.711 PERSONAL HISTORY OF PULMONARY EMBOLISM: ICD-10-CM

## 2024-02-20 DIAGNOSIS — I82.5Y3 CHRONIC EMBOLISM AND THROMBOSIS OF UNSPECIFIED DEEP VEINS OF PROXIMAL LOWER EXTREMITY, BILATERAL (HCC): ICD-10-CM

## 2024-02-20 DIAGNOSIS — J84.9 INTERSTITIAL PULMONARY DISEASE, UNSPECIFIED (HCC): ICD-10-CM

## 2024-02-20 DIAGNOSIS — E78.2 MIXED HYPERLIPIDEMIA: ICD-10-CM

## 2024-02-20 DIAGNOSIS — I48.0 PAROXYSMAL ATRIAL FIBRILLATION (HCC): Primary | ICD-10-CM

## 2024-02-20 PROCEDURE — 93000 ELECTROCARDIOGRAM COMPLETE: CPT | Performed by: INTERNAL MEDICINE

## 2024-02-20 PROCEDURE — 99214 OFFICE O/P EST MOD 30 MIN: CPT | Performed by: INTERNAL MEDICINE

## 2024-02-20 NOTE — PROGRESS NOTES
Questionnaire     Feeling of Stress : Only a little   Social Connections: Socially Isolated (6/7/2023)    Social Connection and Isolation Panel [NHANES]     Frequency of Communication with Friends and Family: More than three times a week     Frequency of Social Gatherings with Friends and Family: More than three times a week     Attends Scientologist Services: Never     Active Member of Clubs or Organizations: No     Attends Club or Organization Meetings: Never     Marital Status:    Intimate Partner Violence: Not At Risk (6/7/2023)    Humiliation, Afraid, Rape, and Kick questionnaire     Fear of Current or Ex-Partner: No     Emotionally Abused: No     Physically Abused: No     Sexually Abused: No   Housing Stability: Unknown (6/7/2023)    Housing Stability Vital Sign     Unable to Pay for Housing in the Last Year: Not on file     Number of Places Lived in the Last Year: Not on file     Unstable Housing in the Last Year: No      Current Outpatient Medications   Medication Sig    metFORMIN (GLUCOPHAGE-XR) 500 MG extended release tablet Take 1 tablet by mouth with breakfast and with evening meal    rosuvastatin (CRESTOR) 20 MG tablet TAKE 1 TABLET BY MOUTH EVERY NIGHT    carvedilol (COREG) 3.125 MG tablet TAKE 1 TABLET BY MOUTH TWICE DAILY WITH MEALS    warfarin (COUMADIN) 5 MG tablet TAKE 1 TABLET DAILY FROM MONDAY TO SATURDAY. SKIP SUNDAY    famotidine (PEPCID) 20 MG tablet Take by mouth daily     No current facility-administered medications for this visit.           Review of Symptoms:  11 systems reviewed, negative other than as stated in the HPI    Physical ExamPhysical Exam:    There were no vitals filed for this visit.  There is no height or weight on file to calculate BMI.    General PE   General:  Well developed, in no acute distress, cooperative and alert  HEENT: No carotid bruits, no JVD, trach is midline. Neck Supple, PEERL, EOM intact.  Heart:  reg rate and rhythm; normal S1/S2; no murmurs, gallops

## 2024-03-05 ENCOUNTER — HOSPITAL ENCOUNTER (EMERGENCY)
Facility: HOSPITAL | Age: 54
Discharge: HOME | End: 2024-03-06
Attending: STUDENT IN AN ORGANIZED HEALTH CARE EDUCATION/TRAINING PROGRAM
Payer: COMMERCIAL

## 2024-03-05 VITALS
HEIGHT: 74 IN | BODY MASS INDEX: 35.14 KG/M2 | TEMPERATURE: 98.1 F | OXYGEN SATURATION: 96 % | RESPIRATION RATE: 18 BRPM | WEIGHT: 273.81 LBS | HEART RATE: 86 BPM | SYSTOLIC BLOOD PRESSURE: 144 MMHG | DIASTOLIC BLOOD PRESSURE: 76 MMHG

## 2024-03-05 DIAGNOSIS — T78.40XA ALLERGIC REACTION, INITIAL ENCOUNTER: Primary | ICD-10-CM

## 2024-03-05 PROCEDURE — 99284 EMERGENCY DEPT VISIT MOD MDM: CPT

## 2024-03-05 PROCEDURE — A4216 STERILE WATER/SALINE, 10 ML: HCPCS | Performed by: STUDENT IN AN ORGANIZED HEALTH CARE EDUCATION/TRAINING PROGRAM

## 2024-03-05 PROCEDURE — 2580000003 HC RX 258: Performed by: STUDENT IN AN ORGANIZED HEALTH CARE EDUCATION/TRAINING PROGRAM

## 2024-03-05 PROCEDURE — 2500000003 HC RX 250 WO HCPCS: Performed by: STUDENT IN AN ORGANIZED HEALTH CARE EDUCATION/TRAINING PROGRAM

## 2024-03-05 PROCEDURE — 96375 TX/PRO/DX INJ NEW DRUG ADDON: CPT

## 2024-03-05 PROCEDURE — 96374 THER/PROPH/DIAG INJ IV PUSH: CPT

## 2024-03-05 PROCEDURE — 6360000002 HC RX W HCPCS: Performed by: STUDENT IN AN ORGANIZED HEALTH CARE EDUCATION/TRAINING PROGRAM

## 2024-03-05 RX ORDER — DIPHENHYDRAMINE HYDROCHLORIDE 50 MG/ML
25 INJECTION INTRAMUSCULAR; INTRAVENOUS
Status: COMPLETED | OUTPATIENT
Start: 2024-03-05 | End: 2024-03-05

## 2024-03-05 RX ORDER — 0.9 % SODIUM CHLORIDE 0.9 %
1000 INTRAVENOUS SOLUTION INTRAVENOUS ONCE
Status: COMPLETED | OUTPATIENT
Start: 2024-03-05 | End: 2024-03-06

## 2024-03-05 RX ADMIN — DIPHENHYDRAMINE HYDROCHLORIDE 25 MG: 50 INJECTION INTRAMUSCULAR; INTRAVENOUS at 22:40

## 2024-03-05 RX ADMIN — FAMOTIDINE 20 MG: 10 INJECTION, SOLUTION INTRAVENOUS at 22:40

## 2024-03-05 RX ADMIN — WATER 125 MG: 1 INJECTION INTRAMUSCULAR; INTRAVENOUS; SUBCUTANEOUS at 22:41

## 2024-03-05 RX ADMIN — SODIUM CHLORIDE 1000 ML: 9 INJECTION, SOLUTION INTRAVENOUS at 22:38

## 2024-03-05 ASSESSMENT — PAIN DESCRIPTION - LOCATION: LOCATION: THROAT

## 2024-03-05 ASSESSMENT — PAIN DESCRIPTION - DESCRIPTORS: DESCRIPTORS: SORE

## 2024-03-05 ASSESSMENT — PAIN DESCRIPTION - ORIENTATION: ORIENTATION: RIGHT

## 2024-03-05 ASSESSMENT — PAIN SCALES - GENERAL: PAINLEVEL_OUTOF10: 8

## 2024-03-05 ASSESSMENT — PAIN - FUNCTIONAL ASSESSMENT: PAIN_FUNCTIONAL_ASSESSMENT: 0-10

## 2024-03-06 ENCOUNTER — OFFICE VISIT (OUTPATIENT)
Age: 54
End: 2024-03-06
Payer: COMMERCIAL

## 2024-03-06 VITALS
OXYGEN SATURATION: 95 % | WEIGHT: 278 LBS | HEART RATE: 99 BPM | SYSTOLIC BLOOD PRESSURE: 124 MMHG | BODY MASS INDEX: 35.69 KG/M2 | RESPIRATION RATE: 19 BRPM | DIASTOLIC BLOOD PRESSURE: 83 MMHG | TEMPERATURE: 97.7 F

## 2024-03-06 DIAGNOSIS — R21 RASH AND NONSPECIFIC SKIN ERUPTION: Primary | ICD-10-CM

## 2024-03-06 PROCEDURE — 99213 OFFICE O/P EST LOW 20 MIN: CPT | Performed by: FAMILY MEDICINE

## 2024-03-06 RX ORDER — CETIRIZINE HYDROCHLORIDE 10 MG/1
10 TABLET ORAL DAILY
Qty: 30 TABLET | Refills: 0 | Status: SHIPPED | OUTPATIENT
Start: 2024-03-06 | End: 2024-03-07

## 2024-03-06 RX ORDER — TRIAMCINOLONE ACETONIDE 1 MG/G
CREAM TOPICAL
Qty: 45 G | Refills: 3 | Status: SHIPPED | OUTPATIENT
Start: 2024-03-06

## 2024-03-06 ASSESSMENT — PATIENT HEALTH QUESTIONNAIRE - PHQ9
2. FEELING DOWN, DEPRESSED OR HOPELESS: 0
SUM OF ALL RESPONSES TO PHQ QUESTIONS 1-9: 0
SUM OF ALL RESPONSES TO PHQ9 QUESTIONS 1 & 2: 0
1. LITTLE INTEREST OR PLEASURE IN DOING THINGS: 0
SUM OF ALL RESPONSES TO PHQ QUESTIONS 1-9: 0

## 2024-03-06 NOTE — ED PROVIDER NOTES
membranes are moist.      Comments: No visible swelling of the tongue, lips or oropharynx.  Oropharynx widely patent, no drooling trismus or stridor, no tonsillar enlargement, voice is clear  Eyes:      Extraocular Movements: Extraocular movements intact.   Neck:      Comments: Mild swelling and urticaria along the right sided neck, neck is supple, no other masses  Cardiovascular:      Rate and Rhythm: Normal rate and regular rhythm.   Pulmonary:      Effort: Pulmonary effort is normal.   Abdominal:      Palpations: Abdomen is soft.      Tenderness: There is no abdominal tenderness.   Musculoskeletal:      Comments: Scattered urticaria over the neck, axilla bilateral   Neurological:      General: No focal deficit present.      Mental Status: He is alert.         Diagnostic Study Results     Labs -   No results found for this or any previous visit (from the past 24 hour(s)).    Radiologic Studies -   No orders to display     [unfilled]  [unfilled]      Medical Decision Making   I am the first provider for this patient.    I reviewed the vital signs, available nursing notes, past medical history, past surgical history, family history and social history.    Vital Signs-Reviewed the patient's vital signs.  [unfilled]      Provider Notes (Medical Decision Making):   53-year-old presenting with itching and swelling.  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  He denies any shortness of breath, he is saturating well on room air with reassuring vital signs, breathing comfortably, unlikely anaphylaxis or impending airway compromise.    ED Course:     .    Medications   sodium chloride 0.9 % bolus 1,000 mL (1,000 mLs IntraVENous New Bag 3/5/24 2238)   diphenhydrAMINE (BENADRYL) injection 25 mg (25 mg IntraVENous Given 3/5/24 2240)   famotidine (PEPCID) 20 mg in sodium chloride (PF) 0.9 % 10 mL injection (20 mg IntraVENous Given 3/5/24 2240)   methylPREDNISolone sodium succ

## 2024-03-06 NOTE — PROGRESS NOTES
Chief Complaint   Patient presents with    Follow-up     [facility] ER 3/5/24  allergic reaction        \"Have you been to the ER, urgent care clinic since your last visit?  Hospitalized since your last visit?\"    YES [facility] ER 3/5/24 allergic reaction     “Have you seen or consulted any other health care providers outside of [facility] since your last visit?”    NO            Vitals:    24 1221   BP: 124/83   Pulse: 99   Resp: 19   Temp: 97.7 °F (36.5 °C)   TempSrc: Infrared   SpO2: 95%   Weight: 126.1 kg (278 lb)         The patient, Garfield Chavez, identity was verified by name and

## 2024-03-06 NOTE — PROGRESS NOTES
Garfield Chavez (:  1970) is a 53 y.o. 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 No blurred vision  Respiratory: no shortness of breath, wheezing cough   Cardiovascular: Has no chest pain, ,and racing heart .   Gastrointestinal: No constipation, diarrhea, nausea and vomiting.   Genitourinary: No frequency.   Musculoskeletal: Negative for joint pain.   Skin: ++ itching, ++rash.   Neurological: Negative for dizziness, no tremors  Psychiatric/Behavioral: Negative for depression   is not nervous/anxious.   and not depressed the patient is not nervous/anxious.      General:  alert cooperative appears stated for the age  HEENT; normocephalic and atraumatic PERRLA extraocular motor intact normal tympanic membrane normal external ear bilaterally, mucosal normal no drainage  Neck: supple no adenopathy no JVD no bruit  Lungs: Clear to auscultation bilaterally no rales rhonchi or wheezes  Heart: Normal regular S1-S2 ,  no murmur  Breast exam deferred  Abdomen: Soft nontender normal bowel sounds   Extremities: Normal range of motion no point tenderness normal pulses no edema  Pelvic/: No lesion, no lymphadenopathy  Skin: Normal no lesion no rash         ASSESSMENT/PLAN:  1. Rash and nonspecific skin eruption  -     triamcinolone (KENALOG) 0.1 % cream; Apply topically 2 times daily.,

## 2024-03-07 RX ORDER — CETIRIZINE HYDROCHLORIDE 10 MG/1
10 TABLET ORAL DAILY
Qty: 90 TABLET | Refills: 0 | Status: SHIPPED | OUTPATIENT
Start: 2024-03-07

## 2024-03-20 RX ORDER — FAMOTIDINE 20 MG/1
20 TABLET, FILM COATED ORAL DAILY
Qty: 90 TABLET | Refills: 2 | Status: SHIPPED | OUTPATIENT
Start: 2024-03-20

## 2024-04-07 DIAGNOSIS — I48.11 LONGSTANDING PERSISTENT ATRIAL FIBRILLATION (HCC): ICD-10-CM

## 2024-04-09 RX ORDER — WARFARIN SODIUM 5 MG/1
TABLET ORAL
Qty: 90 TABLET | Refills: 1 | Status: SHIPPED | OUTPATIENT
Start: 2024-04-09

## 2024-04-09 NOTE — TELEPHONE ENCOUNTER
Last appointment: 1/16/24  Next appointment: 4/26/24  Previous refill encounter(s): 9/16/23 #90 with 1 refill    Requested Prescriptions     Pending Prescriptions Disp Refills    warfarin (COUMADIN) 5 MG tablet [Pharmacy Med Name: WARFARIN SOD 5MG TABLETS (PEACH)] 90 tablet 1     Sig: TAKE ONE TABLET BY MOUTH DAILY FROM MONDAY TO SATURDAY, SKIP SUNDAY         For Pharmacy Admin Tracking Only    Program: Medication Refill  CPA in place:    Recommendation Provided To:   Intervention Detail: New Rx: 1, reason: Patient Preference  Intervention Accepted By:   Gap Closed?:    Time Spent (min): 5

## 2024-04-22 NOTE — TELEPHONE ENCOUNTER
Pharmacy is requesting to dispense a 90 d/s    Last appointment: 3/6/24  Next appointment: 4/26/24    Requested Prescriptions     Pending Prescriptions Disp Refills    cetirizine (ZYRTEC) 10 MG tablet [Pharmacy Med Name: CETIRIZINE 10MG TABLETS] 90 tablet 0     Sig: TAKE 1 TABLET BY MOUTH EVERY DAY         For Pharmacy Admin Tracking Only    Program: Medication Refill  CPA in place:    Recommendation Provided To:   Intervention Detail: New Rx: 1, reason: Patient Preference  Intervention Accepted By:   Gap Closed?:    Time Spent (min): 5  
no

## 2024-04-26 ENCOUNTER — OFFICE VISIT (OUTPATIENT)
Age: 54
End: 2024-04-26
Payer: COMMERCIAL

## 2024-04-26 VITALS
WEIGHT: 271.17 LBS | HEIGHT: 74 IN | SYSTOLIC BLOOD PRESSURE: 115 MMHG | TEMPERATURE: 97.4 F | DIASTOLIC BLOOD PRESSURE: 75 MMHG | BODY MASS INDEX: 34.8 KG/M2 | HEART RATE: 75 BPM | RESPIRATION RATE: 17 BRPM | OXYGEN SATURATION: 97 %

## 2024-04-26 DIAGNOSIS — Z79.01 LONG TERM (CURRENT) USE OF ANTICOAGULANTS: ICD-10-CM

## 2024-04-26 DIAGNOSIS — E11.65 TYPE 2 DIABETES MELLITUS WITH HYPERGLYCEMIA, WITHOUT LONG-TERM CURRENT USE OF INSULIN (HCC): Primary | ICD-10-CM

## 2024-04-26 PROBLEM — J96.01 ACUTE RESPIRATORY FAILURE WITH HYPOXIA (HCC): Status: RESOLVED | Noted: 2020-02-17 | Resolved: 2024-04-26

## 2024-04-26 LAB
HBA1C MFR BLD: 6.4 %
POC INR: 2.9
PROTHROMBIN TIME, POC: NORMAL

## 2024-04-26 PROCEDURE — 85610 PROTHROMBIN TIME: CPT | Performed by: STUDENT IN AN ORGANIZED HEALTH CARE EDUCATION/TRAINING PROGRAM

## 2024-04-26 PROCEDURE — 99213 OFFICE O/P EST LOW 20 MIN: CPT | Performed by: STUDENT IN AN ORGANIZED HEALTH CARE EDUCATION/TRAINING PROGRAM

## 2024-04-26 PROCEDURE — 83036 HEMOGLOBIN GLYCOSYLATED A1C: CPT | Performed by: STUDENT IN AN ORGANIZED HEALTH CARE EDUCATION/TRAINING PROGRAM

## 2024-04-26 NOTE — PROGRESS NOTES
Chief Complaint   Patient presents with    Follow-up     2024 Diabetes Last A1C 6.5%  Last PT/INR 1.8    Discuss Medications     Patient would like to switch to Eliquis      \"Have you been to the ER, urgent care clinic since your last visit?  Hospitalized since your last visit?\"      Yes  Allergic reaction, 3/5-3/6/2024  [facility]      “Have you seen or consulted any other health care providers outside of [facility] since your last visit?”    NO       Vitals:    24 1528   BP: 115/75   Pulse: 75   Resp: 17   Temp: 97.4 °F (36.3 °C)   SpO2: 97%     Health Maintenance Due   Topic Date Due    Hepatitis B vaccine (1 of 3 - 3-dose series) Never done    Pneumococcal 0-64 years Vaccine (1 of 2 - PCV) Never done    Diabetic foot exam  Never done    Diabetic Alb to Cr ratio (uACR) test  Never done    Diabetic retinal exam  Never done    Shingles vaccine (1 of 2) Never done    Lipids  2023    GFR test (Diabetes, CKD 3-4, OR last GFR 15-59)  2023      The patient, Garfield EMELYN AyersChavez, identity was verified by name and , pharmacy verified  Labs:yes  Fasting:no

## 2024-04-26 NOTE — PROGRESS NOTES
[facility]  [address] [place] [address] [phone]    Chief Complaint:  chronic medical problems    Subjective  Garfield Chavez is a 53 y.o. Black /  male , established patient, here for evaluation of the concern(s) above;    PMHx: DM II, HLD, umbilical hernia and Afib    Diabetes:  A1C 6.9% (04/2022).  ON METFORMIN 1g daily and compliant with meds     PAF  On coumadin for more than 20 yrs, currently at 5mg (6/7 days per week)  On coreg 3.125mg BID  Also has a history of 3 DVT's in the past including a PE.  Echo (12/2021) - normal (LVEF 60-65%).  Needs INR check today  Follows with cards (Dr. Fleming).  Compliant with his meds.    Denies any chest pain, nausea, vomiting, abdominal distention, bowel obstruction, fever or chills.        Allergies - reviewed:   Allergies   Allergen Reactions    Bee Venom Swelling    Neomycin Rash    Penicillins      Other reaction(s): Unknown (comments)       Past Medical History - reviewed:  Past Medical History:   Diagnosis Date    A-fib (HCC)     Allergic rhinitis     Anxiety disorder     Cellulitis     post tattoo    Deep vein thrombosis (DVT) (HCC)     [facility]  [facility]/17 f/u note to manage Lovenox    Essential hypertension     Essential tremor     BILATERAL HANDS    Hyperlipidemia     Laceration of arm 09/23/2017    Left cut by a chain saw.    Long term current use of anticoagulant therapy     Nicotine vapor product user     STOPPED 2018    Phlebitis     Psychiatric disorder     ANXIETY WITH PANICK ATTACK    Pulmonary embolism (HCC) 3/2011    multiple episodes    Rectal bleeding 10/2011    on coumadin @ the time &again 4/2017 4/12/17 note from [facility]//colonoscopy report rec'd    Shortness of breath     Thromboembolus (HCC)     Umbilical hernia     Vitamin D deficiency 04/2014    again 4/2017       Depression screening:  No data recorded      Review of systems:   A comprehensive review of

## 2024-05-28 NOTE — LETTER
[address] 
[facility] EMERGENCY DEPT 
[address] [place] [address] [address] [id number] 
[phone] Work/School Note Date: 5/25/2017 To Whom It May concern: Pau Garcia was seen and treated today in the emergency room by the following provider(s): 
Attending Provider: Warden Tom MD 
Physician Assistant: BOZENA Laws. Pau Garcia may return to work on [id number]. Sincerely, BOZENA Laws 
 
 
 
 110

## 2024-05-31 ENCOUNTER — OFFICE VISIT (OUTPATIENT)
Age: 54
End: 2024-05-31
Payer: COMMERCIAL

## 2024-05-31 VITALS
SYSTOLIC BLOOD PRESSURE: 125 MMHG | RESPIRATION RATE: 18 BRPM | TEMPERATURE: 98.1 F | OXYGEN SATURATION: 98 % | DIASTOLIC BLOOD PRESSURE: 83 MMHG | HEART RATE: 82 BPM | BODY MASS INDEX: 36.09 KG/M2 | WEIGHT: 281.09 LBS

## 2024-05-31 DIAGNOSIS — Z79.01 LONG TERM (CURRENT) USE OF ANTICOAGULANTS: ICD-10-CM

## 2024-05-31 DIAGNOSIS — I48.0 PAROXYSMAL ATRIAL FIBRILLATION (HCC): Primary | ICD-10-CM

## 2024-05-31 LAB
POC INR: NORMAL
PROTHROMBIN TIME, POC: NORMAL

## 2024-05-31 PROCEDURE — 85610 PROTHROMBIN TIME: CPT | Performed by: STUDENT IN AN ORGANIZED HEALTH CARE EDUCATION/TRAINING PROGRAM

## 2024-05-31 PROCEDURE — 99213 OFFICE O/P EST LOW 20 MIN: CPT | Performed by: STUDENT IN AN ORGANIZED HEALTH CARE EDUCATION/TRAINING PROGRAM

## 2024-05-31 ASSESSMENT — PATIENT HEALTH QUESTIONNAIRE - PHQ9
SUM OF ALL RESPONSES TO PHQ QUESTIONS 1-9: 0
1. LITTLE INTEREST OR PLEASURE IN DOING THINGS: NOT AT ALL
SUM OF ALL RESPONSES TO PHQ9 QUESTIONS 1 & 2: 0
SUM OF ALL RESPONSES TO PHQ QUESTIONS 1-9: 0
2. FEELING DOWN, DEPRESSED OR HOPELESS: NOT AT ALL

## 2024-05-31 NOTE — PROGRESS NOTES
[facility]  [address] [place] [address] [phone]    Chief Complaint:  chronic medical problems    Subjective  aGrfield Chavez is a 53 y.o. Black /  male , established patient, here for evaluation of the concern(s) above;    PMHx: DM II, HLD, umbilical hernia, DVT/Pe and Afib      PAF  On coumadin for more than 20 yrs, currently at 5mg (6/7 days per week).  Would like to switch to Eliquis.    On coreg 3.125mg BID  Also has a history of 3 DVT's in the past including a PE.  Echo (12/2021) - normal (LVEF 60-65%).  Follows with cards (Dr. Fleming).  Compliant with his meds.    Diabetes:  A1C 6.9% (04/2022).  ON METFORMIN 1g daily and compliant with meds.    Denies any chest pain, nausea, vomiting, abdominal distention, bowel obstruction, fever or chills.        Allergies - reviewed:   Allergies   Allergen Reactions    Bee Venom Swelling    Neomycin Rash    Penicillins      Other reaction(s): Unknown (comments)       Past Medical History - reviewed:  Past Medical History:   Diagnosis Date    A-fib (HCC)     Allergic rhinitis     Anxiety disorder     Cellulitis     post tattoo    Deep vein thrombosis (DVT) ([facility])     [facility]  [facility]/17 f/u note to manage Lovenox    Essential hypertension     Essential tremor     BILATERAL HANDS    Hyperlipidemia     Laceration of arm 09/23/2017    Left cut by a chain saw.    Long term current use of anticoagulant therapy     Nicotine vapor product user     STOPPED 2018    Phlebitis     Psychiatric disorder     ANXIETY WITH PANICK ATTACK    Pulmonary embolism (HCC) 3/2011    multiple episodes    Rectal bleeding 10/2011    on coumadin @ the time &again 4/2017 4/12/17 note from [facility]//colonoscopy report rec'd    Shortness of breath     Thromboembolus (HCC)     Umbilical hernia     Vitamin D deficiency 04/2014    again 4/2017       Depression screening:  PHQ-9 Total Score: 0 (5/31/2024  4:03

## 2024-05-31 NOTE — PROGRESS NOTES
Chief Complaint   Patient presents with    Office Visit for Anticoagulation Management     Requesting to discuss switching to eliquis       \"Have you been to the ER, urgent care clinic since your last visit?  Hospitalized since your last visit?\"    NO    “Have you seen or consulted any other health care providers outside of [facility] since your last visit?”    NO       Vitals:    24 1558   BP: 125/83   Pulse: 82   Resp: 18   Temp: 98.1 °F (36.7 °C)   SpO2: 98%      Health Maintenance Due   Topic Date Due    Hepatitis B vaccine (1 of 3 - 3-dose series) Never done    Pneumococcal 0-64 years Vaccine (1 of 2 - PCV) Never done    Diabetic foot exam  Never done    Diabetic Alb to Cr ratio (uACR) test  Never done    Diabetic retinal exam  Never done    Shingles vaccine (1 of 2) Never done    Lipids  2023    GFR test (Diabetes, CKD 3-4, OR last GFR 15-59)  2023      The patient, Garfield Chavez, identity was verified by name and .

## 2024-07-08 RX ORDER — CARVEDILOL 3.12 MG/1
TABLET ORAL
Qty: 180 TABLET | Refills: 1 | Status: SHIPPED | OUTPATIENT
Start: 2024-07-08

## 2024-07-16 DIAGNOSIS — E11.65 TYPE 2 DIABETES MELLITUS WITH HYPERGLYCEMIA, WITHOUT LONG-TERM CURRENT USE OF INSULIN (HCC): ICD-10-CM

## 2024-07-16 RX ORDER — METFORMIN HYDROCHLORIDE 500 MG/1
500 TABLET, EXTENDED RELEASE ORAL 2 TIMES DAILY WITH MEALS
Qty: 180 TABLET | Refills: 1 | Status: SHIPPED | OUTPATIENT
Start: 2024-07-16

## 2024-07-16 NOTE — TELEPHONE ENCOUNTER
Last appointment: 5/31/24  Next appointment: Advised to follow-up 8/31/24  Previous refill encounter(s): 1/16/24 #180 with 1 refill    Requested Prescriptions     Pending Prescriptions Disp Refills    metFORMIN (GLUCOPHAGE-XR) 500 MG extended release tablet [Pharmacy Med Name: METFORMIN ER 500MG 24HR TABS] 180 tablet 1     Sig: TAKE 1 TABLET BY MOUTH WITH BREAKFAST AND WITH EVENING MEAL         For Pharmacy Admin Tracking Only    Program: Medication Refill  CPA in place:    Recommendation Provided To:   Intervention Detail: New Rx: 1, reason: Patient Preference  Intervention Accepted By:   Gap Closed?:    Time Spent (min): 5

## 2024-08-13 RX ORDER — ROSUVASTATIN CALCIUM 20 MG/1
TABLET, COATED ORAL
Qty: 90 TABLET | Refills: 3 | Status: SHIPPED | OUTPATIENT
Start: 2024-08-13

## 2024-08-13 NOTE — TELEPHONE ENCOUNTER
Last appointment: 5/31/24  Next appointment: Advised to follow-up 8/31/24  Previous refill encounter(s): 10/25/23 #90 with 3 refills    Requested Prescriptions     Pending Prescriptions Disp Refills    rosuvastatin (CRESTOR) 20 MG tablet [Pharmacy Med Name: ROSUVASTATIN 20MG TABLETS] 90 tablet 3     Sig: TAKE 1 TABLET BY MOUTH EVERY NIGHT         For Pharmacy Admin Tracking Only    Program: Medication Refill  CPA in place:    Recommendation Provided To:   Intervention Detail: New Rx: 1, reason: Patient Preference  Intervention Accepted By:   Gap Closed?:    Time Spent (min): 5

## 2024-10-17 NOTE — TELEPHONE ENCOUNTER
Last appointment: 10/8/24  Next appointment: 12/31/24  Previous refill encounter(s): 3/7/24 #90    Requested Prescriptions     Pending Prescriptions Disp Refills    cetirizine (ZYRTEC) 10 MG tablet [Pharmacy Med Name: CETIRIZINE 10MG TABLETS] 90 tablet 3     Sig: TAKE 1 TABLET BY MOUTH EVERY DAY         For Pharmacy Admin Tracking Only    Program: Medication Refill  CPA in place:    Recommendation Provided To:   Intervention Detail: New Rx: 1, reason: Patient Preference  Intervention Accepted By:   Gap Closed?:    Time Spent (min): 5

## 2024-10-18 RX ORDER — CETIRIZINE HYDROCHLORIDE 10 MG/1
10 TABLET ORAL DAILY
Qty: 90 TABLET | Refills: 3 | Status: SHIPPED | OUTPATIENT
Start: 2024-10-18

## 2024-10-23 NOTE — TELEPHONE ENCOUNTER
Last appointment: 10/8/24  Next appointment: 12/31/24  Previous refill encounter(s): 7/8/24 #180 with 1 refill    Requested Prescriptions     Pending Prescriptions Disp Refills    carvedilol (COREG) 3.125 MG tablet [Pharmacy Med Name: CARVEDILOL 3.125MG TABLETS] 180 tablet 3     Sig: TAKE 1 TABLET BY MOUTH TWICE DAILY WITH MEALS         For Pharmacy Admin Tracking Only    Program: Medication Refill  CPA in place:    Recommendation Provided To:   Intervention Detail: New Rx: 1, reason: Patient Preference  Intervention Accepted By:   Gap Closed?:    Time Spent (min): 5

## 2024-10-24 RX ORDER — CARVEDILOL 3.12 MG/1
TABLET ORAL
Qty: 180 TABLET | Refills: 3 | Status: SHIPPED | OUTPATIENT
Start: 2024-10-24

## 2024-11-10 DIAGNOSIS — E11.65 TYPE 2 DIABETES MELLITUS WITH HYPERGLYCEMIA, WITHOUT LONG-TERM CURRENT USE OF INSULIN (HCC): ICD-10-CM

## 2024-11-10 DIAGNOSIS — I48.0 PAROXYSMAL ATRIAL FIBRILLATION (HCC): ICD-10-CM

## 2024-11-12 RX ORDER — METFORMIN HYDROCHLORIDE 500 MG/1
500 TABLET, EXTENDED RELEASE ORAL 2 TIMES DAILY WITH MEALS
Qty: 180 TABLET | Refills: 3 | Status: SHIPPED | OUTPATIENT
Start: 2024-11-12

## 2024-11-12 RX ORDER — FAMOTIDINE 20 MG/1
20 TABLET, FILM COATED ORAL DAILY
Qty: 90 TABLET | Refills: 3 | Status: SHIPPED | OUTPATIENT
Start: 2024-11-12

## 2024-11-12 RX ORDER — APIXABAN 5 MG/1
5 TABLET, FILM COATED ORAL 2 TIMES DAILY
Qty: 180 TABLET | Refills: 3 | Status: SHIPPED | OUTPATIENT
Start: 2024-11-12

## 2024-11-12 NOTE — TELEPHONE ENCOUNTER
Last appointment: 10/8/24  Next appointment: 12/31/24  Previous refill encounter(s): 7/16/24 Glucophage #180 with 1 refill, 5/31/24 Eliquis #180 with 1 refill, 3/20/24 Pepcid #90 with 2 refills    Requested Prescriptions     Pending Prescriptions Disp Refills    metFORMIN (GLUCOPHAGE-XR) 500 MG extended release tablet [Pharmacy Med Name: METFORMIN ER 500MG 24HR TABS] 180 tablet 3     Sig: TAKE 1 TABLET BY MOUTH WITH BREAKFAST AND WITH EVENING MEAL    famotidine (PEPCID) 20 MG tablet [Pharmacy Med Name: FAMOTIDINE 20MG TABLETS] 90 tablet 3     Sig: TAKE 1 TABLET BY MOUTH DAILY    ELIQUIS 5 MG TABS tablet [Pharmacy Med Name: ELIQUIS 5MG TABLETS] 180 tablet 3     Sig: TAKE 1 TABLET BY MOUTH TWICE DAILY         For Pharmacy Admin Tracking Only    Program: Medication Refill  CPA in place:    Recommendation Provided To:   Intervention Detail: New Rx: 3, reason: Patient Preference  Intervention Accepted By:   Gap Closed?:    Time Spent (min): 5

## 2025-02-25 ENCOUNTER — TELEPHONE (OUTPATIENT)
Age: 55
End: 2025-02-25

## 2025-02-25 ENCOUNTER — OFFICE VISIT (OUTPATIENT)
Age: 55
End: 2025-02-25

## 2025-02-25 VITALS
RESPIRATION RATE: 16 BRPM | WEIGHT: 280 LBS | BODY MASS INDEX: 35.94 KG/M2 | SYSTOLIC BLOOD PRESSURE: 130 MMHG | HEIGHT: 74 IN | OXYGEN SATURATION: 96 % | HEART RATE: 85 BPM | DIASTOLIC BLOOD PRESSURE: 70 MMHG

## 2025-02-25 DIAGNOSIS — I48.0 PAROXYSMAL ATRIAL FIBRILLATION (HCC): Primary | ICD-10-CM

## 2025-02-25 DIAGNOSIS — Z86.711 PERSONAL HISTORY OF PULMONARY EMBOLISM: ICD-10-CM

## 2025-02-25 DIAGNOSIS — E78.2 MIXED HYPERLIPIDEMIA: ICD-10-CM

## 2025-02-25 DIAGNOSIS — I82.5Y3 CHRONIC EMBOLISM AND THROMBOSIS OF UNSPECIFIED DEEP VEINS OF PROXIMAL LOWER EXTREMITY, BILATERAL (HCC): ICD-10-CM

## 2025-02-25 DIAGNOSIS — I51.7 LAE (LEFT ATRIAL ENLARGEMENT): ICD-10-CM

## 2025-02-25 DIAGNOSIS — J84.9 INTERSTITIAL PULMONARY DISEASE, UNSPECIFIED (HCC): ICD-10-CM

## 2025-02-25 ASSESSMENT — PATIENT HEALTH QUESTIONNAIRE - PHQ9
2. FEELING DOWN, DEPRESSED OR HOPELESS: NOT AT ALL
SUM OF ALL RESPONSES TO PHQ QUESTIONS 1-9: 0
SUM OF ALL RESPONSES TO PHQ QUESTIONS 1-9: 0
SUM OF ALL RESPONSES TO PHQ9 QUESTIONS 1 & 2: 0
1. LITTLE INTEREST OR PLEASURE IN DOING THINGS: NOT AT ALL
SUM OF ALL RESPONSES TO PHQ QUESTIONS 1-9: 0
SUM OF ALL RESPONSES TO PHQ QUESTIONS 1-9: 0

## 2025-02-25 NOTE — TELEPHONE ENCOUNTER
Enrolled with [employer] - Ordered and being shipped to patient's home address on file.  ETA within 5-7 business days.         Message  Received: Today  Berta Shea, Rosa Maria Rivera  Please order a 2 weeks E. Holter for Afib per dr. Fleming    Thanks    Berta

## 2025-03-16 ENCOUNTER — RESULTS FOLLOW-UP (OUTPATIENT)
Age: 55
End: 2025-03-16

## 2025-03-16 NOTE — RESULT ENCOUNTER NOTE
Please advise echo shows normal heart function with no significant valve problems.      Awaiting Holter monitor.    Follow-up as scheduled.     Future Appointments  4/29/2025  3:20 PM    Medardo Hernandez MD      [facility] [place]  2/26/2026  1:40 PM    Pinky Garg APRN - [facility]

## 2025-03-18 ENCOUNTER — TELEPHONE (OUTPATIENT)
Age: 55
End: 2025-03-18

## 2025-03-18 NOTE — TELEPHONE ENCOUNTER
Patient is calling in regards to results from echo. Patient would like a call back.    Patient phone # [phone]

## 2025-03-19 NOTE — TELEPHONE ENCOUNTER
----- Message from Dr. Lee Fleming MD sent at 3/16/2025 10:57 AM EDT -----  Please advise echo shows normal heart function with no significant valve problems.      Awaiting Holter monitor.    Follow-up as scheduled.     Future Appointments  4/29/2025  3:20 PM    Medardo Hernandez MD      [facility]  2/26/2026  1:40 PM    Pinky Garg, APRN - NP  [facility]     Spoke with patient , verified patient with two identifiers, regarding results and recommendations. Patient voiced understanding.

## 2025-04-29 ENCOUNTER — OFFICE VISIT (OUTPATIENT)
Age: 55
End: 2025-04-29
Payer: COMMERCIAL

## 2025-04-29 VITALS
HEART RATE: 74 BPM | BODY MASS INDEX: 36.13 KG/M2 | TEMPERATURE: 97.4 F | SYSTOLIC BLOOD PRESSURE: 109 MMHG | RESPIRATION RATE: 16 BRPM | DIASTOLIC BLOOD PRESSURE: 70 MMHG | WEIGHT: 281.53 LBS | HEIGHT: 74 IN | OXYGEN SATURATION: 96 %

## 2025-04-29 DIAGNOSIS — E78.2 MIXED HYPERLIPIDEMIA: ICD-10-CM

## 2025-04-29 DIAGNOSIS — E11.65 TYPE 2 DIABETES MELLITUS WITH HYPERGLYCEMIA, WITHOUT LONG-TERM CURRENT USE OF INSULIN (HCC): Primary | ICD-10-CM

## 2025-04-29 DIAGNOSIS — E11.65 TYPE 2 DIABETES MELLITUS WITH HYPERGLYCEMIA, WITHOUT LONG-TERM CURRENT USE OF INSULIN (HCC): ICD-10-CM

## 2025-04-29 DIAGNOSIS — E66.01 MORBID (SEVERE) OBESITY DUE TO EXCESS CALORIES (HCC): ICD-10-CM

## 2025-04-29 PROCEDURE — 99214 OFFICE O/P EST MOD 30 MIN: CPT | Performed by: STUDENT IN AN ORGANIZED HEALTH CARE EDUCATION/TRAINING PROGRAM

## 2025-04-29 NOTE — PROGRESS NOTES
[facility]  [address] [place] [address] [phone]    Chief Complaint:  chronic medical problems    Subjective  Garfield Chavez is a 54 y.o. Black /  male , established patient, here for evaluation of the concern(s) above;    PMHx: DM II, HLD, umbilical hernia, DVT/Pe and Afib    Diabetes:  A1C 6.9% (04/2022).  ON METFORMIN 1g daily and compliant with meds.    Weight Management:  Pt would like medical weight loss management.  The patient's reason for medical weight loss is for overall health.    The patient's biggest struggle with losing weight has been: regular exercise  Patient has been on low calory diet and staying physically active for more than a year without much improvement in weight.    Bariatric co morbidities:  DM    Last Weight Metrics:      4/29/2025     3:33 PM 3/11/2025     8:58 AM 2/25/2025     1:41 PM 5/31/2024     3:58 PM 4/26/2024     3:28 PM 3/6/2024    12:21 PM 3/5/2024     9:34 PM   Weight Loss Metrics   Height 6' 2\" 6' 2\" 6' 2\"  6' 2\"  6' 2\"   Weight - Scale 281 lbs 8 oz 280 lbs 280 lbs 281 lbs 1 oz 271 lbs 3 oz 278 lbs 273 lbs 13 oz   BMI (Calculated) 36.2 kg/m2 36 kg/m2 36 kg/m2 0 kg/m2 34.9 kg/m2 0 kg/m2 35.2 kg/m2          HLD   Pt is doing well on current meds with no medication side effects noted   No new myalgias, no joint pains, no weakness.   Exercising -walking, staying active      PAF  Now on Eliquis/coreg   Also has a history of 3 DVT's in the past including a PE.  Echo (12/2021) - normal (LVEF 60-65%).  Follows with cards (Dr. Fleming).  Compliant with his meds.    Denies any chest pain, nausea, vomiting, abdominal distention, bowel obstruction, fever or chills.        Allergies - reviewed:   Allergies   Allergen Reactions    Bee Venom Swelling    Neomycin Rash    Penicillins      Other reaction(s): Unknown (comments)       Past Medical History - reviewed:  Past Medical History:   Diagnosis Date

## 2025-04-29 NOTE — PROGRESS NOTES
Chief Complaint   Patient presents with    Follow-up       \"Have you been to the ER, urgent care clinic since your last visit?  Hospitalized since your last visit?\"    3/25/25 - CT SCAN -  [facility]    “Have you seen or consulted any other health care providers outside of [facility] since your last visit?”      25 - CARDIOLOGY - DR. ARANGO            Click Here for Release of Records Request       Vitals:    25 1533   BP: 109/70   Pulse: 74   Resp: 16   Temp: 97.4 °F (36.3 °C)   SpO2: 96%      Health Maintenance Due   Topic Date Due    Diabetic foot exam  Never done    Diabetic Alb to Cr ratio (uACR) test  Never done    Diabetic retinal exam  Never done    Hepatitis B vaccine (1 of 3 - 19+ 3-dose series) Never done    Pneumococcal 50+ years Vaccine (1 of 2 - PCV) Never done    Shingles vaccine (1 of 2) Never done    Lipids  2023    GFR test (Diabetes, CKD 3-4, OR last GFR 15-59)  2023    COVID-19 Vaccine (3 - 2024- season) 2024    A1C test (Diabetic or Prediabetic)  2025        The patient, Garfield Chavez, identity was verified by name and .

## 2025-04-29 NOTE — ASSESSMENT & PLAN NOTE
-continue metformin  -repeating labs.  -It would be ideal and most appropriate therapy for this patient to use a GLP-1 once weekly.  This will facilitate a decrease in p.o. intake as well as a increase in insulin secretion leading to overall better glucose control.       GLP1a education:  -We discussed in detail about GLP-1 agonists. Patient denies any personal or family history of  medullary thyroid cancer, pancreatitis, MEN or  pheochromocytoma.  -We discussed risks/benefits/precautions/side effects of medication with the patient  -Recommend eating slowly, drink plenty of water, soups, avoid greasy food, consider more bland foods like rice and avoid lying down after you eat.

## 2025-04-30 LAB
BUN SERPL-MCNC: 9 MG/DL (ref 6–24)
BUN/CREAT SERPL: 10 (ref 9–20)
CALCIUM SERPL-MCNC: 9.5 MG/DL (ref 8.7–10.2)
CHLORIDE SERPL-SCNC: 105 MMOL/L (ref 96–106)
CHOLEST SERPL-MCNC: 127 MG/DL (ref 100–199)
CO2 SERPL-SCNC: 20 MMOL/L (ref 20–29)
CREAT SERPL-MCNC: 0.89 MG/DL (ref 0.76–1.27)
EGFRCR SERPLBLD CKD-EPI 2021: 101 ML/MIN/1.73
GLUCOSE SERPL-MCNC: 93 MG/DL (ref 70–99)
HBA1C MFR BLD: 7.3 % (ref 4.8–5.6)
HDLC SERPL-MCNC: 33 MG/DL
LDLC SERPL CALC-MCNC: 57 MG/DL (ref 0–99)
POTASSIUM SERPL-SCNC: 4.2 MMOL/L (ref 3.5–5.2)
SODIUM SERPL-SCNC: 143 MMOL/L (ref 134–144)
TRIGL SERPL-MCNC: 227 MG/DL (ref 0–149)
VLDLC SERPL CALC-MCNC: 37 MG/DL (ref 5–40)

## 2025-05-01 LAB
ALBUMIN/CREAT UR: 12 MG/G CREAT (ref 0–29)
CREAT UR-MCNC: 162.5 MG/DL
MICROALBUMIN UR-MCNC: 19.7 UG/ML

## 2025-05-02 ENCOUNTER — RESULTS FOLLOW-UP (OUTPATIENT)
Age: 55
End: 2025-05-02

## 2025-08-29 ENCOUNTER — OFFICE VISIT (OUTPATIENT)
Age: 55
End: 2025-08-29
Payer: COMMERCIAL

## 2025-08-29 VITALS
RESPIRATION RATE: 19 BRPM | HEIGHT: 74 IN | OXYGEN SATURATION: 96 % | TEMPERATURE: 97.6 F | BODY MASS INDEX: 36.67 KG/M2 | WEIGHT: 285.72 LBS | DIASTOLIC BLOOD PRESSURE: 74 MMHG | SYSTOLIC BLOOD PRESSURE: 122 MMHG | HEART RATE: 71 BPM

## 2025-08-29 DIAGNOSIS — K21.9 GASTROESOPHAGEAL REFLUX DISEASE, UNSPECIFIED WHETHER ESOPHAGITIS PRESENT: ICD-10-CM

## 2025-08-29 DIAGNOSIS — I48.0 PAROXYSMAL ATRIAL FIBRILLATION (HCC): ICD-10-CM

## 2025-08-29 DIAGNOSIS — E11.65 TYPE 2 DIABETES MELLITUS WITH HYPERGLYCEMIA, WITHOUT LONG-TERM CURRENT USE OF INSULIN (HCC): Primary | ICD-10-CM

## 2025-08-29 DIAGNOSIS — E78.2 MIXED HYPERLIPIDEMIA: ICD-10-CM

## 2025-08-29 LAB — HBA1C MFR BLD: 7.5 %

## 2025-08-29 PROCEDURE — 83036 HEMOGLOBIN GLYCOSYLATED A1C: CPT | Performed by: STUDENT IN AN ORGANIZED HEALTH CARE EDUCATION/TRAINING PROGRAM

## 2025-08-29 PROCEDURE — 3051F HG A1C>EQUAL 7.0%<8.0%: CPT | Performed by: STUDENT IN AN ORGANIZED HEALTH CARE EDUCATION/TRAINING PROGRAM

## 2025-08-29 PROCEDURE — 99214 OFFICE O/P EST MOD 30 MIN: CPT | Performed by: STUDENT IN AN ORGANIZED HEALTH CARE EDUCATION/TRAINING PROGRAM

## 2025-08-29 RX ORDER — FAMOTIDINE 20 MG/1
20 TABLET, FILM COATED ORAL DAILY
Qty: 90 TABLET | Refills: 3
Start: 2025-08-29

## 2025-08-29 RX ORDER — METFORMIN HYDROCHLORIDE 500 MG/1
1000 TABLET, EXTENDED RELEASE ORAL 2 TIMES DAILY WITH MEALS
Qty: 360 TABLET | Refills: 1 | Status: SHIPPED | OUTPATIENT
Start: 2025-08-29

## 2025-08-29 RX ORDER — ROSUVASTATIN CALCIUM 20 MG/1
20 TABLET, COATED ORAL NIGHTLY
Qty: 90 TABLET | Refills: 3 | Status: SHIPPED | OUTPATIENT
Start: 2025-08-29

## 2025-08-29 RX ORDER — CARVEDILOL 3.12 MG/1
3.12 TABLET ORAL 2 TIMES DAILY WITH MEALS
Qty: 180 TABLET | Refills: 3
Start: 2025-08-29

## 2025-08-29 SDOH — ECONOMIC STABILITY: FOOD INSECURITY: WITHIN THE PAST 12 MONTHS, THE FOOD YOU BOUGHT JUST DIDN'T LAST AND YOU DIDN'T HAVE MONEY TO GET MORE.: NEVER TRUE

## 2025-08-29 SDOH — ECONOMIC STABILITY: FOOD INSECURITY: WITHIN THE PAST 12 MONTHS, YOU WORRIED THAT YOUR FOOD WOULD RUN OUT BEFORE YOU GOT MONEY TO BUY MORE.: NEVER TRUE

## 2025-08-29 ASSESSMENT — PATIENT HEALTH QUESTIONNAIRE - PHQ9
SUM OF ALL RESPONSES TO PHQ QUESTIONS 1-9: 0
2. FEELING DOWN, DEPRESSED OR HOPELESS: NOT AT ALL
SUM OF ALL RESPONSES TO PHQ QUESTIONS 1-9: 0
1. LITTLE INTEREST OR PLEASURE IN DOING THINGS: NOT AT ALL

## (undated) DEVICE — STOPCOCK IV 4 W TRNSPAR

## (undated) DEVICE — SUTURE VCRL SZ 3-0 L27IN ABSRB UD L26MM SH 1/2 CIR J416H

## (undated) DEVICE — GARMENT,MEDLINE,DVT,INT,CALF,MED, GEN2: Brand: MEDLINE

## (undated) DEVICE — REM POLYHESIVE ADULT PATIENT RETURN ELECTRODE: Brand: VALLEYLAB

## (undated) DEVICE — SUTURE PERMA-HAND 0 L18IN NONABSORBABLE BLK CT-1 L36MM 1/2 C021D

## (undated) DEVICE — SPONGE GZ W4XL4IN COT 12 PLY TYP VII WVN C FLD DSGN

## (undated) DEVICE — Z DISCONTINUED PER MEDLINE LINE GAS SAMPLING O2/CO2 LNG AD 13 FT NSL W/ TBNG FILTERLINE

## (undated) DEVICE — MAGNETIC INSTR DRAPE 20X16: Brand: MEDLINE INDUSTRIES, INC.

## (undated) DEVICE — SET ADMIN 16ML TBNG L100IN 2 Y INJ SITE IV PIGGY BK DISP

## (undated) DEVICE — DERMABOND SKIN ADH 0.7ML -- DERMABOND ADVANCED 12/BX

## (undated) DEVICE — SYRINGE 50ML E/T

## (undated) DEVICE — SUTURE SZ 0 27IN 5/8 CIR UR-6  TAPER PT VIOLET ABSRB VICRYL J603H

## (undated) DEVICE — ARTICULATING RELOAD WITH TRI-STAPLE TECHNOLOGY: Brand: ENDO GIA

## (undated) DEVICE — INFECTION CONTROL KIT SYS

## (undated) DEVICE — Device

## (undated) DEVICE — SOLUTION IV 1000ML 0.9% SOD CHL

## (undated) DEVICE — NEEDLE HYPO 22GA L1.5IN BLK S STL HUB POLYPR SHLD REG BVL

## (undated) DEVICE — SPONGE,DRAIN,NONWVN,4"X4",6PLY,STRL,LF: Brand: MEDLINE

## (undated) DEVICE — NEEDLE HYPO 18GA L1.5IN PNK S STL HUB POLYPR SHLD REG BVL

## (undated) DEVICE — HANDLE LT SNAP ON ULT DURABLE LENS FOR TRUMPF ALC DISPOSABLE

## (undated) DEVICE — SINGLE USE SUCTION VALVE MAJ-209: Brand: SINGLE USE SUCTION VALVE (STERILE)

## (undated) DEVICE — SYRINGE MED 20ML STD CLR PLAS LUERSLIP TIP N CTRL DISP

## (undated) DEVICE — UNIVERSAL STAPLER: Brand: ENDO GIA ULTRA

## (undated) DEVICE — NEONATAL-ADULT SPO2 SENSOR: Brand: NELLCOR

## (undated) DEVICE — NDL PRT INJ NSAF BLNT 18GX1.5 --

## (undated) DEVICE — BASIN EMSIS 16OZ GRAPHITE PLAS KID SHP MOLD GRAD FOR ORAL

## (undated) DEVICE — SPONGE TONSIL MED X RAY DETECTABLE STRL LTX FREE

## (undated) DEVICE — 1200 GUARD II KIT W/5MM TUBE W/O VAC TUBE: Brand: GUARDIAN

## (undated) DEVICE — SYR 10ML LUER LOK 1/5ML GRAD --

## (undated) DEVICE — TOWEL SURG W17XL27IN STD BLU COT NONFENESTRATED PREWASHED

## (undated) DEVICE — ELECTRODE,RADIOTRANSLUCENT,FOAM,5PK: Brand: MEDLINE

## (undated) DEVICE — TOWEL 4 PLY TISS 19X30 SUE WHT

## (undated) DEVICE — PAD,NON-ADHERENT,3X8,STERILE,LF,1/PK: Brand: MEDLINE

## (undated) DEVICE — STERILE POLYISOPRENE POWDER-FREE SURGICAL GLOVES WITH EMOLLIENT COATING: Brand: PROTEXIS

## (undated) DEVICE — 3M™ IOBAN™ 2 ANTIMICROBIAL INCISE DRAPE 6648EZ: Brand: IOBAN™ 2

## (undated) DEVICE — CONNECTOR TBNG WHT PLAS SUCT STR 5IN1 LTWT W/ M CONN

## (undated) DEVICE — PREP SKN CHLRAPRP APL 26ML STR --

## (undated) DEVICE — SINGLE USE BIOPSY VALVE MAJ-210: Brand: SINGLE USE BIOPSY VALVE (STERILE)

## (undated) DEVICE — DRAPE,REIN 53X77,STERILE: Brand: MEDLINE

## (undated) DEVICE — SUTURE VCRL SZ 4-0 L27IN ABSRB UD L19MM PS-2 3/8 CIR PRIM J426H

## (undated) DEVICE — FALCON® SAMPLE CONTAINER, WITH LID, 4.5OZ (110ML), INDIVIDUALLY WRAPPED, STERILE, 100/CASE: Brand: FALCON A CORNING BRAND

## (undated) DEVICE — YANKAUER,TAPERED BULBOUS TIP,W/O VENT: Brand: MEDLINE

## (undated) DEVICE — VISUALIZATION SYSTEM: Brand: CLEARIFY

## (undated) DEVICE — PAD BD MATTRESS 73X32 IN STD CONVOLUTED FOAM LTX FREE

## (undated) DEVICE — SOLIDIFIER MEDC 1200ML -- CONVERT TO 356117

## (undated) DEVICE — YANKAUER,BULB TIP,W/O VENT,RIGID,STERILE: Brand: MEDLINE

## (undated) DEVICE — TUBING, SUCTION, 1/4" X 10', STRAIGHT: Brand: MEDLINE

## (undated) DEVICE — SURGICAL PROCEDURE KIT GEN LAPAROSCOPY LF

## (undated) DEVICE — SOLUTION IRRIG 1000ML H2O STRL BLT

## (undated) DEVICE — SYR 3ML LL TIP 1/10ML GRAD --

## (undated) DEVICE — NEEDLE SPNL 22GA L3.5IN BLK HUB S STL REG WALL FIT STYL W/

## (undated) DEVICE — BLADE ELECTRODE: Brand: EDGE

## (undated) DEVICE — PROTECTOR E TIP CLR PLAS TB

## (undated) DEVICE — STRAP,POSITIONING,KNEE/BODY,FOAM,4X60": Brand: MEDLINE